# Patient Record
Sex: FEMALE | Race: BLACK OR AFRICAN AMERICAN | NOT HISPANIC OR LATINO
[De-identification: names, ages, dates, MRNs, and addresses within clinical notes are randomized per-mention and may not be internally consistent; named-entity substitution may affect disease eponyms.]

---

## 2017-01-31 ENCOUNTER — APPOINTMENT (OUTPATIENT)
Dept: CARDIOLOGY | Facility: CLINIC | Age: 72
End: 2017-01-31

## 2017-01-31 ENCOUNTER — NON-APPOINTMENT (OUTPATIENT)
Age: 72
End: 2017-01-31

## 2017-01-31 VITALS
SYSTOLIC BLOOD PRESSURE: 186 MMHG | DIASTOLIC BLOOD PRESSURE: 88 MMHG | BODY MASS INDEX: 33.31 KG/M2 | HEART RATE: 62 BPM | HEIGHT: 62 IN | WEIGHT: 181 LBS | RESPIRATION RATE: 16 BRPM | OXYGEN SATURATION: 96 %

## 2017-01-31 VITALS — SYSTOLIC BLOOD PRESSURE: 182 MMHG | DIASTOLIC BLOOD PRESSURE: 69 MMHG

## 2017-02-28 ENCOUNTER — APPOINTMENT (OUTPATIENT)
Dept: CARDIOLOGY | Facility: CLINIC | Age: 72
End: 2017-02-28

## 2017-02-28 ENCOUNTER — NON-APPOINTMENT (OUTPATIENT)
Age: 72
End: 2017-02-28

## 2017-02-28 ENCOUNTER — RX RENEWAL (OUTPATIENT)
Age: 72
End: 2017-02-28

## 2017-02-28 VITALS
TEMPERATURE: 98.3 F | OXYGEN SATURATION: 95 % | HEART RATE: 66 BPM | BODY MASS INDEX: 33.31 KG/M2 | DIASTOLIC BLOOD PRESSURE: 82 MMHG | RESPIRATION RATE: 16 BRPM | WEIGHT: 181 LBS | HEIGHT: 62 IN | SYSTOLIC BLOOD PRESSURE: 176 MMHG

## 2017-02-28 VITALS — SYSTOLIC BLOOD PRESSURE: 194 MMHG | DIASTOLIC BLOOD PRESSURE: 82 MMHG

## 2017-03-08 ENCOUNTER — APPOINTMENT (OUTPATIENT)
Dept: INTERNAL MEDICINE | Facility: CLINIC | Age: 72
End: 2017-03-08

## 2017-03-08 VITALS
RESPIRATION RATE: 16 BRPM | HEIGHT: 62 IN | HEART RATE: 56 BPM | BODY MASS INDEX: 32.02 KG/M2 | DIASTOLIC BLOOD PRESSURE: 73 MMHG | OXYGEN SATURATION: 95 % | SYSTOLIC BLOOD PRESSURE: 150 MMHG | TEMPERATURE: 97.8 F | WEIGHT: 174 LBS

## 2017-03-08 DIAGNOSIS — Z82.49 FAMILY HISTORY OF ISCHEMIC HEART DISEASE AND OTHER DISEASES OF THE CIRCULATORY SYSTEM: ICD-10-CM

## 2017-03-10 LAB
ALBUMIN SERPL ELPH-MCNC: 2.9 G/DL
ALP BLD-CCNC: 86 U/L
ALT SERPL-CCNC: 15 U/L
ANION GAP SERPL CALC-SCNC: 14 MMOL/L
AST SERPL-CCNC: 26 U/L
BASOPHILS # BLD AUTO: 0.03 K/UL
BASOPHILS NFR BLD AUTO: 0.4 %
BILIRUB SERPL-MCNC: <0.2 MG/DL
BUN SERPL-MCNC: 72 MG/DL
CALCIUM SERPL-MCNC: 9.3 MG/DL
CHLORIDE SERPL-SCNC: 101 MMOL/L
CHOLEST SERPL-MCNC: 166 MG/DL
CHOLEST/HDLC SERPL: 1.8 RATIO
CO2 SERPL-SCNC: 24 MMOL/L
CREAT SERPL-MCNC: 4.79 MG/DL
EOSINOPHIL # BLD AUTO: 0.2 K/UL
EOSINOPHIL NFR BLD AUTO: 2.5 %
GLUCOSE SERPL-MCNC: 275 MG/DL
HBA1C MFR BLD HPLC: 9.4 %
HCT VFR BLD CALC: 25.9 %
HDLC SERPL-MCNC: 90 MG/DL
HGB BLD-MCNC: 8.1 G/DL
IMM GRANULOCYTES NFR BLD AUTO: 0.4 %
LDLC SERPL CALC-MCNC: 53 MG/DL
LYMPHOCYTES # BLD AUTO: 1.03 K/UL
LYMPHOCYTES NFR BLD AUTO: 13.1 %
MAN DIFF?: NORMAL
MCHC RBC-ENTMCNC: 24.6 PG
MCHC RBC-ENTMCNC: 31.3 GM/DL
MCV RBC AUTO: 78.7 FL
MONOCYTES # BLD AUTO: 0.49 K/UL
MONOCYTES NFR BLD AUTO: 6.2 %
NEUTROPHILS # BLD AUTO: 6.08 K/UL
NEUTROPHILS NFR BLD AUTO: 77.4 %
PLATELET # BLD AUTO: 446 K/UL
POTASSIUM SERPL-SCNC: 4.1 MMOL/L
PROT SERPL-MCNC: 5.5 G/DL
RBC # BLD: 3.29 M/UL
RBC # FLD: 19.3 %
SODIUM SERPL-SCNC: 139 MMOL/L
TRIGL SERPL-MCNC: 113 MG/DL
TSH SERPL-ACNC: 4.2 UIU/ML
WBC # FLD AUTO: 7.86 K/UL

## 2017-03-29 ENCOUNTER — APPOINTMENT (OUTPATIENT)
Dept: INTERNAL MEDICINE | Facility: CLINIC | Age: 72
End: 2017-03-29

## 2017-03-29 VITALS
WEIGHT: 177 LBS | RESPIRATION RATE: 17 BRPM | TEMPERATURE: 97.4 F | DIASTOLIC BLOOD PRESSURE: 65 MMHG | OXYGEN SATURATION: 98 % | BODY MASS INDEX: 32.57 KG/M2 | HEIGHT: 62 IN | HEART RATE: 65 BPM | SYSTOLIC BLOOD PRESSURE: 140 MMHG

## 2017-03-29 RX ORDER — AMLODIPINE BESYLATE 10 MG/1
10 TABLET ORAL
Refills: 0 | Status: DISCONTINUED | COMMUNITY
End: 2017-03-29

## 2017-03-31 LAB
ALBUMIN SERPL ELPH-MCNC: 2.8 G/DL
ALP BLD-CCNC: 86 U/L
ALT SERPL-CCNC: 23 U/L
ANION GAP SERPL CALC-SCNC: 18 MMOL/L
AST SERPL-CCNC: 43 U/L
BILIRUB SERPL-MCNC: <0.2 MG/DL
BUN SERPL-MCNC: 71 MG/DL
CALCIUM SERPL-MCNC: 8.7 MG/DL
CHLORIDE SERPL-SCNC: 106 MMOL/L
CO2 SERPL-SCNC: 20 MMOL/L
CREAT SERPL-MCNC: 4.38 MG/DL
GLUCOSE SERPL-MCNC: 93 MG/DL
POTASSIUM SERPL-SCNC: 4.6 MMOL/L
PROT SERPL-MCNC: 5.3 G/DL
SODIUM SERPL-SCNC: 144 MMOL/L

## 2017-05-14 ENCOUNTER — INPATIENT (INPATIENT)
Facility: HOSPITAL | Age: 72
LOS: 4 days | Discharge: HOME HEALTH | End: 2017-05-19
Attending: INTERNAL MEDICINE | Admitting: INTERNAL MEDICINE
Payer: MEDICARE

## 2017-05-14 VITALS
HEART RATE: 63 BPM | HEIGHT: 66 IN | OXYGEN SATURATION: 96 % | SYSTOLIC BLOOD PRESSURE: 203 MMHG | WEIGHT: 175.05 LBS | DIASTOLIC BLOOD PRESSURE: 78 MMHG | TEMPERATURE: 98 F | RESPIRATION RATE: 18 BRPM

## 2017-05-14 DIAGNOSIS — D64.9 ANEMIA, UNSPECIFIED: ICD-10-CM

## 2017-05-14 DIAGNOSIS — E83.51 HYPOCALCEMIA: ICD-10-CM

## 2017-05-14 DIAGNOSIS — D63.8 ANEMIA IN OTHER CHRONIC DISEASES CLASSIFIED ELSEWHERE: ICD-10-CM

## 2017-05-14 DIAGNOSIS — E11.22 TYPE 2 DIABETES MELLITUS WITH DIABETIC CHRONIC KIDNEY DISEASE: ICD-10-CM

## 2017-05-14 DIAGNOSIS — I15.0 RENOVASCULAR HYPERTENSION: ICD-10-CM

## 2017-05-14 DIAGNOSIS — N18.5 CHRONIC KIDNEY DISEASE, STAGE 5: ICD-10-CM

## 2017-05-14 LAB
ALBUMIN SERPL ELPH-MCNC: 2.2 G/DL — LOW (ref 3.3–5)
ALLERGY+IMMUNOLOGY DIAG STUDY NOTE: SIGNIFICANT CHANGE UP
ALP SERPL-CCNC: 122 U/L — HIGH (ref 40–120)
ALT FLD-CCNC: 16 U/L — SIGNIFICANT CHANGE UP (ref 12–78)
ANION GAP SERPL CALC-SCNC: 9 MMOL/L — SIGNIFICANT CHANGE UP (ref 5–17)
ANISOCYTOSIS BLD QL: SIGNIFICANT CHANGE UP
APPEARANCE UR: CLEAR — SIGNIFICANT CHANGE UP
APTT BLD: 36.6 SEC — SIGNIFICANT CHANGE UP (ref 27.5–37.4)
AST SERPL-CCNC: 23 U/L — SIGNIFICANT CHANGE UP (ref 15–37)
BACTERIA # UR AUTO: ABNORMAL
BILIRUB SERPL-MCNC: 0.2 MG/DL — SIGNIFICANT CHANGE UP (ref 0.2–1.2)
BILIRUB UR-MCNC: NEGATIVE — SIGNIFICANT CHANGE UP
BLD GP AB SCN SERPL QL: ABNORMAL
BUN SERPL-MCNC: 62 MG/DL — HIGH (ref 7–23)
CALCIUM SERPL-MCNC: 8 MG/DL — LOW (ref 8.5–10.1)
CALCIUM SERPL-MCNC: 8.7 MG/DL — SIGNIFICANT CHANGE UP (ref 8.4–10.5)
CHLORIDE SERPL-SCNC: 106 MMOL/L — SIGNIFICANT CHANGE UP (ref 96–108)
CO2 SERPL-SCNC: 26 MMOL/L — SIGNIFICANT CHANGE UP (ref 22–31)
COLOR SPEC: YELLOW — SIGNIFICANT CHANGE UP
CREAT SERPL-MCNC: 5.65 MG/DL — HIGH (ref 0.5–1.3)
DIFF PNL FLD: ABNORMAL
DIR ANTIGLOB POLYSPECIFIC INTERPRETATION: SIGNIFICANT CHANGE UP
EPI CELLS # UR: SIGNIFICANT CHANGE UP
FERRITIN SERPL-MCNC: 64 NG/ML — SIGNIFICANT CHANGE UP (ref 15–150)
GLUCOSE SERPL-MCNC: 274 MG/DL — HIGH (ref 70–99)
GLUCOSE UR QL: 100 MG/DL
HCT VFR BLD CALC: 21.6 % — LOW (ref 34.5–45)
HGB BLD-MCNC: 7 G/DL — CRITICAL LOW (ref 11.5–15.5)
HYPOCHROMIA BLD QL: SIGNIFICANT CHANGE UP
INR BLD: 0.96 RATIO — SIGNIFICANT CHANGE UP (ref 0.88–1.16)
IRON SATN MFR SERPL: 18 % — SIGNIFICANT CHANGE UP (ref 14–50)
IRON SATN MFR SERPL: 41 UG/DL — SIGNIFICANT CHANGE UP (ref 30–160)
KETONES UR-MCNC: NEGATIVE — SIGNIFICANT CHANGE UP
LEUKOCYTE ESTERASE UR-ACNC: NEGATIVE — SIGNIFICANT CHANGE UP
LYMPHOCYTES # BLD AUTO: 12 % — LOW (ref 13–44)
MCHC RBC-ENTMCNC: 25.7 PG — LOW (ref 27–34)
MCHC RBC-ENTMCNC: 32.5 GM/DL — SIGNIFICANT CHANGE UP (ref 32–36)
MCV RBC AUTO: 79 FL — LOW (ref 80–100)
MICROCYTES BLD QL: SLIGHT — SIGNIFICANT CHANGE UP
MONOCYTES NFR BLD AUTO: 8 % — SIGNIFICANT CHANGE UP (ref 2–14)
NEUTROPHILS NFR BLD AUTO: 80 % — HIGH (ref 43–77)
NITRITE UR-MCNC: NEGATIVE — SIGNIFICANT CHANGE UP
PH UR: 6.5 — SIGNIFICANT CHANGE UP (ref 5–8)
PLAT MORPH BLD: NORMAL — SIGNIFICANT CHANGE UP
PLATELET # BLD AUTO: 333 K/UL — SIGNIFICANT CHANGE UP (ref 150–400)
POTASSIUM SERPL-MCNC: 3.6 MMOL/L — SIGNIFICANT CHANGE UP (ref 3.5–5.3)
POTASSIUM SERPL-SCNC: 3.6 MMOL/L — SIGNIFICANT CHANGE UP (ref 3.5–5.3)
PROT SERPL-MCNC: 6.5 G/DL — SIGNIFICANT CHANGE UP (ref 6–8.3)
PROT SERPL-MCNC: 6.5 G/DL — SIGNIFICANT CHANGE UP (ref 6–8.3)
PROT SERPL-MCNC: 6.5 GM/DL — SIGNIFICANT CHANGE UP (ref 6–8.3)
PROT UR-MCNC: 500 MG/DL
PROTHROM AB SERPL-ACNC: 10.5 SEC — SIGNIFICANT CHANGE UP (ref 9.8–12.7)
PTH-INTACT FLD-MCNC: 222 PG/ML — HIGH (ref 15–65)
RBC # BLD: 2.74 M/UL — LOW (ref 3.8–5.2)
RBC # FLD: 19.4 % — HIGH (ref 11–15)
RBC BLD AUTO: ABNORMAL
RBC CASTS # UR COMP ASSIST: ABNORMAL /HPF (ref 0–4)
SCHISTOCYTES BLD QL AUTO: SLIGHT — SIGNIFICANT CHANGE UP
SODIUM SERPL-SCNC: 141 MMOL/L — SIGNIFICANT CHANGE UP (ref 135–145)
SP GR SPEC: 1.01 — SIGNIFICANT CHANGE UP (ref 1.01–1.02)
TIBC SERPL-MCNC: 223 UG/DL — SIGNIFICANT CHANGE UP (ref 220–430)
UIBC SERPL-MCNC: 182 UG/DL — SIGNIFICANT CHANGE UP (ref 110–370)
UROBILINOGEN FLD QL: NEGATIVE MG/DL — SIGNIFICANT CHANGE UP
WBC # BLD: 9.2 K/UL — SIGNIFICANT CHANGE UP (ref 3.8–10.5)
WBC # FLD AUTO: 9.2 K/UL — SIGNIFICANT CHANGE UP (ref 3.8–10.5)
WBC UR QL: SIGNIFICANT CHANGE UP

## 2017-05-14 PROCEDURE — 99285 EMERGENCY DEPT VISIT HI MDM: CPT | Mod: 25

## 2017-05-14 PROCEDURE — 86077 PHYS BLOOD BANK SERV XMATCH: CPT

## 2017-05-14 RX ORDER — ATORVASTATIN CALCIUM 80 MG/1
20 TABLET, FILM COATED ORAL AT BEDTIME
Qty: 0 | Refills: 0 | Status: DISCONTINUED | OUTPATIENT
Start: 2017-05-14 | End: 2017-05-19

## 2017-05-14 RX ORDER — LOSARTAN POTASSIUM 100 MG/1
50 TABLET, FILM COATED ORAL DAILY
Qty: 0 | Refills: 0 | Status: DISCONTINUED | OUTPATIENT
Start: 2017-05-14 | End: 2017-05-17

## 2017-05-14 RX ORDER — FERROUS SULFATE 325(65) MG
325 TABLET ORAL DAILY
Qty: 0 | Refills: 0 | Status: DISCONTINUED | OUTPATIENT
Start: 2017-05-14 | End: 2017-05-19

## 2017-05-14 RX ORDER — HYDRALAZINE HCL 50 MG
10 TABLET ORAL ONCE
Qty: 0 | Refills: 0 | Status: COMPLETED | OUTPATIENT
Start: 2017-05-14 | End: 2017-05-14

## 2017-05-14 RX ORDER — INSULIN LISPRO 100/ML
VIAL (ML) SUBCUTANEOUS
Qty: 0 | Refills: 0 | Status: DISCONTINUED | OUTPATIENT
Start: 2017-05-14 | End: 2017-05-19

## 2017-05-14 RX ORDER — ASPIRIN/CALCIUM CARB/MAGNESIUM 324 MG
81 TABLET ORAL DAILY
Qty: 0 | Refills: 0 | Status: DISCONTINUED | OUTPATIENT
Start: 2017-05-14 | End: 2017-05-19

## 2017-05-14 RX ORDER — DEXTROSE 50 % IN WATER 50 %
25 SYRINGE (ML) INTRAVENOUS ONCE
Qty: 0 | Refills: 0 | Status: DISCONTINUED | OUTPATIENT
Start: 2017-05-14 | End: 2017-05-19

## 2017-05-14 RX ORDER — INSULIN GLARGINE 100 [IU]/ML
15 INJECTION, SOLUTION SUBCUTANEOUS AT BEDTIME
Qty: 0 | Refills: 0 | Status: DISCONTINUED | OUTPATIENT
Start: 2017-05-14 | End: 2017-05-19

## 2017-05-14 RX ORDER — DEXTROSE 50 % IN WATER 50 %
1 SYRINGE (ML) INTRAVENOUS ONCE
Qty: 0 | Refills: 0 | Status: DISCONTINUED | OUTPATIENT
Start: 2017-05-14 | End: 2017-05-19

## 2017-05-14 RX ORDER — METOPROLOL TARTRATE 50 MG
50 TABLET ORAL
Qty: 0 | Refills: 0 | Status: DISCONTINUED | OUTPATIENT
Start: 2017-05-14 | End: 2017-05-15

## 2017-05-14 RX ORDER — GLUCAGON INJECTION, SOLUTION 0.5 MG/.1ML
1 INJECTION, SOLUTION SUBCUTANEOUS ONCE
Qty: 0 | Refills: 0 | Status: DISCONTINUED | OUTPATIENT
Start: 2017-05-14 | End: 2017-05-19

## 2017-05-14 RX ORDER — SODIUM CHLORIDE 9 MG/ML
1000 INJECTION, SOLUTION INTRAVENOUS
Qty: 0 | Refills: 0 | Status: DISCONTINUED | OUTPATIENT
Start: 2017-05-14 | End: 2017-05-19

## 2017-05-14 RX ORDER — DEXTROSE 50 % IN WATER 50 %
12.5 SYRINGE (ML) INTRAVENOUS ONCE
Qty: 0 | Refills: 0 | Status: DISCONTINUED | OUTPATIENT
Start: 2017-05-14 | End: 2017-05-19

## 2017-05-14 RX ORDER — FUROSEMIDE 40 MG
80 TABLET ORAL DAILY
Qty: 0 | Refills: 0 | Status: DISCONTINUED | OUTPATIENT
Start: 2017-05-14 | End: 2017-05-19

## 2017-05-14 RX ORDER — HYDRALAZINE HCL 50 MG
100 TABLET ORAL THREE TIMES A DAY
Qty: 0 | Refills: 0 | Status: DISCONTINUED | OUTPATIENT
Start: 2017-05-14 | End: 2017-05-15

## 2017-05-14 RX ORDER — AMLODIPINE BESYLATE 2.5 MG/1
5 TABLET ORAL DAILY
Qty: 0 | Refills: 0 | Status: DISCONTINUED | OUTPATIENT
Start: 2017-05-14 | End: 2017-05-15

## 2017-05-14 RX ADMIN — Medication 81 MILLIGRAM(S): at 12:55

## 2017-05-14 RX ADMIN — Medication 80 MILLIGRAM(S): at 12:53

## 2017-05-14 RX ADMIN — Medication 325 MILLIGRAM(S): at 12:53

## 2017-05-14 RX ADMIN — Medication 0.3 MILLIGRAM(S): at 15:22

## 2017-05-14 RX ADMIN — Medication 100 MILLIGRAM(S): at 14:03

## 2017-05-14 RX ADMIN — Medication 100 MILLIGRAM(S): at 21:51

## 2017-05-14 RX ADMIN — Medication 10 MILLIGRAM(S): at 23:49

## 2017-05-14 RX ADMIN — INSULIN GLARGINE 15 UNIT(S): 100 INJECTION, SOLUTION SUBCUTANEOUS at 22:55

## 2017-05-14 RX ADMIN — LOSARTAN POTASSIUM 50 MILLIGRAM(S): 100 TABLET, FILM COATED ORAL at 12:53

## 2017-05-14 RX ADMIN — ATORVASTATIN CALCIUM 20 MILLIGRAM(S): 80 TABLET, FILM COATED ORAL at 21:51

## 2017-05-14 RX ADMIN — Medication 6: at 18:51

## 2017-05-14 RX ADMIN — Medication 50 MILLIGRAM(S): at 18:59

## 2017-05-14 RX ADMIN — Medication 1 TABLET(S): at 12:53

## 2017-05-14 RX ADMIN — AMLODIPINE BESYLATE 5 MILLIGRAM(S): 2.5 TABLET ORAL at 12:53

## 2017-05-14 RX ADMIN — Medication 0.3 MILLIGRAM(S): at 21:51

## 2017-05-14 RX ADMIN — Medication 10 MILLIGRAM(S): at 08:54

## 2017-05-14 NOTE — CONSULT NOTE ADULT - SUBJECTIVE AND OBJECTIVE BOX
HPI:  71 yo lady h/o CKD and anemia sent to the ER by her PMD for low hgb. Pt stated she is feeling tired and week. Denies chest pain and palpitation. (14 May 2017 12:24)      PAST MEDICAL & SURGICAL HISTORY:  Chronic kidney disease  Congestive heart failure, unspecified congestive heart failure chronicity, unspecified congestive heart failure type  Diabetes  Hypertension      REVIEW OF SYSTEMS fatigue  loss of concentration last few days       General: denies fever and chills 	    Skin/Breast:  	  Ophthalmologic:  	  ENMT:	    Respiratory and Thorax: no SOB no cough no hemoptysis  	  Cardiovascular:	no CO no palpitations     Gastrointestinal:	 no abdominal pain     Genitourinary:	    Musculoskeletal: no back pain no weakness of lower legs. 	    Neurological:	no dizziness no headache     Psychiatric:	    Hematology/Lymphatics:	    Endocrine:	    Allergic/Immunologic:	    MEDICATIONS  (STANDING):  aspirin enteric coated 81milliGRAM(s) Oral daily  losartan 50milliGRAM(s) Oral daily  cloNIDine 0.3milliGRAM(s) Oral three times a day  insulin glargine Injectable (LANTUS) 15Unit(s) SubCutaneous at bedtime  atorvastatin 20milliGRAM(s) Oral at bedtime  metoprolol 50milliGRAM(s) Oral two times a day  amLODIPine   Tablet 5milliGRAM(s) Oral daily  furosemide    Tablet 80milliGRAM(s) Oral daily  ferrous    sulfate 325milliGRAM(s) Oral daily  hydrALAZINE 100milliGRAM(s) Oral three times a day  multivitamin 1Tablet(s) Oral daily  insulin lispro (HumaLOG) corrective regimen sliding scale  SubCutaneous three times a day before meals  dextrose 5%. 1000milliLiter(s) IV Continuous <Continuous>  dextrose 50% Injectable 12.5Gram(s) IV Push once  dextrose 50% Injectable 25Gram(s) IV Push once  dextrose 50% Injectable 25Gram(s) IV Push once    MEDICATIONS  (PRN):  dextrose Gel 1Dose(s) Oral once PRN Blood Glucose LESS THAN 70 milliGRAM(s)/deciliter  glucagon  Injectable 1milliGRAM(s) IntraMuscular once PRN Glucose LESS THAN 70 milligrams/deciliter      Allergies    No Known Allergies    Intolerances        SOCIAL HISTORY:    FAMILY HISTORY:      Vital Signs Last 24 Hrs  T(C): 36.3, Max: 36.6 (05-14 @ 06:38)  T(F): 97.4, Max: 97.8 (05-14 @ 06:38)  HR: 62 (57 - 65)  BP: 205/74 (159/57 - 218/21)  BP(mean): --  RR: 22 (17 - 25)  SpO2: 98% (94% - 99%)    PHYSICAL EXAM:      Constitutional: chronically ill looking     Eyes: pale conjunctiva anicteric sclera .     ENMT:    Neck: no JVD non palpable thyroid     Breasts:    Back:    Respiratory: S1S2 regular     Cardiovascular: S1 S2 regular ,     Gastrointestinal: no mass non tender normoactive bowel sounds     Genitourinary:    Rectal:    Extremities:    Vascular:    Neurological: alert awake no focal deficit     Skin:    Lymph Nodes: none     Musculoskeletal: nom focal deficit ,darkening of skin of lower legs     Psychiatric:        LABS:                        7.0    9.2   )-----------( 333      ( 14 May 2017 08:17 )             21.6     05-14    141  |  106  |  62<H>  ----------------------------<  274<H>  3.6   |  26  |  5.65<H>    Ca    8.0<L>      14 May 2017 08:16    TPro  6.5  /  Alb  2.2<L>  /  TBili  0.2  /  DBili  x   /  AST  23  /  ALT  16  /  AlkPhos  122<H>  05-14    PT/INR - ( 14 May 2017 08:16 )   PT: 10.5 sec;   INR: 0.96 ratio         PTT - ( 14 May 2017 08:16 )  PTT:36.6 sec      RADIOLOGY & ADDITIONAL STUDIES:

## 2017-05-14 NOTE — ED ADULT NURSE NOTE - PMH
Chronic kidney disease    Congestive heart failure, unspecified congestive heart failure chronicity, unspecified congestive heart failure type    Diabetes    Hypertension

## 2017-05-14 NOTE — CONSULT NOTE ADULT - ASSESSMENT
72 yob femalev with hypochromic microcytic anemia with CKD stage 5 /  Usually microcytic and hypochromic anemia is not expected in CKD ,

## 2017-05-14 NOTE — CONSULT NOTE ADULT - SUBJECTIVE AND OBJECTIVE BOX
72yFemale  Patient is a 72y old  Female who presents with a chief complaint of anemia (14 May 2017 12:24)  asked to evaluate for ckd  pt has been seeing a nephrologist in [place] who recommended dialysis  but she has refused  HPI:  71 yo lady h/o CKD and anemia sent to the ER by her PMD for low hgb. Pt stated she is feeling tired and week. Denies chest pain and palpitation. (14 May 2017 12:24)    PAST MEDICAL & SURGICAL HISTORY:  Chronic kidney disease  Congestive heart failure, unspecified congestive heart failure chronicity, unspecified congestive heart failure type  Diabetes  Hypertension    Allergies    No Known Allergies    Intolerances      FAMILY HISTORY:    MEDICATIONS  (STANDING):  aspirin enteric coated 81milliGRAM(s) Oral daily  losartan 50milliGRAM(s) Oral daily  cloNIDine 0.3milliGRAM(s) Oral three times a day  insulin glargine Injectable (LANTUS) 15Unit(s) SubCutaneous at bedtime  atorvastatin 20milliGRAM(s) Oral at bedtime  metoprolol 50milliGRAM(s) Oral two times a day  amLODIPine   Tablet 5milliGRAM(s) Oral daily  furosemide    Tablet 80milliGRAM(s) Oral daily  ferrous    sulfate 325milliGRAM(s) Oral daily  hydrALAZINE 100milliGRAM(s) Oral three times a day  multivitamin 1Tablet(s) Oral daily  insulin lispro (HumaLOG) corrective regimen sliding scale  SubCutaneous three times a day before meals  dextrose 5%. 1000milliLiter(s) IV Continuous <Continuous>  dextrose 50% Injectable 12.5Gram(s) IV Push once  dextrose 50% Injectable 25Gram(s) IV Push once  dextrose 50% Injectable 25Gram(s) IV Push once    Vital Signs Last 24 Hrs  T(C): 36.3, Max: 36.6 (05-14 @ 06:38)  T(F): 97.4, Max: 97.8 (05-14 @ 06:38)  HR: 62 (57 - 65)  BP: 205/74 (159/57 - 218/21)  BP(mean): --  RR: 22 (17 - 25)  SpO2: 98% (94% - 99%)  I&O's Summary    Daily Height in cm: 167.64 (14 May 2017 06:38)    Daily   PHYSICAL EXAM:      Constitutional:wdwn f in nad    Eyes:wnl    ENMT:neg    Neck:no jvd no adenopathy    Breasts:    Back:    Respiratory:bilat bs    Cardiovascular:s1 s2     Gastrointestinal:soft nt bs pos    Genitourinary:    Rectal:    Extremities:2+edema    Vascular:    Neurological:a&o x3  moves all extremities    Skin:wnl    Lymph Nodes:    Musculoskeletal:wnl    Psychiatric:                            7.0    9.2   )-----------( 333      ( 14 May 2017 08:17 )             21.6     05-14    141  |  106  |  62<H>  ----------------------------<  274<H>  3.6   |  26  |  5.65<H>    Ca    8.0<L>      14 May 2017 08:16    TPro  6.5  /  Alb  2.2<L>  /  TBili  0.2  /  DBili  x   /  AST  23  /  ALT  16  /  AlkPhos  122<H>  05-14    labs noted  low hb  low ca

## 2017-05-14 NOTE — ED ADULT NURSE REASSESSMENT NOTE - NS ED NURSE REASSESS COMMENT FT1
pt completed her first unit of blood. pt had no negative reactions to transfusion. pt vitals remain within her norm. pt is alert and oriented no signs of sob, or chest pain stated.

## 2017-05-14 NOTE — ED ADULT NURSE REASSESSMENT NOTE - NS ED NURSE REASSESS COMMENT FT1
second unit of PRBC started , consent in the chart, v/s monitor, pt instructed to report any s/s of transfusion reaction to the nursing staff. pt in no acute distress at present , talking to family member at bedside.

## 2017-05-14 NOTE — ED PROVIDER NOTE - OBJECTIVE STATEMENT
72 year old female with h/o HTN, DM, CHf , anemia (s/p transfusion) and CKD presents today c/o being sent in by her pmd for severe anemia secondary to her CKD, pt reports EDMOND (-) chest pain (-) sob (-) palpitations

## 2017-05-14 NOTE — CONSULT NOTE ADULT - PROBLEM SELECTOR RECOMMENDATION 9
anemia work up .  Will benefit from BARBIE ( Erythroid  Stimulating agent)   Will review P.Smear .Avoid over transfusion .Aim for Hb of 9 gm.

## 2017-05-14 NOTE — ED ADULT NURSE NOTE - OBJECTIVE STATEMENT
pt visited her priomary care MD yesterday and had blood work done. Pt daughter received call from MD to b ring her to ed as iron count is low.

## 2017-05-14 NOTE — H&P ADULT. - HISTORY OF PRESENT ILLNESS
71 yo lady h/o CKD and anemia sent to the ER by her PMD for low hgb. Pt stated she is feeling tired and week. Denies chest pain and palpitation. 73 yo lady h/o CKD and anemia sent to the ER by her PMD for low hgb. Pt stated she is feeling tired and weak. Denies chest pain and palpitation.

## 2017-05-14 NOTE — ED ADULT NURSE REASSESSMENT NOTE - NS ED NURSE REASSESS COMMENT FT1
spoke with Technician from the lab concerning receiving blood for patient, and pt has a positive antibody screen anti D. blood will some time to be prepared, and delivered.

## 2017-05-14 NOTE — ED ADULT NURSE REASSESSMENT NOTE - NS ED NURSE REASSESS COMMENT FT1
pt hypertensive, denies any any headache /dizziness, dr Ruiz Made aware, pt medicated as ordered, will continue to monitor pt.

## 2017-05-14 NOTE — H&P ADULT. - PROBLEM SELECTOR PROBLEM 2
Type 2 diabetes mellitus with stage 5 chronic kidney disease not on chronic dialysis, with long-term current use of insulin

## 2017-05-14 NOTE — H&P ADULT. - ASSESSMENT
71 yo lady h/o CKD and anemia sent to the ER by her PMD for low hgb. Pt stated she is feeling tired and week. Denies chest pain and palpitation.

## 2017-05-15 DIAGNOSIS — N18.9 CHRONIC KIDNEY DISEASE, UNSPECIFIED: ICD-10-CM

## 2017-05-15 LAB
% ALBUMIN: 44.3 % — SIGNIFICANT CHANGE UP
% ALPHA 1: 8.6 % — SIGNIFICANT CHANGE UP
% ALPHA 2: 16.9 % — SIGNIFICANT CHANGE UP
% BETA: 25.3 % — SIGNIFICANT CHANGE UP
% GAMMA: 16.4 % — SIGNIFICANT CHANGE UP
ALBUMIN SERPL ELPH-MCNC: 2.9 G/DL — LOW (ref 3.6–5.5)
ALBUMIN/GLOB SERPL ELPH: 0.8 RATIO — SIGNIFICANT CHANGE UP
ALPHA1 GLOB SERPL ELPH-MCNC: 0.6 G/DL — HIGH (ref 0.1–0.4)
ALPHA2 GLOB SERPL ELPH-MCNC: 1.1 G/DL — HIGH (ref 0.5–1)
ANION GAP SERPL CALC-SCNC: 11 MMOL/L — SIGNIFICANT CHANGE UP (ref 5–17)
ANTIBODY INTERPRETATION 2: SIGNIFICANT CHANGE UP
B-GLOBULIN SERPL ELPH-MCNC: 1.6 G/DL — HIGH (ref 0.5–1)
BUN SERPL-MCNC: 61 MG/DL — HIGH (ref 7–23)
CALCIUM SERPL-MCNC: 7.5 MG/DL — LOW (ref 8.5–10.1)
CHLORIDE SERPL-SCNC: 107 MMOL/L — SIGNIFICANT CHANGE UP (ref 96–108)
CO2 SERPL-SCNC: 27 MMOL/L — SIGNIFICANT CHANGE UP (ref 22–31)
CREAT SERPL-MCNC: 5.45 MG/DL — HIGH (ref 0.5–1.3)
GAMMA GLOBULIN: 1.1 G/DL — SIGNIFICANT CHANGE UP (ref 0.6–1.6)
GLUCOSE SERPL-MCNC: 111 MG/DL — HIGH (ref 70–99)
HBA1C BLD-MCNC: 7.3 % — HIGH (ref 4–5.6)
HCT VFR BLD CALC: 28.2 % — LOW (ref 34.5–45)
HGB BLD-MCNC: 9.3 G/DL — LOW (ref 11.5–15.5)
INTERPRETATION SERPL IFE-IMP: SIGNIFICANT CHANGE UP
MCHC RBC-ENTMCNC: 26.1 PG — LOW (ref 27–34)
MCHC RBC-ENTMCNC: 33 GM/DL — SIGNIFICANT CHANGE UP (ref 32–36)
MCV RBC AUTO: 79.2 FL — LOW (ref 80–100)
PHOSPHATE SERPL-MCNC: 4.7 MG/DL — HIGH (ref 2.5–4.5)
PLATELET # BLD AUTO: 355 K/UL — SIGNIFICANT CHANGE UP (ref 150–400)
POTASSIUM SERPL-MCNC: 3.1 MMOL/L — LOW (ref 3.5–5.3)
POTASSIUM SERPL-SCNC: 3.1 MMOL/L — LOW (ref 3.5–5.3)
PROT PATTERN SERPL ELPH-IMP: SIGNIFICANT CHANGE UP
RBC # BLD: 3.57 M/UL — LOW (ref 3.8–5.2)
RBC # FLD: 17.2 % — HIGH (ref 11–15)
SODIUM SERPL-SCNC: 145 MMOL/L — SIGNIFICANT CHANGE UP (ref 135–145)
WBC # BLD: 10.8 K/UL — HIGH (ref 3.8–10.5)
WBC # FLD AUTO: 10.8 K/UL — HIGH (ref 3.8–10.5)

## 2017-05-15 RX ORDER — HYDRALAZINE HCL 50 MG
20 TABLET ORAL EVERY 4 HOURS
Qty: 0 | Refills: 0 | Status: DISCONTINUED | OUTPATIENT
Start: 2017-05-15 | End: 2017-05-19

## 2017-05-15 RX ORDER — HYDRALAZINE HCL 50 MG
100 TABLET ORAL THREE TIMES A DAY
Qty: 0 | Refills: 0 | Status: DISCONTINUED | OUTPATIENT
Start: 2017-05-15 | End: 2017-05-19

## 2017-05-15 RX ORDER — AMLODIPINE BESYLATE 2.5 MG/1
10 TABLET ORAL DAILY
Qty: 0 | Refills: 0 | Status: DISCONTINUED | OUTPATIENT
Start: 2017-05-16 | End: 2017-05-19

## 2017-05-15 RX ORDER — AMLODIPINE BESYLATE 2.5 MG/1
5 TABLET ORAL ONCE
Qty: 0 | Refills: 0 | Status: COMPLETED | OUTPATIENT
Start: 2017-05-15 | End: 2017-05-15

## 2017-05-15 RX ORDER — METOPROLOL TARTRATE 50 MG
100 TABLET ORAL
Qty: 0 | Refills: 0 | Status: DISCONTINUED | OUTPATIENT
Start: 2017-05-15 | End: 2017-05-17

## 2017-05-15 RX ORDER — METOPROLOL TARTRATE 50 MG
50 TABLET ORAL ONCE
Qty: 0 | Refills: 0 | Status: COMPLETED | OUTPATIENT
Start: 2017-05-15 | End: 2017-05-15

## 2017-05-15 RX ORDER — POTASSIUM CHLORIDE 20 MEQ
40 PACKET (EA) ORAL ONCE
Qty: 0 | Refills: 0 | Status: COMPLETED | OUTPATIENT
Start: 2017-05-15 | End: 2017-05-15

## 2017-05-15 RX ORDER — DOCUSATE SODIUM 100 MG
100 CAPSULE ORAL
Qty: 0 | Refills: 0 | Status: DISCONTINUED | OUTPATIENT
Start: 2017-05-15 | End: 2017-05-19

## 2017-05-15 RX ORDER — ERYTHROPOIETIN 10000 [IU]/ML
20000 INJECTION, SOLUTION INTRAVENOUS; SUBCUTANEOUS
Qty: 0 | Refills: 0 | Status: DISCONTINUED | OUTPATIENT
Start: 2017-05-15 | End: 2017-05-19

## 2017-05-15 RX ORDER — HYDRALAZINE HCL 50 MG
100 TABLET ORAL
Qty: 0 | Refills: 0 | Status: DISCONTINUED | OUTPATIENT
Start: 2017-05-15 | End: 2017-05-15

## 2017-05-15 RX ADMIN — Medication 50 MILLIGRAM(S): at 13:47

## 2017-05-15 RX ADMIN — Medication 100 MILLIGRAM(S): at 15:49

## 2017-05-15 RX ADMIN — Medication 0.3 MILLIGRAM(S): at 22:01

## 2017-05-15 RX ADMIN — ERYTHROPOIETIN 20000 UNIT(S): 10000 INJECTION, SOLUTION INTRAVENOUS; SUBCUTANEOUS at 11:38

## 2017-05-15 RX ADMIN — Medication 50 MILLIGRAM(S): at 05:37

## 2017-05-15 RX ADMIN — Medication 0.3 MILLIGRAM(S): at 05:37

## 2017-05-15 RX ADMIN — Medication 100 MILLIGRAM(S): at 17:02

## 2017-05-15 RX ADMIN — AMLODIPINE BESYLATE 5 MILLIGRAM(S): 2.5 TABLET ORAL at 13:47

## 2017-05-15 RX ADMIN — Medication 100 MILLIGRAM(S): at 05:37

## 2017-05-15 RX ADMIN — Medication 6: at 11:38

## 2017-05-15 RX ADMIN — ATORVASTATIN CALCIUM 20 MILLIGRAM(S): 80 TABLET, FILM COATED ORAL at 22:01

## 2017-05-15 RX ADMIN — Medication 100 MILLIGRAM(S): at 13:47

## 2017-05-15 RX ADMIN — Medication 100 MILLIGRAM(S): at 22:01

## 2017-05-15 RX ADMIN — Medication 100 MILLIGRAM(S): at 17:03

## 2017-05-15 RX ADMIN — Medication 40 MILLIEQUIVALENT(S): at 13:48

## 2017-05-15 RX ADMIN — Medication 81 MILLIGRAM(S): at 11:38

## 2017-05-15 RX ADMIN — LOSARTAN POTASSIUM 50 MILLIGRAM(S): 100 TABLET, FILM COATED ORAL at 05:37

## 2017-05-15 RX ADMIN — Medication 325 MILLIGRAM(S): at 11:38

## 2017-05-15 RX ADMIN — AMLODIPINE BESYLATE 5 MILLIGRAM(S): 2.5 TABLET ORAL at 05:37

## 2017-05-15 RX ADMIN — INSULIN GLARGINE 15 UNIT(S): 100 INJECTION, SOLUTION SUBCUTANEOUS at 22:00

## 2017-05-15 RX ADMIN — Medication 80 MILLIGRAM(S): at 05:37

## 2017-05-15 RX ADMIN — Medication 0.3 MILLIGRAM(S): at 13:47

## 2017-05-15 RX ADMIN — Medication 1 TABLET(S): at 11:38

## 2017-05-15 RX ADMIN — Medication 2: at 17:00

## 2017-05-15 NOTE — CONSULT NOTE ADULT - SUBJECTIVE AND OBJECTIVE BOX
HPI:  HPI:  73 yo lady h/o CKD and anemia sent to the ER by her PMD for low hgb. Pt stated she is feeling tired and week. Denies chest pain and palpitation. (14 May 2017 12:24)      Chief Complaint:  Patient is a 72y old  Female who presents with a chief complaint of anemia (14 May 2017 12:24)      Review of Systems:    General:  No wt loss, fevers, chills, night sweats  Eyes:  Good vision, no reported pain  ENT:  No sore throat, pain, runny nose, dysphagia  CV:  No pain, palpitations, hypo/hypertension  Resp:  No dyspnea, cough, tachypnea, wheezing  GI:  No pain, nausea, vomiting, diarrhea, constipation  :  No pain, bleeding, incontinence, nocturia  Muscle:  No pain, weakness  Breast:  No pain, abscess, mass, discharge           Social History/Family History  SOCHX:   tobacco,  -  alcohol    FMHX: FA/MO  - contributory       Discussed with:  PMD, Family    Physical Exam:    Vital Signs:  Vital Signs Last 24 Hrs  T(C): 36.1, Max: 36.9 (05-15 @ 04:50)  T(F): 97, Max: 98.4 (15 @ 04:50)  HR: 59 (58 - 64)  BP: 202/74 (148/76 - 214/80)  BP(mean): --  RR: 16 (16 - 22)  SpO2: 97% (96% - 110%)  Daily Height in cm: 165.1 (15 May 2017 00:37)    Daily   I&O's Summary    I & Os for current day (as of 15 May 2017 13:33)  =============================================  IN: 324 ml / OUT: 0 ml / NET: 324 ml      General:  Appears stated age, well-groomed, well-nourished, no distress  HEENT:  NC/AT, patent nares w/ pink mucosa, OP clear w/o lesions, PERRL, EOMI, conjunctivae clear, no thyromegaly, nodules, adenopathy, no JVD  Chest:  Full & symmetric excursion, no increased effort, breath sounds clear  Cardiovascular:  Regular rhythm, S1, S2, no murmur/rub/S3/S4, no carotid/femoral/abdominal bruit, radial/pedal pulses 2+, no edema  Abdomen:  Soft, non-tender, non-distended, normoactive bowel sounds, no HSM      Laboratory:                          9.3    10.8  )-----------( 355      ( 15 May 2017 06:31 )             28.2     05-15    145  |  107  |  61<H>  ----------------------------<  111<H>  3.1<L>   |  27  |  5.45<H>    Ca    7.5<L>      15 May 2017 06:31  Phos  4.7     05-15    TPro  6.5  /  Alb  x   /  TBili  x   /  DBili  x   /  AST  x   /  ALT  x   /  AlkPhos  x             CAPILLARY BLOOD GLUCOSE  254 (15 May 2017 11:35)  84 (15 May 2017 08:18)  275 (14 May 2017 18:49)    LIVER FUNCTIONS - ( 14 May 2017 20:26 )  Alb: x     / Pro: 6.5 g/dL / ALK PHOS: x     / ALT: x     / AST: x     / GGT: x           PT/INR - ( 14 May 2017 08:16 )   PT: 10.5 sec;   INR: 0.96 ratio         PTT - ( 14 May 2017 08:16 )  PTT:36.6 sec  Urinalysis Basic - ( 14 May 2017 21:19 )    Color: Yellow / Appearance: Clear / S.010 / pH: x  Gluc: x / Ketone: Negative  / Bili: Negative / Urobili: Negative mg/dL   Blood: x / Protein: 500 mg/dL / Nitrite: Negative   Leuk Esterase: Negative / RBC: 3-5 /HPF / WBC 0-2   Sq Epi: x / Non Sq Epi: Occasional / Bacteria: Few        Assessment:    Hypertensive urgency  Hydralazine PRN added, also, QID  Will follow closely

## 2017-05-16 RX ADMIN — ATORVASTATIN CALCIUM 20 MILLIGRAM(S): 80 TABLET, FILM COATED ORAL at 21:56

## 2017-05-16 RX ADMIN — LOSARTAN POTASSIUM 50 MILLIGRAM(S): 100 TABLET, FILM COATED ORAL at 06:24

## 2017-05-16 RX ADMIN — Medication 100 MILLIGRAM(S): at 17:14

## 2017-05-16 RX ADMIN — Medication 0.3 MILLIGRAM(S): at 06:25

## 2017-05-16 RX ADMIN — Medication 100 MILLIGRAM(S): at 21:56

## 2017-05-16 RX ADMIN — AMLODIPINE BESYLATE 10 MILLIGRAM(S): 2.5 TABLET ORAL at 06:24

## 2017-05-16 RX ADMIN — Medication 325 MILLIGRAM(S): at 10:58

## 2017-05-16 RX ADMIN — Medication 1 TABLET(S): at 10:58

## 2017-05-16 RX ADMIN — INSULIN GLARGINE 15 UNIT(S): 100 INJECTION, SOLUTION SUBCUTANEOUS at 21:56

## 2017-05-16 RX ADMIN — Medication: at 11:02

## 2017-05-16 RX ADMIN — Medication 80 MILLIGRAM(S): at 06:24

## 2017-05-16 RX ADMIN — Medication 81 MILLIGRAM(S): at 10:58

## 2017-05-16 RX ADMIN — Medication 100 MILLIGRAM(S): at 15:02

## 2017-05-16 RX ADMIN — Medication 100 MILLIGRAM(S): at 06:24

## 2017-05-16 RX ADMIN — Medication 20 MILLIGRAM(S): at 00:56

## 2017-05-16 RX ADMIN — Medication 0.3 MILLIGRAM(S): at 21:56

## 2017-05-16 RX ADMIN — Medication 100 MILLIGRAM(S): at 06:25

## 2017-05-16 RX ADMIN — Medication 2: at 16:15

## 2017-05-16 RX ADMIN — Medication 0.3 MILLIGRAM(S): at 15:02

## 2017-05-17 LAB
CREATININE, URINE RESULT: 54 MG/DL — SIGNIFICANT CHANGE UP
PROT ?TM UR-MCNC: 755 MG/DL — HIGH (ref 0–12)

## 2017-05-17 RX ORDER — VALSARTAN 80 MG/1
320 TABLET ORAL DAILY
Qty: 0 | Refills: 0 | Status: DISCONTINUED | OUTPATIENT
Start: 2017-05-17 | End: 2017-05-19

## 2017-05-17 RX ORDER — METOPROLOL TARTRATE 50 MG
50 TABLET ORAL
Qty: 0 | Refills: 0 | Status: DISCONTINUED | OUTPATIENT
Start: 2017-05-17 | End: 2017-05-19

## 2017-05-17 RX ADMIN — Medication 0.3 MILLIGRAM(S): at 05:50

## 2017-05-17 RX ADMIN — Medication 4: at 12:01

## 2017-05-17 RX ADMIN — Medication 80 MILLIGRAM(S): at 05:50

## 2017-05-17 RX ADMIN — Medication 2: at 17:44

## 2017-05-17 RX ADMIN — Medication 325 MILLIGRAM(S): at 12:01

## 2017-05-17 RX ADMIN — Medication 100 MILLIGRAM(S): at 05:50

## 2017-05-17 RX ADMIN — ATORVASTATIN CALCIUM 20 MILLIGRAM(S): 80 TABLET, FILM COATED ORAL at 22:41

## 2017-05-17 RX ADMIN — INSULIN GLARGINE 15 UNIT(S): 100 INJECTION, SOLUTION SUBCUTANEOUS at 22:41

## 2017-05-17 RX ADMIN — VALSARTAN 320 MILLIGRAM(S): 80 TABLET ORAL at 22:41

## 2017-05-17 RX ADMIN — Medication 81 MILLIGRAM(S): at 12:01

## 2017-05-17 RX ADMIN — LOSARTAN POTASSIUM 50 MILLIGRAM(S): 100 TABLET, FILM COATED ORAL at 05:50

## 2017-05-17 RX ADMIN — Medication 100 MILLIGRAM(S): at 22:41

## 2017-05-17 RX ADMIN — Medication 100 MILLIGRAM(S): at 17:43

## 2017-05-17 RX ADMIN — Medication 1 TABLET(S): at 12:01

## 2017-05-17 RX ADMIN — AMLODIPINE BESYLATE 10 MILLIGRAM(S): 2.5 TABLET ORAL at 05:50

## 2017-05-17 RX ADMIN — Medication 100 MILLIGRAM(S): at 14:46

## 2017-05-17 RX ADMIN — Medication 0.3 MILLIGRAM(S): at 22:41

## 2017-05-17 RX ADMIN — Medication 0.3 MILLIGRAM(S): at 14:45

## 2017-05-17 NOTE — DIETITIAN INITIAL EVALUATION ADULT. - ENERGY NEEDS
Height (cm): 165.1 (05-15)  Weight (kg): 80.2 (05-15)  BMI (kg/m2): 29.4 (05-15)  IBW: 56.6 kg % IBW: 141   UBW:  79.3kg    %UBW: 101%,   I/O: 584/0 ?, urine output reported

## 2017-05-17 NOTE — DIETITIAN INITIAL EVALUATION ADULT. - PERTINENT LABORATORY DATA
05-15, HbA1/GC 7.3 %<H> bun 61 mg/dL<H> Cr 5.45 mg/dL<H> K 3.1 mmol/L<L> GFR 8 mL/min/1.73M2 <L> 05-14 bun 62 mg/d<H> Cr 5.65 mg/dL<H> K3.6 mmol/L GFR 8 mL/min/1.73M2 <L> Alb 2.9 g/dL<L>

## 2017-05-17 NOTE — DIETITIAN INITIAL EVALUATION ADULT. - NS AS NUTRI INTERV MEALS SNACK
[place] - modified diet/Recommend consistent carbohydrate c evening snack , low sodium, no concentrated phosphorus , no concentrated potassium , 60 gram protein/Carbohydrate - modified diet

## 2017-05-17 NOTE — DIETITIAN INITIAL EVALUATION ADULT. - OTHER INFO
Pt seen due to RN consult for diet education for end stage CKD and for possible HD in the future.  Pt's daughter requested diet education.  Diet hx reveals intake of 3 meals daily which consisted of oatmeal, toast & coffee for breakfast, rice/plantain, meat & vegetable for lunch & dinner.  Pt was on insulin glargine 15 units, Humalog sliding scale coverage & Januvia for diabetes PTA.  Pt self monitored blood glucose before meals.  Pt was not under care of Endocrinologist PTA. Pt seen due to RN consult for diet education for end stage CKD and for possible HD in the future.  Pt's daughter requested diet education.  Diet hx reveals intake of 3 meals daily which consisted of oatmeal, toast & coffee for breakfast, rice/plantain, meat & vegetable for lunch & dinner.  Pt was on insulin glargine 15 units, Humalog sliding scale coverage & Januvia for diabetes PTA.  Pt self monitored blood glucose before meals.  Pt was not under care of Endocrinologist PTA.  Recommend consider Endocrine consult.

## 2017-05-17 NOTE — DIETITIAN INITIAL EVALUATION ADULT. - NS AS NUTRI INTERV ED CONTENT
Purpose of the nutrition education/Educate pt & daughter on end stage CKD nutrition therapy w/o dialysis at present

## 2017-05-17 NOTE — DIETITIAN INITIAL EVALUATION ADULT. - PHYSICAL APPEARANCE
obese/other (specify)/well nourished/BMI=29.4 well nourished/obese/other (specify)/BMI=29.4, pt was not self monitoring wt. for CHF management PTA

## 2017-05-18 LAB
% GAMMA, URINE: 13.1 % — SIGNIFICANT CHANGE UP
ALBUMIN 24H MFR UR ELPH: 60.7 % — SIGNIFICANT CHANGE UP
ALPHA1 GLOB 24H MFR UR ELPH: 7.7 % — SIGNIFICANT CHANGE UP
ALPHA2 GLOB 24H MFR UR ELPH: 8.9 % — SIGNIFICANT CHANGE UP
ANION GAP SERPL CALC-SCNC: 10 MMOL/L — SIGNIFICANT CHANGE UP (ref 5–17)
B-GLOBULIN 24H MFR UR ELPH: 9.6 % — SIGNIFICANT CHANGE UP
BUN SERPL-MCNC: 63 MG/DL — HIGH (ref 7–23)
CALCIUM SERPL-MCNC: 8.4 MG/DL — LOW (ref 8.5–10.1)
CHLORIDE SERPL-SCNC: 105 MMOL/L — SIGNIFICANT CHANGE UP (ref 96–108)
CO2 SERPL-SCNC: 27 MMOL/L — SIGNIFICANT CHANGE UP (ref 22–31)
CREAT SERPL-MCNC: 5.74 MG/DL — HIGH (ref 0.5–1.3)
GLUCOSE SERPL-MCNC: 162 MG/DL — HIGH (ref 70–99)
HCT VFR BLD CALC: 27.8 % — LOW (ref 34.5–45)
HGB BLD-MCNC: 9.4 G/DL — LOW (ref 11.5–15.5)
INTERPRETATION 24H UR IFE-IMP: SIGNIFICANT CHANGE UP
M PROTEIN 24H UR ELPH-MRATE: SIGNIFICANT CHANGE UP
MCHC RBC-ENTMCNC: 27.2 PG — SIGNIFICANT CHANGE UP (ref 27–34)
MCHC RBC-ENTMCNC: 33.9 GM/DL — SIGNIFICANT CHANGE UP (ref 32–36)
MCV RBC AUTO: 80.4 FL — SIGNIFICANT CHANGE UP (ref 80–100)
PLATELET # BLD AUTO: 359 K/UL — SIGNIFICANT CHANGE UP (ref 150–400)
POTASSIUM SERPL-MCNC: 3.5 MMOL/L — SIGNIFICANT CHANGE UP (ref 3.5–5.3)
POTASSIUM SERPL-SCNC: 3.5 MMOL/L — SIGNIFICANT CHANGE UP (ref 3.5–5.3)
PROT ?TM UR-MCNC: 755 MG/DL — HIGH (ref 0–12)
PROT PATTERN 24H UR ELPH-IMP: SIGNIFICANT CHANGE UP
RBC # BLD: 3.46 M/UL — LOW (ref 3.8–5.2)
RBC # FLD: 17.1 % — HIGH (ref 11–15)
SODIUM SERPL-SCNC: 142 MMOL/L — SIGNIFICANT CHANGE UP (ref 135–145)
TOTAL VOLUME - 24 HOUR: SIGNIFICANT CHANGE UP ML
URINE CREATININE CALCULATION: SIGNIFICANT CHANGE UP G/24 H (ref 0.8–1.8)
WBC # BLD: 10.4 K/UL — SIGNIFICANT CHANGE UP (ref 3.8–10.5)
WBC # FLD AUTO: 10.4 K/UL — SIGNIFICANT CHANGE UP (ref 3.8–10.5)

## 2017-05-18 RX ORDER — AMLODIPINE BESYLATE 2.5 MG/1
1 TABLET ORAL
Qty: 30 | Refills: 0 | OUTPATIENT
Start: 2017-05-18 | End: 2017-06-17

## 2017-05-18 RX ORDER — ERYTHROPOIETIN 10000 [IU]/ML
1 INJECTION, SOLUTION INTRAVENOUS; SUBCUTANEOUS
Qty: 4 | Refills: 0 | OUTPATIENT
Start: 2017-05-18 | End: 2017-06-17

## 2017-05-18 RX ORDER — AMLODIPINE BESYLATE 2.5 MG/1
1 TABLET ORAL
Qty: 0 | Refills: 0 | COMMUNITY

## 2017-05-18 RX ORDER — DOCUSATE SODIUM 100 MG
1 CAPSULE ORAL
Qty: 60 | Refills: 0 | OUTPATIENT
Start: 2017-05-18 | End: 2017-06-17

## 2017-05-18 RX ORDER — FERROUS SULFATE 325(65) MG
0 TABLET ORAL
Qty: 0 | Refills: 0 | COMMUNITY

## 2017-05-18 RX ORDER — FERROUS SULFATE 325(65) MG
1 TABLET ORAL
Qty: 90 | Refills: 0 | OUTPATIENT
Start: 2017-05-18 | End: 2017-06-17

## 2017-05-18 RX ORDER — VALSARTAN 80 MG/1
1 TABLET ORAL
Qty: 30 | Refills: 0 | OUTPATIENT
Start: 2017-05-18 | End: 2017-06-17

## 2017-05-18 RX ADMIN — Medication 100 MILLIGRAM(S): at 17:32

## 2017-05-18 RX ADMIN — Medication 0.1 MILLIGRAM(S): at 17:32

## 2017-05-18 RX ADMIN — Medication 80 MILLIGRAM(S): at 05:52

## 2017-05-18 RX ADMIN — Medication 325 MILLIGRAM(S): at 11:45

## 2017-05-18 RX ADMIN — Medication 100 MILLIGRAM(S): at 05:53

## 2017-05-18 RX ADMIN — AMLODIPINE BESYLATE 10 MILLIGRAM(S): 2.5 TABLET ORAL at 05:52

## 2017-05-18 RX ADMIN — ATORVASTATIN CALCIUM 20 MILLIGRAM(S): 80 TABLET, FILM COATED ORAL at 21:59

## 2017-05-18 RX ADMIN — Medication 100 MILLIGRAM(S): at 13:49

## 2017-05-18 RX ADMIN — Medication 0.3 MILLIGRAM(S): at 05:53

## 2017-05-18 RX ADMIN — Medication 1 TABLET(S): at 11:45

## 2017-05-18 RX ADMIN — Medication 4: at 16:42

## 2017-05-18 RX ADMIN — Medication 50 MILLIGRAM(S): at 00:16

## 2017-05-18 RX ADMIN — Medication 100 MILLIGRAM(S): at 05:52

## 2017-05-18 RX ADMIN — INSULIN GLARGINE 15 UNIT(S): 100 INJECTION, SOLUTION SUBCUTANEOUS at 21:59

## 2017-05-18 RX ADMIN — Medication 50 MILLIGRAM(S): at 17:32

## 2017-05-18 RX ADMIN — Medication 50 MILLIGRAM(S): at 11:45

## 2017-05-18 RX ADMIN — VALSARTAN 320 MILLIGRAM(S): 80 TABLET ORAL at 05:52

## 2017-05-18 RX ADMIN — Medication 2: at 12:02

## 2017-05-18 RX ADMIN — Medication 50 MILLIGRAM(S): at 05:53

## 2017-05-18 RX ADMIN — Medication 81 MILLIGRAM(S): at 11:45

## 2017-05-18 RX ADMIN — Medication 100 MILLIGRAM(S): at 21:59

## 2017-05-18 NOTE — DISCHARGE NOTE ADULT - HOSPITAL COURSE
73 yo lady h/o CKD and anemia sent to the ER by her PMD for low hgb. Pt stated she is feeling tired and week. pt noted to have uncontrol HTN

## 2017-05-18 NOTE — PHYSICAL THERAPY INITIAL EVALUATION ADULT - CRITERIA FOR SKILLED THERAPEUTIC INTERVENTIONS
predicted duration of therapy intervention/therapy frequency/risk reduction/prevention/impairments found/anticipated equipment needs at discharge

## 2017-05-18 NOTE — DISCHARGE NOTE ADULT - CAREGIVER ADDRESS
[address] [facility] [[email or web address]] [address]illip [address]az[address], [place] [address]

## 2017-05-18 NOTE — DISCHARGE NOTE ADULT - SECONDARY DIAGNOSIS.
Severe anemia Stage 5 chronic kidney disease Type 2 diabetes mellitus with stage 5 chronic kidney disease not on chronic dialysis, with long-term current use of insulin Acute on chronic diastolic congestive heart failure

## 2017-05-18 NOTE — PHYSICAL THERAPY INITIAL EVALUATION ADULT - MODIFIED CLINICAL TEST OF SENSORY INTEGRATION IN BALANCE TEST
Barthel Index: Feeding Score 10_/10__, Bathing Score 0_/5__, Grooming Score 5_/5__, Dressing Score _5/10__, Bowels Score _10_/10_, Bladder Score _10/10__, Toilet Score 10_/10__, Transfers Score 15_/15__, Mobility Score _0_/15_, Stairs Score _5/10__,     Total Score _70__/100

## 2017-05-18 NOTE — PHYSICAL THERAPY INITIAL EVALUATION ADULT - ADDITIONAL COMMENTS
Pt used to holding on furniture while ambulating and pt had family assisted to negotiate 3 steps w/o rail to get into the house prior to admission. Family also assist in ADL as needed.

## 2017-05-18 NOTE — DISCHARGE NOTE ADULT - PATIENT PORTAL LINK FT
“You can access the [facility] Patient Portal, offered by [facility], by registering with the following website: [email or web address]”

## 2017-05-18 NOTE — DISCHARGE NOTE ADULT - CARE PLAN
Principal Discharge DX:	Renovascular hypertension  Goal:	follow up with pmd in 1 week  Instructions for follow-up, activity and diet:	take med as directed  Secondary Diagnosis:	Severe anemia  Secondary Diagnosis:	Stage 5 chronic kidney disease  Secondary Diagnosis:	Type 2 diabetes mellitus with stage 5 chronic kidney disease not on chronic dialysis, with long-term current use of insulin  Secondary Diagnosis:	Acute on chronic diastolic congestive heart failure

## 2017-05-18 NOTE — PHYSICAL THERAPY INITIAL EVALUATION ADULT - IMPAIRMENTS FOUND, PT EVAL
muscle strength/gait, locomotion, and balance/ergonomics and body mechanics/aerobic capacity/endurance/posture

## 2017-05-18 NOTE — DISCHARGE NOTE ADULT - OTHER SIGNIFICANT FINDINGS
Spoke to Dr Torsten hurd he spoke to the pt"s DTR who told him that the pt nephrologist is trying to get the procrit done therefore pt can be d/c home.

## 2017-05-18 NOTE — DISCHARGE NOTE ADULT - MEDICATION SUMMARY - MEDICATIONS TO TAKE
I will START or STAY ON the medications listed below when I get home from the hospital:    aspirin 81 mg oral tablet  -- 1 tab(s) by mouth once a day  -- Indication: For Heart    valsartan 320 mg oral tablet  -- 1 tab(s) by mouth once a day  -- Indication: For Renovascular hypertension    cloNIDine 0.3 mg oral tablet  -- 1 tab(s) by mouth 2 times a day  -- Indication: For Renovascular hypertension    insulin glargine  -- 15 unit(s) subcutaneous once a day (at bedtime)  -- Indication: For Type 2 diabetes mellitus with stage 5 chronic kidney disease not on chronic dialysis, with long-term current use of insulin    Januvia 50 mg oral tablet  -- 1 tab(s) by mouth once a day  -- Indication: For Type 2 diabetes mellitus with stage 5 chronic kidney disease not on chronic dialysis, with long-term current use of insulin    atorvastatin 20 mg oral tablet  -- 1 tab(s) by mouth once a day  -- Indication: For HLD    amLODIPine 10 mg oral tablet  -- 1 tab(s) by mouth once a day  -- Indication: For Renovascular hypertension    hydroCHLOROthiazide 25 mg oral tablet  -- 1 tab(s) by mouth once a day  -- Indication: For Renovascular hypertension    epoetin marita 20,000 units/mL injectable solution  -- 1 injectable 3 times a week  -- Indication: For Anemia due to chronic kidney disease    ferrous sulfate 324 mg (65 mg elemental iron) oral tablet  -- 1 by mouth 3 times a day  -- Indication: For Anemia, chronic disease    Colace 100 mg oral capsule  -- 1 cap(s) by mouth 2 times a day  -- Indication: For Constipation    hydrALAZINE 100 mg oral tablet  --  by mouth 3 times a day  -- Indication: For Renovascular hypertension    Multiple Vitamins oral tablet  -- 1 tab(s) by mouth once a day  -- Indication: For vitamin I will START or STAY ON the medications listed below when I get home from the hospital:    aspirin 81 mg oral tablet  -- 1 tab(s) by mouth once a day  -- Indication: For Heart    valsartan 320 mg oral tablet  -- 1 tab(s) by mouth once a day  -- Indication: For Renovascular hypertension    cloNIDine 0.3 mg oral tablet  -- 1 tab(s) by mouth 2 times a day  -- Indication: For Renovascular hypertension    doxazosin 2 mg oral tablet  -- 1 tab(s) by mouth once a day (at bedtime)  -- Indication: For Renovascular hypertension    insulin glargine  -- 15 unit(s) subcutaneous once a day (at bedtime)  -- Indication: For Type 2 diabetes mellitus with stage 5 chronic kidney disease not on chronic dialysis, with long-term current use of insulin    Januvia 50 mg oral tablet  -- 1 tab(s) by mouth once a day  -- Indication: For Type 2 diabetes mellitus with stage 5 chronic kidney disease not on chronic dialysis, with long-term current use of insulin    atorvastatin 20 mg oral tablet  -- 1 tab(s) by mouth once a day  -- Indication: For HLD    amLODIPine 10 mg oral tablet  -- 1 tab(s) by mouth once a day  -- Indication: For Renovascular hypertension    hydroCHLOROthiazide 25 mg oral tablet  -- 1 tab(s) by mouth once a day  -- Indication: For Renovascular hypertension    epoetin marita 20,000 units/mL injectable solution  -- 1 injectable 3 times a week  -- Indication: For Anemia due to chronic kidney disease    ferrous sulfate 324 mg (65 mg elemental iron) oral tablet  -- 1 by mouth 3 times a day  -- Indication: For Anemia, chronic disease    Colace 100 mg oral capsule  -- 1 cap(s) by mouth 2 times a day  -- Indication: For Constipation    hydrALAZINE 100 mg oral tablet  --  by mouth 3 times a day  -- Indication: For Renovascular hypertension    Multiple Vitamins oral tablet  -- 1 tab(s) by mouth once a day  -- Indication: For vitamin

## 2017-05-19 VITALS
RESPIRATION RATE: 16 BRPM | SYSTOLIC BLOOD PRESSURE: 173 MMHG | HEART RATE: 56 BPM | DIASTOLIC BLOOD PRESSURE: 91 MMHG | OXYGEN SATURATION: 95 % | TEMPERATURE: 97 F

## 2017-05-19 RX ORDER — DOXAZOSIN MESYLATE 4 MG
1 TABLET ORAL
Qty: 30 | Refills: 0 | OUTPATIENT
Start: 2017-05-19 | End: 2017-06-18

## 2017-05-19 RX ORDER — ACETAMINOPHEN 500 MG
650 TABLET ORAL ONCE
Qty: 0 | Refills: 0 | Status: COMPLETED | OUTPATIENT
Start: 2017-05-19 | End: 2017-05-19

## 2017-05-19 RX ORDER — DOXAZOSIN MESYLATE 4 MG
2 TABLET ORAL ONCE
Qty: 0 | Refills: 0 | Status: COMPLETED | OUTPATIENT
Start: 2017-05-19 | End: 2017-05-19

## 2017-05-19 RX ORDER — DOXAZOSIN MESYLATE 4 MG
2 TABLET ORAL AT BEDTIME
Qty: 0 | Refills: 0 | Status: DISCONTINUED | OUTPATIENT
Start: 2017-05-19 | End: 2017-05-19

## 2017-05-19 RX ADMIN — Medication 50 MILLIGRAM(S): at 13:34

## 2017-05-19 RX ADMIN — Medication 2 MILLIGRAM(S): at 13:37

## 2017-05-19 RX ADMIN — Medication 100 MILLIGRAM(S): at 05:36

## 2017-05-19 RX ADMIN — Medication 0.1 MILLIGRAM(S): at 05:36

## 2017-05-19 RX ADMIN — Medication 50 MILLIGRAM(S): at 00:23

## 2017-05-19 RX ADMIN — VALSARTAN 320 MILLIGRAM(S): 80 TABLET ORAL at 08:03

## 2017-05-19 RX ADMIN — Medication 100 MILLIGRAM(S): at 13:36

## 2017-05-19 RX ADMIN — Medication 81 MILLIGRAM(S): at 13:35

## 2017-05-19 RX ADMIN — AMLODIPINE BESYLATE 10 MILLIGRAM(S): 2.5 TABLET ORAL at 05:36

## 2017-05-19 RX ADMIN — Medication 80 MILLIGRAM(S): at 05:37

## 2017-05-19 RX ADMIN — Medication 650 MILLIGRAM(S): at 06:13

## 2017-05-19 RX ADMIN — Medication 50 MILLIGRAM(S): at 05:36

## 2017-05-19 RX ADMIN — Medication 1 TABLET(S): at 13:34

## 2017-05-19 RX ADMIN — Medication 325 MILLIGRAM(S): at 13:34

## 2017-05-19 NOTE — PROGRESS NOTE ADULT - PROVIDER SPECIALTY LIST ADULT
Heme/Onc
Internal Medicine
Nephrology
Cardiology
Heme/Onc

## 2017-05-19 NOTE — PROGRESS NOTE ADULT - SUBJECTIVE AND OBJECTIVE BOX
Assessment:    Hypertensive urgency  Hydralazine PRN added, also, QID  SBP improved
Assessment:    Hypertensive urgency  Hydralazine PRN added, also, TID  Metoprolol to QID  Cozaar changed to Diovan
Assessment:    Hypertensive urgency  Hydralazine PRN added, also, TID  Metoprolol to QID  Cozaar changed to Diovan  Clonidine to 0.1mg as patient family states dry mouth
Assessment:    Hypertensive urgency, improving  Hydralazine PRN added, also, TID  Metoprolol to QID  Cozaar changed to Diovan  Clonidine to 0.1mg as patient family states dry mouth  Stable for Dc at -165
[id number]    Patient is a 72y old  Female who presents with a chief complaint of anemia (14 May 2017 12:24)      MEDICATIONS  (STANDING):  aspirin enteric coated 81milliGRAM(s) Oral daily  losartan 50milliGRAM(s) Oral daily  cloNIDine 0.3milliGRAM(s) Oral three times a day  insulin glargine Injectable (LANTUS) 15Unit(s) SubCutaneous at bedtime  atorvastatin 20milliGRAM(s) Oral at bedtime  metoprolol 50milliGRAM(s) Oral two times a day  amLODIPine   Tablet 5milliGRAM(s) Oral daily  furosemide    Tablet 80milliGRAM(s) Oral daily  ferrous    sulfate 325milliGRAM(s) Oral daily  hydrALAZINE 100milliGRAM(s) Oral three times a day  multivitamin 1Tablet(s) Oral daily  insulin lispro (HumaLOG) corrective regimen sliding scale  SubCutaneous three times a day before meals  dextrose 5%. 1000milliLiter(s) IV Continuous <Continuous>  dextrose 50% Injectable 12.5Gram(s) IV Push once  dextrose 50% Injectable 25Gram(s) IV Push once  dextrose 50% Injectable 25Gram(s) IV Push once    MEDICATIONS  (PRN):  dextrose Gel 1Dose(s) Oral once PRN Blood Glucose LESS THAN 70 milliGRAM(s)/deciliter  glucagon  Injectable 1milliGRAM(s) IntraMuscular once PRN Glucose LESS THAN 70 milligrams/deciliter      Daily Height in cm: 165.1 (15 May 2017 00:37)    Daily       Vital Signs Last 24 Hrs  T(C): 36.9, Max: 36.9 (05-15 @ 04:50)  T(F): 98.4, Max: 98.4 (15 @ 04:50)  HR: 64 (58 - 65)  BP: 181/74 (148/76 - 216/81)  BP(mean): --  RR: 16 (16 - 25)  SpO2: 98% (94% - 110%)    PHYSICAL EXAM: feeling better post transfusion       Constitutional:    Eyes: pale conjunctiva     ENMT:    Neck: no JVD     Breasts:    Back:    Respiratory: clear lungs,     Cardiovascular: S1S2 regular     Gastrointestinal: no mass non tender normoactive bowel sounds     Genitourinary:    Rectal:    Extremities: no edema, no calf tendeness    Vascular:    Neurological: alert awake OX3    Skin:    Lymph Nodes: none     Musculoskeletal: no focal deficit     Psychiatric:           LABS:  CBC Full  -  ( 15 May 2017 06:31 )  WBC Count : 10.8 K/uL  Hemoglobin : 9.3 g/dL  Hematocrit : 28.2 %  Platelet Count - Automated : 355 K/uL  Mean Cell Volume : 79.2 fl  Mean Cell Hemoglobin : 26.1 pg  Mean Cell Hemoglobin Concentration : 33.0 gm/dL  Auto Neutrophil # : x  Auto Lymphocyte # : x  Auto Monocyte # : x  Auto Eosinophil # : x  Auto Basophil # : x  Auto Neutrophil % : x  Auto Lymphocyte % : x  Auto Monocyte % : x  Auto Eosinophil % : x  Auto Basophil % : x    05-15    145  |  107  |  61<H>  ----------------------------<  111<H>  3.1<L>   |  27  |  5.45<H>    Ca    7.5<L>      15 May 2017 06:31  Phos  4.7     -15    TPro  6.5  /  Alb  x   /  TBili  x   /  DBili  x   /  AST  x   /  ALT  x   /  AlkPhos  x   05-14    PT/INR - ( 14 May 2017 08:16 )   PT: 10.5 sec;   INR: 0.96 ratio         PTT - ( 14 May 2017 08:16 )  PTT:36.6 sec  Urinalysis Basic - ( 14 May 2017 21:19 )    Color: Yellow / Appearance: Clear / S.010 / pH: x  Gluc: x / Ketone: Negative  / Bili: Negative / Urobili: Negative mg/dL   Blood: x / Protein: 500 mg/dL / Nitrite: Negative   Leuk Esterase: Negative / RBC: 3-5 /HPF / WBC 0-2   Sq Epi: x / Non Sq Epi: Occasional / Bacteria: Few        CULTURES:    RADIOLOGY & ADDITIONAL STUDIES:
72yFemale    stage 4-5 ckd    severe anemia   no comp  Vital Signs Last 24 Hrs  T(C): 36.7, Max: 36.7 (-16 @ 16:18)  T(F): 98, Max: 98 (16 @ 16:18)  HR: 60 (56 - 79)  BP: 166/64 (146/75 - 196/69)  BP(mean): --  RR: 18 (16 - 20)  SpO2: 96% (94% - 97%)  I&O's Summary    I & Os for current day (as of 16 May 2017 20:02)  =============================================  IN: 260 ml / OUT: 0 ml / NET: 260 ml    PHYSICAL EXAM:      Constitutional:    Eyes:perrla    ENMT:wnl    Neck:no jvd no adenopathy    Breasts:wnl    Back:    Respiratory:clear to a&p    Cardiovascular:s1 s2    Gastrointestinal:soft nt    Genitourinary:    Rectal:    Extremities:no cc 1+ edema    Vascular:    Neurological:    Skin:    Lymph Nodes:    Musculoskeletal:    Psychiatric:      Urinalysis Basic - ( 14 May 2017 21:19 )    Color: Yellow / Appearance: Clear / S.010 / pH: x  Gluc: x / Ketone: Negative  / Bili: Negative / Urobili: Negative mg/dL   Blood: x / Protein: 500 mg/dL / Nitrite: Negative   Leuk Esterase: Negative / RBC: 3-5 /HPF / WBC 0-2   Sq Epi: x / Non Sq Epi: Occasional / Bacteria: Few      05-15    145  |  107  |  61<H>  ----------------------------<  111<H>  3.1<L>   |  27  |  5.45<H>    Ca    7.5<L>      15 May 2017 06:31  Phos  4.7     05-15    TPro  6.5  /  Alb  2.9<L>  /  TBili  x   /  DBili  x   /  AST  x   /  ALT  x   /  AlkPhos  x                           9.3    10.8  )-----------( 355      ( 15 May 2017 06:31 )             28.2       labs as above
[id number]    Patient is a 72y old  Female who presents with a chief complaint of anemia (18 May 2017 11:50)      MEDICATIONS  (STANDING):  aspirin enteric coated 81milliGRAM(s) Oral daily  insulin glargine Injectable (LANTUS) 15Unit(s) SubCutaneous at bedtime  atorvastatin 20milliGRAM(s) Oral at bedtime  furosemide    Tablet 80milliGRAM(s) Oral daily  ferrous    sulfate 325milliGRAM(s) Oral daily  multivitamin 1Tablet(s) Oral daily  insulin lispro (HumaLOG) corrective regimen sliding scale  SubCutaneous three times a day before meals  dextrose 5%. 1000milliLiter(s) IV Continuous <Continuous>  dextrose 50% Injectable 12.5Gram(s) IV Push once  dextrose 50% Injectable 25Gram(s) IV Push once  dextrose 50% Injectable 25Gram(s) IV Push once  epoetin marita Injectable [id number](s) SubCutaneous every 7 days  amLODIPine   Tablet 10milliGRAM(s) Oral daily  docusate sodium 100milliGRAM(s) Oral two times a day  hydrALAZINE 100milliGRAM(s) Oral three times a day  hydrochlorothiazide   Tablet 25milliGRAM(s) Oral daily  metoprolol 50milliGRAM(s) Oral four times a day  valsartan 320milliGRAM(s) Oral daily  cloNIDine 0.1milliGRAM(s) Oral two times a day    MEDICATIONS  (PRN):  dextrose Gel 1Dose(s) Oral once PRN Blood Glucose LESS THAN 70 milliGRAM(s)/deciliter  glucagon  Injectable 1milliGRAM(s) IntraMuscular once PRN Glucose LESS THAN 70 milligrams/deciliter  hydrALAZINE Injectable 20milliGRAM(s) IV Push every 4 hours PRN only if SBP greater than 170      Daily     Daily Weight in k.4 (18 May 2017 11:50)      Vital Signs Last 24 Hrs  T(C): 36.3, Max: 37.6 (05-18 @ 11:54)  T(F): 97.4, Max: 99.6 (-18 @ 11:54)  HR: 53 (53 - 66)  BP: 159/68 (143/61 - 198/77)  BP(mean): --  RR: 16 (16 - 18)  SpO2: 95% (95% - 99%)    PHYSICAL EXAM :eager to go home , no GI bleeding , no SOB no CP ,       Constitutional: no distress noted    Eyes: pale conjunctiva  anicteric sclera     ENMT:    Neck:    Breasts:    Back:    Respiratory: clear     Cardiovascular: S1S2 regular     Gastrointestinal: no mass normoactive bowel sounds     Genitourinary:    Rectal:    Extremities: no edema  no calf tenderness     Vascular:    Neurological:    Skin:    Lymph Nodes: none     Musculoskeletal:    Psychiatric:           LABS:  CBC Full  -  ( 18 May 2017 07:10 )  WBC Count : 10.4 K/uL  Hemoglobin : 9.4 g/dL  Hematocrit : 27.8 %  Platelet Count - Automated : 359 K/uL  Mean Cell Volume : 80.4 fl  Mean Cell Hemoglobin : 27.2 pg  Mean Cell Hemoglobin Concentration : 33.9 gm/dL  Auto Neutrophil # : x  Auto Lymphocyte # : x  Auto Monocyte # : x  Auto Eosinophil # : x  Auto Basophil # : x  Auto Neutrophil % : x  Auto Lymphocyte % : x  Auto Monocyte % : x  Auto Eosinophil % : x  Auto Basophil % : x    05-18    142  |  105  |  63<H>  ----------------------------<  162<H>  3.5   |  27  |  5.74<H>    Ca    8.4<L>      18 May 2017 07:10            CULTURES:    RADIOLOGY & ADDITIONAL STUDIES:
[id number]    Patient is a 72y old  Female who presents with a chief complaint of anemia (18 May 2017 11:50)      MEDICATIONS  (STANDING):  aspirin enteric coated 81milliGRAM(s) Oral daily  insulin glargine Injectable (LANTUS) 15Unit(s) SubCutaneous at bedtime  atorvastatin 20milliGRAM(s) Oral at bedtime  furosemide    Tablet 80milliGRAM(s) Oral daily  ferrous    sulfate 325milliGRAM(s) Oral daily  multivitamin 1Tablet(s) Oral daily  insulin lispro (HumaLOG) corrective regimen sliding scale  SubCutaneous three times a day before meals  dextrose 5%. 1000milliLiter(s) IV Continuous <Continuous>  dextrose 50% Injectable 12.5Gram(s) IV Push once  dextrose 50% Injectable 25Gram(s) IV Push once  dextrose 50% Injectable 25Gram(s) IV Push once  epoetin marita Injectable [id number](s) SubCutaneous every 7 days  amLODIPine   Tablet 10milliGRAM(s) Oral daily  docusate sodium 100milliGRAM(s) Oral two times a day  hydrALAZINE 100milliGRAM(s) Oral three times a day  hydrochlorothiazide   Tablet 25milliGRAM(s) Oral daily  metoprolol 50milliGRAM(s) Oral four times a day  valsartan 320milliGRAM(s) Oral daily  cloNIDine 0.1milliGRAM(s) Oral two times a day    MEDICATIONS  (PRN):  dextrose Gel 1Dose(s) Oral once PRN Blood Glucose LESS THAN 70 milliGRAM(s)/deciliter  glucagon  Injectable 1milliGRAM(s) IntraMuscular once PRN Glucose LESS THAN 70 milligrams/deciliter  hydrALAZINE Injectable 20milliGRAM(s) IV Push every 4 hours PRN only if SBP greater than 170      Daily     Daily Weight in k.4 (18 May 2017 11:50)      Vital Signs Last 24 Hrs  T(C): 36.6, Max: 37.6 (-18 @ 11:54)  T(F): 97.8, Max: 99.6 (-18 @ 11:54)  HR: 61 (55 - 64)  BP: 152/60 (143/61 - 181/75)  BP(mean): --  RR: 18 (17 - 18)  SpO2: 96% (95% - 96%)    PHYSICAL EXAM: no SOB no GI bleeding feeling better , no fatigue       Constitutional:    Eyes: pale conjunctiva non icteric sclera     ENMT:    Neck: no JVD     Breasts:    Back:    Respiratory: clear lungs    Cardiovascular:S1S2 regular     Gastrointestinal: no mass normoactive bowels sounds     Genitourinary:    Rectal:    Extremities:    Vascular:    Neurological: CNS ox3    Skin:    Lymph Nodes: none     Musculoskeletal:    Psychiatric:           LABS:  CBC Full  -  ( 18 May 2017 07:10 )  WBC Count : 10.4 K/uL  Hemoglobin : 9.4 g/dL  Hematocrit : 27.8 %  Platelet Count - Automated : 359 K/uL  Mean Cell Volume : 80.4 fl  Mean Cell Hemoglobin : 27.2 pg  Mean Cell Hemoglobin Concentration : 33.9 gm/dL  Auto Neutrophil # : x  Auto Lymphocyte # : x  Auto Monocyte # : x  Auto Eosinophil # : x  Auto Basophil # : x  Auto Neutrophil % : x  Auto Lymphocyte % : x  Auto Monocyte % : x  Auto Eosinophil % : x  Auto Basophil % : x    05-18    142  |  105  |  63<H>  ----------------------------<  162<H>  3.5   |  27  |  5.74<H>    Ca    8.4<L>      18 May 2017 07:10            CULTURES:    RADIOLOGY & ADDITIONAL STUDIES:
Patient is a 72y old  Female who presents with a chief complaint of anemia (14 May 2017 12:24)      INTERVAL HPI/OVERNIGHT EVENTS:  c/o constipation  MEDICATIONS  (STANDING):  aspirin enteric coated 81milliGRAM(s) Oral daily  losartan 50milliGRAM(s) Oral daily  cloNIDine 0.3milliGRAM(s) Oral three times a day  insulin glargine Injectable (LANTUS) 15Unit(s) SubCutaneous at bedtime  atorvastatin 20milliGRAM(s) Oral at bedtime  metoprolol 50milliGRAM(s) Oral two times a day  amLODIPine   Tablet 5milliGRAM(s) Oral daily  furosemide    Tablet 80milliGRAM(s) Oral daily  ferrous    sulfate 325milliGRAM(s) Oral daily  hydrALAZINE 100milliGRAM(s) Oral three times a day  multivitamin 1Tablet(s) Oral daily  insulin lispro (HumaLOG) corrective regimen sliding scale  SubCutaneous three times a day before meals  dextrose 5%. 1000milliLiter(s) IV Continuous <Continuous>  dextrose 50% Injectable 12.5Gram(s) IV Push once  dextrose 50% Injectable 25Gram(s) IV Push once  dextrose 50% Injectable 25Gram(s) IV Push once  epoetin marita Injectable [id number](s) SubCutaneous every 7 days    MEDICATIONS  (PRN):  dextrose Gel 1Dose(s) Oral once PRN Blood Glucose LESS THAN 70 milliGRAM(s)/deciliter  glucagon  Injectable 1milliGRAM(s) IntraMuscular once PRN Glucose LESS THAN 70 milligrams/deciliter      Allergies    No Known Allergies    Intolerances        REVIEW OF SYSTEMS:  CONSTITUTIONAL: No fever, weight loss, or fatigue  EYES: No eye pain, visual disturbances, or discharge  ENMT:  No difficulty hearing, tinnitus, vertigo; No sinus or throat pain  NECK: No pain or stiffness  BREASTS: No pain, masses, or nipple discharge  RESPIRATORY: No cough, wheezing, chills or hemoptysis; No shortness of breath  CARDIOVASCULAR: No chest pain, palpitations, dizziness, or leg swelling  GASTROINTESTINAL: No abdominal or epigastric pain. No nausea, vomiting, or hematemesis; No diarrhea or constipation. No melena or hematochezia.  GENITOURINARY: No dysuria, frequency, hematuria, or incontinence  NEUROLOGICAL: No headaches, memory loss, loss of strength, numbness, or tremors  SKIN: No itching, burning, rashes, or lesions   LYMPH NODES: No enlarged glands  ENDOCRINE: No heat or cold intolerance; No hair loss  MUSCULOSKELETAL: No joint pain or swelling; No muscle, back, or extremity pain  PSYCHIATRIC: No depression, anxiety, mood swings, or difficulty sleeping  HEME/LYMPH: No easy bruising, or bleeding gums  ALLERGY AND IMMUNOLOGIC: No hives or eczema    Vital Signs Last 24 Hrs  T(C): 36.1, Max: 36.9 (05-15 @ 04:50)  T(F): 97, Max: 98.4 (05-15 @ 04:50)  HR: 59 (58 - 65)  BP: 202/74 (148/76 - 216/81)  BP(mean): --  RR: 16 (16 - 25)  SpO2: 97% (96% - 110%)    PHYSICAL EXAM:  GENERAL: NAD, well-groomed, well-developed  HEAD:  Atraumatic, Normocephalic  EYES: EOMI, PERRLA, conjunctiva and sclera clear  ENMT: No tonsillar erythema, exudates, or enlargement; Moist mucous membranes, Good dentition, No lesions  NECK: Supple, No JVD, Normal thyroid  NERVOUS SYSTEM:  Alert & Oriented X3, Good concentration; Motor Strength 5/5 B/L upper and lower extremities; DTRs 2+ intact and symmetric  CHEST/LUNG: Clear to percussion bilaterally; No rales, rhonchi, wheezing, or rubs  HEART: Regular rate and rhythm; No murmurs, rubs, or gallops  ABDOMEN: Soft, Nontender, Nondistended; Bowel sounds present  EXTREMITIES:  2+ Peripheral Pulses, No clubbing, cyanosis, or edema  LYMPH: No lymphadenopathy noted  SKIN: No rashes or lesions    LABS:                        9.3    10.8  )-----------( 355      ( 15 May 2017 06:31 )             28.2     05-15    145  |  107  |  61<H>  ----------------------------<  111<H>  3.1<L>   |  27  |  5.45<H>    Ca    7.5<L>      15 May 2017 06:31  Phos  4.7     -15    TPro  6.5  /  Alb  x   /  TBili  x   /  DBili  x   /  AST  x   /  ALT  x   /  AlkPhos  x       PT/INR - ( 14 May 2017 08:16 )   PT: 10.5 sec;   INR: 0.96 ratio         PTT - ( 14 May 2017 08:16 )  PTT:36.6 sec  Urinalysis Basic - ( 14 May 2017 21:19 )    Color: Yellow / Appearance: Clear / S.010 / pH: x  Gluc: x / Ketone: Negative  / Bili: Negative / Urobili: Negative mg/dL   Blood: x / Protein: 500 mg/dL / Nitrite: Negative   Leuk Esterase: Negative / RBC: 3-5 /HPF / WBC 0-2   Sq Epi: x / Non Sq Epi: Occasional / Bacteria: Few      CAPILLARY BLOOD GLUCOSE  254 (15 May 2017 11:35)  84 (15 May 2017 08:18)  275 (14 May 2017 18:49)    CULTURES:    HEMOGLOBIN A1C:  Hemoglobin A1C, Whole Blood: 7.3 % (05-15 @ 08:02)    CHOLESTEROL:        RADIOLOGY & ADDITIONAL TESTS:
Patient is a 72y old  Female who presents with a chief complaint of anemia (14 May 2017 12:24)      INTERVAL HPI/OVERNIGHT EVENTS:  no complaint  MEDICATIONS  (STANDING):  aspirin enteric coated 81milliGRAM(s) Oral daily  insulin glargine Injectable (LANTUS) 15Unit(s) SubCutaneous at bedtime  atorvastatin 20milliGRAM(s) Oral at bedtime  furosemide    Tablet 80milliGRAM(s) Oral daily  ferrous    sulfate 325milliGRAM(s) Oral daily  multivitamin 1Tablet(s) Oral daily  insulin lispro (HumaLOG) corrective regimen sliding scale  SubCutaneous three times a day before meals  dextrose 5%. 1000milliLiter(s) IV Continuous <Continuous>  dextrose 50% Injectable 12.5Gram(s) IV Push once  dextrose 50% Injectable 25Gram(s) IV Push once  dextrose 50% Injectable 25Gram(s) IV Push once  epoetin marita Injectable [id number](s) SubCutaneous every 7 days  amLODIPine   Tablet 10milliGRAM(s) Oral daily  docusate sodium 100milliGRAM(s) Oral two times a day  hydrALAZINE 100milliGRAM(s) Oral three times a day  hydrochlorothiazide   Tablet 25milliGRAM(s) Oral daily  metoprolol 50milliGRAM(s) Oral four times a day  valsartan 320milliGRAM(s) Oral daily  cloNIDine 0.3milliGRAM(s) Oral two times a day    MEDICATIONS  (PRN):  dextrose Gel 1Dose(s) Oral once PRN Blood Glucose LESS THAN 70 milliGRAM(s)/deciliter  glucagon  Injectable 1milliGRAM(s) IntraMuscular once PRN Glucose LESS THAN 70 milligrams/deciliter  hydrALAZINE Injectable 20milliGRAM(s) IV Push every 4 hours PRN only if SBP greater than 170      Allergies    No Known Allergies    Intolerances        REVIEW OF SYSTEMS:  CONSTITUTIONAL: No fever, weight loss, or fatigue  EYES: No eye pain, visual disturbances, or discharge  ENMT:  No difficulty hearing, tinnitus, vertigo; No sinus or throat pain  NECK: No pain or stiffness  BREASTS: No pain, masses, or nipple discharge  RESPIRATORY: No cough, wheezing, chills or hemoptysis; No shortness of breath  CARDIOVASCULAR: No chest pain, palpitations, dizziness, or leg swelling  GASTROINTESTINAL: No abdominal or epigastric pain. No nausea, vomiting, or hematemesis; No diarrhea or constipation. No melena or hematochezia.  GENITOURINARY: No dysuria, frequency, hematuria, or incontinence  NEUROLOGICAL: No headaches, memory loss, loss of strength, numbness, or tremors  SKIN: No itching, burning, rashes, or lesions   LYMPH NODES: No enlarged glands  ENDOCRINE: No heat or cold intolerance; No hair loss  MUSCULOSKELETAL: No joint pain or swelling; No muscle, back, or extremity pain  PSYCHIATRIC: No depression, anxiety, mood swings, or difficulty sleeping  HEME/LYMPH: No easy bruising, or bleeding gums  ALLERGY AND IMMUNOLOGIC: No hives or eczema    Vital Signs Last 24 Hrs  T(C): 36.6, Max: 37 (05-16 @ 23:31)  T(F): 97.8, Max: 98.6 (05-16 @ 23:31)  HR: 66 (58 - 66)  BP: 179/83 (164/73 - 187/94)  BP(mean): --  RR: 18 (17 - 20)  SpO2: 95% (95% - 100%)    PHYSICAL EXAM:  GENERAL: NAD, well-groomed, well-developed  HEAD:  Atraumatic, Normocephalic  EYES: EOMI, PERRLA, conjunctiva and sclera clear  ENMT: No tonsillar erythema, exudates, or enlargement; Moist mucous membranes, Good dentition, No lesions  NECK: Supple, No JVD, Normal thyroid  NERVOUS SYSTEM:  Alert & Oriented X3, Good concentration; Motor Strength 5/5 B/L upper and lower extremities; DTRs 2+ intact and symmetric  CHEST/LUNG: Clear to percussion bilaterally; No rales, rhonchi, wheezing, or rubs  HEART: Regular rate and rhythm; No murmurs, rubs, or gallops  ABDOMEN: Soft, Nontender, Nondistended; Bowel sounds present  EXTREMITIES:  2+ Peripheral Pulses, No clubbing, cyanosis, or edema  LYMPH: No lymphadenopathy noted  SKIN: No rashes or lesions    LABS:              CAPILLARY BLOOD GLUCOSE  172 (17 May 2017 16:25)  233 (17 May 2017 11:54)  98 (17 May 2017 08:05)  143 (16 May 2017 21:54)    CULTURES:    HEMOGLOBIN A1C:    CHOLESTEROL:        RADIOLOGY & ADDITIONAL TESTS:
Patient is a 72y old  Female who presents with a chief complaint of anemia (14 May 2017 12:24)      INTERVAL HPI/OVERNIGHT EVENTS:  pt is doing well no complaint  MEDICATIONS  (STANDING):  aspirin enteric coated 81milliGRAM(s) Oral daily  losartan 50milliGRAM(s) Oral daily  cloNIDine 0.3milliGRAM(s) Oral three times a day  insulin glargine Injectable (LANTUS) 15Unit(s) SubCutaneous at bedtime  atorvastatin 20milliGRAM(s) Oral at bedtime  furosemide    Tablet 80milliGRAM(s) Oral daily  ferrous    sulfate 325milliGRAM(s) Oral daily  multivitamin 1Tablet(s) Oral daily  insulin lispro (HumaLOG) corrective regimen sliding scale  SubCutaneous three times a day before meals  dextrose 5%. 1000milliLiter(s) IV Continuous <Continuous>  dextrose 50% Injectable 12.5Gram(s) IV Push once  dextrose 50% Injectable 25Gram(s) IV Push once  dextrose 50% Injectable 25Gram(s) IV Push once  epoetin marita Injectable [id number](s) SubCutaneous every 7 days  metoprolol 100milliGRAM(s) Oral two times a day  amLODIPine   Tablet 10milliGRAM(s) Oral daily  docusate sodium 100milliGRAM(s) Oral two times a day  hydrALAZINE 100milliGRAM(s) Oral three times a day    MEDICATIONS  (PRN):  dextrose Gel 1Dose(s) Oral once PRN Blood Glucose LESS THAN 70 milliGRAM(s)/deciliter  glucagon  Injectable 1milliGRAM(s) IntraMuscular once PRN Glucose LESS THAN 70 milligrams/deciliter  hydrALAZINE Injectable 20milliGRAM(s) IV Push every 4 hours PRN only if SBP greater than 170      Allergies    No Known Allergies    Intolerances        REVIEW OF SYSTEMS:  CONSTITUTIONAL: No fever, weight loss, or fatigue  EYES: No eye pain, visual disturbances, or discharge  ENMT:  No difficulty hearing, tinnitus, vertigo; No sinus or throat pain  NECK: No pain or stiffness  BREASTS: No pain, masses, or nipple discharge  RESPIRATORY: No cough, wheezing, chills or hemoptysis; No shortness of breath  CARDIOVASCULAR: No chest pain, palpitations, dizziness, or leg swelling  GASTROINTESTINAL: No abdominal or epigastric pain. No nausea, vomiting, or hematemesis; No diarrhea or constipation. No melena or hematochezia.  GENITOURINARY: No dysuria, frequency, hematuria, or incontinence  NEUROLOGICAL: No headaches, memory loss, loss of strength, numbness, or tremors  SKIN: No itching, burning, rashes, or lesions   LYMPH NODES: No enlarged glands  ENDOCRINE: No heat or cold intolerance; No hair loss  MUSCULOSKELETAL: No joint pain or swelling; No muscle, back, or extremity pain  PSYCHIATRIC: No depression, anxiety, mood swings, or difficulty sleeping  HEME/LYMPH: No easy bruising, or bleeding gums  ALLERGY AND IMMUNOLOGIC: No hives or eczema    Vital Signs Last 24 Hrs  T(C): 36.7, Max: 36.7 (-16 @ 16:18)  T(F): 98, Max: 98 ( @ 16:18)  HR: 60 (56 - 79)  BP: 166/64 (146/75 - 196/69)  BP(mean): --  RR: 18 (16 - 20)  SpO2: 96% (94% - 97%)    PHYSICAL EXAM:  GENERAL: NAD, well-groomed, well-developed  HEAD:  Atraumatic, Normocephalic  EYES: EOMI, PERRLA, conjunctiva and sclera clear  ENMT: No tonsillar erythema, exudates, or enlargement; Moist mucous membranes, Good dentition, No lesions  NECK: Supple, No JVD, Normal thyroid  NERVOUS SYSTEM:  Alert & Oriented X3, Good concentration; Motor Strength 5/5 B/L upper and lower extremities; DTRs 2+ intact and symmetric  CHEST/LUNG: Clear to percussion bilaterally; No rales, rhonchi, wheezing, or rubs  HEART: Regular rate and rhythm; No murmurs, rubs, or gallops  ABDOMEN: Soft, Nontender, Nondistended; Bowel sounds present  EXTREMITIES:  2+ Peripheral Pulses, No clubbing, cyanosis, or edema  LYMPH: No lymphadenopathy noted  SKIN: No rashes or lesions    LABS:                        9.3    10.8  )-----------( 355      ( 15 May 2017 06:31 )             28.2     05-15    145  |  107  |  61<H>  ----------------------------<  111<H>  3.1<L>   |  27  |  5.45<H>    Ca    7.5<L>      15 May 2017 06:31  Phos  4.7     05-15        Urinalysis Basic - ( 14 May 2017 21:19 )    Color: Yellow / Appearance: Clear / S.010 / pH: x  Gluc: x / Ketone: Negative  / Bili: Negative / Urobili: Negative mg/dL   Blood: x / Protein: 500 mg/dL / Nitrite: Negative   Leuk Esterase: Negative / RBC: 3-5 /HPF / WBC 0-2   Sq Epi: x / Non Sq Epi: Occasional / Bacteria: Few      CAPILLARY BLOOD GLUCOSE  168 (16 May 2017 16:15)  169 (16 May 2017 10:54)  70 (16 May 2017 08:03)  99 (15 May 2017 21:55)    CULTURES:    HEMOGLOBIN A1C:    CHOLESTEROL:        RADIOLOGY & ADDITIONAL TESTS:
Patient is a 72y old  Female who presents with a chief complaint of anemia (14 May 2017 12:24)    HPI:  73 yo lady h/o CKD and anemia sent to the ER by her PMD for low hgb. Pt stated she is feeling tired and week. Denies chest pain and palpitation. (14 May 2017 12:24)    PAST MEDICAL & SURGICAL HISTORY:  Chronic kidney disease  Congestive heart failure, unspecified congestive heart failure chronicity, unspecified congestive heart failure type  Diabetes  Hypertension  No significant past surgical history    Allergies    No Known Allergies    Intolerances      FAMILY HISTORY:    MEDICATIONS  (STANDING):  aspirin enteric coated 81milliGRAM(s) Oral daily  insulin glargine Injectable (LANTUS) 15Unit(s) SubCutaneous at bedtime  atorvastatin 20milliGRAM(s) Oral at bedtime  furosemide    Tablet 80milliGRAM(s) Oral daily  ferrous    sulfate 325milliGRAM(s) Oral daily  multivitamin 1Tablet(s) Oral daily  insulin lispro (HumaLOG) corrective regimen sliding scale  SubCutaneous three times a day before meals  dextrose 5%. 1000milliLiter(s) IV Continuous <Continuous>  dextrose 50% Injectable 12.5Gram(s) IV Push once  dextrose 50% Injectable 25Gram(s) IV Push once  dextrose 50% Injectable 25Gram(s) IV Push once  epoetin marita Injectable [id number](s) SubCutaneous every 7 days  amLODIPine   Tablet 10milliGRAM(s) Oral daily  docusate sodium 100milliGRAM(s) Oral two times a day  hydrALAZINE 100milliGRAM(s) Oral three times a day  hydrochlorothiazide   Tablet 25milliGRAM(s) Oral daily  metoprolol 50milliGRAM(s) Oral four times a day  valsartan 320milliGRAM(s) Oral daily  cloNIDine 0.3milliGRAM(s) Oral two times a day    Vital Signs Last 24 Hrs  T(C): 36.5, Max: 36.7 (05-17 @ 22:01)  T(F): 97.7, Max: 98 (05-17 @ 22:01)  HR: 57 (57 - 66)  BP: 156/79 (156/79 - 181/75)  BP(mean): --  RR: 17 (17 - 20)  SpO2: 95% (95% - 96%)  I&O's Summary    Daily     Daily Weight in k.6 (17 May 2017 11:21)  PHYSICAL EXAM:      Constitutional: resting in bed, in no distress, alert and oriented x  3    Eyes: normal    Neck: supple; no JVD    Respiratory: clear lungs    Cardiovascular: normal s1s2    Gastrointestinal: abdomen is soft and non tender.                          9.4    10.4  )-----------( 359      ( 18 May 2017 07:10 )             27.8     05-18    142  |  105  |  63<H>  ----------------------------<  162<H>  3.5   |  27  |  5.74<H>    Ca    8.4<L>      18 May 2017 07:10
Patient is a 72y old  Female who presents with a chief complaint of anemia (14 May 2017 12:24)  Patient has known CKD and is followed by a nephrologist in [place]. She was told that dialysis was needed. She presented to our ED with  symptomatic anemia and stage 4-5 CKD.   Patient has not yet agreed to dialysis  Vital Signs Last 24 Hrs  T(C): 37, Max: 37 (05-16 @ 23:31)  T(F): 98.6, Max: 98.6 (05-16 @ 23:31)  HR: 58 (58 - 62)  BP: 182/83 (162/69 - 187/94)  BP(mean): --  RR: 17 (17 - 20)  SpO2: 96% (96% - 100%)  I&O's Summary    MEDICATIONS  (STANDING):  aspirin enteric coated 81milliGRAM(s) Oral daily  losartan 50milliGRAM(s) Oral daily  cloNIDine 0.3milliGRAM(s) Oral three times a day  insulin glargine Injectable (LANTUS) 15Unit(s) SubCutaneous at bedtime  atorvastatin 20milliGRAM(s) Oral at bedtime  furosemide    Tablet 80milliGRAM(s) Oral daily  ferrous    sulfate 325milliGRAM(s) Oral daily  multivitamin 1Tablet(s) Oral daily  insulin lispro (HumaLOG) corrective regimen sliding scale  SubCutaneous three times a day before meals  dextrose 5%. 1000milliLiter(s) IV Continuous <Continuous>  dextrose 50% Injectable 12.5Gram(s) IV Push once  dextrose 50% Injectable 25Gram(s) IV Push once  dextrose 50% Injectable 25Gram(s) IV Push once  epoetin marita Injectable 46682Exrs(s) SubCutaneous every 7 days  metoprolol 100milliGRAM(s) Oral two times a day  amLODIPine   Tablet 10milliGRAM(s) Oral daily  docusate sodium 100milliGRAM(s) Oral two times a day  hydrALAZINE 100milliGRAM(s) Oral three times a day    MEDICATIONS  (PRN):  dextrose Gel 1Dose(s) Oral once PRN Blood Glucose LESS THAN 70 milliGRAM(s)/deciliter  glucagon  Injectable 1milliGRAM(s) IntraMuscular once PRN Glucose LESS THAN 70 milligrams/deciliter  hydrALAZINE Injectable 20milliGRAM(s) IV Push every 4 hours PRN only if SBP greater than 170    PHYSICAL EXAM:    Constitutional: pleasant elderly woman in no acute distress; daughter was at bed side.     Eyes: normal    Neck: supple; no JVD    Respiratory: clear lungs    Cardiovascular: s1s2    Gastrointestinal: benign      BUN: 61  K: 3.1  Creatinine: 5.45  H.3
Patient is a 72y old  Female who presents with a chief complaint of anemia (18 May 2017 11:50)      INTERVAL HPI/OVERNIGHT EVENTS:  no complaint  MEDICATIONS  (STANDING):  aspirin enteric coated 81milliGRAM(s) Oral daily  insulin glargine Injectable (LANTUS) 15Unit(s) SubCutaneous at bedtime  atorvastatin 20milliGRAM(s) Oral at bedtime  furosemide    Tablet 80milliGRAM(s) Oral daily  ferrous    sulfate 325milliGRAM(s) Oral daily  multivitamin 1Tablet(s) Oral daily  insulin lispro (HumaLOG) corrective regimen sliding scale  SubCutaneous three times a day before meals  dextrose 5%. 1000milliLiter(s) IV Continuous <Continuous>  dextrose 50% Injectable 12.5Gram(s) IV Push once  dextrose 50% Injectable 25Gram(s) IV Push once  dextrose 50% Injectable 25Gram(s) IV Push once  epoetin marita Injectable [id number](s) SubCutaneous every 7 days  amLODIPine   Tablet 10milliGRAM(s) Oral daily  docusate sodium 100milliGRAM(s) Oral two times a day  hydrALAZINE 100milliGRAM(s) Oral three times a day  hydrochlorothiazide   Tablet 25milliGRAM(s) Oral daily  metoprolol 50milliGRAM(s) Oral four times a day  valsartan 320milliGRAM(s) Oral daily  cloNIDine 0.1milliGRAM(s) Oral two times a day    MEDICATIONS  (PRN):  dextrose Gel 1Dose(s) Oral once PRN Blood Glucose LESS THAN 70 milliGRAM(s)/deciliter  glucagon  Injectable 1milliGRAM(s) IntraMuscular once PRN Glucose LESS THAN 70 milligrams/deciliter  hydrALAZINE Injectable 20milliGRAM(s) IV Push every 4 hours PRN only if SBP greater than 170      Allergies    No Known Allergies    Intolerances        REVIEW OF SYSTEMS:  CONSTITUTIONAL: No fever, weight loss, or fatigue  EYES: No eye pain, visual disturbances, or discharge  ENMT:  No difficulty hearing, tinnitus, vertigo; No sinus or throat pain  NECK: No pain or stiffness  BREASTS: No pain, masses, or nipple discharge  RESPIRATORY: No cough, wheezing, chills or hemoptysis; No shortness of breath  CARDIOVASCULAR: No chest pain, palpitations, dizziness, or leg swelling  GASTROINTESTINAL: No abdominal or epigastric pain. No nausea, vomiting, or hematemesis; No diarrhea or constipation. No melena or hematochezia.  GENITOURINARY: No dysuria, frequency, hematuria, or incontinence  NEUROLOGICAL: No headaches, memory loss, loss of strength, numbness, or tremors  SKIN: No itching, burning, rashes, or lesions   LYMPH NODES: No enlarged glands  ENDOCRINE: No heat or cold intolerance; No hair loss  MUSCULOSKELETAL: No joint pain or swelling; No muscle, back, or extremity pain  PSYCHIATRIC: No depression, anxiety, mood swings, or difficulty sleeping  HEME/LYMPH: No easy bruising, or bleeding gums  ALLERGY AND IMMUNOLOGIC: No hives or eczema    Vital Signs Last 24 Hrs  T(C): 36.3, Max: 37.6 (05-18 @ 11:54)  T(F): 97.4, Max: 99.6 (05-18 @ 11:54)  HR: 53 (53 - 66)  BP: 159/68 (143/61 - 198/77)  BP(mean): --  RR: 16 (16 - 18)  SpO2: 95% (95% - 99%)    PHYSICAL EXAM:  GENERAL: NAD, well-groomed, well-developed  HEAD:  Atraumatic, Normocephalic  EYES: EOMI, PERRLA, conjunctiva and sclera clear  ENMT: No tonsillar erythema, exudates, or enlargement; Moist mucous membranes, Good dentition, No lesions  NECK: Supple, No JVD, Normal thyroid  NERVOUS SYSTEM:  Alert & Oriented X3, Good concentration; Motor Strength 5/5 B/L upper and lower extremities; DTRs 2+ intact and symmetric  CHEST/LUNG: Clear to percussion bilaterally; No rales, rhonchi, wheezing, or rubs  HEART: Regular rate and rhythm; No murmurs, rubs, or gallops  ABDOMEN: Soft, Nontender, Nondistended; Bowel sounds present  EXTREMITIES:  2+ Peripheral Pulses, No clubbing, cyanosis, or edema  LYMPH: No lymphadenopathy noted  SKIN: No rashes or lesions    LABS:                        9.4    10.4  )-----------( 359      ( 18 May 2017 07:10 )             27.8     05-18    142  |  105  |  63<H>  ----------------------------<  162<H>  3.5   |  27  |  5.74<H>    Ca    8.4<L>      18 May 2017 07:10          CAPILLARY BLOOD GLUCOSE  204 (18 May 2017 21:56)  221 (18 May 2017 16:41)  170 (18 May 2017 11:50)    CULTURES:    HEMOGLOBIN A1C:    CHOLESTEROL:        RADIOLOGY & ADDITIONAL TESTS:
[id number]    Patient is a 72y old  Female who presents with a chief complaint of anemia (14 May 2017 12:24)      MEDICATIONS  (STANDING):  aspirin enteric coated 81milliGRAM(s) Oral daily  losartan 50milliGRAM(s) Oral daily  cloNIDine 0.3milliGRAM(s) Oral three times a day  insulin glargine Injectable (LANTUS) 15Unit(s) SubCutaneous at bedtime  atorvastatin 20milliGRAM(s) Oral at bedtime  furosemide    Tablet 80milliGRAM(s) Oral daily  ferrous    sulfate 325milliGRAM(s) Oral daily  multivitamin 1Tablet(s) Oral daily  insulin lispro (HumaLOG) corrective regimen sliding scale  SubCutaneous three times a day before meals  dextrose 5%. 1000milliLiter(s) IV Continuous <Continuous>  dextrose 50% Injectable 12.5Gram(s) IV Push once  dextrose 50% Injectable 25Gram(s) IV Push once  dextrose 50% Injectable 25Gram(s) IV Push once  epoetin marita Injectable 27765Hevj(s) SubCutaneous every 7 days  metoprolol 100milliGRAM(s) Oral two times a day  amLODIPine   Tablet 10milliGRAM(s) Oral daily  docusate sodium 100milliGRAM(s) Oral two times a day  hydrALAZINE 100milliGRAM(s) Oral three times a day    MEDICATIONS  (PRN):  dextrose Gel 1Dose(s) Oral once PRN Blood Glucose LESS THAN 70 milliGRAM(s)/deciliter  glucagon  Injectable 1milliGRAM(s) IntraMuscular once PRN Glucose LESS THAN 70 milligrams/deciliter  hydrALAZINE Injectable 20milliGRAM(s) IV Push every 4 hours PRN only if SBP greater than 170      Daily     Daily Weight in k (16 May 2017 07:00)      Vital Signs Last 24 Hrs  T(C): 36.7, Max: 36.7 (05-16 @ 16:18)  T(F): 98, Max: 98 (05-16 @ 16:18)  HR: 60 (56 - 79)  BP: 166/64 (146/75 - 196/69)  BP(mean): --  RR: 18 (16 - 20)  SpO2: 96% (94% - 97%)    PHYSICAL EXAM: feeling better , no SOB no CP       Constitutional:    Eyes: pale conjunctiva ,     ENMT:    Neck: no JVD     Breasts:    Back:    Respiratory: clear lungs     Cardiovascular: sis2 regular     Gastrointestinal: no mass normoactive bowel sounds     Genitourinary:    Rectal:    Extremities: no edema     Vascular:    Neurological:    Skin:    Lymph Nodes: none     Musculoskeletal:    Psychiatric:           LABS:  CBC Full  -  ( 15 May 2017 06:31 )  WBC Count : 10.8 K/uL  Hemoglobin : 9.3 g/dL  Hematocrit : 28.2 %  Platelet Count - Automated : 355 K/uL  Mean Cell Volume : 79.2 fl  Mean Cell Hemoglobin : 26.1 pg  Mean Cell Hemoglobin Concentration : 33.0 gm/dL  Auto Neutrophil # : x  Auto Lymphocyte # : x  Auto Monocyte # : x  Auto Eosinophil # : x  Auto Basophil # : x  Auto Neutrophil % : x  Auto Lymphocyte % : x  Auto Monocyte % : x  Auto Eosinophil % : x  Auto Basophil % : x    05-15    145  |  107  |  61<H>  ----------------------------<  111<H>  3.1<L>   |  27  |  5.45<H>    Ca    7.5<L>      15 May 2017 06:31  Phos  4.7     05-15    TPro  6.5  /  Alb  2.9<L>  /  TBili  x   /  DBili  x   /  AST  x   /  ALT  x   /  AlkPhos  x         Urinalysis Basic - ( 14 May 2017 21:19 )    Color: Yellow / Appearance: Clear / S.010 / pH: x  Gluc: x / Ketone: Negative  / Bili: Negative / Urobili: Negative mg/dL   Blood: x / Protein: 500 mg/dL / Nitrite: Negative   Leuk Esterase: Negative / RBC: 3-5 /HPF / WBC 0-2   Sq Epi: x / Non Sq Epi: Occasional / Bacteria: Few        CULTURES:    RADIOLOGY & ADDITIONAL STUDIES:
[id number]    Patient is a 72y old  Female who presents with a chief complaint of anemia (14 May 2017 12:24)      MEDICATIONS  (STANDING):  aspirin enteric coated 81milliGRAM(s) Oral daily  losartan 50milliGRAM(s) Oral daily  cloNIDine 0.3milliGRAM(s) Oral three times a day  insulin glargine Injectable (LANTUS) 15Unit(s) SubCutaneous at bedtime  atorvastatin 20milliGRAM(s) Oral at bedtime  furosemide    Tablet 80milliGRAM(s) Oral daily  ferrous    sulfate 325milliGRAM(s) Oral daily  multivitamin 1Tablet(s) Oral daily  insulin lispro (HumaLOG) corrective regimen sliding scale  SubCutaneous three times a day before meals  dextrose 5%. 1000milliLiter(s) IV Continuous <Continuous>  dextrose 50% Injectable 12.5Gram(s) IV Push once  dextrose 50% Injectable 25Gram(s) IV Push once  dextrose 50% Injectable 25Gram(s) IV Push once  epoetin marita Injectable 27858Nykb(s) SubCutaneous every 7 days  metoprolol 100milliGRAM(s) Oral two times a day  amLODIPine   Tablet 10milliGRAM(s) Oral daily  docusate sodium 100milliGRAM(s) Oral two times a day  hydrALAZINE 100milliGRAM(s) Oral three times a day    MEDICATIONS  (PRN):  dextrose Gel 1Dose(s) Oral once PRN Blood Glucose LESS THAN 70 milliGRAM(s)/deciliter  glucagon  Injectable 1milliGRAM(s) IntraMuscular once PRN Glucose LESS THAN 70 milligrams/deciliter  hydrALAZINE Injectable 20milliGRAM(s) IV Push every 4 hours PRN only if SBP greater than 170      Daily     Daily Weight in k.4 (17 May 2017 05:23)      Vital Signs Last 24 Hrs  T(C): 37, Max: 37 (05-16 @ 23:31)  T(F): 98.6, Max: 98.6 (05-16 @ 23:31)  HR: 58 (58 - 62)  BP: 182/83 (162/69 - 187/94)  BP(mean): --  RR: 17 (17 - 20)  SpO2: 96% (96% - 100%)    PHYSICAL EXAM: lack of energy , no GI bleeding , no SOB no CP ,       Constitutional:    Eyes: pale conjunctiva anicteric sclera     ENMT:    Neck: no JVD     Breasts:    Back:    Respiratory: clear     Cardiovascular: S1S2 regular     Gastrointestinal: no mass normoactive bowel sounds     Genitourinary:    Rectal:    Extremities:no edema no calf tenderness     Vascular:    Neurological: no focal deficit ox 3    Skin:    Lymph Nodes: none     Musculoskeletal:    Psychiatric:           LABS:                CULTURES:    RADIOLOGY & ADDITIONAL STUDIES:

## 2017-05-19 NOTE — PROGRESS NOTE ADULT - PROBLEM SELECTOR PROBLEM 1
Anemia due to chronic kidney disease
Renovascular hypertension
Anemia due to chronic kidney disease

## 2017-05-19 NOTE — PROGRESS NOTE ADULT - PROBLEM SELECTOR PROBLEM 2
Anemia due to chronic kidney disease
Stage 5 chronic kidney disease
Type 2 diabetes mellitus with stage 5 chronic kidney disease not on chronic dialysis, with long-term current use of insulin
Type 2 diabetes mellitus with stage 5 chronic kidney disease not on chronic dialysis, with long-term current use of insulin
Stage 5 chronic kidney disease

## 2017-05-19 NOTE — PROGRESS NOTE ADULT - PROBLEM SELECTOR PLAN 1
better control, cardiology follow up
cardiology follow up
cont med
norvasc, cardiology consult
work up revealed normal ferritin and saturation   Iron deficiency unlikely /  start on Procrit 20,000 units sc once daily,   Work up for multiple myeloma,
Procrit for anemia due to CKD
UPEP in am .  ,
continue wit Procrit as outpatient .  No evidence of Multiple Myeloma.
discussed indication of Aranesp with daughter  Hb is stable ,  ,
procrit

## 2017-05-19 NOTE — PROGRESS NOTE ADULT - ASSESSMENT
80 yob female with CKD required 2 units of PRBCs
72 y o b feamle with anemia secondary to CKD .
72 y o b female with anemia secondary of CKD   SP PRBC transfusion
72 y o b male with anemia of CKD   on Procrit
72 yob female with anemia associated with CKD
Chronic Renal Failure with eGFR of 8; urgent dialysis is not needed but it is advised that patient be prepared for dialysis in the near future with creation of an AVF if possible in the next two to three weeks.   This was discussed at length with the patient and her daughter.   The patient is refusing dialysis at this point and medical management will continue.
Renal failure  Uncontrolled hypertension    Assessment and plan as outlined yesterday  Renal sonogram ordered to assess renal size.
stage 5 ckd  pt refuses avf/avg   anemia procrit as per heme

## 2017-05-26 DIAGNOSIS — I12.0 HYPERTENSIVE CHRONIC KIDNEY DISEASE WITH STAGE 5 CHRONIC KIDNEY DISEASE OR END STAGE RENAL DISEASE: ICD-10-CM

## 2017-05-26 DIAGNOSIS — E83.51 HYPOCALCEMIA: ICD-10-CM

## 2017-05-26 DIAGNOSIS — D64.9 ANEMIA, UNSPECIFIED: ICD-10-CM

## 2017-05-26 DIAGNOSIS — I50.33 ACUTE ON CHRONIC DIASTOLIC (CONGESTIVE) HEART FAILURE: ICD-10-CM

## 2017-05-26 DIAGNOSIS — N18.5 CHRONIC KIDNEY DISEASE, STAGE 5: ICD-10-CM

## 2017-05-26 DIAGNOSIS — D63.1 ANEMIA IN CHRONIC KIDNEY DISEASE: ICD-10-CM

## 2017-05-26 DIAGNOSIS — K59.00 CONSTIPATION, UNSPECIFIED: ICD-10-CM

## 2017-05-26 DIAGNOSIS — E11.22 TYPE 2 DIABETES MELLITUS WITH DIABETIC CHRONIC KIDNEY DISEASE: ICD-10-CM

## 2017-05-26 DIAGNOSIS — E78.5 HYPERLIPIDEMIA, UNSPECIFIED: ICD-10-CM

## 2017-06-06 ENCOUNTER — APPOINTMENT (OUTPATIENT)
Dept: CARDIOLOGY | Facility: CLINIC | Age: 72
End: 2017-06-06

## 2017-06-06 ENCOUNTER — NON-APPOINTMENT (OUTPATIENT)
Age: 72
End: 2017-06-06

## 2017-06-06 VITALS
HEIGHT: 62 IN | DIASTOLIC BLOOD PRESSURE: 72 MMHG | SYSTOLIC BLOOD PRESSURE: 159 MMHG | OXYGEN SATURATION: 95 % | WEIGHT: 170 LBS | TEMPERATURE: 98.1 F | HEART RATE: 89 BPM | BODY MASS INDEX: 31.28 KG/M2 | RESPIRATION RATE: 17 BRPM

## 2017-06-06 PROBLEM — N18.9 CHRONIC KIDNEY DISEASE, UNSPECIFIED: Chronic | Status: ACTIVE | Noted: 2017-05-14

## 2017-06-06 RX ORDER — METOPROLOL TARTRATE 50 MG/1
50 TABLET ORAL 3 TIMES DAILY
Refills: 0 | Status: DISCONTINUED | COMMUNITY
End: 2017-06-06

## 2017-06-06 RX ORDER — LOSARTAN POTASSIUM 50 MG/1
50 TABLET, FILM COATED ORAL DAILY
Qty: 1 | Refills: 1 | Status: DISCONTINUED | COMMUNITY
Start: 2017-03-18 | End: 2017-06-06

## 2017-06-06 RX ORDER — AMLODIPINE BESYLATE 5 MG/1
5 TABLET ORAL DAILY
Qty: 90 | Refills: 4 | Status: DISCONTINUED | COMMUNITY
Start: 2017-02-28 | End: 2017-06-06

## 2017-06-20 ENCOUNTER — MEDICATION RENEWAL (OUTPATIENT)
Age: 72
End: 2017-06-20

## 2017-06-28 ENCOUNTER — APPOINTMENT (OUTPATIENT)
Dept: INTERNAL MEDICINE | Facility: CLINIC | Age: 72
End: 2017-06-28

## 2017-08-05 ENCOUNTER — INPATIENT (INPATIENT)
Facility: HOSPITAL | Age: 72
LOS: 1 days | Discharge: HOME | End: 2017-08-07
Attending: INTERNAL MEDICINE | Admitting: INTERNAL MEDICINE
Payer: MEDICARE

## 2017-08-05 VITALS
OXYGEN SATURATION: 98 % | RESPIRATION RATE: 17 BRPM | TEMPERATURE: 97 F | WEIGHT: 169.98 LBS | DIASTOLIC BLOOD PRESSURE: 63 MMHG | SYSTOLIC BLOOD PRESSURE: 143 MMHG | HEIGHT: 66 IN | HEART RATE: 87 BPM

## 2017-08-05 PROCEDURE — 99284 EMERGENCY DEPT VISIT MOD MDM: CPT | Mod: 25

## 2017-08-06 DIAGNOSIS — D64.9 ANEMIA, UNSPECIFIED: ICD-10-CM

## 2017-08-06 DIAGNOSIS — I50.9 HEART FAILURE, UNSPECIFIED: ICD-10-CM

## 2017-08-06 DIAGNOSIS — I10 ESSENTIAL (PRIMARY) HYPERTENSION: ICD-10-CM

## 2017-08-06 DIAGNOSIS — E11.22 TYPE 2 DIABETES MELLITUS WITH DIABETIC CHRONIC KIDNEY DISEASE: ICD-10-CM

## 2017-08-06 DIAGNOSIS — N18.5 CHRONIC KIDNEY DISEASE, STAGE 5: ICD-10-CM

## 2017-08-06 DIAGNOSIS — Z29.9 ENCOUNTER FOR PROPHYLACTIC MEASURES, UNSPECIFIED: ICD-10-CM

## 2017-08-06 LAB
ALBUMIN SERPL ELPH-MCNC: 2.5 G/DL — LOW (ref 3.3–5)
ALLERGY+IMMUNOLOGY DIAG STUDY NOTE: SIGNIFICANT CHANGE UP
ALP SERPL-CCNC: 78 U/L — SIGNIFICANT CHANGE UP (ref 40–120)
ALT FLD-CCNC: 21 U/L — SIGNIFICANT CHANGE UP (ref 12–78)
ANION GAP SERPL CALC-SCNC: 12 MMOL/L — SIGNIFICANT CHANGE UP (ref 5–17)
ANISOCYTOSIS BLD QL: SLIGHT — SIGNIFICANT CHANGE UP
APTT BLD: 27.5 SEC — SIGNIFICANT CHANGE UP (ref 27.5–37.4)
AST SERPL-CCNC: 18 U/L — SIGNIFICANT CHANGE UP (ref 15–37)
BILIRUB SERPL-MCNC: 0.2 MG/DL — SIGNIFICANT CHANGE UP (ref 0.2–1.2)
BLD GP AB SCN SERPL QL: ABNORMAL
BUN SERPL-MCNC: 91 MG/DL — HIGH (ref 7–23)
CALCIUM SERPL-MCNC: 8 MG/DL — LOW (ref 8.5–10.1)
CHLORIDE SERPL-SCNC: 105 MMOL/L — SIGNIFICANT CHANGE UP (ref 96–108)
CO2 SERPL-SCNC: 22 MMOL/L — SIGNIFICANT CHANGE UP (ref 22–31)
CREAT SERPL-MCNC: 6.3 MG/DL — HIGH (ref 0.5–1.3)
DIR ANTIGLOB POLYSPECIFIC INTERPRETATION: SIGNIFICANT CHANGE UP
EOSINOPHIL NFR BLD AUTO: 2 % — SIGNIFICANT CHANGE UP (ref 0–6)
GLUCOSE SERPL-MCNC: 210 MG/DL — HIGH (ref 70–99)
HCT VFR BLD CALC: 19.8 % — CRITICAL LOW (ref 34.5–45)
HCT VFR BLD CALC: 26.8 % — LOW (ref 34.5–45)
HGB BLD-MCNC: 6.1 G/DL — CRITICAL LOW (ref 11.5–15.5)
HGB BLD-MCNC: 8.6 G/DL — LOW (ref 11.5–15.5)
HYPOCHROMIA BLD QL: SIGNIFICANT CHANGE UP
INR BLD: 0.92 RATIO — SIGNIFICANT CHANGE UP (ref 0.88–1.16)
LYMPHOCYTES # BLD AUTO: 9 % — LOW (ref 13–44)
MCHC RBC-ENTMCNC: 25.8 PG — LOW (ref 27–34)
MCHC RBC-ENTMCNC: 26.6 PG — LOW (ref 27–34)
MCHC RBC-ENTMCNC: 30.5 GM/DL — LOW (ref 32–36)
MCHC RBC-ENTMCNC: 32.3 GM/DL — SIGNIFICANT CHANGE UP (ref 32–36)
MCV RBC AUTO: 82.3 FL — SIGNIFICANT CHANGE UP (ref 80–100)
MCV RBC AUTO: 84.4 FL — SIGNIFICANT CHANGE UP (ref 80–100)
MICROCYTES BLD QL: SLIGHT — SIGNIFICANT CHANGE UP
MONOCYTES NFR BLD AUTO: 3 % — SIGNIFICANT CHANGE UP (ref 2–14)
NEUTROPHILS NFR BLD AUTO: 86 % — HIGH (ref 43–77)
PLAT MORPH BLD: NORMAL — SIGNIFICANT CHANGE UP
PLATELET # BLD AUTO: 416 K/UL — HIGH (ref 150–400)
PLATELET # BLD AUTO: 453 K/UL — HIGH (ref 150–400)
POLYCHROMASIA BLD QL SMEAR: SLIGHT — SIGNIFICANT CHANGE UP
POTASSIUM SERPL-MCNC: 4 MMOL/L — SIGNIFICANT CHANGE UP (ref 3.5–5.3)
POTASSIUM SERPL-SCNC: 4 MMOL/L — SIGNIFICANT CHANGE UP (ref 3.5–5.3)
PROT SERPL-MCNC: 6.5 GM/DL — SIGNIFICANT CHANGE UP (ref 6–8.3)
PROTHROM AB SERPL-ACNC: 10 SEC — SIGNIFICANT CHANGE UP (ref 9.8–12.7)
RBC # BLD: 2.35 M/UL — LOW (ref 3.8–5.2)
RBC # BLD: 3.26 M/UL — LOW (ref 3.8–5.2)
RBC # FLD: 17.1 % — HIGH (ref 11–15)
RBC # FLD: 19.4 % — HIGH (ref 11–15)
RBC BLD AUTO: ABNORMAL
SODIUM SERPL-SCNC: 139 MMOL/L — SIGNIFICANT CHANGE UP (ref 135–145)
WBC # BLD: 8.2 K/UL — SIGNIFICANT CHANGE UP (ref 3.8–10.5)
WBC # BLD: 9.4 K/UL — SIGNIFICANT CHANGE UP (ref 3.8–10.5)
WBC # FLD AUTO: 8.2 K/UL — SIGNIFICANT CHANGE UP (ref 3.8–10.5)
WBC # FLD AUTO: 9.4 K/UL — SIGNIFICANT CHANGE UP (ref 3.8–10.5)

## 2017-08-06 PROCEDURE — 99223 1ST HOSP IP/OBS HIGH 75: CPT | Mod: AI

## 2017-08-06 PROCEDURE — 93010 ELECTROCARDIOGRAM REPORT: CPT

## 2017-08-06 PROCEDURE — 86077 PHYS BLOOD BANK SERV XMATCH: CPT

## 2017-08-06 RX ORDER — INSULIN LISPRO 100/ML
VIAL (ML) SUBCUTANEOUS
Qty: 0 | Refills: 0 | Status: DISCONTINUED | OUTPATIENT
Start: 2017-08-06 | End: 2017-08-07

## 2017-08-06 RX ORDER — AMLODIPINE BESYLATE 2.5 MG/1
10 TABLET ORAL DAILY
Qty: 0 | Refills: 0 | Status: DISCONTINUED | OUTPATIENT
Start: 2017-08-06 | End: 2017-08-07

## 2017-08-06 RX ORDER — FUROSEMIDE 40 MG
40 TABLET ORAL ONCE
Qty: 0 | Refills: 0 | Status: COMPLETED | OUTPATIENT
Start: 2017-08-06 | End: 2017-08-06

## 2017-08-06 RX ORDER — HYDRALAZINE HCL 50 MG
100 TABLET ORAL
Qty: 0 | Refills: 0 | Status: DISCONTINUED | OUTPATIENT
Start: 2017-08-06 | End: 2017-08-07

## 2017-08-06 RX ORDER — GLUCAGON INJECTION, SOLUTION 0.5 MG/.1ML
1 INJECTION, SOLUTION SUBCUTANEOUS ONCE
Qty: 0 | Refills: 0 | Status: DISCONTINUED | OUTPATIENT
Start: 2017-08-06 | End: 2017-08-07

## 2017-08-06 RX ORDER — ASPIRIN/CALCIUM CARB/MAGNESIUM 324 MG
81 TABLET ORAL DAILY
Qty: 0 | Refills: 0 | Status: DISCONTINUED | OUTPATIENT
Start: 2017-08-06 | End: 2017-08-07

## 2017-08-06 RX ORDER — DEXTROSE 50 % IN WATER 50 %
1 SYRINGE (ML) INTRAVENOUS ONCE
Qty: 0 | Refills: 0 | Status: DISCONTINUED | OUTPATIENT
Start: 2017-08-06 | End: 2017-08-07

## 2017-08-06 RX ORDER — DEXTROSE 50 % IN WATER 50 %
12.5 SYRINGE (ML) INTRAVENOUS ONCE
Qty: 0 | Refills: 0 | Status: DISCONTINUED | OUTPATIENT
Start: 2017-08-06 | End: 2017-08-07

## 2017-08-06 RX ORDER — INSULIN LISPRO 100/ML
VIAL (ML) SUBCUTANEOUS AT BEDTIME
Qty: 0 | Refills: 0 | Status: DISCONTINUED | OUTPATIENT
Start: 2017-08-06 | End: 2017-08-07

## 2017-08-06 RX ORDER — DEXTROSE 50 % IN WATER 50 %
25 SYRINGE (ML) INTRAVENOUS ONCE
Qty: 0 | Refills: 0 | Status: DISCONTINUED | OUTPATIENT
Start: 2017-08-06 | End: 2017-08-07

## 2017-08-06 RX ORDER — VALSARTAN 80 MG/1
320 TABLET ORAL DAILY
Qty: 0 | Refills: 0 | Status: DISCONTINUED | OUTPATIENT
Start: 2017-08-06 | End: 2017-08-07

## 2017-08-06 RX ORDER — ATORVASTATIN CALCIUM 80 MG/1
20 TABLET, FILM COATED ORAL AT BEDTIME
Qty: 0 | Refills: 0 | Status: DISCONTINUED | OUTPATIENT
Start: 2017-08-06 | End: 2017-08-07

## 2017-08-06 RX ORDER — SODIUM CHLORIDE 9 MG/ML
1000 INJECTION, SOLUTION INTRAVENOUS
Qty: 0 | Refills: 0 | Status: DISCONTINUED | OUTPATIENT
Start: 2017-08-06 | End: 2017-08-07

## 2017-08-06 RX ORDER — FUROSEMIDE 40 MG
80 TABLET ORAL
Qty: 0 | Refills: 0 | Status: DISCONTINUED | OUTPATIENT
Start: 2017-08-06 | End: 2017-08-07

## 2017-08-06 RX ORDER — DOCUSATE SODIUM 100 MG
100 CAPSULE ORAL
Qty: 0 | Refills: 0 | Status: DISCONTINUED | OUTPATIENT
Start: 2017-08-06 | End: 2017-08-07

## 2017-08-06 RX ORDER — DOXAZOSIN MESYLATE 4 MG
2 TABLET ORAL AT BEDTIME
Qty: 0 | Refills: 0 | Status: DISCONTINUED | OUTPATIENT
Start: 2017-08-06 | End: 2017-08-07

## 2017-08-06 RX ADMIN — VALSARTAN 320 MILLIGRAM(S): 80 TABLET ORAL at 05:54

## 2017-08-06 RX ADMIN — Medication 100 MILLIGRAM(S): at 18:45

## 2017-08-06 RX ADMIN — Medication 0.3 MILLIGRAM(S): at 05:53

## 2017-08-06 RX ADMIN — Medication 1: at 18:04

## 2017-08-06 RX ADMIN — Medication 100 MILLIGRAM(S): at 18:04

## 2017-08-06 RX ADMIN — Medication 80 MILLIGRAM(S): at 18:04

## 2017-08-06 RX ADMIN — Medication 2 MILLIGRAM(S): at 22:59

## 2017-08-06 RX ADMIN — Medication 1 TABLET(S): at 11:04

## 2017-08-06 RX ADMIN — Medication 40 MILLIGRAM(S): at 08:54

## 2017-08-06 RX ADMIN — ATORVASTATIN CALCIUM 20 MILLIGRAM(S): 80 TABLET, FILM COATED ORAL at 21:49

## 2017-08-06 RX ADMIN — Medication 100 MILLIGRAM(S): at 05:53

## 2017-08-06 RX ADMIN — Medication 81 MILLIGRAM(S): at 11:04

## 2017-08-06 RX ADMIN — AMLODIPINE BESYLATE 10 MILLIGRAM(S): 2.5 TABLET ORAL at 05:54

## 2017-08-06 RX ADMIN — Medication 2: at 11:01

## 2017-08-06 RX ADMIN — Medication 0.3 MILLIGRAM(S): at 18:45

## 2017-08-06 RX ADMIN — Medication 80 MILLIGRAM(S): at 05:56

## 2017-08-06 NOTE — H&P ADULT - HISTORY OF PRESENT ILLNESS
pt is a 71 y/o female w/ pmh signif for Renal Failure refusing HD, HTN, HLD; now p/w lightheadedness. sent in from nephrologist for transfusion for hemoglobin of 5.8.  denies cp/sob/palp. denies abd pain. denies n/v/d. denies sick contacts. denies travel. denies trauma. pt states when walks fast gets sob and lightheaded.

## 2017-08-06 NOTE — ED PROVIDER NOTE - OBJECTIVE STATEMENT
72yoF; with pmh signif for Renal Failure, HTN, HLD; now p/w lightheadedness. sent in from nephrologist for transfusion for hemoglobin of 5.8.  denies cp/sob/palp. denies abd pain. denies n/v/d. denies sick contacts. denies travel. denies trauma.

## 2017-08-06 NOTE — H&P ADULT - NSHPLABSRESULTS_GEN_ALL_CORE
LABS:                        6.1    8.2   )-----------( 453      ( 06 Aug 2017 00:57 )             19.8     08-06    139  |  105  |  91<H>  ----------------------------<  210<H>  4.0   |  22  |  6.30<H>    Ca    8.0<L>      06 Aug 2017 00:57    TPro  6.5  /  Alb  2.5<L>  /  TBili  0.2  /  DBili  x   /  AST  18  /  ALT  21  /  AlkPhos  78  08-06    PT/INR - ( 06 Aug 2017 00:57 )   PT: 10.0 sec;   INR: 0.92 ratio         PTT - ( 06 Aug 2017 00:57 )  PTT:27.5 sec    CAPILLARY BLOOD GLUCOSE          RADIOLOGY & ADDITIONAL TESTS:    Imaging Personally Reviewed:  [ X] YES  [ ] NO

## 2017-08-06 NOTE — ED ADULT NURSE REASSESSMENT NOTE - NS ED NURSE REASSESS COMMENT FT1
Blood transfusion started at 04:44. Patient acknowleged to advise nurse for back pain, urticaria, nausea or shortness of breath

## 2017-08-06 NOTE — H&P ADULT - PROBLEM SELECTOR PROBLEM 4
Type 2 diabetes mellitus with stage 5 chronic kidney disease not on chronic dialysis, without long-term current use of insulin

## 2017-08-06 NOTE — CHART NOTE - NSCHARTNOTEFT_GEN_A_CORE
MEDICINE ATTENDING  H&P, labs, imaging reviewed; 72F, HTN, HL, advanced renal failure w/refusal of dialysis; being managed for symptomatic anemia w/hgb 5.8, undergoing supportive transfusions; followup post-transfusion hgb.

## 2017-08-06 NOTE — ED ADULT NURSE NOTE - OBJECTIVE STATEMENT
Patient with hx of anemia sent for low hemoglobin and hematocrit. Co SOB and increased thirst. Denies chest pain. 20 gauge inserted to the L- AC

## 2017-08-06 NOTE — H&P ADULT - NSHPPHYSICALEXAM_GEN_ALL_CORE
Vital Signs Last 24 Hrs  T(C): 36.1 (06 Aug 2017 04:59), Max: 37.1 (06 Aug 2017 01:14)  T(F): 97 (06 Aug 2017 04:59), Max: 98.7 (06 Aug 2017 01:14)  HR: 70 (06 Aug 2017 04:59) (63 - 87)  BP: 171/67 (06 Aug 2017 04:59) (143/63 - 171/67)  BP(mean): --  RR: 14 (06 Aug 2017 04:59) (12 - 17)  SpO2: 100% (06 Aug 2017 04:59) (98% - 100%)    PHYSICAL EXAM:    GENERAL: NAD, well-groomed, well-developed  HEAD:  Atraumatic, Normocephalic  EYES: EOMI, PERRLA, conjunctiva and sclera clear  ENMT: No tonsillar erythema, exudates, or enlargement; Moist mucous membranes, No lesions  NECK: Supple, No JVD, Normal thyroid  NERVOUS SYSTEM:  Alert & Oriented X3, Good concentration; Motor Strength 5/5 B/L upper and lower extremities; DTRs 2+ intact and symmetric  CHEST/LUNG: Clear to percussion bilaterally; No rales, rhonchi, wheezing, or rubs  HEART: Regular rate and rhythm; No rubs, or gallops, +S1,S2  ABDOMEN: Soft, Nontender, Nondistended; Bowel sounds present  EXTREMITIES:  2+ Peripheral Pulses, No clubbing, cyanosis,trace edema  LYMPH: No cervical adenopathy  RECTAL: deferred  BREAST: No palpatble masses, skin no lesions   : deferred  SKIN: No rashes or lesions

## 2017-08-07 ENCOUNTER — TRANSCRIPTION ENCOUNTER (OUTPATIENT)
Age: 72
End: 2017-08-07

## 2017-08-07 VITALS — WEIGHT: 170.64 LBS

## 2017-08-07 DIAGNOSIS — I51.9 HEART DISEASE, UNSPECIFIED: ICD-10-CM

## 2017-08-07 LAB
ANION GAP SERPL CALC-SCNC: 12 MMOL/L — SIGNIFICANT CHANGE UP (ref 5–17)
BUN SERPL-MCNC: 93 MG/DL — HIGH (ref 7–23)
CALCIUM SERPL-MCNC: 8.2 MG/DL — LOW (ref 8.5–10.1)
CHLORIDE SERPL-SCNC: 103 MMOL/L — SIGNIFICANT CHANGE UP (ref 96–108)
CO2 SERPL-SCNC: 25 MMOL/L — SIGNIFICANT CHANGE UP (ref 22–31)
CREAT SERPL-MCNC: 5.86 MG/DL — HIGH (ref 0.5–1.3)
GLUCOSE SERPL-MCNC: 134 MG/DL — HIGH (ref 70–99)
HCT VFR BLD CALC: 28.6 % — LOW (ref 34.5–45)
HGB BLD-MCNC: 9.1 G/DL — LOW (ref 11.5–15.5)
MCHC RBC-ENTMCNC: 26.4 PG — LOW (ref 27–34)
MCHC RBC-ENTMCNC: 32 GM/DL — SIGNIFICANT CHANGE UP (ref 32–36)
MCV RBC AUTO: 82.5 FL — SIGNIFICANT CHANGE UP (ref 80–100)
PLATELET # BLD AUTO: 438 K/UL — HIGH (ref 150–400)
POTASSIUM SERPL-MCNC: 3.8 MMOL/L — SIGNIFICANT CHANGE UP (ref 3.5–5.3)
POTASSIUM SERPL-SCNC: 3.8 MMOL/L — SIGNIFICANT CHANGE UP (ref 3.5–5.3)
RBC # BLD: 3.47 M/UL — LOW (ref 3.8–5.2)
RBC # FLD: 17.3 % — HIGH (ref 11–15)
SODIUM SERPL-SCNC: 140 MMOL/L — SIGNIFICANT CHANGE UP (ref 135–145)
WBC # BLD: 9.8 K/UL — SIGNIFICANT CHANGE UP (ref 3.8–10.5)
WBC # FLD AUTO: 9.8 K/UL — SIGNIFICANT CHANGE UP (ref 3.8–10.5)

## 2017-08-07 PROCEDURE — 99238 HOSP IP/OBS DSCHRG MGMT 30/<: CPT

## 2017-08-07 RX ORDER — FUROSEMIDE 40 MG
1 TABLET ORAL
Qty: 0 | Refills: 0 | COMMUNITY
Start: 2017-08-07

## 2017-08-07 RX ORDER — ATORVASTATIN CALCIUM 80 MG/1
1 TABLET, FILM COATED ORAL
Qty: 0 | Refills: 0 | COMMUNITY

## 2017-08-07 RX ORDER — AMLODIPINE BESYLATE 2.5 MG/1
1 TABLET ORAL
Qty: 0 | Refills: 0 | COMMUNITY
Start: 2017-08-07

## 2017-08-07 RX ORDER — VALSARTAN 80 MG/1
1 TABLET ORAL
Qty: 0 | Refills: 0 | DISCHARGE
Start: 2017-08-07

## 2017-08-07 RX ORDER — HYDRALAZINE HCL 50 MG
0 TABLET ORAL
Qty: 0 | Refills: 0 | COMMUNITY

## 2017-08-07 RX ORDER — DOXAZOSIN MESYLATE 4 MG
1 TABLET ORAL
Qty: 0 | Refills: 0 | COMMUNITY
Start: 2017-08-07

## 2017-08-07 RX ORDER — ASPIRIN/CALCIUM CARB/MAGNESIUM 324 MG
1 TABLET ORAL
Qty: 0 | Refills: 0 | COMMUNITY
Start: 2017-08-07

## 2017-08-07 RX ORDER — ATORVASTATIN CALCIUM 80 MG/1
1 TABLET, FILM COATED ORAL
Qty: 0 | Refills: 0 | DISCHARGE
Start: 2017-08-07

## 2017-08-07 RX ORDER — HYDRALAZINE HCL 50 MG
1 TABLET ORAL
Qty: 0 | Refills: 0 | DISCHARGE
Start: 2017-08-07

## 2017-08-07 RX ORDER — INSULIN LISPRO 100/ML
0 VIAL (ML) SUBCUTANEOUS
Qty: 0 | Refills: 0 | DISCHARGE
Start: 2017-08-07

## 2017-08-07 RX ORDER — SITAGLIPTIN 50 MG/1
1 TABLET, FILM COATED ORAL
Qty: 0 | Refills: 0 | COMMUNITY

## 2017-08-07 RX ORDER — FUROSEMIDE 40 MG
0 TABLET ORAL
Qty: 0 | Refills: 0 | COMMUNITY

## 2017-08-07 RX ORDER — DOCUSATE SODIUM 100 MG
1 CAPSULE ORAL
Qty: 0 | Refills: 0 | DISCHARGE
Start: 2017-08-07

## 2017-08-07 RX ADMIN — Medication 100 MILLIGRAM(S): at 05:36

## 2017-08-07 RX ADMIN — Medication 0.3 MILLIGRAM(S): at 05:36

## 2017-08-07 RX ADMIN — VALSARTAN 320 MILLIGRAM(S): 80 TABLET ORAL at 05:36

## 2017-08-07 RX ADMIN — Medication 80 MILLIGRAM(S): at 05:36

## 2017-08-07 RX ADMIN — Medication 2: at 12:00

## 2017-08-07 RX ADMIN — Medication 1: at 08:22

## 2017-08-07 RX ADMIN — Medication 1 TABLET(S): at 12:00

## 2017-08-07 RX ADMIN — AMLODIPINE BESYLATE 10 MILLIGRAM(S): 2.5 TABLET ORAL at 05:36

## 2017-08-07 RX ADMIN — Medication 81 MILLIGRAM(S): at 12:00

## 2017-08-07 NOTE — DISCHARGE NOTE ADULT - PLAN OF CARE
follow up renal doctors resume prior home diet    low salt low fat low protein diabetic diet continue with epogen per renal doctor continue with meds contiue with meds

## 2017-08-07 NOTE — DISCHARGE NOTE ADULT - PATIENT PORTAL LINK FT
“You can access the [facility] Patient Portal, offered by [facility], by registering with the following website: [email or web address]”

## 2017-08-07 NOTE — DISCHARGE NOTE ADULT - CARE PROVIDER_API CALL
Jaciel Reinoso (DO), Internal Medicine  [facility]  [address] [place] [address]  Phone: [phone]  Fax: [phone]    your established Kidney doctor,   Phone: (   )    -  Fax: (   )    -

## 2017-08-07 NOTE — DISCHARGE NOTE ADULT - PROVIDER TOKENS
TOKEN:'[id number]',FREE:[LAST:[your established Kidney doctor],PHONE:[(   )    -],FAX:[(   )    -]]

## 2017-08-07 NOTE — DISCHARGE NOTE ADULT - SECONDARY DIAGNOSIS.
Symptomatic anemia Type 2 diabetes mellitus with stage 5 chronic kidney disease not on chronic dialysis, with long-term current use of insulin Essential hypertension

## 2017-08-07 NOTE — DISCHARGE NOTE ADULT - HOSPITAL COURSE
· Assessment	  73 yo female h/o CKD and anemia sent to the ed for symptomatic anemia. pt feels better denies any sx.    Problem/Plan - 1:  ·  Problem: Symptomatic anemia.  Plan: admit to tele  s/p 2 units  prbc transfusion hgb at 9 from 6.1.     Problem/Plan - 2:  ·  Problem: Stage 5 chronic kidney disease not on chronic dialysis.  Plan: renal diet  pt refusing hd. d/w family at bedside adise  continued outpt f/u with her established renal doctor.    Problem/Plan - 3:  ·  Problem: Congestive heart failure, unspecified congestive heart failure chronicity, unspecified congestive heart failure type.  Plan: stable cont lasix,   monitor fluid status.     Problem/Plan - 4:  ·  Problem: Type 2 diabetes mellitus with stage 5 chronic kidney disease not on chronic dialysis, without long-term current use of insulin.  Plan: ss insulin coverage  decrease lantus to 10.     Problem/Plan - 5:  ·  Problem: Essential hypertension.  Plan: bp controlled.     Problem/Plan - 6:  Problem: Preventive measure. Plan: b/l scds.    Problem/Plan - 7:  ·  Problem: Diastolic dysfunction.  Plan: stable   ef from 5/2017 normal limits and mil diastolic dysfunction.

## 2017-08-07 NOTE — DISCHARGE NOTE ADULT - MEDICATION SUMMARY - MEDICATIONS TO STOP TAKING
I will STOP taking the medications listed below when I get home from the hospital:    Januvia 50 mg oral tablet  -- 1 tab(s) by mouth once a day

## 2017-08-07 NOTE — PROGRESS NOTE ADULT - PROBLEM SELECTOR PLAN 1
admit to tele  s/p 2 unitsb prbc transfusion hgb at 9 from 6.1 admit to tele  s/p 2 units  prbc transfusion hgb at 9 from 6.1

## 2017-08-07 NOTE — PROGRESS NOTE ADULT - SUBJECTIVE AND OBJECTIVE BOX
Patient is a 72y old  Female who presents with a chief complaint of anemia (06 Aug 2017 05:37)      OVERNIGHT EVENTS:      REVIEW OF SYSTEMS: denies chest pain/SOB, diaphoresis, no F/C, cough, dizziness, headache, blurry vision, nausea, vomiting, abdominal pain. Rest unremarkable     MEDICATIONS  (STANDING):  aspirin enteric coated 81 milliGRAM(s) Oral daily  valsartan 320 milliGRAM(s) Oral daily  cloNIDine 0.3 milliGRAM(s) Oral two times a day  doxazosin 2 milliGRAM(s) Oral at bedtime  atorvastatin 20 milliGRAM(s) Oral at bedtime  amLODIPine   Tablet 10 milliGRAM(s) Oral daily  furosemide    Tablet 80 milliGRAM(s) Oral two times a day  docusate sodium 100 milliGRAM(s) Oral two times a day  hydrALAZINE 100 milliGRAM(s) Oral two times a day  multivitamin 1 Tablet(s) Oral daily  insulin lispro (HumaLOG) corrective regimen sliding scale   SubCutaneous three times a day before meals  insulin lispro (HumaLOG) corrective regimen sliding scale   SubCutaneous at bedtime  dextrose 5%. 1000 milliLiter(s) (50 mL/Hr) IV Continuous <Continuous>  dextrose 50% Injectable 12.5 Gram(s) IV Push once  dextrose 50% Injectable 25 Gram(s) IV Push once  dextrose 50% Injectable 25 Gram(s) IV Push once    MEDICATIONS  (PRN):  dextrose Gel 1 Dose(s) Oral once PRN Blood Glucose LESS THAN 70 milliGRAM(s)/deciliter  glucagon  Injectable 1 milliGRAM(s) IntraMuscular once PRN Glucose LESS THAN 70 milligrams/deciliter      Allergies    No Known Allergies    Intolerances        SUBJECTIVE: in bed in NAD, no acute events overnight     T(F): 96.8 (08-07-17 @ 11:11), Max: 98.4 (08-06-17 @ 13:25)  HR: 69 (08-07-17 @ 11:11) (64 - 100)  BP: 101/62 (08-07-17 @ 11:11) (101/62 - 180/74)  RR: 18 (08-07-17 @ 11:11) (16 - 18)  SpO2: 99% (08-07-17 @ 11:11) (99% - 100%)  Wt(kg): --    PHYSICAL EXAM:  GENERAL: NAD, well-groomed, well-developed  HEAD:  Atraumatic, Normocephalic  EYES: EOMI, PERRLA, conjunctiva and sclera clear  ENMT: No tonsillar erythema, exudates, or enlargement; Moist mucous membranes, Good dentition, No lesions  NECK: Supple, No JVD, Normal thyroid  CHEST/LUNG: Clear to  auscultation bilaterally; No rales, rhonchi, wheezing, or rubs  bilaterally  HEART: Regular rate and rhythm; No murmurs, rubs, or gallops  ABDOMEN: Soft, Nontender, Nondistended; Bowel sounds present  EXTREMITIES:  2+ Peripheral Pulses, No clubbing, cyanosis, or edema BL LE  LYMPH: No lymphadenopathy noted  SKIN: No rashes or lesions  NERVOUS SYSTEM:  Alert & Oriented X3, Good concentration; Motor Strength 5/5 B/L upper and lower extremities;   DTRs 2+ intact and symmetric, sensation intact BL    LABS:                        9.1    9.8   )-----------( 438      ( 07 Aug 2017 06:36 )             28.6     08-07    140  |  103  |  93<H>  ----------------------------<  134<H>  3.8   |  25  |  5.86<H>    Ca    8.2<L>      07 Aug 2017 06:36    TPro  6.5  /  Alb  2.5<L>  /  TBili  0.2  /  DBili  x   /  AST  18  /  ALT  21  /  AlkPhos  78  08-06    PT/INR - ( 06 Aug 2017 00:57 )   PT: 10.0 sec;   INR: 0.92 ratio         PTT - ( 06 Aug 2017 00:57 )  PTT:27.5 sec    Cultures;   CAPILLARY BLOOD GLUCOSE  182 (07 Aug 2017 07:43)  229 (06 Aug 2017 21:48)  157 (06 Aug 2017 17:57)        Lipid panel:           RADIOLOGY & ADDITIONAL TESTS:      Imaging Personally Reviewed:  [ ] YES      Consultant(s) Notes Reviewed:  [ ] YES     Care Discussed with [ ] Consultants [X ] Patient [ ] Family  [x ]    [x ]  Other; RN

## 2017-08-07 NOTE — DISCHARGE NOTE ADULT - CARE PLAN
Principal Discharge DX:	Stage 5 chronic kidney disease not on chronic dialysis  Goal:	follow up renal doctors  Instructions for follow-up, activity and diet:	resume prior home diet    low salt low fat low protein diabetic diet  Secondary Diagnosis:	Symptomatic anemia  Goal:	continue with epogen per renal doctor  Secondary Diagnosis:	Type 2 diabetes mellitus with stage 5 chronic kidney disease not on chronic dialysis, with long-term current use of insulin  Goal:	continue with meds  Secondary Diagnosis:	Essential hypertension  Goal:	contiue with meds

## 2017-08-07 NOTE — DISCHARGE NOTE ADULT - CARE PROVIDERS DIRECT ADDRESSES
[email or web address]

## 2017-08-07 NOTE — DISCHARGE NOTE ADULT - MEDICATION SUMMARY - MEDICATIONS TO TAKE
I will START or STAY ON the medications listed below when I get home from the hospital:    aspirin 81 mg oral delayed release tablet  -- 1 tab(s) by mouth once a day  -- Indication: For general    valsartan 320 mg oral tablet  -- 1 tab(s) by mouth once a day  -- Indication: For htn    cloNIDine 0.3 mg oral tablet  -- 1 tab(s) by mouth 2 times a day  -- Indication: For htn    doxazosin 2 mg oral tablet  -- 1 tab(s) by mouth once a day (at bedtime)  -- Indication: For htn    HumaLOG 100 units/mL subcutaneous solution  --  subcutaneous 3 times a day (before meals); 1 Unit(s) if Glucose 151 - 200  2 Unit(s) if Glucose 201 - 250  3 Unit(s) if Glucose 251 - 300  4 Unit(s) if Glucose 301 - 350  5 Unit(s) if Glucose 351 - 400  6 Unit(s) if Glucose Greater Than 400  -- Indication: For Dm    insulin glargine  -- 15 unit(s) subcutaneous once a day (at bedtime)  -- Indication: For Dm    atorvastatin 20 mg oral tablet  -- 1 tab(s) by mouth once a day (at bedtime)  -- Indication: For hld    amLODIPine 10 mg oral tablet  -- 1 tab(s) by mouth once a day  -- Indication: For htn    furosemide 80 mg oral tablet  -- 1 tab(s) by mouth 2 times a day  -- Indication: For Esrd    ferrous sulfate 324 mg (65 mg elemental iron) oral tablet  -- 1 by mouth 3 times a day  -- Indication: For anemia    docusate sodium 100 mg oral capsule  -- 1 cap(s) by mouth 2 times a day  -- Indication: For general    hydrALAZINE 100 mg oral tablet  -- 1 tab(s) by mouth 2 times a day  -- Indication: For htn    Multiple Vitamins oral tablet  -- 1 tab(s) by mouth once a day  -- Indication: For general

## 2017-08-09 DIAGNOSIS — I13.2 HYPERTENSIVE HEART AND CHRONIC KIDNEY DISEASE WITH HEART FAILURE AND WITH STAGE 5 CHRONIC KIDNEY DISEASE, OR END STAGE RENAL DISEASE: ICD-10-CM

## 2017-08-09 DIAGNOSIS — Z79.82 LONG TERM (CURRENT) USE OF ASPIRIN: ICD-10-CM

## 2017-08-09 DIAGNOSIS — Z53.20 PROCEDURE AND TREATMENT NOT CARRIED OUT BECAUSE OF PATIENT'S DECISION FOR UNSPECIFIED REASONS: ICD-10-CM

## 2017-08-09 DIAGNOSIS — D64.9 ANEMIA, UNSPECIFIED: ICD-10-CM

## 2017-08-09 DIAGNOSIS — E11.22 TYPE 2 DIABETES MELLITUS WITH DIABETIC CHRONIC KIDNEY DISEASE: ICD-10-CM

## 2017-08-09 DIAGNOSIS — N18.5 CHRONIC KIDNEY DISEASE, STAGE 5: ICD-10-CM

## 2017-08-09 DIAGNOSIS — Z79.4 LONG TERM (CURRENT) USE OF INSULIN: ICD-10-CM

## 2017-08-09 DIAGNOSIS — I50.9 HEART FAILURE, UNSPECIFIED: ICD-10-CM

## 2017-08-28 ENCOUNTER — INPATIENT (INPATIENT)
Facility: HOSPITAL | Age: 72
LOS: 4 days | Discharge: HOME HEALTH | End: 2017-09-02
Attending: INTERNAL MEDICINE | Admitting: INTERNAL MEDICINE
Payer: MEDICARE

## 2017-08-28 VITALS
OXYGEN SATURATION: 99 % | WEIGHT: 175.05 LBS | SYSTOLIC BLOOD PRESSURE: 166 MMHG | HEART RATE: 109 BPM | DIASTOLIC BLOOD PRESSURE: 68 MMHG | TEMPERATURE: 98 F | HEIGHT: 66 IN

## 2017-08-28 DIAGNOSIS — N18.5 CHRONIC KIDNEY DISEASE, STAGE 5: ICD-10-CM

## 2017-08-28 DIAGNOSIS — K85.90 ACUTE PANCREATITIS WITHOUT NECROSIS OR INFECTION, UNSPECIFIED: ICD-10-CM

## 2017-08-28 DIAGNOSIS — E11.22 TYPE 2 DIABETES MELLITUS WITH DIABETIC CHRONIC KIDNEY DISEASE: ICD-10-CM

## 2017-08-28 DIAGNOSIS — K52.9 NONINFECTIVE GASTROENTERITIS AND COLITIS, UNSPECIFIED: ICD-10-CM

## 2017-08-28 DIAGNOSIS — I10 ESSENTIAL (PRIMARY) HYPERTENSION: ICD-10-CM

## 2017-08-28 DIAGNOSIS — R10.13 EPIGASTRIC PAIN: ICD-10-CM

## 2017-08-28 DIAGNOSIS — D63.8 ANEMIA IN OTHER CHRONIC DISEASES CLASSIFIED ELSEWHERE: ICD-10-CM

## 2017-08-28 LAB
ALBUMIN SERPL ELPH-MCNC: 2.7 G/DL — LOW (ref 3.3–5)
ALP SERPL-CCNC: 95 U/L — SIGNIFICANT CHANGE UP (ref 40–120)
ALT FLD-CCNC: 18 U/L — SIGNIFICANT CHANGE UP (ref 12–78)
ANION GAP SERPL CALC-SCNC: 16 MMOL/L — SIGNIFICANT CHANGE UP (ref 5–17)
ANISOCYTOSIS BLD QL: SLIGHT — SIGNIFICANT CHANGE UP
APTT BLD: 32 SEC — SIGNIFICANT CHANGE UP (ref 27.5–37.4)
AST SERPL-CCNC: 35 U/L — SIGNIFICANT CHANGE UP (ref 15–37)
BILIRUB SERPL-MCNC: 0.4 MG/DL — SIGNIFICANT CHANGE UP (ref 0.2–1.2)
BLD GP AB SCN SERPL QL: ABNORMAL
BUN SERPL-MCNC: 100 MG/DL — HIGH (ref 7–23)
CALCIUM SERPL-MCNC: 9.3 MG/DL — SIGNIFICANT CHANGE UP (ref 8.5–10.1)
CHLORIDE SERPL-SCNC: 108 MMOL/L — SIGNIFICANT CHANGE UP (ref 96–108)
CO2 SERPL-SCNC: 18 MMOL/L — LOW (ref 22–31)
CREAT SERPL-MCNC: 6.38 MG/DL — HIGH (ref 0.5–1.3)
DIR ANTIGLOB POLYSPECIFIC INTERPRETATION: SIGNIFICANT CHANGE UP
GLUCOSE SERPL-MCNC: 350 MG/DL — HIGH (ref 70–99)
HCT VFR BLD CALC: 24.6 % — LOW (ref 34.5–45)
HGB BLD-MCNC: 7.4 G/DL — LOW (ref 11.5–15.5)
HYPOCHROMIA BLD QL: SLIGHT — SIGNIFICANT CHANGE UP
INR BLD: 0.99 RATIO — SIGNIFICANT CHANGE UP (ref 0.88–1.16)
LACTATE SERPL-SCNC: 1.5 MMOL/L — SIGNIFICANT CHANGE UP (ref 0.7–2)
LACTATE SERPL-SCNC: 2.6 MMOL/L — HIGH (ref 0.7–2)
LIDOCAIN IGE QN: 900 U/L — HIGH (ref 73–393)
LYMPHOCYTES # BLD AUTO: 4 % — LOW (ref 13–44)
MCHC RBC-ENTMCNC: 24.9 PG — LOW (ref 27–34)
MCHC RBC-ENTMCNC: 29.9 GM/DL — LOW (ref 32–36)
MCV RBC AUTO: 83.2 FL — SIGNIFICANT CHANGE UP (ref 80–100)
MONOCYTES NFR BLD AUTO: 3 % — SIGNIFICANT CHANGE UP (ref 2–14)
NEUTROPHILS NFR BLD AUTO: 93 % — HIGH (ref 43–77)
PLAT MORPH BLD: NORMAL — SIGNIFICANT CHANGE UP
PLATELET # BLD AUTO: 537 K/UL — HIGH (ref 150–400)
POLYCHROMASIA BLD QL SMEAR: SLIGHT — SIGNIFICANT CHANGE UP
POTASSIUM SERPL-MCNC: 4.5 MMOL/L — SIGNIFICANT CHANGE UP (ref 3.5–5.3)
POTASSIUM SERPL-SCNC: 4.5 MMOL/L — SIGNIFICANT CHANGE UP (ref 3.5–5.3)
PROT SERPL-MCNC: 7.9 GM/DL — SIGNIFICANT CHANGE UP (ref 6–8.3)
PROTHROM AB SERPL-ACNC: 10.8 SEC — SIGNIFICANT CHANGE UP (ref 9.8–12.7)
RBC # BLD: 2.96 M/UL — LOW (ref 3.8–5.2)
RBC # FLD: 15.3 % — HIGH (ref 11–15)
RBC BLD AUTO: ABNORMAL
SCHISTOCYTES BLD QL AUTO: SLIGHT — SIGNIFICANT CHANGE UP
SODIUM SERPL-SCNC: 142 MMOL/L — SIGNIFICANT CHANGE UP (ref 135–145)
TROPONIN I SERPL-MCNC: 0.05 NG/ML — HIGH (ref 0.01–0.04)
WBC # BLD: 14.7 K/UL — HIGH (ref 3.8–10.5)
WBC # FLD AUTO: 14.7 K/UL — HIGH (ref 3.8–10.5)

## 2017-08-28 PROCEDURE — 93010 ELECTROCARDIOGRAM REPORT: CPT

## 2017-08-28 PROCEDURE — 99285 EMERGENCY DEPT VISIT HI MDM: CPT

## 2017-08-28 PROCEDURE — 99223 1ST HOSP IP/OBS HIGH 75: CPT

## 2017-08-28 PROCEDURE — 71010: CPT | Mod: 26

## 2017-08-28 PROCEDURE — 76700 US EXAM ABDOM COMPLETE: CPT | Mod: 26

## 2017-08-28 PROCEDURE — 74176 CT ABD & PELVIS W/O CONTRAST: CPT | Mod: 26

## 2017-08-28 PROCEDURE — 86077 PHYS BLOOD BANK SERV XMATCH: CPT

## 2017-08-28 RX ORDER — ATORVASTATIN CALCIUM 80 MG/1
20 TABLET, FILM COATED ORAL AT BEDTIME
Qty: 0 | Refills: 0 | Status: DISCONTINUED | OUTPATIENT
Start: 2017-08-28 | End: 2017-09-02

## 2017-08-28 RX ORDER — SODIUM CHLORIDE 9 MG/ML
1000 INJECTION, SOLUTION INTRAVENOUS
Qty: 0 | Refills: 0 | Status: DISCONTINUED | OUTPATIENT
Start: 2017-08-28 | End: 2017-08-28

## 2017-08-28 RX ORDER — INSULIN GLARGINE 100 [IU]/ML
15 INJECTION, SOLUTION SUBCUTANEOUS AT BEDTIME
Qty: 0 | Refills: 0 | Status: DISCONTINUED | OUTPATIENT
Start: 2017-08-28 | End: 2017-09-02

## 2017-08-28 RX ORDER — MORPHINE SULFATE 50 MG/1
2 CAPSULE, EXTENDED RELEASE ORAL EVERY 6 HOURS
Qty: 0 | Refills: 0 | Status: DISCONTINUED | OUTPATIENT
Start: 2017-08-28 | End: 2017-09-02

## 2017-08-28 RX ORDER — GLUCAGON INJECTION, SOLUTION 0.5 MG/.1ML
1 INJECTION, SOLUTION SUBCUTANEOUS ONCE
Qty: 0 | Refills: 0 | Status: DISCONTINUED | OUTPATIENT
Start: 2017-08-28 | End: 2017-09-02

## 2017-08-28 RX ORDER — ASPIRIN/CALCIUM CARB/MAGNESIUM 324 MG
81 TABLET ORAL DAILY
Qty: 0 | Refills: 0 | Status: DISCONTINUED | OUTPATIENT
Start: 2017-08-28 | End: 2017-08-30

## 2017-08-28 RX ORDER — FUROSEMIDE 40 MG
40 TABLET ORAL
Qty: 0 | Refills: 0 | Status: DISCONTINUED | OUTPATIENT
Start: 2017-08-28 | End: 2017-08-29

## 2017-08-28 RX ORDER — DOXAZOSIN MESYLATE 4 MG
2 TABLET ORAL AT BEDTIME
Qty: 0 | Refills: 0 | Status: DISCONTINUED | OUTPATIENT
Start: 2017-08-28 | End: 2017-09-02

## 2017-08-28 RX ORDER — CIPROFLOXACIN LACTATE 400MG/40ML
200 VIAL (ML) INTRAVENOUS EVERY 24 HOURS
Qty: 0 | Refills: 0 | Status: DISCONTINUED | OUTPATIENT
Start: 2017-08-29 | End: 2017-09-02

## 2017-08-28 RX ORDER — INSULIN LISPRO 100/ML
VIAL (ML) SUBCUTANEOUS
Qty: 0 | Refills: 0 | Status: DISCONTINUED | OUTPATIENT
Start: 2017-08-28 | End: 2017-09-02

## 2017-08-28 RX ORDER — HYDRALAZINE HCL 50 MG
0 TABLET ORAL
Qty: 0 | Refills: 0 | COMMUNITY

## 2017-08-28 RX ORDER — MORPHINE SULFATE 50 MG/1
2 CAPSULE, EXTENDED RELEASE ORAL ONCE
Qty: 0 | Refills: 0 | Status: DISCONTINUED | OUTPATIENT
Start: 2017-08-28 | End: 2017-08-28

## 2017-08-28 RX ORDER — CIPROFLOXACIN LACTATE 400MG/40ML
200 VIAL (ML) INTRAVENOUS ONCE
Qty: 0 | Refills: 0 | Status: COMPLETED | OUTPATIENT
Start: 2017-08-28 | End: 2017-08-28

## 2017-08-28 RX ORDER — INSULIN LISPRO 100/ML
0 VIAL (ML) SUBCUTANEOUS
Qty: 0 | Refills: 0 | COMMUNITY

## 2017-08-28 RX ORDER — AMLODIPINE BESYLATE 2.5 MG/1
10 TABLET ORAL DAILY
Qty: 0 | Refills: 0 | Status: DISCONTINUED | OUTPATIENT
Start: 2017-08-28 | End: 2017-09-02

## 2017-08-28 RX ORDER — FAMOTIDINE 10 MG/ML
20 INJECTION INTRAVENOUS ONCE
Qty: 0 | Refills: 0 | Status: COMPLETED | OUTPATIENT
Start: 2017-08-28 | End: 2017-08-28

## 2017-08-28 RX ORDER — ONDANSETRON 8 MG/1
4 TABLET, FILM COATED ORAL EVERY 6 HOURS
Qty: 0 | Refills: 0 | Status: DISCONTINUED | OUTPATIENT
Start: 2017-08-28 | End: 2017-09-02

## 2017-08-28 RX ORDER — METRONIDAZOLE 500 MG
500 TABLET ORAL ONCE
Qty: 0 | Refills: 0 | Status: COMPLETED | OUTPATIENT
Start: 2017-08-28 | End: 2017-08-28

## 2017-08-28 RX ORDER — DEXTROSE 50 % IN WATER 50 %
1 SYRINGE (ML) INTRAVENOUS ONCE
Qty: 0 | Refills: 0 | Status: DISCONTINUED | OUTPATIENT
Start: 2017-08-28 | End: 2017-09-02

## 2017-08-28 RX ORDER — DEXTROSE 50 % IN WATER 50 %
25 SYRINGE (ML) INTRAVENOUS ONCE
Qty: 0 | Refills: 0 | Status: DISCONTINUED | OUTPATIENT
Start: 2017-08-28 | End: 2017-09-02

## 2017-08-28 RX ORDER — ONDANSETRON 8 MG/1
8 TABLET, FILM COATED ORAL ONCE
Qty: 0 | Refills: 0 | Status: COMPLETED | OUTPATIENT
Start: 2017-08-28 | End: 2017-08-28

## 2017-08-28 RX ORDER — METRONIDAZOLE 500 MG
500 TABLET ORAL EVERY 8 HOURS
Qty: 0 | Refills: 0 | Status: DISCONTINUED | OUTPATIENT
Start: 2017-08-28 | End: 2017-09-02

## 2017-08-28 RX ORDER — PANTOPRAZOLE SODIUM 20 MG/1
40 TABLET, DELAYED RELEASE ORAL ONCE
Qty: 0 | Refills: 0 | Status: COMPLETED | OUTPATIENT
Start: 2017-08-28 | End: 2017-08-28

## 2017-08-28 RX ORDER — DOCUSATE SODIUM 100 MG
100 CAPSULE ORAL
Qty: 0 | Refills: 0 | Status: DISCONTINUED | OUTPATIENT
Start: 2017-08-28 | End: 2017-09-02

## 2017-08-28 RX ORDER — FUROSEMIDE 40 MG
1 TABLET ORAL
Qty: 60 | Refills: 2 | COMMUNITY

## 2017-08-28 RX ORDER — PANTOPRAZOLE SODIUM 20 MG/1
40 TABLET, DELAYED RELEASE ORAL
Qty: 0 | Refills: 0 | Status: DISCONTINUED | OUTPATIENT
Start: 2017-08-29 | End: 2017-09-02

## 2017-08-28 RX ORDER — INSULIN LISPRO 100/ML
VIAL (ML) SUBCUTANEOUS AT BEDTIME
Qty: 0 | Refills: 0 | Status: DISCONTINUED | OUTPATIENT
Start: 2017-08-28 | End: 2017-09-02

## 2017-08-28 RX ORDER — METRONIDAZOLE 500 MG
TABLET ORAL
Qty: 0 | Refills: 0 | Status: DISCONTINUED | OUTPATIENT
Start: 2017-08-28 | End: 2017-09-02

## 2017-08-28 RX ORDER — HYDRALAZINE HCL 50 MG
10 TABLET ORAL EVERY 6 HOURS
Qty: 0 | Refills: 0 | Status: DISCONTINUED | OUTPATIENT
Start: 2017-08-28 | End: 2017-09-02

## 2017-08-28 RX ORDER — HYDRALAZINE HCL 50 MG
100 TABLET ORAL EVERY 12 HOURS
Qty: 0 | Refills: 0 | Status: DISCONTINUED | OUTPATIENT
Start: 2017-08-28 | End: 2017-09-02

## 2017-08-28 RX ORDER — SODIUM CHLORIDE 9 MG/ML
1000 INJECTION, SOLUTION INTRAVENOUS
Qty: 0 | Refills: 0 | Status: DISCONTINUED | OUTPATIENT
Start: 2017-08-28 | End: 2017-09-02

## 2017-08-28 RX ORDER — SODIUM CHLORIDE 9 MG/ML
1000 INJECTION INTRAMUSCULAR; INTRAVENOUS; SUBCUTANEOUS ONCE
Qty: 0 | Refills: 0 | Status: COMPLETED | OUTPATIENT
Start: 2017-08-28 | End: 2017-08-28

## 2017-08-28 RX ORDER — DEXTROSE 50 % IN WATER 50 %
12.5 SYRINGE (ML) INTRAVENOUS ONCE
Qty: 0 | Refills: 0 | Status: DISCONTINUED | OUTPATIENT
Start: 2017-08-28 | End: 2017-09-02

## 2017-08-28 RX ORDER — SODIUM CHLORIDE 9 MG/ML
1000 INJECTION, SOLUTION INTRAVENOUS
Qty: 0 | Refills: 0 | Status: DISCONTINUED | OUTPATIENT
Start: 2017-08-28 | End: 2017-08-31

## 2017-08-28 RX ORDER — INSULIN HUMAN 100 [IU]/ML
3 INJECTION, SOLUTION SUBCUTANEOUS ONCE
Qty: 0 | Refills: 0 | Status: COMPLETED | OUTPATIENT
Start: 2017-08-28 | End: 2017-08-28

## 2017-08-28 RX ORDER — CIPROFLOXACIN LACTATE 400MG/40ML
VIAL (ML) INTRAVENOUS
Qty: 0 | Refills: 0 | Status: DISCONTINUED | OUTPATIENT
Start: 2017-08-28 | End: 2017-09-02

## 2017-08-28 RX ADMIN — Medication: at 18:19

## 2017-08-28 RX ADMIN — PANTOPRAZOLE SODIUM 40 MILLIGRAM(S): 20 TABLET, DELAYED RELEASE ORAL at 18:42

## 2017-08-28 RX ADMIN — SODIUM CHLORIDE 1000 MILLILITER(S): 9 INJECTION INTRAMUSCULAR; INTRAVENOUS; SUBCUTANEOUS at 08:45

## 2017-08-28 RX ADMIN — Medication 40 MILLIGRAM(S): at 19:07

## 2017-08-28 RX ADMIN — Medication 100 MILLIGRAM(S): at 18:42

## 2017-08-28 RX ADMIN — Medication 100 MILLIGRAM(S): at 18:41

## 2017-08-28 RX ADMIN — ONDANSETRON 8 MILLIGRAM(S): 8 TABLET, FILM COATED ORAL at 08:51

## 2017-08-28 RX ADMIN — MORPHINE SULFATE 2 MILLIGRAM(S): 50 CAPSULE, EXTENDED RELEASE ORAL at 10:20

## 2017-08-28 RX ADMIN — ONDANSETRON 4 MILLIGRAM(S): 8 TABLET, FILM COATED ORAL at 21:09

## 2017-08-28 RX ADMIN — INSULIN HUMAN 3 UNIT(S): 100 INJECTION, SOLUTION SUBCUTANEOUS at 11:38

## 2017-08-28 RX ADMIN — MORPHINE SULFATE 2 MILLIGRAM(S): 50 CAPSULE, EXTENDED RELEASE ORAL at 21:10

## 2017-08-28 RX ADMIN — Medication 100 MILLIGRAM(S): at 19:07

## 2017-08-28 RX ADMIN — MORPHINE SULFATE 2 MILLIGRAM(S): 50 CAPSULE, EXTENDED RELEASE ORAL at 09:50

## 2017-08-28 RX ADMIN — FAMOTIDINE 20 MILLIGRAM(S): 10 INJECTION INTRAVENOUS at 08:51

## 2017-08-28 NOTE — H&P ADULT - PROBLEM SELECTOR PLAN 1
admit to telemetry  CLD  GI eval/Dr. Hurtado  monitor lft/lipase  US abd negative for obstructive pathology admit to telemetry  CLD  GI eval/Dr. Hurtado  monitor lft/lipase  US abd negative for obstructive pathology  f/u CT abd as ordered by ED(pending as machine down)

## 2017-08-28 NOTE — ED ADULT TRIAGE NOTE - CHIEF COMPLAINT QUOTE
epigastric pain with nausea , vomiting and diarrhea , onset :last night  H/O ESRD , - not on dialysis as per daughter

## 2017-08-28 NOTE — ED PROVIDER NOTE - CONSTITUTIONAL, MLM
normal... Well appearing, well nourished, awake, alert, oriented to person, place, time/situation and appears tired, vomiting.

## 2017-08-28 NOTE — CONSULT NOTE ADULT - SUBJECTIVE AND OBJECTIVE BOX
Chart reviewed   Patient is a 72y old  Female who presents with a chief complaint of   HPI:  71 y/o F with pmh of year old female with PMH of HTN, HLD, DM II ESRD refusing dialysis so far presenting to ED due to nausea/vomiting and diarrhea developing since yesterday. Denies any fever/chills no persistent abd pain. Otherwise with nausea/vomiting feeling weak as well (28 Aug 2017 13:27)      Patient with hx of CKD 5; seeing nephrologist in [place]; recommended HD initiation but has refused. Seen by us in October 2016 during hospitalization.  Sx developed yesterday; review of indices revealed modest changes in Crcl from May 2017.    PAST MEDICAL & SURGICAL HISTORY:  Chronic kidney disease  Congestive heart failure, unspecified congestive heart failure chronicity, unspecified congestive heart failure type  Diabetes  Hypertension  No significant past surgical history    FAMILY HISTORY:  No pertinent family history in first degree relatives    No Known Allergies    MEDICATIONS  (STANDING):  aspirin enteric coated 81 milliGRAM(s) Oral daily  cloNIDine 0.3 milliGRAM(s) Oral two times a day  doxazosin 2 milliGRAM(s) Oral at bedtime  insulin glargine Injectable (LANTUS) 15 Unit(s) SubCutaneous at bedtime  atorvastatin 20 milliGRAM(s) Oral at bedtime  amLODIPine   Tablet 10 milliGRAM(s) Oral daily  furosemide    Tablet 40 milliGRAM(s) Oral two times a day  hydrALAZINE 100 milliGRAM(s) Oral every 12 hours  multivitamin 1 Tablet(s) Oral daily  insulin lispro (HumaLOG) corrective regimen sliding scale   SubCutaneous three times a day before meals  insulin lispro (HumaLOG) corrective regimen sliding scale   SubCutaneous at bedtime  dextrose 5%. 1000 milliLiter(s) (50 mL/Hr) IV Continuous <Continuous>  dextrose 50% Injectable 12.5 Gram(s) IV Push once  dextrose 50% Injectable 25 Gram(s) IV Push once  dextrose 50% Injectable 25 Gram(s) IV Push once  pantoprazole  Injectable 40 milliGRAM(s) IV Push once  dextrose 5% + sodium chloride 0.9%. 1000 milliLiter(s) (50 mL/Hr) IV Continuous <Continuous>    MEDICATIONS  (PRN):  docusate sodium 100 milliGRAM(s) Oral two times a day PRN Constipation  dextrose Gel 1 Dose(s) Oral once PRN Blood Glucose LESS THAN 70 milliGRAM(s)/deciliter  glucagon  Injectable 1 milliGRAM(s) IntraMuscular once PRN Glucose LESS THAN 70 milligrams/deciliter  hydrALAZINE Injectable 10 milliGRAM(s) IV Push every 6 hours PRN for systolic BP more than 160    Vital Signs Last 24 Hrs  T(C): 36.6 (28 Aug 2017 08:04), Max: 36.6 (28 Aug 2017 08:04)  T(F): 97.8 (28 Aug 2017 08:04), Max: 97.8 (28 Aug 2017 08:04)  HR: 105 (28 Aug 2017 09:55) (105 - 109)  BP: 186/68 (28 Aug 2017 09:55) (166/68 - 186/68)  BP(mean): --  RR: 15 (28 Aug 2017 09:55) (15 - 15)  SpO2: 98% (28 Aug 2017 09:55) (98% - 99%)    CAPILLARY BLOOD GLUCOSE        PHYSICAL EXAM:      T(C): 36.6 (28 Aug 2017 08:04), Max: 36.6 (28 Aug 2017 08:04)  HR: 105 (28 Aug 2017 09:55) (105 - 109)  BP: 186/68 (28 Aug 2017 09:55) (166/68 - 186/68)  RR: 15 (28 Aug 2017 09:55) (15 - 15)  SpO2: 98% (28 Aug 2017 09:55) (98% - 99%)  Wt(kg): --  Respiratory: clear anteriorly, decreased BS at bases  Cardiovascular: S1 S2  Gastrointestinal: soft NT ND + BS   Extremities:   1 edema              08-28    142  |  108  |  100<H>  ----------------------------<  350<H>  4.5   |  18<L>  |  6.38<H>    Ca    9.3      28 Aug 2017 08:45    TPro  7.9  /  Alb  2.7<L>  /  TBili  0.4  /  DBili  x   /  AST  35  /  ALT  18  /  AlkPhos  95  08-28                          7.4    14.7  )-----------( 537      ( 28 Aug 2017 08:45 )             24.6             Assessment and Plan    CKD 5 advancing, refusing HD in past.  Suspected acute gastrointestinal illness; r/o element of uremic gastritis.  IVF; supportive care.  Patient continue to refuse the option of HD if/ when indicated.  Fe profle, PTH, phos;   Will follow. Chart reviewed   Patient is a 72y old  Female who presents with a chief complaint of   HPI:  73 y/o F with pmh of year old female with PMH of HTN, HLD, DM II ESRD refusing dialysis so far presenting to ED due to nausea/vomiting and diarrhea developing since yesterday. Denies any fever/chills no persistent abd pain. Otherwise with nausea/vomiting feeling weak as well (28 Aug 2017 13:27)      Patient with hx of CKD 5; seeing nephrologist in [place]; recommended HD initiation but has refused. Seen by us in October 2016 during hospitalization.  Sx developed yesterday; review of indices revealed modest changes in Crcl from May 2017.  Noted elevated lipase.    PAST MEDICAL & SURGICAL HISTORY:  Chronic kidney disease  Congestive heart failure, unspecified congestive heart failure chronicity, unspecified congestive heart failure type  Diabetes  Hypertension  No significant past surgical history    FAMILY HISTORY:  No pertinent family history in first degree relatives    No Known Allergies    MEDICATIONS  (STANDING):  aspirin enteric coated 81 milliGRAM(s) Oral daily  cloNIDine 0.3 milliGRAM(s) Oral two times a day  doxazosin 2 milliGRAM(s) Oral at bedtime  insulin glargine Injectable (LANTUS) 15 Unit(s) SubCutaneous at bedtime  atorvastatin 20 milliGRAM(s) Oral at bedtime  amLODIPine   Tablet 10 milliGRAM(s) Oral daily  furosemide    Tablet 40 milliGRAM(s) Oral two times a day  hydrALAZINE 100 milliGRAM(s) Oral every 12 hours  multivitamin 1 Tablet(s) Oral daily  insulin lispro (HumaLOG) corrective regimen sliding scale   SubCutaneous three times a day before meals  insulin lispro (HumaLOG) corrective regimen sliding scale   SubCutaneous at bedtime  dextrose 5%. 1000 milliLiter(s) (50 mL/Hr) IV Continuous <Continuous>  dextrose 50% Injectable 12.5 Gram(s) IV Push once  dextrose 50% Injectable 25 Gram(s) IV Push once  dextrose 50% Injectable 25 Gram(s) IV Push once  pantoprazole  Injectable 40 milliGRAM(s) IV Push once  dextrose 5% + sodium chloride 0.9%. 1000 milliLiter(s) (50 mL/Hr) IV Continuous <Continuous>    MEDICATIONS  (PRN):  docusate sodium 100 milliGRAM(s) Oral two times a day PRN Constipation  dextrose Gel 1 Dose(s) Oral once PRN Blood Glucose LESS THAN 70 milliGRAM(s)/deciliter  glucagon  Injectable 1 milliGRAM(s) IntraMuscular once PRN Glucose LESS THAN 70 milligrams/deciliter  hydrALAZINE Injectable 10 milliGRAM(s) IV Push every 6 hours PRN for systolic BP more than 160    Vital Signs Last 24 Hrs  T(C): 36.6 (28 Aug 2017 08:04), Max: 36.6 (28 Aug 2017 08:04)  T(F): 97.8 (28 Aug 2017 08:04), Max: 97.8 (28 Aug 2017 08:04)  HR: 105 (28 Aug 2017 09:55) (105 - 109)  BP: 186/68 (28 Aug 2017 09:55) (166/68 - 186/68)  BP(mean): --  RR: 15 (28 Aug 2017 09:55) (15 - 15)  SpO2: 98% (28 Aug 2017 09:55) (98% - 99%)    CAPILLARY BLOOD GLUCOSE        PHYSICAL EXAM:      T(C): 36.6 (28 Aug 2017 08:04), Max: 36.6 (28 Aug 2017 08:04)  HR: 105 (28 Aug 2017 09:55) (105 - 109)  BP: 186/68 (28 Aug 2017 09:55) (166/68 - 186/68)  RR: 15 (28 Aug 2017 09:55) (15 - 15)  SpO2: 98% (28 Aug 2017 09:55) (98% - 99%)  Wt(kg): --  Respiratory: clear anteriorly, decreased BS at bases  Cardiovascular: S1 S2  Gastrointestinal: soft NT ND + BS   Extremities:   1 edema              08-28    142  |  108  |  100<H>  ----------------------------<  350<H>  4.5   |  18<L>  |  6.38<H>    Ca    9.3      28 Aug 2017 08:45    TPro  7.9  /  Alb  2.7<L>  /  TBili  0.4  /  DBili  x   /  AST  35  /  ALT  18  /  AlkPhos  95  08-28                          7.4    14.7  )-----------( 537      ( 28 Aug 2017 08:45 )             24.6             Assessment and Plan    CKD 5 advancing, refusing HD in past.  Suspected acute gastrointestinal illness; pancreatitis;  r/o element of uremic gastritis.  IVF; supportive care. CT abd and pelvis.  Patient continue to refuse the option of HD if/ when indicated.  Fe profle, PTH, phos;   Will follow. Chart reviewed   Patient is a 72y old  Female who presents with a chief complaint of   HPI:  71 y/o F with pmh of year old female with PMH of HTN, HLD, DM II ESRD refusing dialysis so far presenting to ED due to nausea/vomiting and diarrhea developing since yesterday. Denies any fever/chills no persistent abd pain. Otherwise with nausea/vomiting feeling weak as well (28 Aug 2017 13:27)      Patient with hx of CKD 5; seeing nephrologist in [place]; recommended HD initiation but has refused. Seen by us in October 2016 during hospitalization.  Sx developed yesterday; review of indices revealed modest changes in Crcl from May 2017.  Noted elevated lipase.    PAST MEDICAL & SURGICAL HISTORY:  Chronic kidney disease  Congestive heart failure, unspecified congestive heart failure chronicity, unspecified congestive heart failure type  Diabetes  Hypertension  No significant past surgical history    FAMILY HISTORY:  No pertinent family history in first degree relatives    No Known Allergies    MEDICATIONS  (STANDING):  aspirin enteric coated 81 milliGRAM(s) Oral daily  cloNIDine 0.3 milliGRAM(s) Oral two times a day  doxazosin 2 milliGRAM(s) Oral at bedtime  insulin glargine Injectable (LANTUS) 15 Unit(s) SubCutaneous at bedtime  atorvastatin 20 milliGRAM(s) Oral at bedtime  amLODIPine   Tablet 10 milliGRAM(s) Oral daily  furosemide    Tablet 40 milliGRAM(s) Oral two times a day  hydrALAZINE 100 milliGRAM(s) Oral every 12 hours  multivitamin 1 Tablet(s) Oral daily  insulin lispro (HumaLOG) corrective regimen sliding scale   SubCutaneous three times a day before meals  insulin lispro (HumaLOG) corrective regimen sliding scale   SubCutaneous at bedtime  dextrose 5%. 1000 milliLiter(s) (50 mL/Hr) IV Continuous <Continuous>  dextrose 50% Injectable 12.5 Gram(s) IV Push once  dextrose 50% Injectable 25 Gram(s) IV Push once  dextrose 50% Injectable 25 Gram(s) IV Push once  pantoprazole  Injectable 40 milliGRAM(s) IV Push once  dextrose 5% + sodium chloride 0.9%. 1000 milliLiter(s) (50 mL/Hr) IV Continuous <Continuous>    MEDICATIONS  (PRN):  docusate sodium 100 milliGRAM(s) Oral two times a day PRN Constipation  dextrose Gel 1 Dose(s) Oral once PRN Blood Glucose LESS THAN 70 milliGRAM(s)/deciliter  glucagon  Injectable 1 milliGRAM(s) IntraMuscular once PRN Glucose LESS THAN 70 milligrams/deciliter  hydrALAZINE Injectable 10 milliGRAM(s) IV Push every 6 hours PRN for systolic BP more than 160    Vital Signs Last 24 Hrs  T(C): 36.6 (28 Aug 2017 08:04), Max: 36.6 (28 Aug 2017 08:04)  T(F): 97.8 (28 Aug 2017 08:04), Max: 97.8 (28 Aug 2017 08:04)  HR: 105 (28 Aug 2017 09:55) (105 - 109)  BP: 186/68 (28 Aug 2017 09:55) (166/68 - 186/68)  BP(mean): --  RR: 15 (28 Aug 2017 09:55) (15 - 15)  SpO2: 98% (28 Aug 2017 09:55) (98% - 99%)    CAPILLARY BLOOD GLUCOSE        PHYSICAL EXAM:      T(C): 36.6 (28 Aug 2017 08:04), Max: 36.6 (28 Aug 2017 08:04)  HR: 105 (28 Aug 2017 09:55) (105 - 109)  BP: 186/68 (28 Aug 2017 09:55) (166/68 - 186/68)  RR: 15 (28 Aug 2017 09:55) (15 - 15)  SpO2: 98% (28 Aug 2017 09:55) (98% - 99%)  Wt(kg): --  Respiratory: clear anteriorly, decreased BS at bases  Cardiovascular: S1 S2  Gastrointestinal: soft NT ND + BS   Extremities:   1 edema              08-28    142  |  108  |  100<H>  ----------------------------<  350<H>  4.5   |  18<L>  |  6.38<H>    Ca    9.3      28 Aug 2017 08:45    TPro  7.9  /  Alb  2.7<L>  /  TBili  0.4  /  DBili  x   /  AST  35  /  ALT  18  /  AlkPhos  95  08-28                          7.4    14.7  )-----------( 537      ( 28 Aug 2017 08:45 )             24.6             Assessment and Plan    CKD 5 advancing, refusing HD in past.  Suspected acute gastrointestinal illness; pancreatitis;  r/o element of uremic gastritis.  IVF hypotonic; supportive care. CT abd and pelvis.  Change catapress to transdermal for now.  Patient continue to refuse the option of HD if/ when indicated.  Fe profle, PTH, phos;   Will follow.

## 2017-08-28 NOTE — H&P ADULT - PROBLEM SELECTOR PLAN 7
Epigastric pain w nausea/vomiting, abnormal EKG  Cardio consult/Dr. Tyler  d/w Dr. Tyler , recommend serial trop to r/o ACS,   troponin stat ordered ,

## 2017-08-28 NOTE — H&P ADULT - NSHPPHYSICALEXAM_GEN_ALL_CORE
Vital Signs Last 24 Hrs  T(C): 36.6 (28 Aug 2017 08:04), Max: 36.6 (28 Aug 2017 08:04)  T(F): 97.8 (28 Aug 2017 08:04), Max: 97.8 (28 Aug 2017 08:04)  HR: 105 (28 Aug 2017 09:55) (105 - 109)  BP: 186/68 (28 Aug 2017 09:55) (166/68 - 186/68)  BP(mean): --  RR: 15 (28 Aug 2017 09:55) (15 - 15)  SpO2: 98% (28 Aug 2017 09:55) (98% - 99%)    PHYSICAL EXAM:  GENERAL: NAD,  well-developed  HEAD:  Atraumatic, Normocephalic  EYES: EOMI, PERRLA, ++ pallor  ENMT: No tonsillar erythema, exudates, or enlargement; Moist mucous membranes,  NECK: Supple, No JVD, Normal thyroid  NERVOUS SYSTEM:  Alert & Oriented X3,  Motor Strength 4/5 B/L upper and lower extremities;  CHEST/LUNG: Clear to percussion bilaterally; No rales, rhonchi, wheezing, or rubs  HEART: Regular rate and rhythm;   ABDOMEN: Soft, Nontender, Nondistended; Bowel sounds present  EXTREMITIES:  no cyanosis, or edema  LYMPH: No lymphadenopathy noted

## 2017-08-28 NOTE — H&P ADULT - HISTORY OF PRESENT ILLNESS
73 y/o F with pmh of year old female with PMH of HTN, HLD, DM II ESRD refusing dialysis so far presenting to ED due to nausea/vomiting and diarrhea developing since yesterday. Denies any fever/chills no persistent abd pain. Otherwise with nausea/vomiting feeling weak as well 73 y/o F with PMH of HTN, HLD, DM II, CKD stage 5, has been refusing dialysis so far p/w c/o nausea/vomiting and diarrhea since yesterday. Also c/o epigastric pain, pain is severe , radiates to LUQ, 8/10, Denies any fever/chills , denies CP, SOB.  Denies cough, dizziness, HA or blurry vision, all other ROS negative.  patient AAOx3 , but poor historian, Pt also refuses for dialysis,  Pt was recently admitted at [facility] for symptomatic anemia and received 2 PRBC.

## 2017-08-28 NOTE — ED PROVIDER NOTE - OBJECTIVE STATEMENT
72 year old female with PMH of HTN, HLD, DM II ESRD refusing dialysis so far presenting to ED due to nausea/vomiting and diarrhea developing since yesterday. Denies any fever/chills no persistent abd pain. Otherwise with nausea/vomiting feeling weak as well

## 2017-08-28 NOTE — H&P ADULT - NSHPLABSRESULTS_GEN_ALL_CORE
LABS:                        7.4    14.7  )-----------( 537      ( 28 Aug 2017 08:45 )             24.6     08-28    142  |  108  |  100<H>  ----------------------------<  350<H>  4.5   |  18<L>  |  6.38<H>    Ca    9.3      28 Aug 2017 08:45    TPro  7.9  /  Alb  2.7<L>  /  TBili  0.4  /  DBili  x   /  AST  35  /  ALT  18  /  AlkPhos  95  08-28    PT/INR - ( 28 Aug 2017 09:56 )   PT: 10.8 sec;   INR: 0.99 ratio         PTT - ( 28 Aug 2017 09:56 )  PTT:32.0 sec        < from: US Abdomen Complete (08.28.17 @ 11:58) >      IMPRESSION:    No cholelithiasis or biliary ductal dilatation.  Bilaterally increased renal cortical echogenicity compatible with medical   renal disease.  No hydronephrosis.    < from: X-ray Chest 1 View AP/PA. (08.28.17 @ 10:33) >      IMPRESSION:    No focal air space opacities. LABS:                        7.4    14.7  )-----------( 537      ( 28 Aug 2017 08:45 )             24.6     08-28    142  |  108  |  100<H>  ----------------------------<  350<H>  4.5   |  18<L>  |  6.38<H>    Ca    9.3      28 Aug 2017 08:45    TPro  7.9  /  Alb  2.7<L>  /  TBili  0.4  /  DBili  x   /  AST  35  /  ALT  18  /  AlkPhos  95  08-28    PT/INR - ( 28 Aug 2017 09:56 )   PT: 10.8 sec;   INR: 0.99 ratio         PTT - ( 28 Aug 2017 09:56 )  PTT:32.0 sec        < from: US Abdomen Complete (08.28.17 @ 11:58) >      IMPRESSION:    No cholelithiasis or biliary ductal dilatation.  Bilaterally increased renal cortical echogenicity compatible with medical   renal disease.  No hydronephrosis.    < from: X-ray Chest 1 View AP/PA. (08.28.17 @ 10:33) >      IMPRESSION:    No focal air space opacities.    ekg  nsr, twi in lateral leads.

## 2017-08-28 NOTE — H&P ADULT - ASSESSMENT
71 y/o F with PMH of HTN, HLD, DM II, CKD stage 5, has been refusing dialysis so far p/w c/o nausea/vomiting and diarrhea since yesterday. Also c/o epigastric pain, pain is severe , radiates to LUQ, 8/10, Denies any fever/chills , denies CP, SOB. Pt being admitted for acute gastroenteritis/Pancreatitis, and severe anemia, as per daughter no h/o recent antibiotics use. Lipase elevated, US abd negative, CT abd pending (as machine is down) 73 y/o F with PMH of HTN, HLD, DM II, CKD stage 5, has been refusing dialysis so far p/w c/o nausea/vomiting and diarrhea since yesterday. Also c/o epigastric pain, pain is severe , radiates to LUQ, 8/10, Denies any fever/chills , denies CP, SOB. Pt being admitted for acute gastroenteritis/Pancreatitis, and severe anemia, as per daughter no h/o recent antibiotics use. Lipase elevated, US abd negative, CT abd pending (as machine is down), troponin added to am labs,

## 2017-08-28 NOTE — CONSULT NOTE ADULT - SUBJECTIVE AND OBJECTIVE BOX
MEDICAL RECORD REVIEWED; HISTORY AND  REVIEW OF SYSTEMS OBTAINED; PATIENT EXAMINED:     72 YEAR OLD  FEMALE WITH NAUSEA, VOMITING, DIARRHEA IN SETTING OF CHRONIC RENAL FAILURE REFUSING DIALYSIS; ECG: NSR, LVH NONSPECIFIC ST T WAVE CHANGES TROPONIN: PENDING; ALL LABS/RADIOLOGY/MEDICATIONS REVIEWED; RENAL FAILURE, ANEMIA, ELEVATED LIPASE    ON EXAM: NO OVERT ASTERIXIS, ALERT , NO JVD, NO OVERT ICTERUS, 2/6 FREDDIE THROUGHOUT, NO APPRECIABLE RUB, SOFT ABDOMEN, NO CLUBBING CYANOSIS OR EDEMA     MONITOR TROPONINS AND ECG  AS PER NEPHROLOGY      TARI GUZMÁN MD, FACC

## 2017-08-28 NOTE — ED PROVIDER NOTE - MEDICAL DECISION MAKING DETAILS
pt with pancreatitis noted to admit for further care with Dr. Rodríguez, US noted no GB stone or choledocholithiasis

## 2017-08-28 NOTE — H&P ADULT - NSHPREVIEWOFSYSTEMS_GEN_ALL_CORE
CONSTITUTIONAL: No fever, weight loss, or fatigue  EYES: No eye pain, visual disturbances, or discharge  ENMT:  No difficulty hearing, tinnitus, vertigo; No sinus or throat pain  NECK: No pain or stiffness  RESPIRATORY: No cough, wheezing, chills or hemoptysis; No shortness of breath  CARDIOVASCULAR: No chest pain, palpitations, dizziness, or leg swelling  GASTROINTESTINAL: + epigastric pain. + nausea, vomiting, No hematemesis; + diarrhea . No melena or hematochezia.  GENITOURINARY: No dysuria, frequency, hematuria, or incontinence  NEUROLOGICAL: No headaches, memory loss, loss of strength, numbness, or tremors  SKIN: No itching, burning, rashes, or lesions   MUSCULOSKELETAL: No joint pain or swelling;

## 2017-08-28 NOTE — CONSULT NOTE ADULT - ASSESSMENT
HPI:  71 y/o F with PMH of HTN, HLD, DM II, CKD stage 5, has been refusing dialysis so far p/w c/o nausea/vomiting and diarrhea since yesterday. Also c/o epigastric pain, pain is severe , radiates to LUQ, 8/10, Denies any fever/chills , denies CP, SOB.  Denies cough, dizziness, HA or blurry vision, all other ROS negative.  patient AAOx3 , but poor historian, Pt also refuses for dialysis,  Pt was recently admitted at [facility] for symptomatic anemia and received 2 PRBC. (28 Aug 2017 13:27)  Patient is a poor historian.  Patient states that she was in her USOH until yesterday when she developed N/V, abdominal pain and diarrhea. No prior history. On ASA daily. No ETOH or gallbladder disease. CKD disease but refusing dialysis. Prior to this episode, the patient denies melena, hematochezia, hematemesis, nausea, vomiting, abdominal pain, constipation, diarrhea, or change in bowel movements.    -- Pancreatitis vs PUD vs Uremia - 1) repeat labs 2) NPO/IV fluid  3) Dialysis  4) PPI  5) Zofran PRN  6) EGD

## 2017-08-28 NOTE — CONSULT NOTE ADULT - SUBJECTIVE AND OBJECTIVE BOX
HPI:  71 y/o F with PMH of HTN, HLD, DM II, CKD stage 5, has been refusing dialysis so far p/w c/o nausea/vomiting and diarrhea since yesterday. Also c/o epigastric pain, pain is severe , radiates to LUQ, 8/10, Denies any fever/chills , denies CP, SOB.  Denies cough, dizziness, HA or blurry vision, all other ROS negative.  patient AAOx3 , but poor historian, Pt also refuses for dialysis,  Pt was recently admitted at [facility] for symptomatic anemia and received 2 PRBC. (28 Aug 2017 13:27)  Patient is a poor historian.  Patient states that she was in her USOH until yesterday when she developed N/V, abdominal pain and diarrhea. No prior history. On ASA daily. No ETOH or gallbladder disease. CKD disease but refusing dialysis. Prior to this episode, the patient denies melena, hematochezia, hematemesis, nausea, vomiting, abdominal pain, constipation, diarrhea, or change in bowel movements.      PAST MEDICAL & SURGICAL HISTORY:  Chronic kidney disease  Congestive heart failure, unspecified congestive heart failure chronicity, unspecified congestive heart failure type  Diabetes  Hypertension  No significant past surgical history      MEDICATIONS  (STANDING):  aspirin enteric coated 81 milliGRAM(s) Oral daily  doxazosin 2 milliGRAM(s) Oral at bedtime  insulin glargine Injectable (LANTUS) 15 Unit(s) SubCutaneous at bedtime  atorvastatin 20 milliGRAM(s) Oral at bedtime  amLODIPine   Tablet 10 milliGRAM(s) Oral daily  furosemide    Tablet 40 milliGRAM(s) Oral two times a day  hydrALAZINE 100 milliGRAM(s) Oral every 12 hours  multivitamin 1 Tablet(s) Oral daily  insulin lispro (HumaLOG) corrective regimen sliding scale   SubCutaneous three times a day before meals  insulin lispro (HumaLOG) corrective regimen sliding scale   SubCutaneous at bedtime  dextrose 5%. 1000 milliLiter(s) (50 mL/Hr) IV Continuous <Continuous>  dextrose 50% Injectable 12.5 Gram(s) IV Push once  dextrose 50% Injectable 25 Gram(s) IV Push once  dextrose 50% Injectable 25 Gram(s) IV Push once  dextrose 5% + sodium chloride 0.45%. 1000 milliLiter(s) (100 mL/Hr) IV Continuous <Continuous>  cloNIDine Patch 0.3 mG/24Hr(s) 1 patch Topical every 7 days  ciprofloxacin   IVPB   IV Intermittent   metroNIDAZOLE  IVPB   IV Intermittent   metroNIDAZOLE  IVPB 500 milliGRAM(s) IV Intermittent every 8 hours    MEDICATIONS  (PRN):  docusate sodium 100 milliGRAM(s) Oral two times a day PRN Constipation  dextrose Gel 1 Dose(s) Oral once PRN Blood Glucose LESS THAN 70 milliGRAM(s)/deciliter  glucagon  Injectable 1 milliGRAM(s) IntraMuscular once PRN Glucose LESS THAN 70 milligrams/deciliter  hydrALAZINE Injectable 10 milliGRAM(s) IV Push every 6 hours PRN for systolic BP more than 160  ondansetron Injectable 4 milliGRAM(s) IV Push every 6 hours PRN Nausea and/or Vomiting  morphine  - Injectable 2 milliGRAM(s) IV Push every 6 hours PRN Severe Pain (7 - 10)      Allergies    No Known Allergies    Intolerances        FAMILY HISTORY:  No pertinent family history in first degree relatives      REVIEW OF SYSTEMS:    CONSTITUTIONAL: No fever, weight loss, or fatigue  EYES: No eye pain, visual disturbances, or discharge  ENMT:  No difficulty hearing, tinnitus, vertigo; No sinus or throat pain  NECK: No pain or stiffness  BREASTS: No pain, masses, or nipple discharge  RESPIRATORY: No cough, wheezing, chills or hemoptysis; No shortness of breath  CARDIOVASCULAR: No chest pain, palpitations, dizziness, or leg swelling  GASTROINTESTINAL: See above   GENITOURINARY: No dysuria, frequency, hematuria, or incontinence  NEUROLOGICAL: No headaches, memory loss, loss of strength, numbness, or tremors  SKIN: No itching, burning, rashes, or lesions   LYMPH NODES: No enlarged glands  ENDOCRINE: No heat or cold intolerance; No hair loss  MUSCULOSKELETAL: No joint  swelling; No muscle, back, or extremity pain  PSYCHIATRIC: No depression, anxiety, mood swings, or difficulty sleeping  HEME/LYMPH: No easy bruising, or bleeding gums  ALLERGY AND IMMUNOLOGIC: No hives or eczema          SOCIAL HISTORY:    FAMILY HISTORY:  No pertinent family history in first degree relatives      Vital Signs Last 24 Hrs  T(C): 36.6 (28 Aug 2017 08:04), Max: 36.6 (28 Aug 2017 08:04)  T(F): 97.8 (28 Aug 2017 08:04), Max: 97.8 (28 Aug 2017 08:04)  HR: 105 (28 Aug 2017 09:55) (105 - 109)  BP: 186/68 (28 Aug 2017 09:55) (166/68 - 186/68)  BP(mean): --  RR: 15 (28 Aug 2017 09:55) (15 - 15)  SpO2: 98% (28 Aug 2017 09:55) (98% - 99%)    PHYSICAL EXAM:    GENERAL: NAD, well-groomed, well-developed  HEAD:  Atraumatic, Normocephalic  EYES: EOMI, PERRLA, conjunctiva and sclera clear  NECK: Supple, No JVD, Normal thyroid  NERVOUS SYSTEM:  Alert & Oriented X3, Good concentration;   CHEST/LUNG: Clear to percussion bilaterally; No rales, rhonchi, wheezing, or rubs  HEART: Regular rate and rhythm; No murmurs, rubs, or gallops  ABDOMEN: Soft, Minimal tenderness mid epigastrium, Nondistended; Bowel sounds present  EXTREMITIES:  2+ Peripheral Pulses, No clubbing, cyanosis, or edema  LYMPH: No lymphadenopathy noted   RECTAL: Patient refused  SKIN: No rashes or lesions    LABS:                        7.4    14.7  )-----------( 537      ( 28 Aug 2017 08:45 )             24.6       CBC:  08-28 @ 08:45  WBC  14.7  HGB 7.4  HCT 24.6 Plate 537  MCV 83.2           28 Aug 2017 08:45    142    |  108    |  100    ----------------------------<  350    4.5     |  18     |  6.38     Ca    9.3        28 Aug 2017 08:45    TPro  7.9    /  Alb  2.7    /  TBili  0.4    /  DBili  x      /  AST  35     /  ALT  18     /  AlkPhos  95     28 Aug 2017 08:45    PT/INR - ( 28 Aug 2017 09:56 )   PT: 10.8 sec;   INR: 0.99 ratio         PTT - ( 28 Aug 2017 09:56 )  PTT:32.0 sec        RADIOLOGY & ADDITIONAL STUDIES: HPI:  73 y/o F with PMH of HTN, HLD, DM II, CKD stage 5, has been refusing dialysis so far p/w c/o nausea/vomiting and diarrhea since yesterday. Also c/o epigastric pain, pain is severe , radiates to LUQ, 8/10, Denies any fever/chills , denies CP, SOB.  Denies cough, dizziness, HA or blurry vision, all other ROS negative.  patient AAOx3 , but poor historian, Pt also refuses for dialysis,  Pt was recently admitted at [facility] for symptomatic anemia and received 2 PRBC. (28 Aug 2017 13:27)  Patient is a poor historian.  Patient states that she was in her USOH until yesterday when she developed N/V, abdominal pain and diarrhea. No prior history. On ASA daily. No ETOH or gallbladder disease. No new medications CKD disease but refusing dialysis. Prior to this episode, the patient denies melena, hematochezia, hematemesis, nausea, vomiting, abdominal pain, constipation, diarrhea, or change in bowel movements.      PAST MEDICAL & SURGICAL HISTORY:  Chronic kidney disease  Congestive heart failure, unspecified congestive heart failure chronicity, unspecified congestive heart failure type  Diabetes  Hypertension  No significant past surgical history      MEDICATIONS  (STANDING):  aspirin enteric coated 81 milliGRAM(s) Oral daily  doxazosin 2 milliGRAM(s) Oral at bedtime  insulin glargine Injectable (LANTUS) 15 Unit(s) SubCutaneous at bedtime  atorvastatin 20 milliGRAM(s) Oral at bedtime  amLODIPine   Tablet 10 milliGRAM(s) Oral daily  furosemide    Tablet 40 milliGRAM(s) Oral two times a day  hydrALAZINE 100 milliGRAM(s) Oral every 12 hours  multivitamin 1 Tablet(s) Oral daily  insulin lispro (HumaLOG) corrective regimen sliding scale   SubCutaneous three times a day before meals  insulin lispro (HumaLOG) corrective regimen sliding scale   SubCutaneous at bedtime  dextrose 5%. 1000 milliLiter(s) (50 mL/Hr) IV Continuous <Continuous>  dextrose 50% Injectable 12.5 Gram(s) IV Push once  dextrose 50% Injectable 25 Gram(s) IV Push once  dextrose 50% Injectable 25 Gram(s) IV Push once  dextrose 5% + sodium chloride 0.45%. 1000 milliLiter(s) (100 mL/Hr) IV Continuous <Continuous>  cloNIDine Patch 0.3 mG/24Hr(s) 1 patch Topical every 7 days  ciprofloxacin   IVPB   IV Intermittent   metroNIDAZOLE  IVPB   IV Intermittent   metroNIDAZOLE  IVPB 500 milliGRAM(s) IV Intermittent every 8 hours    MEDICATIONS  (PRN):  docusate sodium 100 milliGRAM(s) Oral two times a day PRN Constipation  dextrose Gel 1 Dose(s) Oral once PRN Blood Glucose LESS THAN 70 milliGRAM(s)/deciliter  glucagon  Injectable 1 milliGRAM(s) IntraMuscular once PRN Glucose LESS THAN 70 milligrams/deciliter  hydrALAZINE Injectable 10 milliGRAM(s) IV Push every 6 hours PRN for systolic BP more than 160  ondansetron Injectable 4 milliGRAM(s) IV Push every 6 hours PRN Nausea and/or Vomiting  morphine  - Injectable 2 milliGRAM(s) IV Push every 6 hours PRN Severe Pain (7 - 10)      Allergies    No Known Allergies    Intolerances        FAMILY HISTORY:  No pertinent family history in first degree relatives      REVIEW OF SYSTEMS:    CONSTITUTIONAL: No fever, weight loss, or fatigue  EYES: No eye pain, visual disturbances, or discharge  ENMT:  No difficulty hearing, tinnitus, vertigo; No sinus or throat pain  NECK: No pain or stiffness  BREASTS: No pain, masses, or nipple discharge  RESPIRATORY: No cough, wheezing, chills or hemoptysis; No shortness of breath  CARDIOVASCULAR: No chest pain, palpitations, dizziness, or leg swelling  GASTROINTESTINAL: See above   GENITOURINARY: No dysuria, frequency, hematuria, or incontinence  NEUROLOGICAL: No headaches, memory loss, loss of strength, numbness, or tremors  SKIN: No itching, burning, rashes, or lesions   LYMPH NODES: No enlarged glands  ENDOCRINE: No heat or cold intolerance; No hair loss  MUSCULOSKELETAL: No joint  swelling; No muscle, back, or extremity pain  PSYCHIATRIC: No depression, anxiety, mood swings, or difficulty sleeping  HEME/LYMPH: No easy bruising, or bleeding gums  ALLERGY AND IMMUNOLOGIC: No hives or eczema          SOCIAL HISTORY:    FAMILY HISTORY:  No pertinent family history in first degree relatives      Vital Signs Last 24 Hrs  T(C): 36.6 (28 Aug 2017 08:04), Max: 36.6 (28 Aug 2017 08:04)  T(F): 97.8 (28 Aug 2017 08:04), Max: 97.8 (28 Aug 2017 08:04)  HR: 105 (28 Aug 2017 09:55) (105 - 109)  BP: 186/68 (28 Aug 2017 09:55) (166/68 - 186/68)  BP(mean): --  RR: 15 (28 Aug 2017 09:55) (15 - 15)  SpO2: 98% (28 Aug 2017 09:55) (98% - 99%)    PHYSICAL EXAM:    GENERAL: NAD, well-groomed, well-developed  HEAD:  Atraumatic, Normocephalic  EYES: EOMI, PERRLA, conjunctiva and sclera clear  NECK: Supple, No JVD, Normal thyroid  NERVOUS SYSTEM:  Alert & Oriented X3, Good concentration;   CHEST/LUNG: Clear to percussion bilaterally; No rales, rhonchi, wheezing, or rubs  HEART: Regular rate and rhythm; No murmurs, rubs, or gallops  ABDOMEN: Soft, Minimal tenderness mid epigastrium, Nondistended; Bowel sounds present  EXTREMITIES:  2+ Peripheral Pulses, No clubbing, cyanosis, or edema  LYMPH: No lymphadenopathy noted   RECTAL: Patient refused  SKIN: No rashes or lesions    LABS:                        7.4    14.7  )-----------( 537      ( 28 Aug 2017 08:45 )             24.6       CBC:  08-28 @ 08:45  WBC  14.7  HGB 7.4  HCT 24.6 Plate 537  MCV 83.2           28 Aug 2017 08:45    142    |  108    |  100    ----------------------------<  350    4.5     |  18     |  6.38     Ca    9.3        28 Aug 2017 08:45    TPro  7.9    /  Alb  2.7    /  TBili  0.4    /  DBili  x      /  AST  35     /  ALT  18     /  AlkPhos  95     28 Aug 2017 08:45    PT/INR - ( 28 Aug 2017 09:56 )   PT: 10.8 sec;   INR: 0.99 ratio         PTT - ( 28 Aug 2017 09:56 )  PTT:32.0 sec        RADIOLOGY & ADDITIONAL STUDIES:  < from: CT Abdomen and Pelvis No Cont (08.28.17 @ 15:36) >    EXAM:  CT ABDOMEN AND PELVIS                            PROCEDURE DATE:  08/28/2017          INTERPRETATION:  CLINICAL INFORMATION: Nausea, vomiting, and diarrhea    COMPARISON: No prior examinations are available for comparison.    PROCEDURE:     Serial axial sections through the abdomen and pelvis are obtained without   the use of intravenous contrast from the diaphragms to the symphysis   pubis. 3-D coronal and sagittal reformations were then created from the   axial images. Oral contrast was administered.     FINDINGS:    LOWER CHEST: Clear lung bases. Enlarged heart with coronary artery and   mitral annulus calcifications.    Evaluation of solid visceral organs and vascular structures is limited by   lack of intravenous contrast.     ABDOMEN:  LIVER: Within normal limits.  BILE DUCTS: Normal caliber.  GALLBLADDER: No calcified gallstones. Normal caliber wall.  PANCREAS: Within normal limits.  SPLEEN: Within normal limits.  ADRENALS: Mild thickening.  KIDNEYS/URETERS: Nonspecific perinephric stranding. No hydronephrosis.    PELVIS:  REPRODUCTIVE ORGANS: Calcified uterine fibroids.  URINARY BLADDER: Within normal limits.    BOWEL: Small hiatal hernia. Several colonic diverticula. No bowel   obstruction.  APPENDIX: Within normal limits.  PERITONEUM: No ascites or free air. No fluid collection.  VESSELS: Atherosclerotic changes.  RETROPERITONEUM: Within normal limits.  ABDOMINAL WALL: Small umbilical hernia containing fat and small bowel.  BONES: Hip and spine degenerative changes. Grade 1 spondylolisthesis of   L5 over S1.    IMPRESSION:      No CT evidence of colitis.  No bowel obstruction.                    OMER JUNG M.D., ATTENDING RADIOLOGIST  This document has been electronically signed. Aug 28 2017  3:48PM                < end of copied text >

## 2017-08-29 DIAGNOSIS — R10.9 UNSPECIFIED ABDOMINAL PAIN: ICD-10-CM

## 2017-08-29 DIAGNOSIS — N18.9 CHRONIC KIDNEY DISEASE, UNSPECIFIED: ICD-10-CM

## 2017-08-29 DIAGNOSIS — R74.8 ABNORMAL LEVELS OF OTHER SERUM ENZYMES: ICD-10-CM

## 2017-08-29 DIAGNOSIS — I25.9 CHRONIC ISCHEMIC HEART DISEASE, UNSPECIFIED: ICD-10-CM

## 2017-08-29 DIAGNOSIS — R11.0 NAUSEA: ICD-10-CM

## 2017-08-29 LAB
ALBUMIN SERPL ELPH-MCNC: 2.7 G/DL — LOW (ref 3.3–5)
ALLERGY+IMMUNOLOGY DIAG STUDY NOTE: SIGNIFICANT CHANGE UP
ALP SERPL-CCNC: 91 U/L — SIGNIFICANT CHANGE UP (ref 40–120)
ALT FLD-CCNC: 16 U/L — SIGNIFICANT CHANGE UP (ref 12–78)
ANION GAP SERPL CALC-SCNC: 10 MMOL/L — SIGNIFICANT CHANGE UP (ref 5–17)
ANTIBODY INTERPRETATION 2: SIGNIFICANT CHANGE UP
APPEARANCE UR: CLEAR — SIGNIFICANT CHANGE UP
AST SERPL-CCNC: 15 U/L — SIGNIFICANT CHANGE UP (ref 15–37)
BACTERIA # UR AUTO: ABNORMAL
BASOPHILS # BLD AUTO: 0.1 K/UL — SIGNIFICANT CHANGE UP (ref 0–0.2)
BASOPHILS NFR BLD AUTO: 0.3 % — SIGNIFICANT CHANGE UP (ref 0–2)
BILIRUB SERPL-MCNC: 0.4 MG/DL — SIGNIFICANT CHANGE UP (ref 0.2–1.2)
BILIRUB UR-MCNC: NEGATIVE — SIGNIFICANT CHANGE UP
BUN SERPL-MCNC: 87 MG/DL — HIGH (ref 7–23)
CALCIUM SERPL-MCNC: 8.8 MG/DL — SIGNIFICANT CHANGE UP (ref 8.5–10.1)
CALCIUM SERPL-MCNC: 9.5 MG/DL — SIGNIFICANT CHANGE UP (ref 8.4–10.5)
CHLORIDE SERPL-SCNC: 113 MMOL/L — HIGH (ref 96–108)
CO2 SERPL-SCNC: 26 MMOL/L — SIGNIFICANT CHANGE UP (ref 22–31)
COLOR SPEC: YELLOW — SIGNIFICANT CHANGE UP
CREAT SERPL-MCNC: 6.23 MG/DL — HIGH (ref 0.5–1.3)
CRP SERPL-MCNC: 1.1 MG/DL — HIGH (ref 0–0.4)
DIFF PNL FLD: ABNORMAL
EOSINOPHIL # BLD AUTO: 0 K/UL — SIGNIFICANT CHANGE UP (ref 0–0.5)
EOSINOPHIL NFR BLD AUTO: 0.1 % — SIGNIFICANT CHANGE UP (ref 0–6)
EPI CELLS # UR: SIGNIFICANT CHANGE UP
FERRITIN SERPL-MCNC: 65 NG/ML — SIGNIFICANT CHANGE UP (ref 15–150)
GLUCOSE SERPL-MCNC: 230 MG/DL — HIGH (ref 70–99)
GLUCOSE UR QL: 250 MG/DL
GRAN CASTS # UR COMP ASSIST: ABNORMAL /LPF
HBA1C BLD-MCNC: 6.9 % — HIGH (ref 4–5.6)
HCT VFR BLD CALC: 30.2 % — LOW (ref 34.5–45)
HGB BLD-MCNC: 9.7 G/DL — LOW (ref 11.5–15.5)
IRON SATN MFR SERPL: 103 UG/DL — SIGNIFICANT CHANGE UP (ref 30–160)
IRON SATN MFR SERPL: 46 % — SIGNIFICANT CHANGE UP (ref 14–50)
KETONES UR-MCNC: NEGATIVE — SIGNIFICANT CHANGE UP
LEUKOCYTE ESTERASE UR-ACNC: NEGATIVE — SIGNIFICANT CHANGE UP
LIDOCAIN IGE QN: 357 U/L — SIGNIFICANT CHANGE UP (ref 73–393)
LYMPHOCYTES # BLD AUTO: 0.6 K/UL — LOW (ref 1–3.3)
LYMPHOCYTES # BLD AUTO: 2.9 % — LOW (ref 13–44)
MCHC RBC-ENTMCNC: 27.2 PG — SIGNIFICANT CHANGE UP (ref 27–34)
MCHC RBC-ENTMCNC: 32.2 GM/DL — SIGNIFICANT CHANGE UP (ref 32–36)
MCV RBC AUTO: 84.4 FL — SIGNIFICANT CHANGE UP (ref 80–100)
MONOCYTES # BLD AUTO: 0.8 K/UL — SIGNIFICANT CHANGE UP (ref 0–0.9)
MONOCYTES NFR BLD AUTO: 4 % — SIGNIFICANT CHANGE UP (ref 2–14)
NEUTROPHILS # BLD AUTO: 18.1 K/UL — HIGH (ref 1.8–7.4)
NEUTROPHILS NFR BLD AUTO: 92.8 % — HIGH (ref 43–77)
NITRITE UR-MCNC: NEGATIVE — SIGNIFICANT CHANGE UP
PH UR: 5 — SIGNIFICANT CHANGE UP (ref 5–8)
PHOSPHATE SERPL-MCNC: 6.8 MG/DL — HIGH (ref 2.5–4.5)
PLATELET # BLD AUTO: 465 K/UL — HIGH (ref 150–400)
POTASSIUM SERPL-MCNC: 3.6 MMOL/L — SIGNIFICANT CHANGE UP (ref 3.5–5.3)
POTASSIUM SERPL-SCNC: 3.6 MMOL/L — SIGNIFICANT CHANGE UP (ref 3.5–5.3)
PROT SERPL-MCNC: 7.2 GM/DL — SIGNIFICANT CHANGE UP (ref 6–8.3)
PROT UR-MCNC: 500 MG/DL
PTH-INTACT FLD-MCNC: 359 PG/ML — HIGH (ref 15–65)
RBC # BLD: 3.58 M/UL — LOW (ref 3.8–5.2)
RBC # FLD: 14.6 % — SIGNIFICANT CHANGE UP (ref 11–15)
RBC CASTS # UR COMP ASSIST: ABNORMAL /HPF (ref 0–4)
SODIUM SERPL-SCNC: 149 MMOL/L — HIGH (ref 135–145)
SP GR SPEC: 1.01 — SIGNIFICANT CHANGE UP (ref 1.01–1.02)
TIBC SERPL-MCNC: 222 UG/DL — SIGNIFICANT CHANGE UP (ref 220–430)
TROPONIN I SERPL-MCNC: 0.07 NG/ML — HIGH (ref 0.01–0.04)
UIBC SERPL-MCNC: 119 UG/DL — SIGNIFICANT CHANGE UP (ref 110–370)
UROBILINOGEN FLD QL: NEGATIVE MG/DL — SIGNIFICANT CHANGE UP
WBC # BLD: 19.5 K/UL — HIGH (ref 3.8–10.5)
WBC # FLD AUTO: 19.5 K/UL — HIGH (ref 3.8–10.5)
WBC UR QL: ABNORMAL

## 2017-08-29 PROCEDURE — 99233 SBSQ HOSP IP/OBS HIGH 50: CPT

## 2017-08-29 RX ORDER — ACETAMINOPHEN 500 MG
650 TABLET ORAL EVERY 4 HOURS
Qty: 0 | Refills: 0 | Status: DISCONTINUED | OUTPATIENT
Start: 2017-08-29 | End: 2017-09-02

## 2017-08-29 RX ADMIN — Medication 650 MILLIGRAM(S): at 14:43

## 2017-08-29 RX ADMIN — Medication 100 MILLIGRAM(S): at 01:07

## 2017-08-29 RX ADMIN — Medication 1 PATCH: at 14:36

## 2017-08-29 RX ADMIN — Medication 2 MILLIGRAM(S): at 22:25

## 2017-08-29 RX ADMIN — Medication 100 MILLIGRAM(S): at 18:13

## 2017-08-29 RX ADMIN — Medication 2: at 17:00

## 2017-08-29 RX ADMIN — Medication 100 MILLIGRAM(S): at 16:36

## 2017-08-29 RX ADMIN — AMLODIPINE BESYLATE 10 MILLIGRAM(S): 2.5 TABLET ORAL at 06:53

## 2017-08-29 RX ADMIN — SODIUM CHLORIDE 100 MILLILITER(S): 9 INJECTION, SOLUTION INTRAVENOUS at 14:44

## 2017-08-29 RX ADMIN — Medication 100 MILLIGRAM(S): at 22:36

## 2017-08-29 RX ADMIN — Medication 2: at 01:06

## 2017-08-29 RX ADMIN — Medication 81 MILLIGRAM(S): at 13:22

## 2017-08-29 RX ADMIN — MORPHINE SULFATE 2 MILLIGRAM(S): 50 CAPSULE, EXTENDED RELEASE ORAL at 23:11

## 2017-08-29 RX ADMIN — Medication 650 MILLIGRAM(S): at 13:48

## 2017-08-29 RX ADMIN — MORPHINE SULFATE 2 MILLIGRAM(S): 50 CAPSULE, EXTENDED RELEASE ORAL at 16:00

## 2017-08-29 RX ADMIN — Medication 100 MILLIGRAM(S): at 06:53

## 2017-08-29 RX ADMIN — MORPHINE SULFATE 2 MILLIGRAM(S): 50 CAPSULE, EXTENDED RELEASE ORAL at 15:36

## 2017-08-29 RX ADMIN — ATORVASTATIN CALCIUM 20 MILLIGRAM(S): 80 TABLET, FILM COATED ORAL at 01:01

## 2017-08-29 RX ADMIN — Medication 4: at 08:31

## 2017-08-29 RX ADMIN — Medication 2 MILLIGRAM(S): at 01:01

## 2017-08-29 RX ADMIN — PANTOPRAZOLE SODIUM 40 MILLIGRAM(S): 20 TABLET, DELAYED RELEASE ORAL at 07:25

## 2017-08-29 RX ADMIN — INSULIN GLARGINE 15 UNIT(S): 100 INJECTION, SOLUTION SUBCUTANEOUS at 01:02

## 2017-08-29 RX ADMIN — ATORVASTATIN CALCIUM 20 MILLIGRAM(S): 80 TABLET, FILM COATED ORAL at 22:25

## 2017-08-29 RX ADMIN — Medication 40 MILLIGRAM(S): at 06:53

## 2017-08-29 RX ADMIN — INSULIN GLARGINE 15 UNIT(S): 100 INJECTION, SOLUTION SUBCUTANEOUS at 22:26

## 2017-08-29 RX ADMIN — Medication 100 MILLIGRAM(S): at 18:04

## 2017-08-29 NOTE — PROGRESS NOTE ADULT - PROBLEM SELECTOR PLAN 1
Amylase now normal. Mid epigastric pain with nausea (+). HCO3 level normal.- 1) maintain clear liquid diet  2) EGD Thursday 3) PPI Amylase now normal. Mid epigastric pain with nausea (+). HCO3 level normal.- 1) maintain clear liquid diet  2) EGD Thursday 3) PPI 4) ASA held

## 2017-08-29 NOTE — PROGRESS NOTE ADULT - SUBJECTIVE AND OBJECTIVE BOX
Patient is a 72y old  Female who presents with a chief complaint of Abd. pain, nausea, vomiting, diarrhea since last night (28 Aug 2017 13:27)      HPI:  73 y/o F with PMH of HTN, HLD, DM II, CKD stage 5, has been refusing dialysis so far p/w c/o nausea/vomiting and diarrhea since yesterday. Also c/o epigastric pain, pain is severe , radiates to LUQ, 8/10, Denies any fever/chills , denies CP, SOB.  Denies cough, dizziness, HA or blurry vision, all other ROS negative.  patient AAOx3 , but poor historian, Pt also refuses for dialysis,  Pt was recently admitted at [facility] for symptomatic anemia and received 2 PRBC. (28 Aug 2017 13:27)      INTERVAL HPI/OVERNIGHT EVENTS:  Patient complains of feeling nauseous and has a mid epigastric burning pain. No diarrhea or vomiting. ? Tolerating clear liquid diet.  MEDICATIONS  (STANDING):  aspirin enteric coated 81 milliGRAM(s) Oral daily  doxazosin 2 milliGRAM(s) Oral at bedtime  insulin glargine Injectable (LANTUS) 15 Unit(s) SubCutaneous at bedtime  atorvastatin 20 milliGRAM(s) Oral at bedtime  amLODIPine   Tablet 10 milliGRAM(s) Oral daily  hydrALAZINE 100 milliGRAM(s) Oral every 12 hours  multivitamin 1 Tablet(s) Oral daily  insulin lispro (HumaLOG) corrective regimen sliding scale   SubCutaneous three times a day before meals  insulin lispro (HumaLOG) corrective regimen sliding scale   SubCutaneous at bedtime  dextrose 5%. 1000 milliLiter(s) (50 mL/Hr) IV Continuous <Continuous>  dextrose 50% Injectable 12.5 Gram(s) IV Push once  dextrose 50% Injectable 25 Gram(s) IV Push once  dextrose 50% Injectable 25 Gram(s) IV Push once  pantoprazole    Tablet 40 milliGRAM(s) Oral before breakfast  dextrose 5% + sodium chloride 0.45%. 1000 milliLiter(s) (100 mL/Hr) IV Continuous <Continuous>  cloNIDine Patch 0.3 mG/24Hr(s) 1 patch Topical every 7 days  ciprofloxacin   IVPB   IV Intermittent   metroNIDAZOLE  IVPB   IV Intermittent   ciprofloxacin   IVPB 200 milliGRAM(s) IV Intermittent every 24 hours  metroNIDAZOLE  IVPB 500 milliGRAM(s) IV Intermittent every 8 hours    MEDICATIONS  (PRN):  docusate sodium 100 milliGRAM(s) Oral two times a day PRN Constipation  dextrose Gel 1 Dose(s) Oral once PRN Blood Glucose LESS THAN 70 milliGRAM(s)/deciliter  glucagon  Injectable 1 milliGRAM(s) IntraMuscular once PRN Glucose LESS THAN 70 milligrams/deciliter  hydrALAZINE Injectable 10 milliGRAM(s) IV Push every 6 hours PRN for systolic BP more than 160  ondansetron Injectable 4 milliGRAM(s) IV Push every 6 hours PRN Nausea and/or Vomiting  morphine  - Injectable 2 milliGRAM(s) IV Push every 6 hours PRN Severe Pain (7 - 10)  acetaminophen  Suppository. 650 milliGRAM(s) Rectal every 4 hours PRN Moderate Pain (4 - 6)      FAMILY HISTORY:  No pertinent family history in first degree relatives      Allergies    No Known Allergies    Intolerances        PMH/PSH:  Chronic kidney disease  Congestive heart failure, unspecified congestive heart failure chronicity, unspecified congestive heart failure type  Diabetes  Hypertension  No significant past surgical history        REVIEW OF SYSTEMS:  CONSTITUTIONAL: No fever, weight loss, or fatigue  EYES: No eye pain, visual disturbances, or discharge  ENMT:  No difficulty hearing, tinnitus, vertigo; No sinus or throat pain  NECK: No pain or stiffness  BREASTS: No pain, masses, or nipple discharge  RESPIRATORY: No cough, wheezing, chills or hemoptysis; No shortness of breath  CARDIOVASCULAR: No chest pain, palpitations, dizziness, or leg swelling  GASTROINTESTINAL: No abdominal or epigastric pain. No nausea, vomiting, or hematemesis; No diarrhea or constipation. No melena or hematochezia.  GENITOURINARY: No dysuria, frequency, hematuria, or incontinence  NEUROLOGICAL: No headaches, memory loss, loss of strength, numbness, or tremors  SKIN: No itching, burning, rashes, or lesions   LYMPH NODES: No enlarged glands  ENDOCRINE: No heat or cold intolerance; No hair loss  MUSCULOSKELETAL: No joint pain or swelling; No muscle, back, or extremity pain  PSYCHIATRIC: No depression, anxiety, mood swings, or difficulty sleeping  HEME/LYMPH: No easy bruising, or bleeding gums  ALLERGY AND IMMUNOLOGIC: No hives or eczema    Vital Signs Last 24 Hrs  T(C): 36.6 (29 Aug 2017 11:47), Max: 37.2 (28 Aug 2017 21:05)  T(F): 97.8 (29 Aug 2017 11:47), Max: 99 (28 Aug 2017 21:05)  HR: 97 (29 Aug 2017 11:47) (91 - 103)  BP: 160/72 (29 Aug 2017 11:47) (154/68 - 177/64)  BP(mean): --  RR: 16 (29 Aug 2017 11:47) (16 - 18)  SpO2: 100% (29 Aug 2017 11:47) (96% - 100%)    PHYSICAL EXAM:  GENERAL: NAD, well-groomed, well-developed  HEAD:  Atraumatic, Normocephalic  EYES: EOMI, PERRLA, conjunctiva and sclera clear  ENMT: No tonsillar erythema, exudates, or enlargement; Moist mucous membranes, Good dentition, No lesions  NECK: Supple, No JVD, Normal thyroid  NERVOUS SYSTEM:  Alert & Oriented X3, Good concentration; Motor Strength 5/5 B/L upper and lower extremities; DTRs 2+ intact and symmetric  CHEST/LUNG: Clear to percussion bilaterally; No rales, rhonchi, wheezing, or rubs  HEART: Regular rate and rhythm; No murmurs, rubs, or gallops  ABDOMEN: Soft, Nontender, Nondistended; Bowel sounds present  EXTREMITIES:  2+ Peripheral Pulses, No clubbing, cyanosis, or edema  LYMPH: No lymphadenopathy noted  SKIN: No rashes or lesions    LAB  08-29 @ 11:52  amylase --   lipase 357   08-28 @ 08:45  amylase --   lipase 900                           9.7    19.5  )-----------( 465      ( 29 Aug 2017 11:52 )             30.2       CBC:  08-29 @ 11:52  WBC 19.5   Hgb 9.7   Hct 30.2   Plts 465  MCV 84.4  08-28 @ 08:45  WBC 14.7   Hgb 7.4   Hct 24.6   Plts 537  MCV 83.2      Chemistry:  08-29 @ 11:52  Na+ 149  K+ 3.6  Cl- 113  CO2 26  BUN 87  Cr 6.23     08-28 @ 08:45  Na+ 142  K+ 4.5  Cl- 108  CO2 18    Cr 6.38         Glucose, Serum: 230 mg/dL (08-29 @ 11:52)  Glucose, Serum: 350 mg/dL (08-28 @ 08:45)      29 Aug 2017 11:52    149    |  113    |  87     ----------------------------<  230    3.6     |  26     |  6.23   28 Aug 2017 08:45    142    |  108    |  100    ----------------------------<  350    4.5     |  18     |  6.38     Ca    8.8        29 Aug 2017 11:52  Ca    9.3        28 Aug 2017 08:45  Phos  6.8       29 Aug 2017 11:52    TPro  7.2    /  Alb  2.7    /  TBili  0.4    /  DBili  x      /  AST  15     /  ALT  16     /  AlkPhos  91     29 Aug 2017 11:52  TPro  7.9    /  Alb  2.7    /  TBili  0.4    /  DBili  x      /  AST  35     /  ALT  18     /  AlkPhos  95     28 Aug 2017 08:45      PT/INR - ( 28 Aug 2017 09:56 )   PT: 10.8 sec;   INR: 0.99 ratio         PTT - ( 28 Aug 2017 09:56 )  PTT:32.0 sec        CAPILLARY BLOOD GLUCOSE  231 (29 Aug 2017 16:59)  213 (29 Aug 2017 12:24)  313 (29 Aug 2017 08:27)  417 (28 Aug 2017 17:17)  438 (28 Aug 2017 17:15)          C-Reactive Protein, Serum: 1.10 mg/dL (08-29 @ 14:52)      RADIOLOGY & ADDITIONAL TESTS:    Imaging Personally Reviewed:  [ ] YES  [ ] NO    Consultant(s) Notes Reviewed:  [ ] YES  [ ] NO    Care Discussed with Consultants/Other Providers [ ] YES  [ ] NO

## 2017-08-29 NOTE — PROGRESS NOTE ADULT - SUBJECTIVE AND OBJECTIVE BOX
Patient seen in follow up for KATHERINE, CKD 4-5; still with epigastric discomfort; nausea.     MEDICATIONS  (STANDING):  aspirin enteric coated 81 milliGRAM(s) Oral daily  doxazosin 2 milliGRAM(s) Oral at bedtime  insulin glargine Injectable (LANTUS) 15 Unit(s) SubCutaneous at bedtime  atorvastatin 20 milliGRAM(s) Oral at bedtime  amLODIPine   Tablet 10 milliGRAM(s) Oral daily  hydrALAZINE 100 milliGRAM(s) Oral every 12 hours  multivitamin 1 Tablet(s) Oral daily  insulin lispro (HumaLOG) corrective regimen sliding scale   SubCutaneous three times a day before meals  insulin lispro (HumaLOG) corrective regimen sliding scale   SubCutaneous at bedtime  dextrose 5%. 1000 milliLiter(s) (50 mL/Hr) IV Continuous <Continuous>  dextrose 50% Injectable 12.5 Gram(s) IV Push once  dextrose 50% Injectable 25 Gram(s) IV Push once  dextrose 50% Injectable 25 Gram(s) IV Push once  pantoprazole    Tablet 40 milliGRAM(s) Oral before breakfast  dextrose 5% + sodium chloride 0.45%. 1000 milliLiter(s) (100 mL/Hr) IV Continuous <Continuous>  cloNIDine Patch 0.3 mG/24Hr(s) 1 patch Topical every 7 days  ciprofloxacin   IVPB   IV Intermittent   metroNIDAZOLE  IVPB   IV Intermittent   ciprofloxacin   IVPB 200 milliGRAM(s) IV Intermittent every 24 hours  metroNIDAZOLE  IVPB 500 milliGRAM(s) IV Intermittent every 8 hours    MEDICATIONS  (PRN):  docusate sodium 100 milliGRAM(s) Oral two times a day PRN Constipation  dextrose Gel 1 Dose(s) Oral once PRN Blood Glucose LESS THAN 70 milliGRAM(s)/deciliter  glucagon  Injectable 1 milliGRAM(s) IntraMuscular once PRN Glucose LESS THAN 70 milligrams/deciliter  hydrALAZINE Injectable 10 milliGRAM(s) IV Push every 6 hours PRN for systolic BP more than 160  ondansetron Injectable 4 milliGRAM(s) IV Push every 6 hours PRN Nausea and/or Vomiting  morphine  - Injectable 2 milliGRAM(s) IV Push every 6 hours PRN Severe Pain (7 - 10)  acetaminophen  Suppository. 650 milliGRAM(s) Rectal every 4 hours PRN Moderate Pain (4 - 6)    PHYSICAL EXAM:      T(C): 36.6 (08-29-17 @ 11:47), Max: 37.2 (08-28-17 @ 21:05)  HR: 97 (08-29-17 @ 11:47) (91 - 103)  BP: 160/72 (08-29-17 @ 11:47) (154/68 - 177/64)  RR: 16 (08-29-17 @ 11:47) (16 - 22)  SpO2: 100% (08-29-17 @ 11:47) (96% - 100%)  Wt(kg): --  Respiratory: clear anteriorly, decreased BS at bases  Cardiovascular: S1 S2  Gastrointestinal: soft NT mildD +BS  Extremities:   tr edema                                    9.7    19.5  )-----------( 465      ( 29 Aug 2017 11:52 )             30.2     08-29    149<H>  |  113<H>  |  87<H>  ----------------------------<  230<H>  3.6   |  26  |  6.23<H>    Ca    8.8      29 Aug 2017 11:52  Phos  6.8     08-29    TPro  7.2  /  Alb  2.7<L>  /  TBili  0.4  /  DBili  x   /  AST  15  /  ALT  16  /  AlkPhos  91  08-29      LIVER FUNCTIONS - ( 29 Aug 2017 11:52 )  Alb: 2.7 g/dL / Pro: 7.2 gm/dL / ALK PHOS: 91 U/L / ALT: 16 U/L / AST: 15 U/L / GGT: x             Assessment and Plan:  KATHERINE, CKD 4-5; suspected infectious gastroenteritis vs PUD; question element of uremic gastritis, however GFR relatively stable the last 3 months w/o sx.  IVF; IV abx; PPI; blood cultures; UA micro  Patient still resistant to the prospect of HD; no emergent indications, will follow closely.

## 2017-08-29 NOTE — PROGRESS NOTE ADULT - SUBJECTIVE AND OBJECTIVE BOX
CC/F/U for: pancreatitis, advanced kidney failure    HPI:  73 y/o F with PMH of HTN, HLD, DM II, CKD stage 5, has been refusing dialysis so far p/w c/o nausea/vomiting and diarrhea since yesterday. Also c/o epigastric pain, pain is severe , radiates to LUQ, 8/10, Denies any fever/chills , denies CP, SOB.  Denies cough, dizziness, HA or blurry vision, all other ROS negative.  patient AAOx3 , but poor historian, Pt also refuses for dialysis,  Pt was recently admitted at [facility] for symptomatic anemia and received 2 PRBC. (28 Aug 2017 13:27)        INTERVAL HPI/OVERNIGHT EVENTS:  Pt seen and examined at bedside; wants to eat, feels like left upper abd discomfort is from not eating, want to eat food now; says will talk about not wanting dialysis once eats some food.     Allergies/Intolerance: No Known Allergies      MEDICATIONS  (STANDING):  aspirin enteric coated 81 milliGRAM(s) Oral daily  doxazosin 2 milliGRAM(s) Oral at bedtime  insulin glargine Injectable (LANTUS) 15 Unit(s) SubCutaneous at bedtime  atorvastatin 20 milliGRAM(s) Oral at bedtime  amLODIPine   Tablet 10 milliGRAM(s) Oral daily  furosemide    Tablet 40 milliGRAM(s) Oral two times a day  hydrALAZINE 100 milliGRAM(s) Oral every 12 hours  multivitamin 1 Tablet(s) Oral daily  insulin lispro (HumaLOG) corrective regimen sliding scale   SubCutaneous three times a day before meals  insulin lispro (HumaLOG) corrective regimen sliding scale   SubCutaneous at bedtime  dextrose 5%. 1000 milliLiter(s) (50 mL/Hr) IV Continuous <Continuous>  dextrose 50% Injectable 12.5 Gram(s) IV Push once  dextrose 50% Injectable 25 Gram(s) IV Push once  dextrose 50% Injectable 25 Gram(s) IV Push once  pantoprazole    Tablet 40 milliGRAM(s) Oral before breakfast  dextrose 5% + sodium chloride 0.45%. 1000 milliLiter(s) (100 mL/Hr) IV Continuous <Continuous>  cloNIDine Patch 0.3 mG/24Hr(s) 1 patch Topical every 7 days  ciprofloxacin   IVPB   IV Intermittent   metroNIDAZOLE  IVPB   IV Intermittent   ciprofloxacin   IVPB 200 milliGRAM(s) IV Intermittent every 24 hours  metroNIDAZOLE  IVPB 500 milliGRAM(s) IV Intermittent every 8 hours    MEDICATIONS  (PRN):  docusate sodium 100 milliGRAM(s) Oral two times a day PRN Constipation  dextrose Gel 1 Dose(s) Oral once PRN Blood Glucose LESS THAN 70 milliGRAM(s)/deciliter  glucagon  Injectable 1 milliGRAM(s) IntraMuscular once PRN Glucose LESS THAN 70 milligrams/deciliter  hydrALAZINE Injectable 10 milliGRAM(s) IV Push every 6 hours PRN for systolic BP more than 160  ondansetron Injectable 4 milliGRAM(s) IV Push every 6 hours PRN Nausea and/or Vomiting  morphine  - Injectable 2 milliGRAM(s) IV Push every 6 hours PRN Severe Pain (7 - 10)        ROS: as above; all other systems reviewed and wnl      PHYSICAL EXAMINATION:  Vital Signs Last 24 Hrs  T(C): 36.6 (29 Aug 2017 11:47), Max: 37.2 (28 Aug 2017 21:05)  T(F): 97.8 (29 Aug 2017 11:47), Max: 99 (28 Aug 2017 21:05)  HR: 97 (29 Aug 2017 11:47) (91 - 103)  BP: 160/72 (29 Aug 2017 11:47) (154/68 - 177/64)  RR: 16 (29 Aug 2017 11:47) (16 - 22)  SpO2: 100% (29 Aug 2017 11:47) (96% - 100%)  CAPILLARY BLOOD GLUCOSE  213 (29 Aug 2017 12:24)  313 (29 Aug 2017 08:27)  417 (28 Aug 2017 17:17)  438 (28 Aug 2017 17:15)    GENERAL: elderly female; mild distress 2/2 left upper abd discomfort  HEAD:  atraumatic, normocephalic  EYES: sclera anicteric  ENMT: mucous membranes dry  NECK: supple, No JVD  CHEST/LUNG: respirations unlabored; air entry symmetric; no wheezing b/l  HEART: normal S1, S2  ABDOMEN: BS+, soft, ND, left upper abd w/ttp, no rebound, no guarding  EXTREMITIES:  pulses palpable; no clubbing, cyanosis, or edema b/l LEs  NEURO: awake, alert, interactive; moves all extremities  SKIN: no rashes or lesions      LABS:                        9.7    19.5  )-----------( 465      ( 29 Aug 2017 11:52 )             30.2     08-29    149<H>  |  113<H>  |  87<H>  ----------------------------<  230<H>  3.6   |  26  |  6.23<H>    Ca    8.8      29 Aug 2017 11:52  Phos  6.8     08-29    TPro  7.2  /  Alb  2.7<L>  /  TBili  0.4  /  DBili  x   /  AST  15  /  ALT  16  /  AlkPhos  91  08-29    PT/INR - ( 28 Aug 2017 09:56 )   PT: 10.8 sec;   INR: 0.99 ratio         PTT - ( 28 Aug 2017 09:56 )  PTT:32.0 sec      ASSESSMENT AND PLAN:  72y Female, advanced renal failure; being tx'd for pancreatitis, gastroenteritis, cardiac ischemia w/elev trop, and anemia requiring supportive transfusion  -npo status  -trend lipase  -GI/Bienstock following  -IVFs  -telemetry monitoring  -trending troponins  -TTE  -Cardiology consult  - CC/F/U for: pancreatitis, gastroenteritis, advanced kidney failure, elev trop    HPI:  73 y/o F with PMH of HTN, HLD, DM II, CKD stage 5, has been refusing dialysis so far p/w c/o nausea/vomiting and diarrhea since yesterday. Also c/o epigastric pain, pain is severe , radiates to LUQ, 8/10, Denies any fever/chills , denies CP, SOB.  Denies cough, dizziness, HA or blurry vision, all other ROS negative.  patient AAOx3 , but poor historian, Pt also refuses for dialysis,  Pt was recently admitted at [facility] for symptomatic anemia and received 2 PRBC. (28 Aug 2017 13:27)        INTERVAL HPI/OVERNIGHT EVENTS:  Pt seen and examined at bedside; wants to eat, feels like left upper abd discomfort is from not eating, want to eat food now; says will talk about not wanting dialysis once eats some food.     Allergies/Intolerance: No Known Allergies      MEDICATIONS  (STANDING):  aspirin enteric coated 81 milliGRAM(s) Oral daily  doxazosin 2 milliGRAM(s) Oral at bedtime  insulin glargine Injectable (LANTUS) 15 Unit(s) SubCutaneous at bedtime  atorvastatin 20 milliGRAM(s) Oral at bedtime  amLODIPine   Tablet 10 milliGRAM(s) Oral daily  furosemide    Tablet 40 milliGRAM(s) Oral two times a day  hydrALAZINE 100 milliGRAM(s) Oral every 12 hours  multivitamin 1 Tablet(s) Oral daily  insulin lispro (HumaLOG) corrective regimen sliding scale   SubCutaneous three times a day before meals  insulin lispro (HumaLOG) corrective regimen sliding scale   SubCutaneous at bedtime  dextrose 5%. 1000 milliLiter(s) (50 mL/Hr) IV Continuous <Continuous>  dextrose 50% Injectable 12.5 Gram(s) IV Push once  dextrose 50% Injectable 25 Gram(s) IV Push once  dextrose 50% Injectable 25 Gram(s) IV Push once  pantoprazole    Tablet 40 milliGRAM(s) Oral before breakfast  dextrose 5% + sodium chloride 0.45%. 1000 milliLiter(s) (100 mL/Hr) IV Continuous <Continuous>  cloNIDine Patch 0.3 mG/24Hr(s) 1 patch Topical every 7 days  ciprofloxacin   IVPB   IV Intermittent   metroNIDAZOLE  IVPB   IV Intermittent   ciprofloxacin   IVPB 200 milliGRAM(s) IV Intermittent every 24 hours  metroNIDAZOLE  IVPB 500 milliGRAM(s) IV Intermittent every 8 hours    MEDICATIONS  (PRN):  docusate sodium 100 milliGRAM(s) Oral two times a day PRN Constipation  dextrose Gel 1 Dose(s) Oral once PRN Blood Glucose LESS THAN 70 milliGRAM(s)/deciliter  glucagon  Injectable 1 milliGRAM(s) IntraMuscular once PRN Glucose LESS THAN 70 milligrams/deciliter  hydrALAZINE Injectable 10 milliGRAM(s) IV Push every 6 hours PRN for systolic BP more than 160  ondansetron Injectable 4 milliGRAM(s) IV Push every 6 hours PRN Nausea and/or Vomiting  morphine  - Injectable 2 milliGRAM(s) IV Push every 6 hours PRN Severe Pain (7 - 10)        ROS: as above; all other systems reviewed and wnl      PHYSICAL EXAMINATION:  Vital Signs Last 24 Hrs  T(C): 36.6 (29 Aug 2017 11:47), Max: 37.2 (28 Aug 2017 21:05)  T(F): 97.8 (29 Aug 2017 11:47), Max: 99 (28 Aug 2017 21:05)  HR: 97 (29 Aug 2017 11:47) (91 - 103)  BP: 160/72 (29 Aug 2017 11:47) (154/68 - 177/64)  RR: 16 (29 Aug 2017 11:47) (16 - 22)  SpO2: 100% (29 Aug 2017 11:47) (96% - 100%)  CAPILLARY BLOOD GLUCOSE  213 (29 Aug 2017 12:24)  313 (29 Aug 2017 08:27)  417 (28 Aug 2017 17:17)  438 (28 Aug 2017 17:15)    GENERAL: elderly female; mild distress 2/2 left upper abd discomfort  HEAD:  atraumatic, normocephalic  EYES: sclera anicteric  ENMT: mucous membranes dry  NECK: supple, No JVD  CHEST/LUNG: respirations unlabored; air entry symmetric; no wheezing b/l  HEART: normal S1, S2  ABDOMEN: BS+, soft, ND, left upper abd w/ttp, no rebound, no guarding  EXTREMITIES:  pulses palpable; no clubbing, cyanosis, or edema b/l LEs  NEURO: awake, alert, interactive; moves all extremities  SKIN: no rashes or lesions      LABS:                        9.7    19.5  )-----------( 465      ( 29 Aug 2017 11:52 )             30.2     08-29    149<H>  |  113<H>  |  87<H>  ----------------------------<  230<H>  3.6   |  26  |  6.23<H>    Ca    8.8      29 Aug 2017 11:52  Phos  6.8     08-29    TPro  7.2  /  Alb  2.7<L>  /  TBili  0.4  /  DBili  x   /  AST  15  /  ALT  16  /  AlkPhos  91  08-29    PT/INR - ( 28 Aug 2017 09:56 )   PT: 10.8 sec;   INR: 0.99 ratio         PTT - ( 28 Aug 2017 09:56 )  PTT:32.0 sec      ASSESSMENT AND PLAN:  72y Female, type 2 DM, HTN, HL, stage 5 CKD - has been refusing HD, adm w/abd pain, nausea/vomiting, 1 episode diarrhea, CTAP w/no colitis or bowel obstruction, CXR neg, being eval for possible GI inflammatory process vs acute infection; less likely acute uremic sxs, lipase 900, c/f acute pancreatitis, though CT neg; also elev trop0.05    GI:   *acute pancreatitis  -NPO  -IVFs, gently given advanced renal failure  -prn analgesia, antiemetics  -trend lipase  -GI/Bienstock following    *?gastroenteritis - CT neg, diarrhea resolved; currently on regimen of cipro, flagyl; f/u cxs; GI/Bienstock following    *epigastric/LUQ pain - etiology unclear, r/o gastritis - GI/Bienstock considering EGD      HEME: anemia requiring transfusion   -supportive transfusions prn  -check FOBT  -GI/Bienstock following      RENAL: advanced renal failure, refusing HD; monitoring for need to initiate HD  -Nephrology/Vishnu following  -monitoring electrolyes for stability  -gentle IVFs      CV:   *elev trop/cardiac ischemia - trops 0.05->0.07  -continue cardiac regimen of asa, statin for now  -Cardiology/Tyler following  -trend trops    *HTN - continue multidrug antiHTNve regimen, monitor for need to uptitrate dosages      OTHER:  -dvt prophy  -PT eval  -all diagnoses/mgmt plans discussed in detail w/pt, daughter at bedside; all questions answered

## 2017-08-29 NOTE — PROGRESS NOTE ADULT - SUBJECTIVE AND OBJECTIVE BOX
REPRODUCIBLE CHEST WALL PAIN  EQUIVOCAL TROPONIN MARKER(S)   NO CHANGE  TELEMETRY: NSR  NO acute coronary syndrome SUSPECTED  TYLENOL  DIALYSIS  STABLE CARDIOVASCULAR STATUS; CONTINUING WITH PRESENT MANAGEMENT.    TARI GUZMÁN MD, FACC

## 2017-08-30 DIAGNOSIS — D72.829 ELEVATED WHITE BLOOD CELL COUNT, UNSPECIFIED: ICD-10-CM

## 2017-08-30 LAB
ALBUMIN SERPL ELPH-MCNC: 2.4 G/DL — LOW (ref 3.3–5)
ALP SERPL-CCNC: 83 U/L — SIGNIFICANT CHANGE UP (ref 40–120)
ALT FLD-CCNC: 15 U/L — SIGNIFICANT CHANGE UP (ref 12–78)
ANION GAP SERPL CALC-SCNC: 12 MMOL/L — SIGNIFICANT CHANGE UP (ref 5–17)
AST SERPL-CCNC: 18 U/L — SIGNIFICANT CHANGE UP (ref 15–37)
BILIRUB SERPL-MCNC: 0.3 MG/DL — SIGNIFICANT CHANGE UP (ref 0.2–1.2)
BUN SERPL-MCNC: 80 MG/DL — HIGH (ref 7–23)
CALCIUM SERPL-MCNC: 8.2 MG/DL — LOW (ref 8.5–10.1)
CHLORIDE SERPL-SCNC: 110 MMOL/L — HIGH (ref 96–108)
CO2 SERPL-SCNC: 23 MMOL/L — SIGNIFICANT CHANGE UP (ref 22–31)
CREAT SERPL-MCNC: 5.77 MG/DL — HIGH (ref 0.5–1.3)
GLUCOSE SERPL-MCNC: 277 MG/DL — HIGH (ref 70–99)
HCT VFR BLD CALC: 29.9 % — LOW (ref 34.5–45)
HGB BLD-MCNC: 9.8 G/DL — LOW (ref 11.5–15.5)
MCHC RBC-ENTMCNC: 27.9 PG — SIGNIFICANT CHANGE UP (ref 27–34)
MCHC RBC-ENTMCNC: 32.8 GM/DL — SIGNIFICANT CHANGE UP (ref 32–36)
MCV RBC AUTO: 85.1 FL — SIGNIFICANT CHANGE UP (ref 80–100)
PLATELET # BLD AUTO: 426 K/UL — HIGH (ref 150–400)
POTASSIUM SERPL-MCNC: 3.6 MMOL/L — SIGNIFICANT CHANGE UP (ref 3.5–5.3)
POTASSIUM SERPL-SCNC: 3.6 MMOL/L — SIGNIFICANT CHANGE UP (ref 3.5–5.3)
PROT SERPL-MCNC: 6.5 GM/DL — SIGNIFICANT CHANGE UP (ref 6–8.3)
RBC # BLD: 3.51 M/UL — LOW (ref 3.8–5.2)
RBC # FLD: 15 % — SIGNIFICANT CHANGE UP (ref 11–15)
SODIUM SERPL-SCNC: 145 MMOL/L — SIGNIFICANT CHANGE UP (ref 135–145)
WBC # BLD: 19.5 K/UL — HIGH (ref 3.8–10.5)
WBC # FLD AUTO: 19.5 K/UL — HIGH (ref 3.8–10.5)

## 2017-08-30 PROCEDURE — 99233 SBSQ HOSP IP/OBS HIGH 50: CPT

## 2017-08-30 RX ORDER — MORPHINE SULFATE 50 MG/1
2 CAPSULE, EXTENDED RELEASE ORAL ONCE
Qty: 0 | Refills: 0 | Status: DISCONTINUED | OUTPATIENT
Start: 2017-08-30 | End: 2017-08-30

## 2017-08-30 RX ADMIN — MORPHINE SULFATE 2 MILLIGRAM(S): 50 CAPSULE, EXTENDED RELEASE ORAL at 04:41

## 2017-08-30 RX ADMIN — ATORVASTATIN CALCIUM 20 MILLIGRAM(S): 80 TABLET, FILM COATED ORAL at 21:49

## 2017-08-30 RX ADMIN — Medication 100 MILLIGRAM(S): at 05:25

## 2017-08-30 RX ADMIN — MORPHINE SULFATE 2 MILLIGRAM(S): 50 CAPSULE, EXTENDED RELEASE ORAL at 12:00

## 2017-08-30 RX ADMIN — Medication 2 MILLIGRAM(S): at 21:49

## 2017-08-30 RX ADMIN — MORPHINE SULFATE 2 MILLIGRAM(S): 50 CAPSULE, EXTENDED RELEASE ORAL at 22:21

## 2017-08-30 RX ADMIN — MORPHINE SULFATE 2 MILLIGRAM(S): 50 CAPSULE, EXTENDED RELEASE ORAL at 01:32

## 2017-08-30 RX ADMIN — AMLODIPINE BESYLATE 10 MILLIGRAM(S): 2.5 TABLET ORAL at 05:34

## 2017-08-30 RX ADMIN — MORPHINE SULFATE 2 MILLIGRAM(S): 50 CAPSULE, EXTENDED RELEASE ORAL at 05:04

## 2017-08-30 RX ADMIN — Medication 100 MILLIGRAM(S): at 05:24

## 2017-08-30 RX ADMIN — SODIUM CHLORIDE 100 MILLILITER(S): 9 INJECTION, SOLUTION INTRAVENOUS at 11:34

## 2017-08-30 RX ADMIN — Medication 2: at 17:26

## 2017-08-30 RX ADMIN — Medication 100 MILLIGRAM(S): at 21:50

## 2017-08-30 RX ADMIN — Medication 3: at 07:46

## 2017-08-30 RX ADMIN — MORPHINE SULFATE 2 MILLIGRAM(S): 50 CAPSULE, EXTENDED RELEASE ORAL at 22:30

## 2017-08-30 RX ADMIN — MORPHINE SULFATE 2 MILLIGRAM(S): 50 CAPSULE, EXTENDED RELEASE ORAL at 17:44

## 2017-08-30 RX ADMIN — MORPHINE SULFATE 2 MILLIGRAM(S): 50 CAPSULE, EXTENDED RELEASE ORAL at 18:25

## 2017-08-30 RX ADMIN — MORPHINE SULFATE 2 MILLIGRAM(S): 50 CAPSULE, EXTENDED RELEASE ORAL at 11:22

## 2017-08-30 RX ADMIN — INSULIN GLARGINE 15 UNIT(S): 100 INJECTION, SOLUTION SUBCUTANEOUS at 21:50

## 2017-08-30 RX ADMIN — SODIUM CHLORIDE 100 MILLILITER(S): 9 INJECTION, SOLUTION INTRAVENOUS at 05:33

## 2017-08-30 RX ADMIN — PANTOPRAZOLE SODIUM 40 MILLIGRAM(S): 20 TABLET, DELAYED RELEASE ORAL at 05:33

## 2017-08-30 RX ADMIN — Medication 100 MILLIGRAM(S): at 13:37

## 2017-08-30 RX ADMIN — Medication 1 TABLET(S): at 11:31

## 2017-08-30 RX ADMIN — Medication 100 MILLIGRAM(S): at 14:58

## 2017-08-30 RX ADMIN — Medication 2: at 11:29

## 2017-08-30 RX ADMIN — Medication 100 MILLIGRAM(S): at 17:50

## 2017-08-30 NOTE — PROGRESS NOTE ADULT - SUBJECTIVE AND OBJECTIVE BOX
Patient is a 72y old  Female who presents with a chief complaint of Abd. pain, nausea, vomiting, diarrhea since last night (28 Aug 2017 13:27)      HPI:  73 y/o F with PMH of HTN, HLD, DM II, CKD stage 5, has been refusing dialysis so far p/w c/o nausea/vomiting and diarrhea since yesterday. Also c/o epigastric pain, pain is severe , radiates to LUQ, 8/10, Denies any fever/chills , denies CP, SOB.  Denies cough, dizziness, HA or blurry vision, all other ROS negative.  patient AAOx3 , but poor historian, Pt also refuses for dialysis,  Pt was recently admitted at [facility] for symptomatic anemia and received 2 PRBC. (28 Aug 2017 13:27)      INTERVAL HPI/OVERNIGHT EVENTS:   Nausea and abdominal present but better this AM.  The patient denies melena, hematochezia, hematemesis, vomiting,  constipation, diarrhea, or change in bowel movements     MEDICATIONS  (STANDING):  aspirin enteric coated 81 milliGRAM(s) Oral daily  doxazosin 2 milliGRAM(s) Oral at bedtime  insulin glargine Injectable (LANTUS) 15 Unit(s) SubCutaneous at bedtime  atorvastatin 20 milliGRAM(s) Oral at bedtime  amLODIPine   Tablet 10 milliGRAM(s) Oral daily  hydrALAZINE 100 milliGRAM(s) Oral every 12 hours  multivitamin 1 Tablet(s) Oral daily  insulin lispro (HumaLOG) corrective regimen sliding scale   SubCutaneous three times a day before meals  insulin lispro (HumaLOG) corrective regimen sliding scale   SubCutaneous at bedtime  dextrose 5%. 1000 milliLiter(s) (50 mL/Hr) IV Continuous <Continuous>  dextrose 50% Injectable 12.5 Gram(s) IV Push once  dextrose 50% Injectable 25 Gram(s) IV Push once  dextrose 50% Injectable 25 Gram(s) IV Push once  pantoprazole    Tablet 40 milliGRAM(s) Oral before breakfast  dextrose 5% + sodium chloride 0.45%. 1000 milliLiter(s) (100 mL/Hr) IV Continuous <Continuous>  cloNIDine Patch 0.3 mG/24Hr(s) 1 patch Topical every 7 days  ciprofloxacin   IVPB   IV Intermittent   metroNIDAZOLE  IVPB   IV Intermittent   ciprofloxacin   IVPB 200 milliGRAM(s) IV Intermittent every 24 hours  metroNIDAZOLE  IVPB 500 milliGRAM(s) IV Intermittent every 8 hours    MEDICATIONS  (PRN):  docusate sodium 100 milliGRAM(s) Oral two times a day PRN Constipation  dextrose Gel 1 Dose(s) Oral once PRN Blood Glucose LESS THAN 70 milliGRAM(s)/deciliter  glucagon  Injectable 1 milliGRAM(s) IntraMuscular once PRN Glucose LESS THAN 70 milligrams/deciliter  hydrALAZINE Injectable 10 milliGRAM(s) IV Push every 6 hours PRN for systolic BP more than 160  ondansetron Injectable 4 milliGRAM(s) IV Push every 6 hours PRN Nausea and/or Vomiting  morphine  - Injectable 2 milliGRAM(s) IV Push every 6 hours PRN Severe Pain (7 - 10)  acetaminophen  Suppository. 650 milliGRAM(s) Rectal every 4 hours PRN Moderate Pain (4 - 6)      FAMILY HISTORY:  No pertinent family history in first degree relatives      Allergies    No Known Allergies    Intolerances        PMH/PSH:  Chronic kidney disease  Congestive heart failure, unspecified congestive heart failure chronicity, unspecified congestive heart failure type  Diabetes  Hypertension  No significant past surgical history        REVIEW OF SYSTEMS:  CONSTITUTIONAL: No fever, weight loss, or fatigue  EYES: No eye pain, visual disturbances, or discharge  NECK: No pain or stiffness  BREASTS: No pain, masses, or nipple discharge  RESPIRATORY: No cough, wheezing, chills or hemoptysis; No shortness of breath  CARDIOVASCULAR: No chest pain, palpitations, dizziness, or leg swelling  GASTROINTESTINAL: See above  GENITOURINARY: No dysuria, frequency, hematuria, or incontinence  NEUROLOGICAL: No headaches, memory loss, loss of strength, numbness, or tremors  SKIN: No itching, burning, rashes, or lesions   LYMPH NODES: No enlarged glands  ENDOCRINE: No heat or cold intolerance; No hair loss      Vital Signs Last 24 Hrs  T(C): 36.4 (30 Aug 2017 04:27), Max: 37.3 (29 Aug 2017 16:40)  T(F): 97.6 (30 Aug 2017 04:27), Max: 99.2 (29 Aug 2017 16:40)  HR: 95 (30 Aug 2017 04:27) (85 - 97)  BP: 157/68 (30 Aug 2017 04:27) (157/68 - 166/68)  BP(mean): --  RR: 18 (30 Aug 2017 04:27) (16 - 18)  SpO2: 98% (30 Aug 2017 04:27) (97% - 100%)    PHYSICAL EXAM:  GENERAL: NAD, well-groomed, well-developed  HEAD:  Atraumatic, Normocephalic  EYES: EOMI, PERRLA, conjunctiva and sclera clear  NECK: Supple, No JVD, Normal thyroid  NERVOUS SYSTEM:  Alert & Oriented X3, Good concentration;  CHEST/LUNG: Clear to percussion bilaterally; No rales, rhonchi, wheezing, or rubs  HEART: Regular rate and rhythm; No murmurs, rubs, or gallops  ABDOMEN: Soft, Mild tenderness in mid epigastrium , Nondistended; Bowel sounds present  EXTREMITIES:  2+ Peripheral Pulses, No clubbing, cyanosis, or edema  LYMPH: No lymphadenopathy noted  SKIN: No rashes or lesions    LAB   @ 11:52  amylase --   lipase 357    @ 08:45  amylase --   lipase 900                           9.8    19.5  )-----------( 426      ( 30 Aug 2017 06:16 )             29.9       CBC:   @ 06:16  WBC 19.5   Hgb 9.8   Hct 29.9   Plts 426  MCV 85.1   @ 11:52  WBC 19.5   Hgb 9.7   Hct 30.2   Plts 465  MCV 84.4   @ 08:45  WBC 14.7   Hgb 7.4   Hct 24.6   Plts 537  MCV 83.2      Chemistry:   @ 06:16  Na+ 145  K+ 3.6  Cl- 110  CO2 23  BUN 80  Cr 5.77      @ 11:52  Na+ 149  K+ 3.6  Cl- 113  CO2 26  BUN 87  Cr 6.23      @ 08:45  Na+ 142  K+ 4.5  Cl- 108  CO2 18    Cr 6.38         Glucose, Serum: 277 mg/dL ( @ 06:16)  Glucose, Serum: 230 mg/dL ( @ 11:52)  Glucose, Serum: 350 mg/dL ( @ 08:45)      30 Aug 2017 06:16    145    |  110    |  80     ----------------------------<  277    3.6     |  23     |  5.77   29 Aug 2017 11:52    149    |  113    |  87     ----------------------------<  230    3.6     |  26     |  6.23   28 Aug 2017 08:45    142    |  108    |  100    ----------------------------<  350    4.5     |  18     |  6.38     Ca    8.2        30 Aug 2017 06:16  Ca    8.8        29 Aug 2017 11:52  Ca    9.3        28 Aug 2017 08:45  Phos  6.8       29 Aug 2017 11:52    TPro  6.5    /  Alb  2.4    /  TBili  0.3    /  DBili  x      /  AST  18     /  ALT  15     /  AlkPhos  83     30 Aug 2017 06:16  TPro  7.2    /  Alb  2.7    /  TBili  0.4    /  DBili  x      /  AST  15     /  ALT  16     /  AlkPhos  91     29 Aug 2017 11:52  TPro  7.9    /  Alb  2.7    /  TBili  0.4    /  DBili  x      /  AST  35     /  ALT  18     /  AlkPhos  95     28 Aug 2017 08:45          Urinalysis Basic - ( 29 Aug 2017 21:58 )    Color: Yellow / Appearance: Clear / S.015 / pH: x  Gluc: x / Ketone: Negative  / Bili: Negative / Urobili: Negative mg/dL   Blood: x / Protein: 500 mg/dL / Nitrite: Negative   Leuk Esterase: Negative / RBC: 3-5 /HPF / WBC 11-25   Sq Epi: x / Non Sq Epi: Few / Bacteria: Few        CAPILLARY BLOOD GLUCOSE  296 (30 Aug 2017 07:26)  227 (29 Aug 2017 22:23)  231 (29 Aug 2017 16:59)  213 (29 Aug 2017 12:24)          C-Reactive Protein, Serum: 1.10 mg/dL ( @ 14:52)      RADIOLOGY & ADDITIONAL TESTS:    Imaging Personally Reviewed:  [ ] YES  [ ] NO    Consultant(s) Notes Reviewed:  [ ] YES  [ ] NO    Care Discussed with Consultants/Other Providers [ ] YES  [ ] NO

## 2017-08-30 NOTE — PROGRESS NOTE ADULT - SUBJECTIVE AND OBJECTIVE BOX
RESTING COMFORTABLY  OFF TELEMETRY:   ON IVF  STABLE CARDIOVASCULAR STATUS; CONTINUING WITH PRESENT MANAGEMENT.

## 2017-08-30 NOTE — PROGRESS NOTE ADULT - SUBJECTIVE AND OBJECTIVE BOX
Patient seen in follow up for CKD 4-5; appears comfortable, less distress; still with epigastric discomfort.      MEDICATIONS  (STANDING):  doxazosin 2 milliGRAM(s) Oral at bedtime  insulin glargine Injectable (LANTUS) 15 Unit(s) SubCutaneous at bedtime  atorvastatin 20 milliGRAM(s) Oral at bedtime  amLODIPine   Tablet 10 milliGRAM(s) Oral daily  hydrALAZINE 100 milliGRAM(s) Oral every 12 hours  multivitamin 1 Tablet(s) Oral daily  insulin lispro (HumaLOG) corrective regimen sliding scale   SubCutaneous three times a day before meals  insulin lispro (HumaLOG) corrective regimen sliding scale   SubCutaneous at bedtime  dextrose 5%. 1000 milliLiter(s) (50 mL/Hr) IV Continuous <Continuous>  dextrose 50% Injectable 12.5 Gram(s) IV Push once  dextrose 50% Injectable 25 Gram(s) IV Push once  dextrose 50% Injectable 25 Gram(s) IV Push once  pantoprazole    Tablet 40 milliGRAM(s) Oral before breakfast  dextrose 5% + sodium chloride 0.45%. 1000 milliLiter(s) (100 mL/Hr) IV Continuous <Continuous>  cloNIDine Patch 0.3 mG/24Hr(s) 1 patch Topical every 7 days  ciprofloxacin   IVPB   IV Intermittent   metroNIDAZOLE  IVPB   IV Intermittent   ciprofloxacin   IVPB 200 milliGRAM(s) IV Intermittent every 24 hours  metroNIDAZOLE  IVPB 500 milliGRAM(s) IV Intermittent every 8 hours    MEDICATIONS  (PRN):  docusate sodium 100 milliGRAM(s) Oral two times a day PRN Constipation  dextrose Gel 1 Dose(s) Oral once PRN Blood Glucose LESS THAN 70 milliGRAM(s)/deciliter  glucagon  Injectable 1 milliGRAM(s) IntraMuscular once PRN Glucose LESS THAN 70 milligrams/deciliter  hydrALAZINE Injectable 10 milliGRAM(s) IV Push every 6 hours PRN for systolic BP more than 160  ondansetron Injectable 4 milliGRAM(s) IV Push every 6 hours PRN Nausea and/or Vomiting  morphine  - Injectable 2 milliGRAM(s) IV Push every 6 hours PRN Severe Pain (7 - 10)  acetaminophen  Suppository. 650 milliGRAM(s) Rectal every 4 hours PRN Moderate Pain (4 - 6)    PHYSICAL EXAM:      T(C): 36.6 (08-30-17 @ 10:28), Max: 37.3 (08-29-17 @ 16:40)  HR: 90 (08-30-17 @ 10:28) (85 - 97)  BP: 140/61 (08-30-17 @ 10:28) (140/61 - 166/68)  RR: 18 (08-30-17 @ 10:28) (18 - 18)  SpO2: 93% (08-30-17 @ 10:28) (93% - 98%)  Wt(kg): --  Respiratory: clear anteriorly, decreased BS at bases  Cardiovascular: S1 S2  Gastrointestinal: soft NT ND +BS  Extremities:  1 edema                                    9.8    19.5  )-----------( 426      ( 30 Aug 2017 06:16 )             29.9     08-30    145  |  110<H>  |  80<H>  ----------------------------<  277<H>  3.6   |  23  |  5.77<H>    Ca    8.2<L>      30 Aug 2017 06:16  Phos  6.8     08-29    TPro  6.5  /  Alb  2.4<L>  /  TBili  0.3  /  DBili  x   /  AST  18  /  ALT  15  /  AlkPhos  83  08-30      LIVER FUNCTIONS - ( 30 Aug 2017 06:16 )  Alb: 2.4 g/dL / Pro: 6.5 gm/dL / ALK PHOS: 83 U/L / ALT: 15 U/L / AST: 18 U/L / GGT: x             Assessment and Plan:  CKD 4-5, stable; GI sx to r/o PUD;   No immediate indication for HD; will follow clinical course;   AV access placement once stable.

## 2017-08-30 NOTE — PROGRESS NOTE ADULT - SUBJECTIVE AND OBJECTIVE BOX
CC/F/U for: pancreatitis, gastroenteritis, epigastric abd pain, advanced kidney failure, elev trop      HPI:  71 y/o F with PMH of HTN, HLD, DM II, CKD stage 5, has been refusing dialysis so far p/w c/o nausea/vomiting and diarrhea since yesterday. Also c/o epigastric pain, pain is severe , radiates to LUQ, 8/10, Denies any fever/chills , denies CP, SOB.  Denies cough, dizziness, HA or blurry vision, all other ROS negative.  patient AAOx3 , but poor historian, Pt also refuses for dialysis,  Pt was recently admitted at [facility] for symptomatic anemia and received 2 PRBC. (28 Aug 2017 13:27)        INTERVAL HPI/OVERNIGHT EVENTS:  Pt seen and examined, with family at bedside; feels slightly better; remains on clears; for EGD tomorrow    Allergies/Intolerance: No Known Allergies      MEDICATIONS  (STANDING):  doxazosin 2 milliGRAM(s) Oral at bedtime  insulin glargine Injectable (LANTUS) 15 Unit(s) SubCutaneous at bedtime  atorvastatin 20 milliGRAM(s) Oral at bedtime  amLODIPine   Tablet 10 milliGRAM(s) Oral daily  hydrALAZINE 100 milliGRAM(s) Oral every 12 hours  multivitamin 1 Tablet(s) Oral daily  insulin lispro (HumaLOG) corrective regimen sliding scale   SubCutaneous three times a day before meals  insulin lispro (HumaLOG) corrective regimen sliding scale   SubCutaneous at bedtime  dextrose 5%. 1000 milliLiter(s) (50 mL/Hr) IV Continuous <Continuous>  dextrose 50% Injectable 12.5 Gram(s) IV Push once  dextrose 50% Injectable 25 Gram(s) IV Push once  dextrose 50% Injectable 25 Gram(s) IV Push once  pantoprazole    Tablet 40 milliGRAM(s) Oral before breakfast  dextrose 5% + sodium chloride 0.45%. 1000 milliLiter(s) (100 mL/Hr) IV Continuous <Continuous>  cloNIDine Patch 0.3 mG/24Hr(s) 1 patch Topical every 7 days  ciprofloxacin   IVPB   IV Intermittent   metroNIDAZOLE  IVPB   IV Intermittent   ciprofloxacin   IVPB 200 milliGRAM(s) IV Intermittent every 24 hours  metroNIDAZOLE  IVPB 500 milliGRAM(s) IV Intermittent every 8 hours    MEDICATIONS  (PRN):  docusate sodium 100 milliGRAM(s) Oral two times a day PRN Constipation  dextrose Gel 1 Dose(s) Oral once PRN Blood Glucose LESS THAN 70 milliGRAM(s)/deciliter  glucagon  Injectable 1 milliGRAM(s) IntraMuscular once PRN Glucose LESS THAN 70 milligrams/deciliter  hydrALAZINE Injectable 10 milliGRAM(s) IV Push every 6 hours PRN for systolic BP more than 160  ondansetron Injectable 4 milliGRAM(s) IV Push every 6 hours PRN Nausea and/or Vomiting  morphine  - Injectable 2 milliGRAM(s) IV Push every 6 hours PRN Severe Pain (7 - 10)  acetaminophen  Suppository. 650 milliGRAM(s) Rectal every 4 hours PRN Moderate Pain (4 - 6)        ROS: as above; all other systems reviewed and wnl      PHYSICAL EXAMINATION:  Vital Signs Last 24 Hrs  T(C): 36.4 (30 Aug 2017 17:39), Max: 36.6 (30 Aug 2017 10:28)  T(F): 97.6 (30 Aug 2017 17:39), Max: 97.8 (30 Aug 2017 10:28)  HR: 91 (30 Aug 2017 17:39) (90 - 97)  BP: 152/72 (30 Aug 2017 17:39) (140/61 - 165/69)  RR: 18 (30 Aug 2017 17:39) (18 - 18)  SpO2: 94% (30 Aug 2017 17:39) (93% - 98%)  CAPILLARY BLOOD GLUCOSE  211 (30 Aug 2017 16:21)  243 (30 Aug 2017 11:27)  296 (30 Aug 2017 07:26)  227 (29 Aug 2017 22:23)    GENERAL: elderly female; mild distress 2/2 left upper abd discomfort  HEAD:  atraumatic, normocephalic  EYES: sclera anicteric  ENMT: mucous membranes dry  NECK: supple, No JVD  CHEST/LUNG: respirations unlabored; air entry symmetric; no wheezing b/l  HEART: normal S1, S2  ABDOMEN: BS+, soft, ND, left upper abd w/ttp, no rebound, no guarding  EXTREMITIES:  pulses palpable; no clubbing, cyanosis, or edema b/l LEs  NEURO: awake, alert, interactive; moves all extremities  SKIN: no rashes or lesions    LABS:                        9.8    19.5  )-----------( 426      ( 30 Aug 2017 06:16 )             29.9         145  |  110<H>  |  80<H>  ----------------------------<  277<H>  3.6   |  23  |  5.77<H>    Ca    8.2<L>      30 Aug 2017 06:16  Phos  6.8         TPro  6.5  /  Alb  2.4<L>  /  TBili  0.3  /  DBili  x   /  AST  18  /  ALT  15  /  AlkPhos  83        Urinalysis Basic - ( 29 Aug 2017 21:58 )    Color: Yellow / Appearance: Clear / S.015 / pH: x  Gluc: x / Ketone: Negative  / Bili: Negative / Urobili: Negative mg/dL   Blood: x / Protein: 500 mg/dL / Nitrite: Negative   Leuk Esterase: Negative / RBC: 3-5 /HPF / WBC 11-25   Sq Epi: x / Non Sq Epi: Few / Bacteria: Few      ASSESSMENT AND PLAN:  72y Female, type 2 DM, HTN, HL, stage 5 CKD - has been refusing HD, adm w/abd pain, nausea/vomiting, 1 episode diarrhea, CTAP w/no colitis or bowel obstruction, CXR neg, being eval for possible GI inflammatory process vs acute infection; less likely acute uremic sxs, lipase 900, c/f acute pancreatitis, though CT neg; also elev trop 0.05    GI:   *acute pancreatitis - resolved?; lipase normalized; on clear liquid diet  -prn analgesia, antiemetics  -GI/[facility]ienstock following    *?gastroenteritis - CT neg, diarrhea resolved; currently on regimen of cipro, flagyl; f/u cxs; GI/Bienstock following    *epigastric/LUQ pain - etiology unclear, r/o gastritis - for EGD tomorrow by GI/Bienstock       HEME: anemia requiring transfusion   -supportive transfusions prn  -FOBT ordered  -GI/Bienstock following      RENAL: advanced renal failure, refusing HD; monitoring for need to initiate HD  -Nephrology/Vishnu following  -monitoring electrolyes for stability  -gentle IVFs      CV:   *elev trop/cardiac ischemia - trops 0.05->0.07  -continue cardiac regimen of asa, statin for now  -Cardiology/Tyler following  -trend trops    *HTN - continue multidrug antiHTNve regimen, monitor for need to uptitrate dosages      OTHER:  -dvt prophy  -PT eval  -all diagnoses/mgmt plans discussed in detail w/pt, daughter at bedside; all questions answered CC/F/U for: pancreatitis, gastroenteritis, epigastric abd pain, advanced kidney failure, elev trop      HPI:  73 y/o F with PMH of HTN, HLD, DM II, CKD stage 5, has been refusing dialysis so far p/w c/o nausea/vomiting and diarrhea since yesterday. Also c/o epigastric pain, pain is severe , radiates to LUQ, 8/10, Denies any fever/chills , denies CP, SOB.  Denies cough, dizziness, HA or blurry vision, all other ROS negative.  patient AAOx3 , but poor historian, Pt also refuses for dialysis,  Pt was recently admitted at [facility] for symptomatic anemia and received 2 PRBC. (28 Aug 2017 13:27)        INTERVAL HPI/OVERNIGHT EVENTS:  Pt seen and examined, with family at bedside; feels slightly better; remains on clears; for EGD tomorrow    Allergies/Intolerance: No Known Allergies      MEDICATIONS  (STANDING):  doxazosin 2 milliGRAM(s) Oral at bedtime  insulin glargine Injectable (LANTUS) 15 Unit(s) SubCutaneous at bedtime  atorvastatin 20 milliGRAM(s) Oral at bedtime  amLODIPine   Tablet 10 milliGRAM(s) Oral daily  hydrALAZINE 100 milliGRAM(s) Oral every 12 hours  multivitamin 1 Tablet(s) Oral daily  insulin lispro (HumaLOG) corrective regimen sliding scale   SubCutaneous three times a day before meals  insulin lispro (HumaLOG) corrective regimen sliding scale   SubCutaneous at bedtime  dextrose 5%. 1000 milliLiter(s) (50 mL/Hr) IV Continuous <Continuous>  dextrose 50% Injectable 12.5 Gram(s) IV Push once  dextrose 50% Injectable 25 Gram(s) IV Push once  dextrose 50% Injectable 25 Gram(s) IV Push once  pantoprazole    Tablet 40 milliGRAM(s) Oral before breakfast  dextrose 5% + sodium chloride 0.45%. 1000 milliLiter(s) (100 mL/Hr) IV Continuous <Continuous>  cloNIDine Patch 0.3 mG/24Hr(s) 1 patch Topical every 7 days  ciprofloxacin   IVPB   IV Intermittent   metroNIDAZOLE  IVPB   IV Intermittent   ciprofloxacin   IVPB 200 milliGRAM(s) IV Intermittent every 24 hours  metroNIDAZOLE  IVPB 500 milliGRAM(s) IV Intermittent every 8 hours    MEDICATIONS  (PRN):  docusate sodium 100 milliGRAM(s) Oral two times a day PRN Constipation  dextrose Gel 1 Dose(s) Oral once PRN Blood Glucose LESS THAN 70 milliGRAM(s)/deciliter  glucagon  Injectable 1 milliGRAM(s) IntraMuscular once PRN Glucose LESS THAN 70 milligrams/deciliter  hydrALAZINE Injectable 10 milliGRAM(s) IV Push every 6 hours PRN for systolic BP more than 160  ondansetron Injectable 4 milliGRAM(s) IV Push every 6 hours PRN Nausea and/or Vomiting  morphine  - Injectable 2 milliGRAM(s) IV Push every 6 hours PRN Severe Pain (7 - 10)  acetaminophen  Suppository. 650 milliGRAM(s) Rectal every 4 hours PRN Moderate Pain (4 - 6)        ROS: as above; all other systems reviewed and wnl      PHYSICAL EXAMINATION:  Vital Signs Last 24 Hrs  T(C): 36.4 (30 Aug 2017 17:39), Max: 36.6 (30 Aug 2017 10:28)  T(F): 97.6 (30 Aug 2017 17:39), Max: 97.8 (30 Aug 2017 10:28)  HR: 91 (30 Aug 2017 17:39) (90 - 97)  BP: 152/72 (30 Aug 2017 17:39) (140/61 - 165/69)  RR: 18 (30 Aug 2017 17:39) (18 - 18)  SpO2: 94% (30 Aug 2017 17:39) (93% - 98%)  CAPILLARY BLOOD GLUCOSE  211 (30 Aug 2017 16:21)  243 (30 Aug 2017 11:27)  296 (30 Aug 2017 07:26)  227 (29 Aug 2017 22:23)    GENERAL: elderly female; mild distress 2/2 left upper abd discomfort  HEAD:  atraumatic, normocephalic  EYES: sclera anicteric  ENMT: mucous membranes dry  NECK: supple, No JVD  CHEST/LUNG: respirations unlabored; air entry symmetric; no wheezing b/l  HEART: normal S1, S2  ABDOMEN: BS+, soft, ND, left upper abd w/ttp, no rebound, no guarding  EXTREMITIES:  pulses palpable; no clubbing, cyanosis, or edema b/l LEs  NEURO: awake, alert, interactive; moves all extremities  SKIN: no rashes or lesions    LABS:                        9.8    19.5  )-----------( 426      ( 30 Aug 2017 06:16 )             29.9         145  |  110<H>  |  80<H>  ----------------------------<  277<H>  3.6   |  23  |  5.77<H>    Ca    8.2<L>      30 Aug 2017 06:16  Phos  6.8         TPro  6.5  /  Alb  2.4<L>  /  TBili  0.3  /  DBili  x   /  AST  18  /  ALT  15  /  AlkPhos  83        Urinalysis Basic - ( 29 Aug 2017 21:58 )    Color: Yellow / Appearance: Clear / S.015 / pH: x  Gluc: x / Ketone: Negative  / Bili: Negative / Urobili: Negative mg/dL   Blood: x / Protein: 500 mg/dL / Nitrite: Negative   Leuk Esterase: Negative / RBC: 3-5 /HPF / WBC 11-25   Sq Epi: x / Non Sq Epi: Few / Bacteria: Few      ASSESSMENT AND PLAN:  72y Female, type 2 DM, HTN, HL, stage 5 CKD - has been refusing HD, adm w/abd pain, nausea/vomiting, 1 episode diarrhea, CTAP w/no colitis or bowel obstruction, CXR neg, being eval for possible GI inflammatory process vs acute infection; less likely acute uremic sxs, lipase 900, c/f acute pancreatitis, though CT neg; also elev trop 0.05    GI/ID:   *acute pancreatitis - resolved?; lipase normalized; on clear liquid diet  -prn analgesia, antiemetics  -GI/[facility] following    *epigastric/LUQ pain - etiology unclear, r/o gastritis - for EGD tomorrow by GI/Bienstock     *?gastroenteritis, persistent leukocytosis - CT neg, diarrhea resolved; currently on regimen of cipro, flagyl; f/u cxs; GI/Bienstock following      HEME: anemia requiring transfusion   -supportive transfusions prn  -FOBT ordered  -GI/Bienstock following      RENAL: advanced renal failure, refusing HD; monitoring for need to initiate HD  -Nephrology/Vishnu following  -monitoring electrolyes for stability  -gentle IVFs      CV:   *elev trop/cardiac ischemia - trops 0.05->0.07  -continue cardiac regimen of asa, statin for now  -Cardiology/Tyler following  -trend trops    *HTN - continue multidrug antiHTNve regimen, monitor for need to uptitrate dosages      OTHER:  -dvt prophy  -PT eval  -all diagnoses/mgmt plans discussed in detail w/pt, daughter at bedside; all questions answered

## 2017-08-30 NOTE — PROGRESS NOTE ADULT - PROBLEM SELECTOR PLAN 1
Lipase now normal. Improving mid epigastric pain with nausea (+). HCO3 level normal.- 1) maintain clear liquid diet  2) EGD Thursday 3) PPI

## 2017-08-31 ENCOUNTER — RESULT REVIEW (OUTPATIENT)
Age: 72
End: 2017-08-31

## 2017-08-31 LAB
CRP SERPL-MCNC: 0.6 MG/DL — HIGH (ref 0–0.4)
LIDOCAIN IGE QN: 417 U/L — HIGH (ref 73–393)

## 2017-08-31 PROCEDURE — 88312 SPECIAL STAINS GROUP 1: CPT | Mod: 26

## 2017-08-31 PROCEDURE — 88305 TISSUE EXAM BY PATHOLOGIST: CPT | Mod: 26

## 2017-08-31 PROCEDURE — 99233 SBSQ HOSP IP/OBS HIGH 50: CPT

## 2017-08-31 RX ORDER — SODIUM CHLORIDE 9 MG/ML
1000 INJECTION, SOLUTION INTRAVENOUS
Qty: 0 | Refills: 0 | Status: DISCONTINUED | OUTPATIENT
Start: 2017-08-31 | End: 2017-09-02

## 2017-08-31 RX ORDER — MORPHINE SULFATE 50 MG/1
1 CAPSULE, EXTENDED RELEASE ORAL ONCE
Qty: 0 | Refills: 0 | Status: DISCONTINUED | OUTPATIENT
Start: 2017-08-31 | End: 2017-08-31

## 2017-08-31 RX ORDER — SODIUM CHLORIDE 9 MG/ML
1000 INJECTION, SOLUTION INTRAVENOUS
Qty: 0 | Refills: 0 | Status: DISCONTINUED | OUTPATIENT
Start: 2017-08-31 | End: 2017-08-31

## 2017-08-31 RX ADMIN — PANTOPRAZOLE SODIUM 40 MILLIGRAM(S): 20 TABLET, DELAYED RELEASE ORAL at 05:33

## 2017-08-31 RX ADMIN — SODIUM CHLORIDE 50 MILLILITER(S): 9 INJECTION, SOLUTION INTRAVENOUS at 20:31

## 2017-08-31 RX ADMIN — Medication 1: at 08:12

## 2017-08-31 RX ADMIN — Medication 100 MILLIGRAM(S): at 05:32

## 2017-08-31 RX ADMIN — ATORVASTATIN CALCIUM 20 MILLIGRAM(S): 80 TABLET, FILM COATED ORAL at 21:41

## 2017-08-31 RX ADMIN — Medication 2 MILLIGRAM(S): at 21:41

## 2017-08-31 RX ADMIN — MORPHINE SULFATE 1 MILLIGRAM(S): 50 CAPSULE, EXTENDED RELEASE ORAL at 20:28

## 2017-08-31 RX ADMIN — Medication 100 MILLIGRAM(S): at 21:41

## 2017-08-31 RX ADMIN — Medication 100 MILLIGRAM(S): at 14:51

## 2017-08-31 RX ADMIN — Medication 100 MILLIGRAM(S): at 05:33

## 2017-08-31 RX ADMIN — INSULIN GLARGINE 15 UNIT(S): 100 INJECTION, SOLUTION SUBCUTANEOUS at 21:42

## 2017-08-31 RX ADMIN — Medication 100 MILLIGRAM(S): at 15:00

## 2017-08-31 RX ADMIN — AMLODIPINE BESYLATE 10 MILLIGRAM(S): 2.5 TABLET ORAL at 05:33

## 2017-08-31 RX ADMIN — SODIUM CHLORIDE 100 MILLILITER(S): 9 INJECTION, SOLUTION INTRAVENOUS at 05:33

## 2017-08-31 RX ADMIN — MORPHINE SULFATE 2 MILLIGRAM(S): 50 CAPSULE, EXTENDED RELEASE ORAL at 16:34

## 2017-08-31 RX ADMIN — MORPHINE SULFATE 2 MILLIGRAM(S): 50 CAPSULE, EXTENDED RELEASE ORAL at 16:49

## 2017-08-31 RX ADMIN — MORPHINE SULFATE 1 MILLIGRAM(S): 50 CAPSULE, EXTENDED RELEASE ORAL at 20:42

## 2017-08-31 RX ADMIN — MORPHINE SULFATE 2 MILLIGRAM(S): 50 CAPSULE, EXTENDED RELEASE ORAL at 05:32

## 2017-08-31 RX ADMIN — SODIUM CHLORIDE 50 MILLILITER(S): 9 INJECTION, SOLUTION INTRAVENOUS at 12:08

## 2017-08-31 RX ADMIN — MORPHINE SULFATE 2 MILLIGRAM(S): 50 CAPSULE, EXTENDED RELEASE ORAL at 06:00

## 2017-08-31 RX ADMIN — Medication 1: at 16:32

## 2017-08-31 RX ADMIN — Medication 1 TABLET(S): at 12:06

## 2017-08-31 NOTE — PROGRESS NOTE ADULT - SUBJECTIVE AND OBJECTIVE BOX
STABLE CARDIOVASCULAR STATUS; CONTINUING WITH PRESENT MANAGEMENT.  EQUIVOCAL TROPONIN MARKER(S) SECONDARY TO STRUCTURAL HEART DISEASE; NO ACUTE CORONARY SYNDROME SUSPECTED.  FOR EGD TODAY    TARI GUZMÁN MD, FACC

## 2017-08-31 NOTE — PROGRESS NOTE ADULT - SUBJECTIVE AND OBJECTIVE BOX
Subjective: nausea, anorexia. Epigastric pain persists. No SOB      MEDICATIONS  (STANDING):  doxazosin 2 milliGRAM(s) Oral at bedtime  insulin glargine Injectable (LANTUS) 15 Unit(s) SubCutaneous at bedtime  atorvastatin 20 milliGRAM(s) Oral at bedtime  amLODIPine   Tablet 10 milliGRAM(s) Oral daily  hydrALAZINE 100 milliGRAM(s) Oral every 12 hours  multivitamin 1 Tablet(s) Oral daily  insulin lispro (HumaLOG) corrective regimen sliding scale   SubCutaneous three times a day before meals  insulin lispro (HumaLOG) corrective regimen sliding scale   SubCutaneous at bedtime  dextrose 5%. 1000 milliLiter(s) (50 mL/Hr) IV Continuous <Continuous>  dextrose 50% Injectable 12.5 Gram(s) IV Push once  dextrose 50% Injectable 25 Gram(s) IV Push once  dextrose 50% Injectable 25 Gram(s) IV Push once  pantoprazole    Tablet 40 milliGRAM(s) Oral before breakfast  dextrose 5% + sodium chloride 0.45%. 1000 milliLiter(s) (100 mL/Hr) IV Continuous <Continuous>  cloNIDine Patch 0.3 mG/24Hr(s) 1 patch Topical every 7 days  ciprofloxacin   IVPB   IV Intermittent   metroNIDAZOLE  IVPB   IV Intermittent   ciprofloxacin   IVPB 200 milliGRAM(s) IV Intermittent every 24 hours  metroNIDAZOLE  IVPB 500 milliGRAM(s) IV Intermittent every 8 hours    MEDICATIONS  (PRN):  docusate sodium 100 milliGRAM(s) Oral two times a day PRN Constipation  dextrose Gel 1 Dose(s) Oral once PRN Blood Glucose LESS THAN 70 milliGRAM(s)/deciliter  glucagon  Injectable 1 milliGRAM(s) IntraMuscular once PRN Glucose LESS THAN 70 milligrams/deciliter  hydrALAZINE Injectable 10 milliGRAM(s) IV Push every 6 hours PRN for systolic BP more than 160  ondansetron Injectable 4 milliGRAM(s) IV Push every 6 hours PRN Nausea and/or Vomiting  morphine  - Injectable 2 milliGRAM(s) IV Push every 6 hours PRN Severe Pain (7 - 10)  acetaminophen  Suppository. 650 milliGRAM(s) Rectal every 4 hours PRN Moderate Pain (4 - 6)          T(C): 36.6 (17 @ 05:07), Max: 36.6 (17 @ 10:28)  HR: 96 (17 @ 05:07) (90 - 96)  BP: 154/67 (17 @ 05:07) (140/61 - 158/60)  RR: 18 (17 @ 05:07) (16 - 18)  SpO2: 97% (17 @ 05:07) (92% - 98%)  Wt(kg): --        I&O's Detail    30 Aug 2017 07:01  -  31 Aug 2017 07:00  --------------------------------------------------------  IN:    dextrose 5% + sodium chloride 0.45%.: 1100 mL    Oral Fluid: 240 mL    Solution: 100 mL  Total IN: 1440 mL    OUT:    Voided: 2 mL  Total OUT: 2 mL    Total NET: 1438 mL               PHYSICAL EXAM:    GENERAL: anxious  EYES: EOMI, PERRLA, conjunctiva and sclera clear  NECK: Supple, no inc in JVP  CHEST/LUNG: clear  HEART: S1S2  ABDOMEN: Soft, tender in epigastrium  EXTREMITIES: min edema   NEURO: no asterixis      LABS:  CBC Full  -  ( 30 Aug 2017 06:16 )  WBC Count : 19.5 K/uL  Hemoglobin : 9.8 g/dL  Hematocrit : 29.9 %  Platelet Count - Automated : 426 K/uL  Mean Cell Volume : 85.1 fl  Mean Cell Hemoglobin : 27.9 pg  Mean Cell Hemoglobin Concentration : 32.8 gm/dL  Auto Neutrophil # : x  Auto Lymphocyte # : x  Auto Monocyte # : x  Auto Eosinophil # : x  Auto Basophil # : x  Auto Neutrophil % : x  Auto Lymphocyte % : x  Auto Monocyte % : x  Auto Eosinophil % : x  Auto Basophil % : x    08    145  |  110<H>  |  80<H>  ----------------------------<  277<H>  3.6   |  23  |  5.77<H>    Ca    8.2<L>      30 Aug 2017 06:16  Phos  6.8     08-    TPro  6.5  /  Alb  2.4<L>  /  TBili  0.3  /  DBili  x   /  AST  18  /  ALT  15  /  AlkPhos  83  08-30      Urinalysis Basic - ( 29 Aug 2017 21:58 )    Color: Yellow / Appearance: Clear / S.015 / pH: x  Gluc: x / Ketone: Negative  / Bili: Negative / Urobili: Negative mg/dL   Blood: x / Protein: 500 mg/dL / Nitrite: Negative   Leuk Esterase: Negative / RBC: 3-5 /HPF / WBC 11-25   Sq Epi: x / Non Sq Epi: Few / Bacteria: Few            ASSESSMENT and PLAN:  CKD5, nausea, transient elevation in pancreatic enzymes  * Advanced CKD. ? early uremia. GI w/u in progress to r/o gastritis/ulcer. No urgent indication for HD. Will cont to watch while inpt. Dec IVFs to 50cc/h.   * Awaiting EGD

## 2017-08-31 NOTE — PROGRESS NOTE ADULT - SUBJECTIVE AND OBJECTIVE BOX
Upper esophagogastroduodenoscopy/ENDOSCOPY with biopsy    -Informed consent obtained from patient prior to exam.     Indication:   PUD symptoms    Anesthesia: as per anesthesiologist  provided by:    Esophogus:  WNL    Stomach:  small gastric ulcer wit surrounding edema  s/p biopsy    Duodenum:  WNL    The patient tolerated the procedure well.    Findings:  As above    Plan:  clear liquids, check histology

## 2017-08-31 NOTE — PROGRESS NOTE ADULT - SUBJECTIVE AND OBJECTIVE BOX
CC/F/U for: pancreatitis, gastroenteritis, epigastric abd pain, advanced kidney failure, elev trop    HPI:  71 y/o F with PMH of HTN, HLD, DM II, CKD stage 5, has been refusing dialysis so far p/w c/o nausea/vomiting and diarrhea since yesterday. Also c/o epigastric pain, pain is severe , radiates to LUQ, 8/10, Denies any fever/chills , denies CP, SOB.  Denies cough, dizziness, HA or blurry vision, all other ROS negative.  patient AAOx3 , but poor historian, Pt also refuses for dialysis,  Pt was recently admitted at [facility] for symptomatic anemia and received 2 PRBC. (28 Aug 2017 13:27)        INTERVAL HPI/OVERNIGHT EVENTS:  Pt seen and examined at bedside; for EGD today; sxs unchanged per pt.     Allergies/Intolerance: No Known Allergies      MEDICATIONS  (STANDING):  doxazosin 2 milliGRAM(s) Oral at bedtime  insulin glargine Injectable (LANTUS) 15 Unit(s) SubCutaneous at bedtime  atorvastatin 20 milliGRAM(s) Oral at bedtime  amLODIPine   Tablet 10 milliGRAM(s) Oral daily  hydrALAZINE 100 milliGRAM(s) Oral every 12 hours  multivitamin 1 Tablet(s) Oral daily  insulin lispro (HumaLOG) corrective regimen sliding scale   SubCutaneous three times a day before meals  insulin lispro (HumaLOG) corrective regimen sliding scale   SubCutaneous at bedtime  dextrose 5%. 1000 milliLiter(s) (50 mL/Hr) IV Continuous <Continuous>  dextrose 50% Injectable 12.5 Gram(s) IV Push once  dextrose 50% Injectable 25 Gram(s) IV Push once  dextrose 50% Injectable 25 Gram(s) IV Push once  pantoprazole    Tablet 40 milliGRAM(s) Oral before breakfast  cloNIDine Patch 0.3 mG/24Hr(s) 1 patch Topical every 7 days  ciprofloxacin   IVPB   IV Intermittent   metroNIDAZOLE  IVPB   IV Intermittent   ciprofloxacin   IVPB 200 milliGRAM(s) IV Intermittent every 24 hours  metroNIDAZOLE  IVPB 500 milliGRAM(s) IV Intermittent every 8 hours  dextrose 5% + sodium chloride 0.45%. 1000 milliLiter(s) (50 mL/Hr) IV Continuous <Continuous>    MEDICATIONS  (PRN):  docusate sodium 100 milliGRAM(s) Oral two times a day PRN Constipation  dextrose Gel 1 Dose(s) Oral once PRN Blood Glucose LESS THAN 70 milliGRAM(s)/deciliter  glucagon  Injectable 1 milliGRAM(s) IntraMuscular once PRN Glucose LESS THAN 70 milligrams/deciliter  hydrALAZINE Injectable 10 milliGRAM(s) IV Push every 6 hours PRN for systolic BP more than 160  ondansetron Injectable 4 milliGRAM(s) IV Push every 6 hours PRN Nausea and/or Vomiting  morphine  - Injectable 2 milliGRAM(s) IV Push every 6 hours PRN Severe Pain (7 - 10)  acetaminophen  Suppository. 650 milliGRAM(s) Rectal every 4 hours PRN Moderate Pain (4 - 6)        ROS: as above; all other systems reviewed and wnl      PHYSICAL EXAMINATION:  Vital Signs Last 24 Hrs  T(C): 37.4 (31 Aug 2017 12:01), Max: 37.4 (31 Aug 2017 12:01)  T(F): 99.4 (31 Aug 2017 12:01), Max: 99.4 (31 Aug 2017 12:01)  HR: 91 (31 Aug 2017 12:01) (90 - 96)  BP: 125/46 (31 Aug 2017 12:01) (125/46 - 158/60)  RR: 18 (31 Aug 2017 12:01) (16 - 18)  SpO2: 95% (31 Aug 2017 12:01) (92% - 98%)  CAPILLARY BLOOD GLUCOSE  149 (31 Aug 2017 12:03)  200 (31 Aug 2017 08:10)  212 (30 Aug 2017 21:42)  211 (30 Aug 2017 16:21)      GENERAL: elderly female; mild distress 2/2 left upper abd discomfort  HEAD:  atraumatic, normocephalic  EYES: sclera anicteric  ENMT: mucous membranes dry  NECK: supple, No JVD  CHEST/LUNG: respirations unlabored; air entry symmetric; no wheezing b/l  HEART: normal S1, S2  ABDOMEN: BS+, soft, ND, left upper abd w/ttp, no rebound, no guarding  EXTREMITIES:  pulses palpable; no clubbing, cyanosis, or edema b/l LEs  NEURO: awake, alert, interactive; moves all extremities  SKIN: no rashes or lesions      LABS:                        9.8    19.5  )-----------( 426      ( 30 Aug 2017 06:16 )             29.9     08-31    144  |  111<H>  |  74<H>  ----------------------------<  178<H>  3.6   |  22  |  5.68<H>    Ca    7.8<L>      31 Aug 2017 08:12    TPro  6.5  /  Alb  2.4<L>  /  TBili  0.3  /  DBili  x   /  AST  18  /  ALT  15  /  AlkPhos  83        Urinalysis Basic - ( 29 Aug 2017 21:58 )    Color: Yellow / Appearance: Clear / S.015 / pH: x  Gluc: x / Ketone: Negative  / Bili: Negative / Urobili: Negative mg/dL   Blood: x / Protein: 500 mg/dL / Nitrite: Negative   Leuk Esterase: Negative / RBC: 3-5 /HPF / WBC 11-25   Sq Epi: x / Non Sq Epi: Few / Bacteria: Few      ASSESSMENT AND PLAN:  72y Female, type 2 DM, HTN, HL, stage 5 CKD - has been refusing HD, adm w/abd pain, nausea/vomiting, 1 episode diarrhea, CTAP w/no colitis or bowel obstruction, CXR neg, being eval for possible GI inflammatory process vs acute infection; less likely acute uremic sxs, lipase 900, c/f acute pancreatitis, though CT neg; also elev trop 0.05 - for EGD today    GI/ID:   *acute pancreatitis - resolved?; lipase normalized; on clear liquid diet, but currently npo for EGD today  -prn analgesia, antiemetics  -GI/Bienstock following    *epigastric/LUQ pain - etiology unclear, r/o gastritis - for EGD today by GI/Bienstock     *?gastroenteritis, persistent leukocytosis - CT neg, diarrhea resolved; currently on regimen of cipro, flagyl; blood cx neg; GI/Bienstock following      HEME: anemia requiring transfusion   -supportive transfusions prn  -FOBT ordered  -GI/Bienstock following      RENAL: advanced renal failure, refusing HD; per Nephrology/Vishnu - no acute indication for HD initiation - stable electrolytes, volume status; sx not from uremia given abrupt onset and focal epigastric pain on exam      CV:   *elev trop/cardiac ischemia - trops 0.05->0.07  -continue cardiac regimen of asa, statin for now  -Cardiology/Tyler following      *HTN - continue multidrug antiHTNve regimen, monitor for need to uptitrate dosages      OTHER:  -dvt prophy  -PT eval

## 2017-08-31 NOTE — CHART NOTE - NSCHARTNOTEFT_GEN_A_CORE
Patient feels minimally better.   VSS, afebrile  Lungs - clear  Heart - S1S@ (+), wnl   Abd - BS(+), mid epigastric tenderness    ---discussed with cardiology. For EGD today

## 2017-09-01 ENCOUNTER — TRANSCRIPTION ENCOUNTER (OUTPATIENT)
Age: 72
End: 2017-09-01

## 2017-09-01 LAB
ANION GAP SERPL CALC-SCNC: 11 MMOL/L — SIGNIFICANT CHANGE UP (ref 5–17)
BASOPHILS # BLD AUTO: 0.1 K/UL — SIGNIFICANT CHANGE UP (ref 0–0.2)
BASOPHILS NFR BLD AUTO: 0.9 % — SIGNIFICANT CHANGE UP (ref 0–2)
BUN SERPL-MCNC: 73 MG/DL — HIGH (ref 7–23)
CALCIUM SERPL-MCNC: 7.8 MG/DL — LOW (ref 8.5–10.1)
CHLORIDE SERPL-SCNC: 113 MMOL/L — HIGH (ref 96–108)
CO2 SERPL-SCNC: 22 MMOL/L — SIGNIFICANT CHANGE UP (ref 22–31)
CREAT SERPL-MCNC: 5.43 MG/DL — HIGH (ref 0.5–1.3)
EOSINOPHIL # BLD AUTO: 0.3 K/UL — SIGNIFICANT CHANGE UP (ref 0–0.5)
EOSINOPHIL NFR BLD AUTO: 2.4 % — SIGNIFICANT CHANGE UP (ref 0–6)
GLUCOSE SERPL-MCNC: 67 MG/DL — LOW (ref 70–99)
HCT VFR BLD CALC: 30 % — LOW (ref 34.5–45)
HGB BLD-MCNC: 9.6 G/DL — LOW (ref 11.5–15.5)
LYMPHOCYTES # BLD AUTO: 0.7 K/UL — LOW (ref 1–3.3)
LYMPHOCYTES # BLD AUTO: 6.8 % — LOW (ref 13–44)
MCHC RBC-ENTMCNC: 26.6 PG — LOW (ref 27–34)
MCHC RBC-ENTMCNC: 32.1 GM/DL — SIGNIFICANT CHANGE UP (ref 32–36)
MCV RBC AUTO: 82.9 FL — SIGNIFICANT CHANGE UP (ref 80–100)
MONOCYTES # BLD AUTO: 0.6 K/UL — SIGNIFICANT CHANGE UP (ref 0–0.9)
MONOCYTES NFR BLD AUTO: 5.8 % — SIGNIFICANT CHANGE UP (ref 2–14)
NEUTROPHILS # BLD AUTO: 8.7 K/UL — HIGH (ref 1.8–7.4)
NEUTROPHILS NFR BLD AUTO: 84 % — HIGH (ref 43–77)
PLATELET # BLD AUTO: 398 K/UL — SIGNIFICANT CHANGE UP (ref 150–400)
POTASSIUM SERPL-MCNC: 3.5 MMOL/L — SIGNIFICANT CHANGE UP (ref 3.5–5.3)
POTASSIUM SERPL-SCNC: 3.5 MMOL/L — SIGNIFICANT CHANGE UP (ref 3.5–5.3)
RBC # BLD: 3.62 M/UL — LOW (ref 3.8–5.2)
RBC # FLD: 15.4 % — HIGH (ref 11–15)
SODIUM SERPL-SCNC: 146 MMOL/L — HIGH (ref 135–145)
WBC # BLD: 10.4 K/UL — SIGNIFICANT CHANGE UP (ref 3.8–10.5)
WBC # FLD AUTO: 10.4 K/UL — SIGNIFICANT CHANGE UP (ref 3.8–10.5)

## 2017-09-01 PROCEDURE — 99233 SBSQ HOSP IP/OBS HIGH 50: CPT

## 2017-09-01 RX ADMIN — Medication 2 MILLIGRAM(S): at 22:48

## 2017-09-01 RX ADMIN — Medication 100 MILLIGRAM(S): at 18:31

## 2017-09-01 RX ADMIN — INSULIN GLARGINE 15 UNIT(S): 100 INJECTION, SOLUTION SUBCUTANEOUS at 23:00

## 2017-09-01 RX ADMIN — ATORVASTATIN CALCIUM 20 MILLIGRAM(S): 80 TABLET, FILM COATED ORAL at 22:48

## 2017-09-01 RX ADMIN — SODIUM CHLORIDE 50 MILLILITER(S): 9 INJECTION, SOLUTION INTRAVENOUS at 22:51

## 2017-09-01 RX ADMIN — PANTOPRAZOLE SODIUM 40 MILLIGRAM(S): 20 TABLET, DELAYED RELEASE ORAL at 06:24

## 2017-09-01 RX ADMIN — Medication 100 MILLIGRAM(S): at 06:23

## 2017-09-01 RX ADMIN — AMLODIPINE BESYLATE 10 MILLIGRAM(S): 2.5 TABLET ORAL at 06:24

## 2017-09-01 RX ADMIN — Medication 100 MILLIGRAM(S): at 15:29

## 2017-09-01 RX ADMIN — Medication 100 MILLIGRAM(S): at 22:51

## 2017-09-01 RX ADMIN — SODIUM CHLORIDE 50 MILLILITER(S): 9 INJECTION, SOLUTION INTRAVENOUS at 06:25

## 2017-09-01 RX ADMIN — Medication 1 TABLET(S): at 11:33

## 2017-09-01 RX ADMIN — Medication 100 MILLIGRAM(S): at 06:24

## 2017-09-01 NOTE — PROGRESS NOTE ADULT - SUBJECTIVE AND OBJECTIVE BOX
Patient is a 72y old  Female who presents with a chief complaint of Abd. pain, nausea, vomiting, diarrhea since last night (28 Aug 2017 13:27)      HPI:  73 y/o F with PMH of HTN, HLD, DM II, CKD stage 5, has been refusing dialysis so far p/w c/o nausea/vomiting and diarrhea since yesterday. Also c/o epigastric pain, pain is severe , radiates to LUQ, 8/10, Denies any fever/chills , denies CP, SOB.  Denies cough, dizziness, HA or blurry vision, all other ROS negative.  patient AAOx3 , but poor historian, Pt also refuses for dialysis,  Pt was recently admitted at [facility] for symptomatic anemia and received 2 PRBC. (28 Aug 2017 13:27)      INTERVAL HPI/OVERNIGHT EVENTS:  Patient feeling much better. No nausea and the pain is almost gone completely. The patient denies melena, hematochezia, hematemesis, nausea, vomiting, constipation, diarrhea, or change in bowel movement.     MEDICATIONS  (STANDING):  doxazosin 2 milliGRAM(s) Oral at bedtime  insulin glargine Injectable (LANTUS) 15 Unit(s) SubCutaneous at bedtime  atorvastatin 20 milliGRAM(s) Oral at bedtime  amLODIPine   Tablet 10 milliGRAM(s) Oral daily  hydrALAZINE 100 milliGRAM(s) Oral every 12 hours  multivitamin 1 Tablet(s) Oral daily  insulin lispro (HumaLOG) corrective regimen sliding scale   SubCutaneous three times a day before meals  insulin lispro (HumaLOG) corrective regimen sliding scale   SubCutaneous at bedtime  dextrose 5%. 1000 milliLiter(s) (50 mL/Hr) IV Continuous <Continuous>  dextrose 50% Injectable 12.5 Gram(s) IV Push once  dextrose 50% Injectable 25 Gram(s) IV Push once  dextrose 50% Injectable 25 Gram(s) IV Push once  pantoprazole    Tablet 40 milliGRAM(s) Oral before breakfast  cloNIDine Patch 0.3 mG/24Hr(s) 1 patch Topical every 7 days  ciprofloxacin   IVPB   IV Intermittent   metroNIDAZOLE  IVPB   IV Intermittent   ciprofloxacin   IVPB 200 milliGRAM(s) IV Intermittent every 24 hours  metroNIDAZOLE  IVPB 500 milliGRAM(s) IV Intermittent every 8 hours  dextrose 5% + sodium chloride 0.45%. 1000 milliLiter(s) (50 mL/Hr) IV Continuous <Continuous>    MEDICATIONS  (PRN):  docusate sodium 100 milliGRAM(s) Oral two times a day PRN Constipation  dextrose Gel 1 Dose(s) Oral once PRN Blood Glucose LESS THAN 70 milliGRAM(s)/deciliter  glucagon  Injectable 1 milliGRAM(s) IntraMuscular once PRN Glucose LESS THAN 70 milligrams/deciliter  hydrALAZINE Injectable 10 milliGRAM(s) IV Push every 6 hours PRN for systolic BP more than 160  ondansetron Injectable 4 milliGRAM(s) IV Push every 6 hours PRN Nausea and/or Vomiting  morphine  - Injectable 2 milliGRAM(s) IV Push every 6 hours PRN Severe Pain (7 - 10)  acetaminophen  Suppository. 650 milliGRAM(s) Rectal every 4 hours PRN Moderate Pain (4 - 6)      FAMILY HISTORY:  No pertinent family history in first degree relatives      Allergies    No Known Allergies    Intolerances        PMH/PSH:  Chronic kidney disease  Congestive heart failure, unspecified congestive heart failure chronicity, unspecified congestive heart failure type  Diabetes  Hypertension  No significant past surgical history        REVIEW OF SYSTEMS:  CONSTITUTIONAL: No fever, weight loss, or fatigue  EYES: No eye pain, visual disturbances, or discharge  ENMT:  No difficulty hearing, tinnitus, vertigo; No sinus or throat pain  NECK: No pain or stiffness  BREASTS: No pain, masses, or nipple discharge  RESPIRATORY: No cough, wheezing, chills or hemoptysis; No shortness of breath  CARDIOVASCULAR: No chest pain, palpitations, dizziness, or leg swelling  GASTROINTESTINAL: See above   GENITOURINARY: No dysuria, frequency, hematuria, or incontinence  NEUROLOGICAL: No headaches, memory loss, loss of strength, numbness, or tremors  SKIN: No itching, burning, rashes, or lesions   LYMPH NODES: No enlarged glands  ENDOCRINE: No heat or cold intolerance; No hair loss  MUSCULOSKELETAL: No joint pain or swelling; No muscle, back, or extremity pain  PSYCHIATRIC: No depression, anxiety, mood swings, or difficulty sleeping  HEME/LYMPH: No easy bruising, or bleeding gums  ALLERGY AND IMMUNOLOGIC: No hives or eczema    Vital Signs Last 24 Hrs  T(C): 36.6 (01 Sep 2017 05:05), Max: 97.9 (31 Aug 2017 18:55)  T(F): 97.8 (01 Sep 2017 05:05), Max: 208.2 (31 Aug 2017 18:55)  HR: 87 (01 Sep 2017 05:05) (78 - 91)  BP: 140/61 (01 Sep 2017 05:05) (125/46 - 178/77)  BP(mean): --  RR: 18 (01 Sep 2017 05:05) (13 - 18)  SpO2: 96% (01 Sep 2017 05:05) (95% - 100%)    PHYSICAL EXAM:  GENERAL: NAD, well-groomed, well-developed  HEAD:  Atraumatic, Normocephalic  EYES: EOMI, PERRLA, conjunctiva and sclera clear  NECK: Supple, No JVD, Normal thyroid  NERVOUS SYSTEM:  Alert & Oriented X3, Good concentration  CHEST/LUNG: Clear to percussion bilaterally; No rales, rhonchi, wheezing, or rubs  HEART: Regular rate and rhythm; No murmurs, rubs, or gallops  ABDOMEN: Soft, Nontender, Nondistended; Bowel sounds present  EXTREMITIES:  2+ Peripheral Pulses, No clubbing, cyanosis, or edema  LYMPH: No lymphadenopathy noted  SKIN: No rashes or lesions    LAB  08-31 @ 08:12  amylase --   lipase 417   08-29 @ 11:52  amylase --   lipase 357   08-28 @ 08:45  amylase --   lipase 900                           9.6    10.4  )-----------( 398      ( 01 Sep 2017 07:38 )             30.0       CBC:  09-01 @ 07:38  WBC 10.4   Hgb 9.6   Hct 30.0   Plts 398  MCV 82.9  08-30 @ 06:16  WBC 19.5   Hgb 9.8   Hct 29.9   Plts 426  MCV 85.1  08-29 @ 11:52  WBC 19.5   Hgb 9.7   Hct 30.2   Plts 465  MCV 84.4  08-28 @ 08:45  WBC 14.7   Hgb 7.4   Hct 24.6   Plts 537  MCV 83.2      Chemistry:  09-01 @ 07:38  Na+ 146  K+ 3.5  Cl- 113  CO2 22  BUN 73  Cr 5.43     08-31 @ 08:12  Na+ 144  K+ 3.6  Cl- 111  CO2 22  BUN 74  Cr 5.68     08-30 @ 06:16  Na+ 145  K+ 3.6  Cl- 110  CO2 23  BUN 80  Cr 5.77     08-29 @ 11:52  Na+ 149  K+ 3.6  Cl- 113  CO2 26  BUN 87  Cr 6.23     08-28 @ 08:45  Na+ 142  K+ 4.5  Cl- 108  CO2 18    Cr 6.38         Glucose, Serum: 67 mg/dL (09-01 @ 07:38)  Glucose, Serum: 178 mg/dL (08-31 @ 08:12)  Glucose, Serum: 277 mg/dL (08-30 @ 06:16)  Glucose, Serum: 230 mg/dL (08-29 @ 11:52)  Glucose, Serum: 350 mg/dL (08-28 @ 08:45)      01 Sep 2017 07:38    146    |  113    |  73     ----------------------------<  67     3.5     |  22     |  5.43   31 Aug 2017 08:12    144    |  111    |  74     ----------------------------<  178    3.6     |  22     |  5.68   30 Aug 2017 06:16    145    |  110    |  80     ----------------------------<  277    3.6     |  23     |  5.77   29 Aug 2017 11:52    149    |  113    |  87     ----------------------------<  230    3.6     |  26     |  6.23   28 Aug 2017 08:45    142    |  108    |  100    ----------------------------<  350    4.5     |  18     |  6.38     Ca    7.8        01 Sep 2017 07:38  Ca    7.8        31 Aug 2017 08:12  Ca    8.2        30 Aug 2017 06:16  Ca    8.8        29 Aug 2017 11:52  Ca    9.3        28 Aug 2017 08:45  Phos  6.8       29 Aug 2017 11:52    TPro  6.5    /  Alb  2.4    /  TBili  0.3    /  DBili  x      /  AST  18     /  ALT  15     /  AlkPhos  83     30 Aug 2017 06:16  TPro  7.2    /  Alb  2.7    /  TBili  0.4    /  DBili  x      /  AST  15     /  ALT  16     /  AlkPhos  91     29 Aug 2017 11:52  TPro  7.9    /  Alb  2.7    /  TBili  0.4    /  DBili  x      /  AST  35     /  ALT  18     /  AlkPhos  95     28 Aug 2017 08:45              CAPILLARY BLOOD GLUCOSE  94 (01 Sep 2017 07:17)  154 (31 Aug 2017 16:30)  149 (31 Aug 2017 12:03)          C-Reactive Protein, Serum: 0.60 mg/dL (08-31 @ 10:06)  C-Reactive Protein, Serum: 1.10 mg/dL (08-29 @ 14:52)      RADIOLOGY & ADDITIONAL TESTS:    Imaging Personally Reviewed:  [ ] YES  [ ] NO    Consultant(s) Notes Reviewed:  [ ] YES  [ ] NO    Care Discussed with Consultants/Other Providers [ ] YES  [ ] NO

## 2017-09-01 NOTE — PROGRESS NOTE ADULT - PROBLEM SELECTOR PLAN 1
Lipase now 417. Doubt that patient has pancreatitis. Feels significantly better. See EGD result. .- 1) Advance diet and observe  2) Check biopsy results  3) PPI PO

## 2017-09-01 NOTE — PROGRESS NOTE ADULT - SUBJECTIVE AND OBJECTIVE BOX
Patient seen in follow up for CKD 4-5; feels better; no n/v.    MEDICATIONS  (STANDING):  doxazosin 2 milliGRAM(s) Oral at bedtime  insulin glargine Injectable (LANTUS) 15 Unit(s) SubCutaneous at bedtime  atorvastatin 20 milliGRAM(s) Oral at bedtime  amLODIPine   Tablet 10 milliGRAM(s) Oral daily  hydrALAZINE 100 milliGRAM(s) Oral every 12 hours  multivitamin 1 Tablet(s) Oral daily  insulin lispro (HumaLOG) corrective regimen sliding scale   SubCutaneous three times a day before meals  insulin lispro (HumaLOG) corrective regimen sliding scale   SubCutaneous at bedtime  dextrose 5%. 1000 milliLiter(s) (50 mL/Hr) IV Continuous <Continuous>  dextrose 50% Injectable 12.5 Gram(s) IV Push once  dextrose 50% Injectable 25 Gram(s) IV Push once  dextrose 50% Injectable 25 Gram(s) IV Push once  pantoprazole    Tablet 40 milliGRAM(s) Oral before breakfast  cloNIDine Patch 0.3 mG/24Hr(s) 1 patch Topical every 7 days  ciprofloxacin   IVPB   IV Intermittent   metroNIDAZOLE  IVPB   IV Intermittent   ciprofloxacin   IVPB 200 milliGRAM(s) IV Intermittent every 24 hours  metroNIDAZOLE  IVPB 500 milliGRAM(s) IV Intermittent every 8 hours  dextrose 5% + sodium chloride 0.45%. 1000 milliLiter(s) (50 mL/Hr) IV Continuous <Continuous>    MEDICATIONS  (PRN):  docusate sodium 100 milliGRAM(s) Oral two times a day PRN Constipation  dextrose Gel 1 Dose(s) Oral once PRN Blood Glucose LESS THAN 70 milliGRAM(s)/deciliter  glucagon  Injectable 1 milliGRAM(s) IntraMuscular once PRN Glucose LESS THAN 70 milligrams/deciliter  hydrALAZINE Injectable 10 milliGRAM(s) IV Push every 6 hours PRN for systolic BP more than 160  ondansetron Injectable 4 milliGRAM(s) IV Push every 6 hours PRN Nausea and/or Vomiting  morphine  - Injectable 2 milliGRAM(s) IV Push every 6 hours PRN Severe Pain (7 - 10)  acetaminophen  Suppository. 650 milliGRAM(s) Rectal every 4 hours PRN Moderate Pain (4 - 6)    PHYSICAL EXAM:      T(C): 36.9 (09-01-17 @ 12:11), Max: 97.9 (08-31-17 @ 18:55)  HR: 86 (09-01-17 @ 12:11) (78 - 90)  BP: 142/53 (09-01-17 @ 12:11) (134/65 - 178/77)  RR: 18 (09-01-17 @ 12:11) (13 - 18)  SpO2: 95% (09-01-17 @ 12:11) (95% - 100%)  Wt(kg): --  Respiratory: clear anteriorly, decreased BS at bases  Cardiovascular: S1 S2  Gastrointestinal: soft NT ND +BS  Extremities:  1 edema                                    9.6    10.4  )-----------( 398      ( 01 Sep 2017 07:38 )             30.0     09-01    146<H>  |  113<H>  |  73<H>  ----------------------------<  67<L>  3.5   |  22  |  5.43<H>    Ca    7.8<L>      01 Sep 2017 07:38            Assessment and Plan:  CKD 4-5; GI sx secondary to gastric ulcer.  PPI, supportive care, no immediate indication for HD; will need close outpatient follow up.

## 2017-09-01 NOTE — PROGRESS NOTE ADULT - SUBJECTIVE AND OBJECTIVE BOX
CC/F/U for: pancreatitis, gastroenteritis, epigastric abd pain, advanced kidney failure, elev trop    HPI:  71 y/o F with PMH of HTN, HLD, DM II, CKD stage 5, has been refusing dialysis so far p/w c/o nausea/vomiting and diarrhea since yesterday. Also c/o epigastric pain, pain is severe , radiates to LUQ, 8/10, Denies any fever/chills , denies CP, SOB.  Denies cough, dizziness, HA or blurry vision, all other ROS negative.  patient AAOx3 , but poor historian, Pt also refuses for dialysis,  Pt was recently admitted at [facility] for symptomatic anemia and received 2 PRBC. (28 Aug 2017 13:27)        INTERVAL HPI/OVERNIGHT EVENTS:  Pt seen and examined, with family at bedside; also spk w/daughter by phone at bedside; s/p EGD yesterday w/gastric ulcer - recomm for Plavix, advance diet; feels slightly better today; nausea less.    Allergies/Intolerance: No Known Allergies      MEDICATIONS  (STANDING):  doxazosin 2 milliGRAM(s) Oral at bedtime  insulin glargine Injectable (LANTUS) 15 Unit(s) SubCutaneous at bedtime  atorvastatin 20 milliGRAM(s) Oral at bedtime  amLODIPine   Tablet 10 milliGRAM(s) Oral daily  hydrALAZINE 100 milliGRAM(s) Oral every 12 hours  multivitamin 1 Tablet(s) Oral daily  insulin lispro (HumaLOG) corrective regimen sliding scale   SubCutaneous three times a day before meals  insulin lispro (HumaLOG) corrective regimen sliding scale   SubCutaneous at bedtime  dextrose 5%. 1000 milliLiter(s) (50 mL/Hr) IV Continuous <Continuous>  dextrose 50% Injectable 12.5 Gram(s) IV Push once  dextrose 50% Injectable 25 Gram(s) IV Push once  dextrose 50% Injectable 25 Gram(s) IV Push once  pantoprazole    Tablet 40 milliGRAM(s) Oral before breakfast  cloNIDine Patch 0.3 mG/24Hr(s) 1 patch Topical every 7 days  ciprofloxacin   IVPB   IV Intermittent   metroNIDAZOLE  IVPB   IV Intermittent   ciprofloxacin   IVPB 200 milliGRAM(s) IV Intermittent every 24 hours  metroNIDAZOLE  IVPB 500 milliGRAM(s) IV Intermittent every 8 hours  dextrose 5% + sodium chloride 0.45%. 1000 milliLiter(s) (50 mL/Hr) IV Continuous <Continuous>    MEDICATIONS  (PRN):  docusate sodium 100 milliGRAM(s) Oral two times a day PRN Constipation  dextrose Gel 1 Dose(s) Oral once PRN Blood Glucose LESS THAN 70 milliGRAM(s)/deciliter  glucagon  Injectable 1 milliGRAM(s) IntraMuscular once PRN Glucose LESS THAN 70 milligrams/deciliter  hydrALAZINE Injectable 10 milliGRAM(s) IV Push every 6 hours PRN for systolic BP more than 160  ondansetron Injectable 4 milliGRAM(s) IV Push every 6 hours PRN Nausea and/or Vomiting  morphine  - Injectable 2 milliGRAM(s) IV Push every 6 hours PRN Severe Pain (7 - 10)  acetaminophen  Suppository. 650 milliGRAM(s) Rectal every 4 hours PRN Moderate Pain (4 - 6)        ROS: as above; all other systems reviewed and wnl      PHYSICAL EXAMINATION:  Vital Signs Last 24 Hrs  T(C): 37.1 (01 Sep 2017 17:34), Max: 97.9 (31 Aug 2017 18:55)  T(F): 98.8 (01 Sep 2017 17:34), Max: 208.2 (31 Aug 2017 18:55)  HR: 88 (01 Sep 2017 17:34) (83 - 88)  BP: 143/73 (01 Sep 2017 17:34) (137/61 - 178/77)  RR: 17 (01 Sep 2017 17:34) (15 - 18)  SpO2: 97% (01 Sep 2017 17:34) (95% - 100%)  CAPILLARY BLOOD GLUCOSE  109 (01 Sep 2017 17:10)  124 (01 Sep 2017 11:20)  94 (01 Sep 2017 07:17)      GENERAL: elderly female; NAD  HEAD:  atraumatic, normocephalic  EYES: sclera anicteric  ENMT: mucous membranes dry  NECK: supple, No JVD  CHEST/LUNG: respirations unlabored; air entry symmetric; no wheezing b/l  HEART: normal S1, S2  ABDOMEN: BS+, soft, ND, left upper abd w/ttp, no rebound, no guarding  EXTREMITIES:  pulses palpable; no clubbing, cyanosis, or edema b/l LEs  NEURO: awake, alert, interactive; moves all extremities  SKIN: no rashes or lesions      LABS:                        9.6    10.4  )-----------( 398      ( 01 Sep 2017 07:38 )             30.0     09-01    146<H>  |  113<H>  |  73<H>  ----------------------------<  67<L>  3.5   |  22  |  5.43<H>    Ca    7.8<L>      01 Sep 2017 07:38      ASSESSMENT AND PLAN:  72y Female, type 2 DM, HTN, HL, stage 5 CKD - has been refusing HD, chronic anemia; adm w/abd pain, nausea/vomiting, 1 episode diarrhea, CTAP w/no colitis or bowel obstruction, CXR neg, being eval for possible GI inflammatory process vs acute infection; less likely acute uremic sxs, lipase 900, c/f acute pancreatitis, though CT neg; also elev trop 0.05; s/p EGD w/gastric ulcer    GI/ID:   *epigastric abd pain - likely 2/2 gastric ulcer - continue protonix, advance diet; to f/u w/GI/Bienstock as outpatient for results of EGD bx    *acute pancreatitis - resolved; lipase normalized; diet being advanced    *gastroenteritis, persistent leukocytosis - CT neg, diarrhea resolved; WBCs now normalized on abx cvrg w/cipro, flagyl; blood cx neg; GI/Bienstock following; will plan for 7d course total  (ends 9/4)      HEME: acute on chronic anemia - requiring transfusion; hgb 7.4 on admit, s/p transfusion 2u PRBCs 8/28; hgb remains stable in 9s range - likely multifactorial in setting of advanced CKD and gastric ulcer - may need BARBIE as outpt      RENAL: advanced renal failure, refusing HD; per Nephrology/Vishnu - no acute indication for HD initiation - stable electrolytes, volume status; sx not from uremia given abrupt onset and focal epigastric pain on exam      CV:   *elev trop/cardiac ischemia - trops 0.05->0.07; Cardiology/Tyler followed - recomm continue cardiac regimen of asa, statin; TTE 5/2017 w/nl EF, so not repeated      *HTN - continue multidrug antiHTNve regimen      OTHER:  -dvt prophy  -PT eval pending  -d/c planning

## 2017-09-01 NOTE — PROGRESS NOTE ADULT - PROBLEM SELECTOR PROBLEM 3
Anemia due to chronic kidney disease

## 2017-09-02 ENCOUNTER — TRANSCRIPTION ENCOUNTER (OUTPATIENT)
Age: 72
End: 2017-09-02

## 2017-09-02 VITALS
HEART RATE: 89 BPM | OXYGEN SATURATION: 98 % | RESPIRATION RATE: 18 BRPM | TEMPERATURE: 98 F | DIASTOLIC BLOOD PRESSURE: 80 MMHG | SYSTOLIC BLOOD PRESSURE: 130 MMHG

## 2017-09-02 PROCEDURE — 99239 HOSP IP/OBS DSCHRG MGMT >30: CPT

## 2017-09-02 RX ORDER — PANTOPRAZOLE SODIUM 20 MG/1
1 TABLET, DELAYED RELEASE ORAL
Qty: 30 | Refills: 0 | OUTPATIENT
Start: 2017-09-02 | End: 2017-10-02

## 2017-09-02 RX ORDER — METRONIDAZOLE 500 MG
1 TABLET ORAL
Qty: 6 | Refills: 0 | OUTPATIENT
Start: 2017-09-02 | End: 2017-09-04

## 2017-09-02 RX ORDER — MOXIFLOXACIN HYDROCHLORIDE TABLETS, 400 MG 400 MG/1
1 TABLET, FILM COATED ORAL
Qty: 4 | Refills: 0 | OUTPATIENT
Start: 2017-09-02 | End: 2017-09-04

## 2017-09-02 RX ADMIN — PANTOPRAZOLE SODIUM 40 MILLIGRAM(S): 20 TABLET, DELAYED RELEASE ORAL at 08:20

## 2017-09-02 RX ADMIN — Medication 1 TABLET(S): at 12:22

## 2017-09-02 RX ADMIN — Medication 100 MILLIGRAM(S): at 05:42

## 2017-09-02 RX ADMIN — Medication 1: at 12:23

## 2017-09-02 RX ADMIN — AMLODIPINE BESYLATE 10 MILLIGRAM(S): 2.5 TABLET ORAL at 05:42

## 2017-09-02 RX ADMIN — Medication 100 MILLIGRAM(S): at 15:20

## 2017-09-02 RX ADMIN — Medication 100 MILLIGRAM(S): at 13:32

## 2017-09-02 NOTE — DISCHARGE NOTE ADULT - SECONDARY DIAGNOSIS.
Acute pancreatitis without infection or necrosis, unspecified pancreatitis type Gastroenteritis Acute gastric ulcer with hemorrhage Leukocytosis, unspecified type Stage 5 chronic kidney disease not on chronic dialysis Elevated troponin

## 2017-09-02 NOTE — DISCHARGE NOTE ADULT - MEDICATION SUMMARY - MEDICATIONS TO TAKE
I will START or STAY ON the medications listed below when I get home from the hospital:    Flagyl 500 mg oral tablet  -- 1 tab(s) by mouth every 8 hours x 2 days  -- Indication: For Antibiotic    valsartan 320 mg oral tablet  -- 1 tab(s) by mouth once a day  -- Indication: For bp    cloNIDine 0.3 mg oral tablet  -- 1 tab(s) by mouth 2 times a day  -- Indication: For bp    doxazosin 2 mg oral tablet  -- 1 tab(s) by mouth once a day (at bedtime)  -- Indication: For bp    HumaLOG 100 units/mL subcutaneous solution  --  subcutaneous 3 times a day (before meals); 1 Unit(s) if Glucose 151 - 200  2 Unit(s) if Glucose 201 - 250  3 Unit(s) if Glucose 251 - 300  4 Unit(s) if Glucose 301 - 350  5 Unit(s) if Glucose 351 - 400  6 Unit(s) if Glucose Greater Than 400  -- Indication: For diabetes    insulin glargine  -- 15 unit(s) subcutaneous once a day (at bedtime)  -- Indication: For diabetes    atorvastatin 20 mg oral tablet  -- 1 tab(s) by mouth once a day (at bedtime)  -- Indication: For heart    amLODIPine 10 mg oral tablet  -- 1 tab(s) by mouth once a day  -- Indication: For bp    furosemide 80 mg oral tablet  -- 1 tab(s) by mouth 2 times a day  -- Indication: For kidneys    ferrous sulfate 324 mg (65 mg elemental iron) oral tablet  -- 1 by mouth 3 times a day  -- Indication: For mineral    docusate sodium 100 mg oral capsule  -- 1 cap(s) by mouth 2 times a day  -- Indication: For Constipation    Protonix 40 mg oral delayed release tablet  -- 1 tab(s) by mouth once a day (before a meal)  -- Indication: For ulcer    Cipro 500 mg oral tablet  -- 1 tab(s) by mouth every 12 hours x 2days  -- Indication: For Antibiotic    hydrALAZINE 100 mg oral tablet  -- 1 tab(s) by mouth 2 times a day  -- Indication: For bp    Multiple Vitamins oral tablet  -- 1 tab(s) by mouth once a day  -- Indication: For vitamin

## 2017-09-02 NOTE — DISCHARGE NOTE ADULT - CARE PROVIDERS DIRECT ADDRESSES
,DirectAddress_Unknown,DirectAddress_Unknown,DirectAddress_Unknown,[email or web address]

## 2017-09-02 NOTE — PROGRESS NOTE ADULT - SUBJECTIVE AND OBJECTIVE BOX
Patient is a 72y old  Female who presents with a chief complaint of Abd. pain, nausea, vomiting, diarrhea since last night (02 Sep 2017 11:45)      HPI:  71 y/o F with PMH of HTN, HLD, DM II, CKD stage 5, has been refusing dialysis so far p/w c/o nausea/vomiting and diarrhea since yesterday. Also c/o epigastric pain, pain is severe , radiates to LUQ, 8/10, Denies any fever/chills , denies CP, SOB.  Denies cough, dizziness, HA or blurry vision, all other ROS negative.  patient AAOx3 , but poor historian, Pt also refuses for dialysis,  Pt was recently admitted at [facility] for symptomatic anemia and received 2 PRBC. (28 Aug 2017 13:27)      INTERVAL HPI/OVERNIGHT EVENTS:  The patient denies melena, hematochezia, hematemesis, nausea, vomiting, abdominal pain, constipation, diarrhea, or change in bowel movements Tolerating regular diet    MEDICATIONS  (STANDING):  doxazosin 2 milliGRAM(s) Oral at bedtime  insulin glargine Injectable (LANTUS) 15 Unit(s) SubCutaneous at bedtime  atorvastatin 20 milliGRAM(s) Oral at bedtime  amLODIPine   Tablet 10 milliGRAM(s) Oral daily  hydrALAZINE 100 milliGRAM(s) Oral every 12 hours  multivitamin 1 Tablet(s) Oral daily  insulin lispro (HumaLOG) corrective regimen sliding scale   SubCutaneous three times a day before meals  insulin lispro (HumaLOG) corrective regimen sliding scale   SubCutaneous at bedtime  dextrose 5%. 1000 milliLiter(s) (50 mL/Hr) IV Continuous <Continuous>  dextrose 50% Injectable 12.5 Gram(s) IV Push once  dextrose 50% Injectable 25 Gram(s) IV Push once  dextrose 50% Injectable 25 Gram(s) IV Push once  pantoprazole    Tablet 40 milliGRAM(s) Oral before breakfast  cloNIDine Patch 0.3 mG/24Hr(s) 1 patch Topical every 7 days  ciprofloxacin   IVPB   IV Intermittent   metroNIDAZOLE  IVPB   IV Intermittent   ciprofloxacin   IVPB 200 milliGRAM(s) IV Intermittent every 24 hours  metroNIDAZOLE  IVPB 500 milliGRAM(s) IV Intermittent every 8 hours  dextrose 5% + sodium chloride 0.45%. 1000 milliLiter(s) (50 mL/Hr) IV Continuous <Continuous>    MEDICATIONS  (PRN):  docusate sodium 100 milliGRAM(s) Oral two times a day PRN Constipation  dextrose Gel 1 Dose(s) Oral once PRN Blood Glucose LESS THAN 70 milliGRAM(s)/deciliter  glucagon  Injectable 1 milliGRAM(s) IntraMuscular once PRN Glucose LESS THAN 70 milligrams/deciliter  hydrALAZINE Injectable 10 milliGRAM(s) IV Push every 6 hours PRN for systolic BP more than 160  ondansetron Injectable 4 milliGRAM(s) IV Push every 6 hours PRN Nausea and/or Vomiting  morphine  - Injectable 2 milliGRAM(s) IV Push every 6 hours PRN Severe Pain (7 - 10)  acetaminophen  Suppository. 650 milliGRAM(s) Rectal every 4 hours PRN Moderate Pain (4 - 6)      FAMILY HISTORY:  No pertinent family history in first degree relatives      Allergies    No Known Allergies    Intolerances        PMH/PSH:  Chronic kidney disease  Congestive heart failure, unspecified congestive heart failure chronicity, unspecified congestive heart failure type  Diabetes  Hypertension  No significant past surgical history        REVIEW OF SYSTEMS:  CONSTITUTIONAL: No fever, weight loss, or fatigue  EYES: No eye pain, visual disturbances, or discharge  ENMT:  No difficulty hearing, tinnitus, vertigo; No sinus or throat pain  NECK: No pain or stiffness  BREASTS: No pain, masses, or nipple discharge  RESPIRATORY: No cough, wheezing, chills or hemoptysis; No shortness of breath  CARDIOVASCULAR: No chest pain, palpitations, dizziness, or leg swelling  GASTROINTESTINAL:  See above  NEUROLOGICAL: No headaches, memory loss, loss of strength, numbness, or tremors  SKIN: No itching, burning, rashes, or lesions   LYMPH NODES: No enlarged glands  ENDOCRINE: No heat or cold intolerance; No hair loss  MUSCULOSKELETAL: No joint pain or swelling; No muscle, back, or extremity pain  PSYCHIATRIC: No depression, anxiety, mood swings, or difficulty sleeping  HEME/LYMPH: No easy bruising, or bleeding gums  ALLERGY AND IMMUNOLOGIC: No hives or eczema    Vital Signs Last 24 Hrs  T(C): 36.9 (02 Sep 2017 12:15), Max: 37.2 (02 Sep 2017 05:48)  T(F): 98.4 (02 Sep 2017 12:15), Max: 99 (02 Sep 2017 05:48)  HR: 89 (02 Sep 2017 12:15) (80 - 89)  BP: 130/80 (02 Sep 2017 12:15) (130/80 - 156/66)  BP(mean): --  RR: 18 (02 Sep 2017 12:15) (16 - 18)  SpO2: 98% (02 Sep 2017 12:15) (95% - 98%)    PHYSICAL EXAM:  GENERAL: NAD, well-groomed, well-developed  HEAD:  Atraumatic, Normocephalic  EYES: EOMI, PERRLA, conjunctiva and sclera clear  NECK: Supple, No JVD, Normal thyroid  NERVOUS SYSTEM:  Alert & Oriented X3, Good concentration;   CHEST/LUNG: Clear to percussion bilaterally; No rales, rhonchi, wheezing, or rubs  HEART: Regular rate and rhythm; No murmurs, rubs, or gallops  ABDOMEN: Soft, Nontender, Nondistended; Bowel sounds present  EXTREMITIES:  2+ Peripheral Pulses, No clubbing, cyanosis, or edema  LYMPH: No lymphadenopathy noted  SKIN: No rashes or lesions    LAB  08-31 @ 08:12  amylase --   lipase 417   08-29 @ 11:52  amylase --   lipase 357                           9.6    10.4  )-----------( 398      ( 01 Sep 2017 07:38 )             30.0       CBC:  09-01 @ 07:38  WBC 10.4   Hgb 9.6   Hct 30.0   Plts 398  MCV 82.9  08-30 @ 06:16  WBC 19.5   Hgb 9.8   Hct 29.9   Plts 426  MCV 85.1  08-29 @ 11:52  WBC 19.5   Hgb 9.7   Hct 30.2   Plts 465  MCV 84.4  08-28 @ 08:45  WBC 14.7   Hgb 7.4   Hct 24.6   Plts 537  MCV 83.2      Chemistry:  09-01 @ 07:38  Na+ 146  K+ 3.5  Cl- 113  CO2 22  BUN 73  Cr 5.43     08-31 @ 08:12  Na+ 144  K+ 3.6  Cl- 111  CO2 22  BUN 74  Cr 5.68     08-30 @ 06:16  Na+ 145  K+ 3.6  Cl- 110  CO2 23  BUN 80  Cr 5.77     08-29 @ 11:52  Na+ 149  K+ 3.6  Cl- 113  CO2 26  BUN 87  Cr 6.23     08-28 @ 08:45  Na+ 142  K+ 4.5  Cl- 108  CO2 18    Cr 6.38         Glucose, Serum: 67 mg/dL (09-01 @ 07:38)  Glucose, Serum: 178 mg/dL (08-31 @ 08:12)  Glucose, Serum: 277 mg/dL (08-30 @ 06:16)  Glucose, Serum: 230 mg/dL (08-29 @ 11:52)  Glucose, Serum: 350 mg/dL (08-28 @ 08:45)      01 Sep 2017 07:38    146    |  113    |  73     ----------------------------<  67     3.5     |  22     |  5.43   31 Aug 2017 08:12    144    |  111    |  74     ----------------------------<  178    3.6     |  22     |  5.68   30 Aug 2017 06:16    145    |  110    |  80     ----------------------------<  277    3.6     |  23     |  5.77   29 Aug 2017 11:52    149    |  113    |  87     ----------------------------<  230    3.6     |  26     |  6.23   28 Aug 2017 08:45    142    |  108    |  100    ----------------------------<  350    4.5     |  18     |  6.38     Ca    7.8        01 Sep 2017 07:38  Ca    7.8        31 Aug 2017 08:12  Ca    8.2        30 Aug 2017 06:16  Ca    8.8        29 Aug 2017 11:52  Ca    9.3        28 Aug 2017 08:45  Phos  6.8       29 Aug 2017 11:52    TPro  6.5    /  Alb  2.4    /  TBili  0.3    /  DBili  x      /  AST  18     /  ALT  15     /  AlkPhos  83     30 Aug 2017 06:16  TPro  7.2    /  Alb  2.7    /  TBili  0.4    /  DBili  x      /  AST  15     /  ALT  16     /  AlkPhos  91     29 Aug 2017 11:52  TPro  7.9    /  Alb  2.7    /  TBili  0.4    /  DBili  x      /  AST  35     /  ALT  18     /  AlkPhos  95     28 Aug 2017 08:45              CAPILLARY BLOOD GLUCOSE  160 (02 Sep 2017 12:20)  92 (02 Sep 2017 08:19)  109 (01 Sep 2017 17:10)          C-Reactive Protein, Serum: 0.60 mg/dL (08-31 @ 10:06)  C-Reactive Protein, Serum: 1.10 mg/dL (08-29 @ 14:52)      RADIOLOGY & ADDITIONAL TESTS:    Imaging Personally Reviewed:  [ ] YES  [ ] NO    Consultant(s) Notes Reviewed:  [ ] YES  [ ] NO    Care Discussed with Consultants/Other Providers [ ] YES  [ ] NO

## 2017-09-02 NOTE — PHYSICAL THERAPY INITIAL EVALUATION ADULT - PERTINENT HX OF CURRENT PROBLEM, REHAB EVAL
Pt is a 71yo F admitted secondary to abdominal pain, nausea, vomiting, & diarrhea. CT abdomen & pelvis: no CT evidence of colitis, no bowel obstruction. US abdomen complete: no cholelithiasis or biliary ductal dilation, bilaterally increased renal cortical echogenicity compatible with medical renal disease, & no hydronephrosis. X-ray chest: no focal airspace opacities. Pt is s/p EGD with biopsy on 8/31 with no post-op complications.

## 2017-09-02 NOTE — PROGRESS NOTE ADULT - SUBJECTIVE AND OBJECTIVE BOX
SMILING  FEELING BETTER  NO JVD  CLEAR LUNGS  SOFT S1  NON TENDER   NO EDEMA    STABLE CARDIOVASCULAR STATUS; CONTINUING WITH PRESENT MANAGEMENT.  GASTRIC  ULCER

## 2017-09-02 NOTE — DISCHARGE NOTE ADULT - PROVIDER TOKENS
FREE:[LAST:[Followup with PMD, Gastroenterologist, Cardiologist, and Nephrologist within 3 days of discharge],PHONE:[(   )    -],FAX:[(   )    -]],TOKEN:'[id number]:'[id number]:'[id number]'

## 2017-09-02 NOTE — PHYSICAL THERAPY INITIAL EVALUATION ADULT - ADDITIONAL COMMENTS
Pt lives with family in a private home, 3 entry steps (no rail), pt states she sleeps on 1st floor. Pt states prior to admission she was independent with all functional mobility including household ambulation with a straight cane. Pt endorses that she owns a rolling walker as well. Pt is right hand dominant & wears eye glasses. Goal of therapy: return home today.

## 2017-09-02 NOTE — PROGRESS NOTE ADULT - PROBLEM SELECTOR PROBLEM 1
Acute pancreatitis without infection or necrosis, unspecified pancreatitis type
Nausea
Acute pancreatitis without infection or necrosis, unspecified pancreatitis type
Nausea

## 2017-09-02 NOTE — DISCHARGE NOTE ADULT - PLAN OF CARE
improved; due to gastric ulcer; continue medications and modified diet; followup with Gastroenterologist within 3 days of discharge resolved; followup with PMD and Gastroenterologist within 3 days of discharge continue medications and modified diet; followup with Gastroenterologist within 3 days of discharge for result of biopsy at EGD resolved stable; followup with PMD and Nephrologist within 3 days of discharge continue medications; followup with PMD and Cardiologist within 3 days of discharge

## 2017-09-02 NOTE — DISCHARGE NOTE ADULT - NSFTFSERV1RD_GEN_ALL_CORE
rehabilitation services/observation and assessment rehabilitation services/medication teaching and assessment/observation and assessment/teaching and training

## 2017-09-02 NOTE — DISCHARGE NOTE ADULT - CARE PLAN
Principal Discharge DX:	Epigastric pain  Goal:	improved; due to gastric ulcer; continue medications and modified diet; followup with Gastroenterologist within 3 days of discharge  Instructions for follow-up, activity and diet:	improved; due to gastric ulcer; continue medications and modified diet; followup with Gastroenterologist within 3 days of discharge  Secondary Diagnosis:	Acute pancreatitis without infection or necrosis, unspecified pancreatitis type  Goal:	resolved; followup with PMD and Gastroenterologist within 3 days of discharge  Secondary Diagnosis:	Gastroenteritis  Goal:	resolved; followup with PMD and Gastroenterologist within 3 days of discharge  Secondary Diagnosis:	Acute gastric ulcer with hemorrhage  Goal:	continue medications and modified diet; followup with Gastroenterologist within 3 days of discharge for result of biopsy at EGD  Secondary Diagnosis:	Leukocytosis, unspecified type  Goal:	resolved  Secondary Diagnosis:	Stage 5 chronic kidney disease not on chronic dialysis  Goal:	stable; followup with PMD and Nephrologist within 3 days of discharge  Secondary Diagnosis:	Elevated troponin  Goal:	continue medications; followup with PMD and Cardiologist within 3 days of discharge

## 2017-09-02 NOTE — PROGRESS NOTE ADULT - PROBLEM SELECTOR PROBLEM 2
Gastroenteritis
Abdominal pain, unspecified location
Gastroenteritis
Abdominal pain, unspecified location

## 2017-09-02 NOTE — DISCHARGE NOTE ADULT - HOSPITAL COURSE
72y Female, type 2 DM, HTN, HL, stage 5 CKD - has been refusing HD, chronic anemia; adm w/abd pain, nausea/vomiting, 1 episode diarrhea, CTAP w/no colitis or bowel obstruction, CXR neg, eval for possible GI inflammatory process vs acute infection; less likely acute uremic sxs, lipase 900, c/f acute pancreatitis, though CT neg; also elev trop 0.05; s/p EGD w/gastric ulcer    *epigastric abd pain - likely 2/2 gastric ulcer - continue protonix, advance diet; to f/u w/GI/Bienstock as outpatient for results of EGD bx    *acute pancreatitis - resolved; lipase normalized; diet being advanced    *gastroenteritis, persistent leukocytosis - CT neg, diarrhea resolved; WBCs now normalized on abx cvrg w/cipro, flagyl; blood cx neg; GI/Bienstock followed; for 7d course total (ends 9/4)      *acute on chronic anemia - requiring transfusion; hgb 7.4 on admit, s/p transfusion 2u PRBCs 8/28; hgb remains stable in 9s range - likely multifactorial in setting of advanced CKD and gastric ulcer - may need BARBIE as outpt      *advanced renal failure, refusing HD; per Nephrology/Vishnu - no acute indication for HD initiation - stable electrolytes, volume status; sx not from uremia given abrupt onset and focal epigastric pain on exam      *elev trop/cardiac ischemia - trops 0.05->0.07; Cardiology/Jayson followed - recomm continue cardiac regimen of asa, statin; TTE 5/2017 w/nl EF, so not repeated      *HTN - continue multidrug antiHTNve regimen      -PT eval

## 2017-09-02 NOTE — DISCHARGE NOTE ADULT - CARE PROVIDER_API CALL
Followup with PMD, Gastroenterologist, Cardiologist, and Nephrologist within 3 days of discharge,   Phone: (   )    -  Fax: (   )    -    Howard Hurtado), Medicine  [address] [place] [address]  Phone: [phone]  Fax: [phone]    Alex Tyler), Cardiology; Internal Medicine; Nuclear Cardiology  [address] [place] [address]  Phone: [phone]  Fax: [phone]    Stef Mares), Internal Medicine; Nephrology  [address] [facility]  [address] [place] [id number]  Phone: [phone]  Fax: [phone]

## 2017-09-02 NOTE — DISCHARGE NOTE ADULT - PATIENT PORTAL LINK FT
“You can access the Follow[facility] Patient Portal, offered by [facility], by registering with the following website: [email or web address]”

## 2017-09-02 NOTE — PHYSICAL THERAPY INITIAL EVALUATION ADULT - MODIFIED CLINICAL TEST OF SENSORY INTEGRATION IN BALANCE TEST
Barthel Index: Feeding Score _10/10__, Bathing Score _5/5__, Grooming Score _5/5__, Dressing Score _10/10__, Bowels Score __10/10_, Bladder Score _10/10__, Toilet Score _10/10__, Transfers Score _15/15__, Mobility Score __0/15_, Stairs Score _10/10__,     Total Score ___85/100

## 2017-09-02 NOTE — PROGRESS NOTE ADULT - PROBLEM SELECTOR PLAN 1
Lipase was 417, no labs drawn today. Doubt that patient has pancreatitis. Feels significantly better. Tolerating regular diet. See EGD result. .- 1) Advance diet and observe  2) Check biopsy results  3) PPI PO Lipase was 417, no labs drawn today. Doubt that patient has pancreatitis. Feels significantly better. Tolerating regular diet. See EGD result. .- 1) Check biopsy results  2) PPI PO

## 2017-09-02 NOTE — PROGRESS NOTE ADULT - ASSESSMENT
HPI:  71 y/o F with PMH of HTN, HLD, DM II, CKD stage 5, has been refusing dialysis so far p/w c/o nausea/vomiting and diarrhea since yesterday. Also c/o epigastric pain, pain is severe , radiates to LUQ, 8/10, Denies any fever/chills , denies CP, SOB.  Denies cough, dizziness, HA or blurry vision, all other ROS negative.  patient AAOx3 , but poor historian, Pt also refuses for dialysis,  Pt was recently admitted at [facility] for symptomatic anemia and received 2 PRBC. (28 Aug 2017 13:27)  Patient is a poor historian.  Patient states that she was in her USOH until yesterday when she developed N/V, abdominal pain and diarrhea. No prior history. On ASA daily. No ETOH or gallbladder disease. CKD disease but refusing dialysis. Prior to this episode, the patient denies melena, hematochezia, hematemesis, nausea, vomiting, abdominal pain, constipation, diarrhea, or change in bowel movements.    -- Pancreatitis vs PUD vs Uremia - 1) repeat labs 2) NPO/IV fluid  3) Dialysis  4) PPI  5) Zofran PRN  6) EGD
HPI:  71 y/o F with PMH of HTN, HLD, DM II, CKD stage 5, has been refusing dialysis so far p/w c/o nausea/vomiting and diarrhea since yesterday. Also c/o epigastric pain, pain is severe , radiates to LUQ, 8/10, Denies any fever/chills , denies CP, SOB.  Denies cough, dizziness, HA or blurry vision, all other ROS negative.  patient AAOx3 , but poor historian, Pt also refuses for dialysis,  Pt was recently admitted at [facility] for symptomatic anemia and received 2 PRBC. (28 Aug 2017 13:27)  Patient is a poor historian.  Patient states that she was in her USOH until yesterday when she developed N/V, abdominal pain and diarrhea. No prior history. On ASA daily. No ETOH or gallbladder disease. CKD disease but refusing dialysis. Prior to this episode, the patient denies melena, hematochezia, hematemesis, nausea, vomiting, abdominal pain, constipation, diarrhea, or change in bowel movements.    -- Pancreatitis vs PUD vs Uremia - 1) repeat labs 2) NPO/IV fluid  3) Dialysis  4) PPI  5) Zofran PRN  6) EGD
HPI:  73 y/o F with PMH of HTN, HLD, DM II, CKD stage 5, has been refusing dialysis so far p/w c/o nausea/vomiting and diarrhea since yesterday. Also c/o epigastric pain, pain is severe , radiates to LUQ, 8/10, Denies any fever/chills , denies CP, SOB.  Denies cough, dizziness, HA or blurry vision, all other ROS negative.  patient AAOx3 , but poor historian, Pt also refuses for dialysis,  Pt was recently admitted at [facility] for symptomatic anemia and received 2 PRBC. (28 Aug 2017 13:27)  Patient is a poor historian.  Patient states that she was in her USOH until yesterday when she developed N/V, abdominal pain and diarrhea. No prior history. On ASA daily. No ETOH or gallbladder disease. CKD disease but refusing dialysis. Prior to this episode, the patient denies melena, hematochezia, hematemesis, nausea, vomiting, abdominal pain, constipation, diarrhea, or change in bowel movements.    -- Pancreatitis vs PUD vs Uremia - 1) repeat labs 2) NPO/IV fluid  3) Dialysis  4) PPI  5) Zofran PRN  6) EGD
HPI:  73 y/o F with PMH of HTN, HLD, DM II, CKD stage 5, has been refusing dialysis so far p/w c/o nausea/vomiting and diarrhea since yesterday. Also c/o epigastric pain, pain is severe , radiates to LUQ, 8/10, Denies any fever/chills , denies CP, SOB.  Denies cough, dizziness, HA or blurry vision, all other ROS negative.  patient AAOx3 , but poor historian, Pt also refuses for dialysis,  Pt was recently admitted at [facility] for symptomatic anemia and received 2 PRBC. (28 Aug 2017 13:27)  Patient is a poor historian.  Patient states that she was in her USOH until yesterday when she developed N/V, abdominal pain and diarrhea. No prior history. On ASA daily. No ETOH or gallbladder disease. CKD disease but refusing dialysis. Prior to this episode, the patient denies melena, hematochezia, hematemesis, nausea, vomiting, abdominal pain, constipation, diarrhea, or change in bowel movements.    -- Pancreatitis vs PUD vs Uremia - 1) repeat labs 2) NPO/IV fluid  3) Dialysis  4) PPI  5) Zofran PRN  6) EGD
10-Lazaro-2017 09:43

## 2017-09-04 LAB
CULTURE RESULTS: SIGNIFICANT CHANGE UP
SPECIMEN SOURCE: SIGNIFICANT CHANGE UP

## 2017-09-05 LAB — SURGICAL PATHOLOGY FINAL REPORT - CH: SIGNIFICANT CHANGE UP

## 2017-09-13 ENCOUNTER — APPOINTMENT (OUTPATIENT)
Dept: INTERNAL MEDICINE | Facility: CLINIC | Age: 72
End: 2017-09-13

## 2017-09-14 ENCOUNTER — MEDICATION RENEWAL (OUTPATIENT)
Age: 72
End: 2017-09-14

## 2017-09-14 NOTE — CHART NOTE - NSCHARTNOTEFT_GEN_A_CORE
MEDICINE ATTENDING  Pt has advanced renal failure, designated as stage 5 CKD. MEDICINE ATTENDING  In response to medical coding query 9/7/17: Pt has advanced renal failure, currently designated as stage 5 CKD.

## 2017-09-15 DIAGNOSIS — N18.5 CHRONIC KIDNEY DISEASE, STAGE 5: ICD-10-CM

## 2017-09-15 DIAGNOSIS — Z79.82 LONG TERM (CURRENT) USE OF ASPIRIN: ICD-10-CM

## 2017-09-15 DIAGNOSIS — N17.9 ACUTE KIDNEY FAILURE, UNSPECIFIED: ICD-10-CM

## 2017-09-15 DIAGNOSIS — K29.70 GASTRITIS, UNSPECIFIED, WITHOUT BLEEDING: ICD-10-CM

## 2017-09-15 DIAGNOSIS — I25.9 CHRONIC ISCHEMIC HEART DISEASE, UNSPECIFIED: ICD-10-CM

## 2017-09-15 DIAGNOSIS — E78.5 HYPERLIPIDEMIA, UNSPECIFIED: ICD-10-CM

## 2017-09-15 DIAGNOSIS — D63.1 ANEMIA IN CHRONIC KIDNEY DISEASE: ICD-10-CM

## 2017-09-15 DIAGNOSIS — K25.3 ACUTE GASTRIC ULCER WITHOUT HEMORRHAGE OR PERFORATION: ICD-10-CM

## 2017-09-15 DIAGNOSIS — K52.9 NONINFECTIVE GASTROENTERITIS AND COLITIS, UNSPECIFIED: ICD-10-CM

## 2017-09-15 DIAGNOSIS — Z79.4 LONG TERM (CURRENT) USE OF INSULIN: ICD-10-CM

## 2017-09-15 DIAGNOSIS — Z28.21 IMMUNIZATION NOT CARRIED OUT BECAUSE OF PATIENT REFUSAL: ICD-10-CM

## 2017-09-15 DIAGNOSIS — Z53.29 PROCEDURE AND TREATMENT NOT CARRIED OUT BECAUSE OF PATIENT'S DECISION FOR OTHER REASONS: ICD-10-CM

## 2017-09-15 DIAGNOSIS — I13.2 HYPERTENSIVE HEART AND CHRONIC KIDNEY DISEASE WITH HEART FAILURE AND WITH STAGE 5 CHRONIC KIDNEY DISEASE, OR END STAGE RENAL DISEASE: ICD-10-CM

## 2017-09-15 DIAGNOSIS — I50.9 HEART FAILURE, UNSPECIFIED: ICD-10-CM

## 2017-09-15 DIAGNOSIS — E11.22 TYPE 2 DIABETES MELLITUS WITH DIABETIC CHRONIC KIDNEY DISEASE: ICD-10-CM

## 2017-10-10 ENCOUNTER — INPATIENT (INPATIENT)
Facility: HOSPITAL | Age: 72
LOS: 2 days | Discharge: HOME | End: 2017-10-13
Attending: HOSPITALIST | Admitting: HOSPITALIST
Payer: MEDICARE

## 2017-10-10 VITALS
WEIGHT: 177.03 LBS | OXYGEN SATURATION: 99 % | SYSTOLIC BLOOD PRESSURE: 149 MMHG | RESPIRATION RATE: 19 BRPM | DIASTOLIC BLOOD PRESSURE: 63 MMHG | HEART RATE: 88 BPM | TEMPERATURE: 99 F

## 2017-10-10 DIAGNOSIS — K92.2 GASTROINTESTINAL HEMORRHAGE, UNSPECIFIED: ICD-10-CM

## 2017-10-10 DIAGNOSIS — I50.9 HEART FAILURE, UNSPECIFIED: ICD-10-CM

## 2017-10-10 DIAGNOSIS — Z29.9 ENCOUNTER FOR PROPHYLACTIC MEASURES, UNSPECIFIED: ICD-10-CM

## 2017-10-10 DIAGNOSIS — E11.22 TYPE 2 DIABETES MELLITUS WITH DIABETIC CHRONIC KIDNEY DISEASE: ICD-10-CM

## 2017-10-10 DIAGNOSIS — Z11.59 ENCOUNTER FOR SCREENING FOR OTHER VIRAL DISEASES: ICD-10-CM

## 2017-10-10 DIAGNOSIS — D64.9 ANEMIA, UNSPECIFIED: ICD-10-CM

## 2017-10-10 DIAGNOSIS — E66.09 OTHER OBESITY DUE TO EXCESS CALORIES: ICD-10-CM

## 2017-10-10 DIAGNOSIS — I10 ESSENTIAL (PRIMARY) HYPERTENSION: ICD-10-CM

## 2017-10-10 LAB
ALBUMIN SERPL ELPH-MCNC: 2.6 G/DL — LOW (ref 3.3–5)
ALLERGY+IMMUNOLOGY DIAG STUDY NOTE: SIGNIFICANT CHANGE UP
ALP SERPL-CCNC: 107 U/L — SIGNIFICANT CHANGE UP (ref 40–120)
ALT FLD-CCNC: 20 U/L — SIGNIFICANT CHANGE UP (ref 12–78)
ANION GAP SERPL CALC-SCNC: 12 MMOL/L — SIGNIFICANT CHANGE UP (ref 5–17)
ANISOCYTOSIS BLD QL: SIGNIFICANT CHANGE UP
APTT BLD: 33.4 SEC — SIGNIFICANT CHANGE UP (ref 27.5–37.4)
AST SERPL-CCNC: 20 U/L — SIGNIFICANT CHANGE UP (ref 15–37)
BILIRUB SERPL-MCNC: 0.2 MG/DL — SIGNIFICANT CHANGE UP (ref 0.2–1.2)
BLD GP AB SCN SERPL QL: ABNORMAL
BUN SERPL-MCNC: 88 MG/DL — HIGH (ref 7–23)
CALCIUM SERPL-MCNC: 8.3 MG/DL — LOW (ref 8.5–10.1)
CHLORIDE SERPL-SCNC: 102 MMOL/L — SIGNIFICANT CHANGE UP (ref 96–108)
CK MB BLD-MCNC: 0.2 % — SIGNIFICANT CHANGE UP (ref 0–3.5)
CK MB CFR SERPL CALC: 1.6 NG/ML — SIGNIFICANT CHANGE UP (ref 0.5–3.6)
CK SERPL-CCNC: 924 U/L — HIGH (ref 26–192)
CO2 SERPL-SCNC: 25 MMOL/L — SIGNIFICANT CHANGE UP (ref 22–31)
CREAT SERPL-MCNC: 6.44 MG/DL — HIGH (ref 0.5–1.3)
DIR ANTIGLOB POLYSPECIFIC INTERPRETATION: SIGNIFICANT CHANGE UP
EOSINOPHIL NFR BLD AUTO: 3 % — SIGNIFICANT CHANGE UP (ref 0–6)
GLUCOSE SERPL-MCNC: 186 MG/DL — HIGH (ref 70–99)
HCT VFR BLD CALC: 19.3 % — CRITICAL LOW (ref 34.5–45)
HGB BLD-MCNC: 6.1 G/DL — CRITICAL LOW (ref 11.5–15.5)
HYPOCHROMIA BLD QL: SIGNIFICANT CHANGE UP
INR BLD: 0.96 RATIO — SIGNIFICANT CHANGE UP (ref 0.88–1.16)
LYMPHOCYTES # BLD AUTO: 19 % — SIGNIFICANT CHANGE UP (ref 13–44)
MCHC RBC-ENTMCNC: 25.9 PG — LOW (ref 27–34)
MCHC RBC-ENTMCNC: 31.4 GM/DL — LOW (ref 32–36)
MCV RBC AUTO: 82.5 FL — SIGNIFICANT CHANGE UP (ref 80–100)
MICROCYTES BLD QL: SLIGHT — SIGNIFICANT CHANGE UP
MONOCYTES NFR BLD AUTO: 4 % — SIGNIFICANT CHANGE UP (ref 2–14)
NEUTROPHILS NFR BLD AUTO: 74 % — SIGNIFICANT CHANGE UP (ref 43–77)
OB PNL STL: POSITIVE
PLAT MORPH BLD: NORMAL — SIGNIFICANT CHANGE UP
PLATELET # BLD AUTO: 381 K/UL — SIGNIFICANT CHANGE UP (ref 150–400)
POLYCHROMASIA BLD QL SMEAR: SLIGHT — SIGNIFICANT CHANGE UP
POTASSIUM SERPL-MCNC: 4.5 MMOL/L — SIGNIFICANT CHANGE UP (ref 3.5–5.3)
POTASSIUM SERPL-SCNC: 4.5 MMOL/L — SIGNIFICANT CHANGE UP (ref 3.5–5.3)
PROT SERPL-MCNC: 6.8 GM/DL — SIGNIFICANT CHANGE UP (ref 6–8.3)
PROTHROM AB SERPL-ACNC: 10.4 SEC — SIGNIFICANT CHANGE UP (ref 9.8–12.7)
RBC # BLD: 2.34 M/UL — LOW (ref 3.8–5.2)
RBC # FLD: 17.4 % — HIGH (ref 11–15)
RBC BLD AUTO: ABNORMAL
SCHISTOCYTES BLD QL AUTO: SLIGHT — SIGNIFICANT CHANGE UP
SODIUM SERPL-SCNC: 139 MMOL/L — SIGNIFICANT CHANGE UP (ref 135–145)
TROPONIN I SERPL-MCNC: <.015 NG/ML — SIGNIFICANT CHANGE UP (ref 0.01–0.04)
WBC # BLD: 11.8 K/UL — HIGH (ref 3.8–10.5)
WBC # FLD AUTO: 11.8 K/UL — HIGH (ref 3.8–10.5)

## 2017-10-10 PROCEDURE — 86077 PHYS BLOOD BANK SERV XMATCH: CPT

## 2017-10-10 PROCEDURE — 71010: CPT | Mod: 26

## 2017-10-10 PROCEDURE — 99285 EMERGENCY DEPT VISIT HI MDM: CPT

## 2017-10-10 PROCEDURE — 99223 1ST HOSP IP/OBS HIGH 75: CPT

## 2017-10-10 RX ORDER — HYDRALAZINE HCL 50 MG
100 TABLET ORAL
Qty: 0 | Refills: 0 | Status: DISCONTINUED | OUTPATIENT
Start: 2017-10-10 | End: 2017-10-13

## 2017-10-10 RX ORDER — ATORVASTATIN CALCIUM 80 MG/1
20 TABLET, FILM COATED ORAL AT BEDTIME
Qty: 0 | Refills: 0 | Status: DISCONTINUED | OUTPATIENT
Start: 2017-10-10 | End: 2017-10-13

## 2017-10-10 RX ORDER — VALSARTAN 80 MG/1
320 TABLET ORAL DAILY
Qty: 0 | Refills: 0 | Status: DISCONTINUED | OUTPATIENT
Start: 2017-10-10 | End: 2017-10-13

## 2017-10-10 RX ORDER — FUROSEMIDE 40 MG
80 TABLET ORAL
Qty: 0 | Refills: 0 | Status: DISCONTINUED | OUTPATIENT
Start: 2017-10-10 | End: 2017-10-13

## 2017-10-10 RX ORDER — FERROUS SULFATE 325(65) MG
325 TABLET ORAL
Qty: 0 | Refills: 0 | Status: DISCONTINUED | OUTPATIENT
Start: 2017-10-10 | End: 2017-10-13

## 2017-10-10 RX ORDER — PANTOPRAZOLE SODIUM 20 MG/1
40 TABLET, DELAYED RELEASE ORAL
Qty: 0 | Refills: 0 | Status: DISCONTINUED | OUTPATIENT
Start: 2017-10-10 | End: 2017-10-13

## 2017-10-10 RX ORDER — AMLODIPINE BESYLATE 2.5 MG/1
10 TABLET ORAL DAILY
Qty: 0 | Refills: 0 | Status: DISCONTINUED | OUTPATIENT
Start: 2017-10-10 | End: 2017-10-13

## 2017-10-10 RX ORDER — PANTOPRAZOLE SODIUM 20 MG/1
40 TABLET, DELAYED RELEASE ORAL ONCE
Qty: 0 | Refills: 0 | Status: COMPLETED | OUTPATIENT
Start: 2017-10-10 | End: 2017-10-10

## 2017-10-10 RX ORDER — SODIUM CHLORIDE 9 MG/ML
1000 INJECTION INTRAMUSCULAR; INTRAVENOUS; SUBCUTANEOUS
Qty: 0 | Refills: 0 | Status: DISCONTINUED | OUTPATIENT
Start: 2017-10-10 | End: 2017-10-12

## 2017-10-10 RX ORDER — DOXAZOSIN MESYLATE 4 MG
2 TABLET ORAL AT BEDTIME
Qty: 0 | Refills: 0 | Status: DISCONTINUED | OUTPATIENT
Start: 2017-10-10 | End: 2017-10-13

## 2017-10-10 RX ORDER — SODIUM CHLORIDE 9 MG/ML
3 INJECTION INTRAMUSCULAR; INTRAVENOUS; SUBCUTANEOUS ONCE
Qty: 0 | Refills: 0 | Status: COMPLETED | OUTPATIENT
Start: 2017-10-10 | End: 2017-10-10

## 2017-10-10 RX ADMIN — SODIUM CHLORIDE 40 MILLILITER(S): 9 INJECTION INTRAMUSCULAR; INTRAVENOUS; SUBCUTANEOUS at 22:16

## 2017-10-10 RX ADMIN — SODIUM CHLORIDE 3 MILLILITER(S): 9 INJECTION INTRAMUSCULAR; INTRAVENOUS; SUBCUTANEOUS at 20:47

## 2017-10-10 RX ADMIN — PANTOPRAZOLE SODIUM 40 MILLIGRAM(S): 20 TABLET, DELAYED RELEASE ORAL at 20:51

## 2017-10-10 NOTE — H&P ADULT - HISTORY OF PRESENT ILLNESS
72 year old woman with PMH of HTN, HLD, DM2, CKD stage 5 (has been refusing dialysis) sent in by PMD for Hb of 5.6.  Patient had EGD done last admission for abdominal pain, done as outpt, gastritis found.  Pt's only complaint today is dark stool which she says is not new, also takes Fe supplements.  She does complain of recent fatigue, no other complaints.    In the ED,  h/h 6.1/19.3, FOBT +.  CK elevated at 924.  Vitals stable.

## 2017-10-10 NOTE — H&P ADULT - PROBLEM SELECTOR PLAN 1
Vital sign as per protocol  Keep NPO for now  2 large bore IV lines  Give IV fluid as per cardiac/renal status  Transfuse 2 units pRBC  Serial CBCs  Bleeding precautions  Avoid antiplatelets and antithrombotic medication  Dr. Child called by ED, patient will likely undergo Colonoscopy once stable

## 2017-10-10 NOTE — H&P ADULT - ASSESSMENT
72 year old woman with PMH of HTN, HLD, DM2, CKD stage 5 (has been refusing dialysis) sent in by PMD for Hb of 5.6.  Signs, symptoms, and work up consistent with Symptomatic anemia likely secondary to GIB.  Patient will require admission for at least 2 midnights as detailed below:

## 2017-10-10 NOTE — H&P ADULT - NSHPPHYSICALEXAM_GEN_ALL_CORE
GENERAL: NAD, well-groomed, well-developed  HEAD:  Atraumatic, Normocephalic  EYES: EOMI, PERRLA, conjunctiva and sclera clear  ENMT: No tonsillar erythema, exudates, or enlargement; Moist mucous membranes, No lesions  NECK: Supple, No JVD, Normal thyroid  NERVOUS SYSTEM:  Alert & Oriented X3, Good concentration  CHEST/LUNG: Clear to ascultation bilaterally; No rales, rhonchi, wheezing, or rubs  HEART: Regular rate and rhythm; No murmurs, rubs, or gallops  ABDOMEN: Soft, Nontender, Nondistended; Bowel sounds present  EXTREMITIES: no clubbing, cyanosis, or edema  SKIN: no rashes or lesions  PSYCH: normal affect and behavior

## 2017-10-10 NOTE — ED PROVIDER NOTE - MEDICAL DECISION MAKING DETAILS
Pt anemic with + occult bleed. will transfuse. CK also elev will trial slow hydration. d/w dr. lowe for admission. dr. melony mclaughlin.

## 2017-10-10 NOTE — H&P ADULT - NSHPLABSRESULTS_GEN_ALL_CORE
Vital Signs Last 24 Hrs  T(C): 37.1 (10 Oct 2017 19:32), Max: 37.1 (10 Oct 2017 19:32)  T(F): 98.8 (10 Oct 2017 19:32), Max: 98.8 (10 Oct 2017 19:32)  HR: 81 (10 Oct 2017 21:45) (81 - 88)  BP: 139/71 (10 Oct 2017 21:45) (139/71 - 149/63)  BP(mean): --  RR: 18 (10 Oct 2017 21:45) (18 - 19)  SpO2: 98% (10 Oct 2017 21:45) (98% - 99%)        LABS:                        6.1    11.8  )-----------( 381      ( 10 Oct 2017 20:45 )             19.3     10-10    139  |  102  |  88<H>  ----------------------------<  186<H>  4.5   |  25  |  6.44<H>    Ca    8.3<L>      10 Oct 2017 20:45    TPro  6.8  /  Alb  2.6<L>  /  TBili  0.2  /  DBili  x   /  AST  20  /  ALT  20  /  AlkPhos  107  10-10    PT/INR - ( 10 Oct 2017 20:45 )   PT: 10.4 sec;   INR: 0.96 ratio         PTT - ( 10 Oct 2017 20:45 )  PTT:33.4 sec

## 2017-10-10 NOTE — ED PROVIDER NOTE - PHYSICAL EXAMINATION

## 2017-10-10 NOTE — ED ADULT NURSE NOTE - OBJECTIVE STATEMENT
pt came from pmd with c/o low hemoglobin of 5.7, pt with hx of blood transfusion, not in any distress, vss, axox3,will monitor

## 2017-10-10 NOTE — H&P ADULT - NSHPREVIEWOFSYSTEMS_GEN_ALL_CORE
No fever/chills, No photophobia/eye pain/changes in vision,  No chest pain/palpitations, no SOB/cough/wheeze/stridor, No abdominal pain, No N/V/D, no dysuria/frequency/discharge, No neck/back pain, no rash, no changes in neurological status/function.

## 2017-10-10 NOTE — H&P ADULT - PROBLEM/PLAN-2
Both written and verbal discharge instructions given to pt with verbalized understanding of home care and follow up. Prescription given. DISPLAY PLAN FREE TEXT

## 2017-10-10 NOTE — ED PROVIDER NOTE - OBJECTIVE STATEMENT
71 yo female with pmh HTN, HL, DM II, CKD stage 5, refusing dialysis, recent admission for anemia, abd pain, NVD and Endoscopy with gastritis, s/p transfusion 2 U, presents as had follow up with PMD and Hb was 5.6.  PT was dc 1 month ago with Hb of 9. Pt only c/o fatigue. Pt states black BM, but is on iron. No abd pain, cp, sob, dizziness, palpitations.     ROS: No fever/chills. No photophobia/eye pain/changes in vision, No ear pain/sore throat/dysphagia, No chest pain/palpitations. No SOB/cough/stridor. No abdominal pain, N/V/D, + black/bloody bm. No dysuria/frequency/discharge, No headache. No Dizziness.  No rash.  No numbness/tingling/weakness.

## 2017-10-11 DIAGNOSIS — R74.8 ABNORMAL LEVELS OF OTHER SERUM ENZYMES: ICD-10-CM

## 2017-10-11 LAB
ANION GAP SERPL CALC-SCNC: 10 MMOL/L — SIGNIFICANT CHANGE UP (ref 5–17)
BUN SERPL-MCNC: 82 MG/DL — HIGH (ref 7–23)
CALCIUM SERPL-MCNC: 8.1 MG/DL — LOW (ref 8.5–10.1)
CHLORIDE SERPL-SCNC: 105 MMOL/L — SIGNIFICANT CHANGE UP (ref 96–108)
CO2 SERPL-SCNC: 26 MMOL/L — SIGNIFICANT CHANGE UP (ref 22–31)
CREAT SERPL-MCNC: 6.37 MG/DL — HIGH (ref 0.5–1.3)
GLUCOSE BLDC GLUCOMTR-MCNC: 253 MG/DL — HIGH (ref 70–99)
GLUCOSE SERPL-MCNC: 194 MG/DL — HIGH (ref 70–99)
HCT VFR BLD CALC: 22 % — LOW (ref 34.5–45)
HCT VFR BLD CALC: 26 % — LOW (ref 34.5–45)
HCV AB S/CO SERPL IA: 0.12 S/CO — SIGNIFICANT CHANGE UP
HCV AB SERPL-IMP: SIGNIFICANT CHANGE UP
HGB BLD-MCNC: 7.3 G/DL — LOW (ref 11.5–15.5)
HGB BLD-MCNC: 8.7 G/DL — LOW (ref 11.5–15.5)
MCHC RBC-ENTMCNC: 27.7 PG — SIGNIFICANT CHANGE UP (ref 27–34)
MCHC RBC-ENTMCNC: 27.8 PG — SIGNIFICANT CHANGE UP (ref 27–34)
MCHC RBC-ENTMCNC: 33.2 GM/DL — SIGNIFICANT CHANGE UP (ref 32–36)
MCHC RBC-ENTMCNC: 33.4 GM/DL — SIGNIFICANT CHANGE UP (ref 32–36)
MCV RBC AUTO: 83 FL — SIGNIFICANT CHANGE UP (ref 80–100)
MCV RBC AUTO: 83.7 FL — SIGNIFICANT CHANGE UP (ref 80–100)
PLATELET # BLD AUTO: 365 K/UL — SIGNIFICANT CHANGE UP (ref 150–400)
PLATELET # BLD AUTO: 370 K/UL — SIGNIFICANT CHANGE UP (ref 150–400)
POTASSIUM SERPL-MCNC: 4.3 MMOL/L — SIGNIFICANT CHANGE UP (ref 3.5–5.3)
POTASSIUM SERPL-SCNC: 4.3 MMOL/L — SIGNIFICANT CHANGE UP (ref 3.5–5.3)
RBC # BLD: 2.63 M/UL — LOW (ref 3.8–5.2)
RBC # BLD: 3.13 M/UL — LOW (ref 3.8–5.2)
RBC # FLD: 15.5 % — HIGH (ref 11–15)
RBC # FLD: 15.8 % — HIGH (ref 11–15)
SODIUM SERPL-SCNC: 141 MMOL/L — SIGNIFICANT CHANGE UP (ref 135–145)
WBC # BLD: 12.6 K/UL — HIGH (ref 3.8–10.5)
WBC # BLD: 13.4 K/UL — HIGH (ref 3.8–10.5)
WBC # FLD AUTO: 12.6 K/UL — HIGH (ref 3.8–10.5)
WBC # FLD AUTO: 13.4 K/UL — HIGH (ref 3.8–10.5)

## 2017-10-11 PROCEDURE — 93010 ELECTROCARDIOGRAM REPORT: CPT

## 2017-10-11 PROCEDURE — 99233 SBSQ HOSP IP/OBS HIGH 50: CPT

## 2017-10-11 RX ORDER — TOBRAMYCIN AND DEXAMETHASONE 1; 3 MG/ML; MG/ML
2 SUSPENSION/ DROPS OPHTHALMIC EVERY 6 HOURS
Qty: 0 | Refills: 0 | Status: DISCONTINUED | OUTPATIENT
Start: 2017-10-11 | End: 2017-10-13

## 2017-10-11 RX ORDER — INSULIN LISPRO 100/ML
VIAL (ML) SUBCUTANEOUS AT BEDTIME
Qty: 0 | Refills: 0 | Status: DISCONTINUED | OUTPATIENT
Start: 2017-10-11 | End: 2017-10-13

## 2017-10-11 RX ORDER — SODIUM CHLORIDE 9 MG/ML
1000 INJECTION, SOLUTION INTRAVENOUS
Qty: 0 | Refills: 0 | Status: DISCONTINUED | OUTPATIENT
Start: 2017-10-11 | End: 2017-10-13

## 2017-10-11 RX ORDER — DEXTROSE 50 % IN WATER 50 %
25 SYRINGE (ML) INTRAVENOUS ONCE
Qty: 0 | Refills: 0 | Status: DISCONTINUED | OUTPATIENT
Start: 2017-10-11 | End: 2017-10-13

## 2017-10-11 RX ORDER — GLUCAGON INJECTION, SOLUTION 0.5 MG/.1ML
1 INJECTION, SOLUTION SUBCUTANEOUS ONCE
Qty: 0 | Refills: 0 | Status: DISCONTINUED | OUTPATIENT
Start: 2017-10-11 | End: 2017-10-13

## 2017-10-11 RX ORDER — INSULIN LISPRO 100/ML
VIAL (ML) SUBCUTANEOUS
Qty: 0 | Refills: 0 | Status: DISCONTINUED | OUTPATIENT
Start: 2017-10-11 | End: 2017-10-13

## 2017-10-11 RX ORDER — DEXTROSE 50 % IN WATER 50 %
12.5 SYRINGE (ML) INTRAVENOUS ONCE
Qty: 0 | Refills: 0 | Status: DISCONTINUED | OUTPATIENT
Start: 2017-10-11 | End: 2017-10-13

## 2017-10-11 RX ORDER — DEXTROSE 50 % IN WATER 50 %
1 SYRINGE (ML) INTRAVENOUS ONCE
Qty: 0 | Refills: 0 | Status: DISCONTINUED | OUTPATIENT
Start: 2017-10-11 | End: 2017-10-13

## 2017-10-11 RX ADMIN — SODIUM CHLORIDE 40 MILLILITER(S): 9 INJECTION INTRAMUSCULAR; INTRAVENOUS; SUBCUTANEOUS at 09:27

## 2017-10-11 RX ADMIN — Medication 325 MILLIGRAM(S): at 12:13

## 2017-10-11 RX ADMIN — Medication 2 MILLIGRAM(S): at 21:38

## 2017-10-11 RX ADMIN — PANTOPRAZOLE SODIUM 40 MILLIGRAM(S): 20 TABLET, DELAYED RELEASE ORAL at 06:17

## 2017-10-11 RX ADMIN — Medication 1 TABLET(S): at 12:14

## 2017-10-11 RX ADMIN — ATORVASTATIN CALCIUM 20 MILLIGRAM(S): 80 TABLET, FILM COATED ORAL at 21:38

## 2017-10-11 RX ADMIN — TOBRAMYCIN AND DEXAMETHASONE 2 DROP(S): 1; 3 SUSPENSION/ DROPS OPHTHALMIC at 17:05

## 2017-10-11 RX ADMIN — Medication 325 MILLIGRAM(S): at 16:57

## 2017-10-11 RX ADMIN — AMLODIPINE BESYLATE 10 MILLIGRAM(S): 2.5 TABLET ORAL at 06:17

## 2017-10-11 RX ADMIN — Medication 80 MILLIGRAM(S): at 17:05

## 2017-10-11 RX ADMIN — Medication 0.3 MILLIGRAM(S): at 17:05

## 2017-10-11 RX ADMIN — Medication 4: at 07:37

## 2017-10-11 RX ADMIN — TOBRAMYCIN AND DEXAMETHASONE 2 DROP(S): 1; 3 SUSPENSION/ DROPS OPHTHALMIC at 23:37

## 2017-10-11 RX ADMIN — Medication 0.3 MILLIGRAM(S): at 06:17

## 2017-10-11 RX ADMIN — Medication 100 MILLIGRAM(S): at 17:05

## 2017-10-11 RX ADMIN — Medication 100 MILLIGRAM(S): at 06:17

## 2017-10-11 RX ADMIN — VALSARTAN 320 MILLIGRAM(S): 80 TABLET ORAL at 06:17

## 2017-10-11 RX ADMIN — Medication 325 MILLIGRAM(S): at 07:56

## 2017-10-11 RX ADMIN — Medication 80 MILLIGRAM(S): at 06:17

## 2017-10-11 RX ADMIN — Medication 1: at 23:36

## 2017-10-11 NOTE — CONSULT NOTE ADULT - PROBLEM SELECTOR RECOMMENDATION 9
Patient had EGD with Dr Hurtado less than 2 weeks ago, with results of Gastric ulcer, path neg for H. Pylori as above  Start PPI and Carafate   Transfuse PRBC as needed  Nephrology F/U

## 2017-10-11 NOTE — CONSULT NOTE ADULT - SUBJECTIVE AND OBJECTIVE BOX
Information from chart:  "Patient is a 72y old  Female who presents with a chief complaint of sent in by PMD for low h/h (10 Oct 2017 21:44)    HPI:  72 year old woman with PMH of HTN, HLD, DM2, CKD stage 5 (has been refusing dialysis) sent in by PMD for Hb of 5.6.  Patient had EGD done last admission for abdominal pain, done as outpt, gastritis found.  Pt's only complaint today is dark stool which she says is not new, also takes Fe supplements.  She does complain of recent fatigue, no other complaints.    In the ED,  h/h 6.1/19.3, FOBT +.  CK elevated at 924.  Vitals stable. (10 Oct 2017 21:44)    Patient being followed by an outpatient nephrologists, resistant to HD.   "    PAST MEDICAL & SURGICAL HISTORY:  Chronic kidney disease  Congestive heart failure, unspecified congestive heart failure chronicity, unspecified congestive heart failure type  Diabetes  Hypertension  No significant past surgical history    FAMILY HISTORY:  No pertinent family history in first degree relatives    Allergies    No Known Allergies    Intolerances      MEDICATIONS  (STANDING):  amLODIPine   Tablet 10 milliGRAM(s) Oral daily  atorvastatin 20 milliGRAM(s) Oral at bedtime  cloNIDine 0.3 milliGRAM(s) Oral two times a day  dextrose 5%. 1000 milliLiter(s) (50 mL/Hr) IV Continuous <Continuous>  dextrose 50% Injectable 12.5 Gram(s) IV Push once  dextrose 50% Injectable 25 Gram(s) IV Push once  dextrose 50% Injectable 25 Gram(s) IV Push once  doxazosin 2 milliGRAM(s) Oral at bedtime  ferrous    sulfate 325 milliGRAM(s) Oral three times a day with meals  furosemide    Tablet 80 milliGRAM(s) Oral two times a day  hydrALAZINE 100 milliGRAM(s) Oral two times a day  insulin lispro (HumaLOG) corrective regimen sliding scale   SubCutaneous three times a day before meals  insulin lispro (HumaLOG) corrective regimen sliding scale   SubCutaneous at bedtime  multivitamin 1 Tablet(s) Oral daily  pantoprazole    Tablet 40 milliGRAM(s) Oral before breakfast  sodium chloride 0.9%. 1000 milliLiter(s) (40 mL/Hr) IV Continuous <Continuous>  tobramycin/dexamethasone Suspension 2 Drop(s) Both EYES every 6 hours  valsartan 320 milliGRAM(s) Oral daily    MEDICATIONS  (PRN):  dextrose Gel 1 Dose(s) Oral once PRN Blood Glucose LESS THAN 70 milliGRAM(s)/deciliter  glucagon  Injectable 1 milliGRAM(s) IntraMuscular once PRN Glucose LESS THAN 70 milligrams/deciliter    Vital Signs Last 24 Hrs  T(C): 36.7 (11 Oct 2017 11:50), Max: 37.1 (10 Oct 2017 19:32)  T(F): 98 (11 Oct 2017 11:50), Max: 98.8 (10 Oct 2017 19:32)  HR: 79 (11 Oct 2017 11:50) (69 - 90)  BP: 149/67 (11 Oct 2017 11:50) (136/80 - 174/69)  BP(mean): --  RR: 17 (11 Oct 2017 11:50) (16 - 19)  SpO2: 97% (11 Oct 2017 11:50) (97% - 100%)    Daily     Daily Weight in k.3 (11 Oct 2017 06:49)  CAPILLARY BLOOD GLUCOSE      POCT Blood Glucose.: 114 mg/dL (11 Oct 2017 11:50)  POCT Blood Glucose.: 214 mg/dL (11 Oct 2017 07:32)  POCT Blood Glucose.: 125 mg/dL (11 Oct 2017 00:46)    PHYSICAL EXAM:      T(C): 36.7 (10-11-17 @ 11:50), Max: 37.1 (10-10-17 @ 19:32)  HR: 79 (10-11-17 @ 11:50) (69 - 90)  BP: 149/67 (10-11-17 @ 11:50) (136/80 - 174/69)  RR: 17 (10-11-17 @ 11:50) (16 - 19)  SpO2: 97% (10-11-17 @ 11:50) (97% - 100%)  Wt(kg): --  Respiratory: clear anteriorly, decreased BS at bases  Cardiovascular: S1 S2  Gastrointestinal: soft NT ND +BS  Extremities:    tr edema              10-11    141  |  105  |  82<H>  ----------------------------<  194<H>  4.3   |  26  |  6.37<H>    Ca    8.1<L>      11 Oct 2017 08:32    TPro  6.8  /  Alb  2.6<L>  /  TBili  0.2  /  DBili  x   /  AST  20  /  ALT  20  /  AlkPhos  107  10-10                          7.3    12.6  )-----------( 370      ( 11 Oct 2017 08:32 )             22.0             Assessment and Plan    CKD 4-5; impending ESRD;   Resistant to HD; aware of uremic complications including death.  PRBC tx.  Will follow.
Chief Complaint:  Patient is a 72y old  Female who presents with a chief complaint of sent in by PMD for low h/h (10 Oct 2017 21:44)      HPI:  72 year old woman with PMH of HTN, HLD, DM2, CKD stage 5 (has been refusing dialysis) sent in by PMD for Hb of 5.6.  Patient had EGD done last admission for abdominal pain with results : Gastric ulcer with path neg for Hpylori.  Pt's only complaint today is dark stool which she says is not new, also takes Fe supplements.  She does complain of recent fatigue, no other complaints.      PMH/PSH:PAST MEDICAL & SURGICAL HISTORY:  Chronic kidney disease  Congestive heart failure, unspecified congestive heart failure chronicity, unspecified congestive heart failure type  Diabetes  Hypertension  No significant past surgical history      Allergies:  No Known Allergies      Medications:  amLODIPine   Tablet 10 milliGRAM(s) Oral daily  atorvastatin 20 milliGRAM(s) Oral at bedtime  cloNIDine 0.3 milliGRAM(s) Oral two times a day  dextrose 5%. 1000 milliLiter(s) IV Continuous <Continuous>  dextrose 50% Injectable 12.5 Gram(s) IV Push once  dextrose 50% Injectable 25 Gram(s) IV Push once  dextrose 50% Injectable 25 Gram(s) IV Push once  dextrose Gel 1 Dose(s) Oral once PRN  doxazosin 2 milliGRAM(s) Oral at bedtime  ferrous    sulfate 325 milliGRAM(s) Oral three times a day with meals  furosemide    Tablet 80 milliGRAM(s) Oral two times a day  glucagon  Injectable 1 milliGRAM(s) IntraMuscular once PRN  hydrALAZINE 100 milliGRAM(s) Oral two times a day  insulin lispro (HumaLOG) corrective regimen sliding scale   SubCutaneous three times a day before meals  insulin lispro (HumaLOG) corrective regimen sliding scale   SubCutaneous at bedtime  multivitamin 1 Tablet(s) Oral daily  pantoprazole    Tablet 40 milliGRAM(s) Oral before breakfast  sodium chloride 0.9%. 1000 milliLiter(s) IV Continuous <Continuous>  tobramycin/dexamethasone Suspension 2 Drop(s) Both EYES every 6 hours  valsartan 320 milliGRAM(s) Oral daily      Review of Systems:    General:  No weight loss, fevers, chills, night sweats, fatigue,   Eyes:  Good vision, no reported pain  ENT:  No sore throat, pain, runny nose, dysphagia  CV:  No pain, palpitations, hypo/hypertension  Resp:  No dyspnea, cough, tachypnea, wheezing  GI:  No pain, No nausea, No vomiting, No diarrhea, No constipation, No pruritis, No rectal bleeding, +Dark Stools, No dysphagia,  :  No pain, bleeding, incontinence, nocturia  Muscle:  No pain, weakness  Breast:  No pain, abscess, mass, discharge  Neuro:  No weakness, tingling, memory problems  Psych:  No fatigue, insomnia, mood problems, depression  Endocrine:  No polyuria, polydypsia, cold/heat intolerance  Heme:  No petechiae, ecchymosis, easy bruisability  Skin:  No rash, tattoos, scars, edema    Relevant Family History:   FAMILY HISTORY:  No pertinent family history in first degree relatives      Relevant Social History:     Physical Exam:    Vital Signs:  Vital Signs Last 24 Hrs  T(C): 36.7 (11 Oct 2017 11:50), Max: 37.1 (10 Oct 2017 19:32)  T(F): 98 (11 Oct 2017 11:50), Max: 98.8 (10 Oct 2017 19:32)  HR: 79 (11 Oct 2017 11:50) (69 - 90)  BP: 149/67 (11 Oct 2017 11:50) (136/80 - 174/69)  BP(mean): --  RR: 17 (11 Oct 2017 11:50) (16 - 19)  SpO2: 97% (11 Oct 2017 11:50) (97% - 100%)  Daily     Daily Weight in k.3 (11 Oct 2017 06:49)    General:  Appears stated age, well-groomed, well-nourished, no distress  HEENT:  NC/AT,  conjunctivae clear and pink, no thyromegaly, nodules, adenopathy, no JVD  Chest:  Full & symmetric excursion, no increased effort, breath sounds clear  Cardiovascular:  Regular rhythm, S1, S2, no murmur/rub/S3/S4, no abdominal bruit, no edema  Abdomen:  Soft, non-tender, non-distended, normoactive bowel sounds,  no masses , no hepatosplenomegaly, no signs of chronic liver disease  Extremities:  no cyanosis, clubbing or edema  Skin:  No rash/erythema/ecchymoses/petechiae/wounds/abscess/warm/dry  Neuro/Psych:  Alert, oriented, no asterixis, no tremor, no encephalopathy    Laboratory:                          7.3    12.6  )-----------( 370      ( 11 Oct 2017 08:32 )             22.0     10-11    141  |  105  |  82<H>  ----------------------------<  194<H>  4.3   |  26  |  6.37<H>    Ca    8.1<L>      11 Oct 2017 08:32    TPro  6.8  /  Alb  2.6<L>  /  TBili  0.2  /  DBili  x   /  AST  20  /  ALT  20  /  AlkPhos  107  10-10    LIVER FUNCTIONS - ( 10 Oct 2017 20:45 )  Alb: 2.6 g/dL / Pro: 6.8 gm/dL / ALK PHOS: 107 U/L / ALT: 20 U/L / AST: 20 U/L / GGT: x           PT/INR - ( 10 Oct 2017 20:45 )   PT: 10.4 sec;   INR: 0.96 ratio         PTT - ( 10 Oct 2017 20:45 )  PTT:33.4 sec        Intake and Output    10-10-17 @ 07:01  -  10-11-17 @ 07:00  --------------------------------------------------------  IN: 0 mL / OUT: 230 mL / NET: -230 mL      Progress Note:   · Provider Specialty	Gastroenterology	      · Subjective and Objective: 	  Upper esophagogastroduodenoscopy/ENDOSCOPY with biopsy    -Informed consent obtained from patient prior to exam.     Indication:   PUD symptoms    Anesthesia: as per anesthesiologist  provided by:    Esophogus:  WNL    Stomach:  small gastric ulcer wit surrounding edema  s/p biopsy    Duodenum:  WNL    The patient tolerated the procedure well.    Findings:  As above    Plan:  clear liquids, check histology    Electronic Signatures:  Howard Hurtado)  (Signed 31-Aug-2017 18:14)  	Authored: Progress Note, Subjective and Objective      Last Updated: 31-Aug-2017 18:14 by Howard Hurtado)    Pathology  Surgical Pathology Final Report -  (17 @ 18:05)    Surgical Pathology Final Report - :   ACCESSION No:  [id number]    ALTHEA FERGUSON                      1    Surgical Final Report    Final Diagnosis    Gastric antrum, biopsy:  - Mild chronic antral gastritis with reactive/regenerative  changes  - Negative for Helicobacter pylori (cresyl voilet stain)    Delonte Cornelius MD  (Electronic Signature)  Reported on: 17    Clinical Information  Gastric ulcer  Operation performed: Gastroscopy with biopsy    Specimen Description  1     Antrum biopsy    Gross Description  1.  The specimen is received in formalin and the specimen  container is labeled: Antrum biopsy.  It consists of two  fragments of tan soft tissue measuring 0.1-0.2 cm.  H. Pylori  stain is requested.  Entirely submitted.  One cassette.    In addition to other data that may appear on the specimen  container, the label has been inspected to confirm the presence  of the patient's name and date of birth.  TALIA 17 14:37

## 2017-10-11 NOTE — CONSULT NOTE ADULT - ASSESSMENT
Symptomatic Anemia, multifactorial, Upper GI Bleed and Chronic Disease Symptomatic Anemia, Upper GI Bleed and Chronic Disease

## 2017-10-12 ENCOUNTER — TRANSCRIPTION ENCOUNTER (OUTPATIENT)
Age: 72
End: 2017-10-12

## 2017-10-12 LAB
ANION GAP SERPL CALC-SCNC: 13 MMOL/L — SIGNIFICANT CHANGE UP (ref 5–17)
BUN SERPL-MCNC: 75 MG/DL — HIGH (ref 7–23)
CALCIUM SERPL-MCNC: 7.9 MG/DL — LOW (ref 8.5–10.1)
CHLORIDE SERPL-SCNC: 103 MMOL/L — SIGNIFICANT CHANGE UP (ref 96–108)
CK SERPL-CCNC: 506 U/L — HIGH (ref 26–192)
CO2 SERPL-SCNC: 25 MMOL/L — SIGNIFICANT CHANGE UP (ref 22–31)
CREAT SERPL-MCNC: 6.19 MG/DL — HIGH (ref 0.5–1.3)
FERRITIN SERPL-MCNC: 102 NG/ML — SIGNIFICANT CHANGE UP (ref 15–150)
GLUCOSE BLDC GLUCOMTR-MCNC: 150 MG/DL — HIGH (ref 70–99)
GLUCOSE BLDC GLUCOMTR-MCNC: 169 MG/DL — HIGH (ref 70–99)
GLUCOSE BLDC GLUCOMTR-MCNC: 238 MG/DL — HIGH (ref 70–99)
GLUCOSE BLDC GLUCOMTR-MCNC: 267 MG/DL — HIGH (ref 70–99)
GLUCOSE SERPL-MCNC: 125 MG/DL — HIGH (ref 70–99)
HCT VFR BLD CALC: 26.3 % — LOW (ref 34.5–45)
HGB BLD-MCNC: 8.7 G/DL — LOW (ref 11.5–15.5)
IRON SATN MFR SERPL: 17 % — SIGNIFICANT CHANGE UP (ref 14–50)
IRON SATN MFR SERPL: 30 UG/DL — SIGNIFICANT CHANGE UP (ref 30–160)
MCHC RBC-ENTMCNC: 28 PG — SIGNIFICANT CHANGE UP (ref 27–34)
MCHC RBC-ENTMCNC: 33 GM/DL — SIGNIFICANT CHANGE UP (ref 32–36)
MCV RBC AUTO: 84.9 FL — SIGNIFICANT CHANGE UP (ref 80–100)
PLATELET # BLD AUTO: 337 K/UL — SIGNIFICANT CHANGE UP (ref 150–400)
POTASSIUM SERPL-MCNC: 3.9 MMOL/L — SIGNIFICANT CHANGE UP (ref 3.5–5.3)
POTASSIUM SERPL-SCNC: 3.9 MMOL/L — SIGNIFICANT CHANGE UP (ref 3.5–5.3)
RBC # BLD: 3.1 M/UL — LOW (ref 3.8–5.2)
RBC # FLD: 15.4 % — HIGH (ref 11–15)
SODIUM SERPL-SCNC: 141 MMOL/L — SIGNIFICANT CHANGE UP (ref 135–145)
TIBC SERPL-MCNC: 180 UG/DL — LOW (ref 220–430)
UIBC SERPL-MCNC: 150 UG/DL — SIGNIFICANT CHANGE UP (ref 110–370)
WBC # BLD: 13 K/UL — HIGH (ref 3.8–10.5)
WBC # FLD AUTO: 13 K/UL — HIGH (ref 3.8–10.5)

## 2017-10-12 PROCEDURE — 99233 SBSQ HOSP IP/OBS HIGH 50: CPT

## 2017-10-12 RX ADMIN — Medication 0.3 MILLIGRAM(S): at 17:26

## 2017-10-12 RX ADMIN — Medication 100 MILLIGRAM(S): at 17:28

## 2017-10-12 RX ADMIN — Medication 0.3 MILLIGRAM(S): at 05:48

## 2017-10-12 RX ADMIN — VALSARTAN 320 MILLIGRAM(S): 80 TABLET ORAL at 05:48

## 2017-10-12 RX ADMIN — Medication 325 MILLIGRAM(S): at 11:32

## 2017-10-12 RX ADMIN — Medication 4: at 11:31

## 2017-10-12 RX ADMIN — Medication 325 MILLIGRAM(S): at 07:31

## 2017-10-12 RX ADMIN — Medication 6: at 17:25

## 2017-10-12 RX ADMIN — Medication 2: at 07:30

## 2017-10-12 RX ADMIN — Medication 100 MILLIGRAM(S): at 05:48

## 2017-10-12 RX ADMIN — Medication 325 MILLIGRAM(S): at 17:26

## 2017-10-12 RX ADMIN — ATORVASTATIN CALCIUM 20 MILLIGRAM(S): 80 TABLET, FILM COATED ORAL at 21:40

## 2017-10-12 RX ADMIN — AMLODIPINE BESYLATE 10 MILLIGRAM(S): 2.5 TABLET ORAL at 05:48

## 2017-10-12 RX ADMIN — Medication 80 MILLIGRAM(S): at 05:48

## 2017-10-12 RX ADMIN — Medication 2 MILLIGRAM(S): at 21:40

## 2017-10-12 RX ADMIN — TOBRAMYCIN AND DEXAMETHASONE 2 DROP(S): 1; 3 SUSPENSION/ DROPS OPHTHALMIC at 17:28

## 2017-10-12 RX ADMIN — Medication 1 TABLET(S): at 11:32

## 2017-10-12 RX ADMIN — TOBRAMYCIN AND DEXAMETHASONE 2 DROP(S): 1; 3 SUSPENSION/ DROPS OPHTHALMIC at 11:32

## 2017-10-12 RX ADMIN — Medication 80 MILLIGRAM(S): at 17:27

## 2017-10-12 RX ADMIN — TOBRAMYCIN AND DEXAMETHASONE 2 DROP(S): 1; 3 SUSPENSION/ DROPS OPHTHALMIC at 05:48

## 2017-10-12 RX ADMIN — PANTOPRAZOLE SODIUM 40 MILLIGRAM(S): 20 TABLET, DELAYED RELEASE ORAL at 05:48

## 2017-10-12 RX ADMIN — TOBRAMYCIN AND DEXAMETHASONE 2 DROP(S): 1; 3 SUSPENSION/ DROPS OPHTHALMIC at 23:18

## 2017-10-12 NOTE — DISCHARGE NOTE ADULT - CARE PROVIDERS DIRECT ADDRESSES
,DirectAddress_Unknown,DirectAddress_Unknown ,DirectAddress_Unknown,[email or web address],DirectAddress_Unknown

## 2017-10-12 NOTE — DISCHARGE NOTE ADULT - PLAN OF CARE
stable, resolve follow up with your PCP  and your nephrologist follow up with  within 1 week to plan colonoscopy as outpt follow up with your PCP  follow up with your nephrologist DR.Sushil Herrera follow up with your PCP  within 3-5 days upon discharge follow up with your PCP  and your nephrologist  hold off aspirin until reevaluated by PMD

## 2017-10-12 NOTE — DISCHARGE NOTE ADULT - CARE PROVIDER_API CALL
your, PCP  Phone: (   )    -  Fax: (   )    -    Howard Hurtado), [place] [address]  Phone: [phone]  Fax: [phone] Howard Hurtado (MD), Medicine  [address] [place] [address]  Phone: [phone]  Fax: [phone]    Jaciel Reinoso (DO), Internal Medicine  [facility]  [address] [place] [address] [address] [place] [address]  Phone: [phone]  Fax: [phone]    Warren Herrera), Nephrology  [address] [place] [address]  [address] [place] [address]  Phone: [phone]  Fax: [phone]

## 2017-10-12 NOTE — DISCHARGE NOTE ADULT - PROVIDER TOKENS
FREE:[LAST:[your],FIRST:[PCP],PHONE:[(   )    -],FAX:[(   )    -]],TOKEN:'[id number]' TOKEN:'[id number]',TOKEN:'[id number]',TOKEN:'[id number]'

## 2017-10-12 NOTE — DISCHARGE NOTE ADULT - SECONDARY DIAGNOSIS.
Gastrointestinal hemorrhage, unspecified gastrointestinal hemorrhage type Elevated CK Type 2 diabetes mellitus with stage 5 chronic kidney disease not on chronic dialysis, with long-term current use of insulin Leukocytosis, unspecified type

## 2017-10-12 NOTE — DISCHARGE NOTE ADULT - HOSPITAL COURSE
72 year old woman with PMH of HTN, HLD, DM2, CKD stage 5 (has been refusing dialysis) sent in by PMD for Hb of 5.6    -gastric ulcer on EGD done in may by   - GI /Azra consulted now   -s/p 2 units PRBC transfusion, monitored CBC, stable  - likely combination of GI bleed and CKD.   discussed w/  , who suggested outpt colonoscopy 72 year old woman with PMH of HTN, HLD, DM2, CKD stage 5 (has been refusing dialysis) sent in by PMD for Hb of 5.6    -gastric ulcer on EGD done in may by   - GI /Azra consulted now   -s/p 2 units PRBC transfusion, monitored CBC, stable  - likely combination of GI bleed and CKD.   discussed w/  , who suggested outpt colonoscopy  discussed with pt's daughter jonatan upon pt's request. also spoke to Dr. roberto [phone] upon pt's request who is pt's PCP. Discussed in detail all the above including GI bleed and leukocytosis. He will repeat labs and f/u. no obvious source of infection to explain leukocytosis?? reactive to low h/h? no fever, no cough, no uti s/s ,tolerating diet well. i requested info from  who sent fax about pt's home meds. updated med list in the chart. Also explained to the daughter the same

## 2017-10-12 NOTE — DISCHARGE NOTE ADULT - PATIENT PORTAL LINK FT
“You can access the [facility] Patient Portal, offered by [facility], by registering with the following website: [email or web address]”

## 2017-10-12 NOTE — PROGRESS NOTE ADULT - PROBLEM SELECTOR PROBLEM 3
Type 2 diabetes mellitus with stage 5 chronic kidney disease not on chronic dialysis, with long-term current use of insulin
Type 2 diabetes mellitus with stage 5 chronic kidney disease not on chronic dialysis, with long-term current use of insulin

## 2017-10-12 NOTE — DISCHARGE NOTE ADULT - CARE PLAN
Principal Discharge DX:	Symptomatic anemia  Secondary Diagnosis:	Gastrointestinal hemorrhage, unspecified gastrointestinal hemorrhage type  Secondary Diagnosis:	Elevated CK  Secondary Diagnosis:	Type 2 diabetes mellitus with stage 5 chronic kidney disease not on chronic dialysis, with long-term current use of insulin Principal Discharge DX:	Symptomatic anemia  Goal:	stable, resolve  Instructions for follow-up, activity and diet:	follow up with your PCP  and your nephrologist  Secondary Diagnosis:	Gastrointestinal hemorrhage, unspecified gastrointestinal hemorrhage type  Instructions for follow-up, activity and diet:	follow up with  within 1 week to plan colonoscopy as outpt  Secondary Diagnosis:	Elevated CK  Instructions for follow-up, activity and diet:	follow up with your PCP   Secondary Diagnosis:	Type 2 diabetes mellitus with stage 5 chronic kidney disease not on chronic dialysis, with long-term current use of insulin  Instructions for follow-up, activity and diet:	follow up with your nephrologist DR.Sushil Herrera  Secondary Diagnosis:	Leukocytosis, unspecified type  Instructions for follow-up, activity and diet:	follow up with your PCP  within 3-5 days upon discharge Principal Discharge DX:	Symptomatic anemia  Goal:	stable, resolve  Instructions for follow-up, activity and diet:	follow up with your PCP  and your nephrologist  hold off aspirin until reevaluated by PMD  Secondary Diagnosis:	Gastrointestinal hemorrhage, unspecified gastrointestinal hemorrhage type  Instructions for follow-up, activity and diet:	follow up with  within 1 week to plan colonoscopy as outpt  Secondary Diagnosis:	Elevated CK  Instructions for follow-up, activity and diet:	follow up with your PCP   Secondary Diagnosis:	Type 2 diabetes mellitus with stage 5 chronic kidney disease not on chronic dialysis, with long-term current use of insulin  Instructions for follow-up, activity and diet:	follow up with your nephrologist DR.Sushil Herrera  Secondary Diagnosis:	Leukocytosis, unspecified type  Instructions for follow-up, activity and diet:	follow up with your PCP  within 3-5 days upon discharge

## 2017-10-12 NOTE — PROGRESS NOTE ADULT - PROBLEM SELECTOR PLAN 3
-monitor FS, c/w S/S   - Pt still refusing HD  - Renal consult noted
-monitor FS, c/w S/S   - Pt still refusing HD  - Renal consulted

## 2017-10-13 VITALS
SYSTOLIC BLOOD PRESSURE: 166 MMHG | RESPIRATION RATE: 16 BRPM | DIASTOLIC BLOOD PRESSURE: 73 MMHG | HEART RATE: 86 BPM | OXYGEN SATURATION: 98 % | TEMPERATURE: 99 F

## 2017-10-13 LAB
ANION GAP SERPL CALC-SCNC: 12 MMOL/L — SIGNIFICANT CHANGE UP (ref 5–17)
BUN SERPL-MCNC: 79 MG/DL — HIGH (ref 7–23)
CALCIUM SERPL-MCNC: 8.5 MG/DL — SIGNIFICANT CHANGE UP (ref 8.5–10.1)
CHLORIDE SERPL-SCNC: 101 MMOL/L — SIGNIFICANT CHANGE UP (ref 96–108)
CO2 SERPL-SCNC: 26 MMOL/L — SIGNIFICANT CHANGE UP (ref 22–31)
CREAT SERPL-MCNC: 6.46 MG/DL — HIGH (ref 0.5–1.3)
GLUCOSE BLDC GLUCOMTR-MCNC: 259 MG/DL — HIGH (ref 70–99)
GLUCOSE BLDC GLUCOMTR-MCNC: 339 MG/DL — HIGH (ref 70–99)
GLUCOSE SERPL-MCNC: 245 MG/DL — HIGH (ref 70–99)
HCT VFR BLD CALC: 26 % — LOW (ref 34.5–45)
HGB BLD-MCNC: 8.5 G/DL — LOW (ref 11.5–15.5)
MCHC RBC-ENTMCNC: 27.5 PG — SIGNIFICANT CHANGE UP (ref 27–34)
MCHC RBC-ENTMCNC: 32.8 GM/DL — SIGNIFICANT CHANGE UP (ref 32–36)
MCV RBC AUTO: 83.8 FL — SIGNIFICANT CHANGE UP (ref 80–100)
PLATELET # BLD AUTO: 370 K/UL — SIGNIFICANT CHANGE UP (ref 150–400)
POTASSIUM SERPL-MCNC: 3.7 MMOL/L — SIGNIFICANT CHANGE UP (ref 3.5–5.3)
POTASSIUM SERPL-SCNC: 3.7 MMOL/L — SIGNIFICANT CHANGE UP (ref 3.5–5.3)
RBC # BLD: 3.1 M/UL — LOW (ref 3.8–5.2)
RBC # FLD: 16 % — HIGH (ref 11–15)
SODIUM SERPL-SCNC: 139 MMOL/L — SIGNIFICANT CHANGE UP (ref 135–145)
WBC # BLD: 15.6 K/UL — HIGH (ref 3.8–10.5)
WBC # FLD AUTO: 15.6 K/UL — HIGH (ref 3.8–10.5)

## 2017-10-13 PROCEDURE — 99239 HOSP IP/OBS DSCHRG MGMT >30: CPT

## 2017-10-13 RX ORDER — INSULIN GLARGINE 100 [IU]/ML
15 INJECTION, SOLUTION SUBCUTANEOUS AT BEDTIME
Qty: 0 | Refills: 0 | Status: DISCONTINUED | OUTPATIENT
Start: 2017-10-13 | End: 2017-10-13

## 2017-10-13 RX ORDER — ASPIRIN/CALCIUM CARB/MAGNESIUM 324 MG
1 TABLET ORAL
Qty: 0 | Refills: 0 | COMMUNITY

## 2017-10-13 RX ORDER — CALCITRIOL 0.5 UG/1
5 CAPSULE ORAL
Qty: 0 | Refills: 0 | COMMUNITY

## 2017-10-13 RX ORDER — IRON SUCROSE 20 MG/ML
100 INJECTION, SOLUTION INTRAVENOUS
Qty: 0 | Refills: 0 | Status: DISCONTINUED | OUTPATIENT
Start: 2017-10-13 | End: 2017-10-13

## 2017-10-13 RX ORDER — INFLUENZA VIRUS VACCINE 15; 15; 15; 15 UG/.5ML; UG/.5ML; UG/.5ML; UG/.5ML
0.5 SUSPENSION INTRAMUSCULAR ONCE
Qty: 0 | Refills: 0 | Status: COMPLETED | OUTPATIENT
Start: 2017-10-13 | End: 2017-10-13

## 2017-10-13 RX ORDER — SITAGLIPTIN 50 MG/1
1 TABLET, FILM COATED ORAL
Qty: 0 | Refills: 0 | COMMUNITY

## 2017-10-13 RX ADMIN — VALSARTAN 320 MILLIGRAM(S): 80 TABLET ORAL at 07:03

## 2017-10-13 RX ADMIN — Medication 100 MILLIGRAM(S): at 05:43

## 2017-10-13 RX ADMIN — AMLODIPINE BESYLATE 10 MILLIGRAM(S): 2.5 TABLET ORAL at 05:43

## 2017-10-13 RX ADMIN — IRON SUCROSE 210 MILLIGRAM(S): 20 INJECTION, SOLUTION INTRAVENOUS at 13:41

## 2017-10-13 RX ADMIN — Medication 325 MILLIGRAM(S): at 08:42

## 2017-10-13 RX ADMIN — Medication 80 MILLIGRAM(S): at 07:03

## 2017-10-13 RX ADMIN — Medication 325 MILLIGRAM(S): at 12:45

## 2017-10-13 RX ADMIN — Medication 8: at 13:00

## 2017-10-13 RX ADMIN — PANTOPRAZOLE SODIUM 40 MILLIGRAM(S): 20 TABLET, DELAYED RELEASE ORAL at 08:43

## 2017-10-13 RX ADMIN — TOBRAMYCIN AND DEXAMETHASONE 2 DROP(S): 1; 3 SUSPENSION/ DROPS OPHTHALMIC at 05:43

## 2017-10-13 RX ADMIN — TOBRAMYCIN AND DEXAMETHASONE 2 DROP(S): 1; 3 SUSPENSION/ DROPS OPHTHALMIC at 12:44

## 2017-10-13 RX ADMIN — Medication 0.3 MILLIGRAM(S): at 05:43

## 2017-10-13 RX ADMIN — Medication 6: at 08:41

## 2017-10-13 RX ADMIN — TOBRAMYCIN AND DEXAMETHASONE 2 DROP(S): 1; 3 SUSPENSION/ DROPS OPHTHALMIC at 18:31

## 2017-10-13 RX ADMIN — INFLUENZA VIRUS VACCINE 0.5 MILLILITER(S): 15; 15; 15; 15 SUSPENSION INTRAMUSCULAR at 15:16

## 2017-10-13 RX ADMIN — Medication 100 MILLIGRAM(S): at 18:31

## 2017-10-13 RX ADMIN — Medication 0.3 MILLIGRAM(S): at 18:31

## 2017-10-13 RX ADMIN — Medication 1 TABLET(S): at 12:45

## 2017-10-13 NOTE — PROGRESS NOTE ADULT - PROBLEM SELECTOR PLAN 1
EGD with Dr Hurtado less than 2 weeks ago, with results of Gastric ulcer, path neg for H. Pylori as above  Start PPI and Carafate   Transfuse PRBC as needed  Nephrology F/U.  Advance diet.  Will need Colonoscopy as outpatient when stable
- gastric ulcer on EGD done in may by   - GI   on board now   - s/p 2 units PRBC transfusion, monitor CBC  -Discussed w/  , colonoscopy as outpt  - likely combination of GI bleed and CKD  -discussed w/ pt's daughter Sanchez [phone]  - advance diet as tolerated, d/c planning In AM if stable
PI and Carafate   Transfuse PRBC as needed  Nephrology F/U.  Advance diet.  Will need Colonoscopy as outpatient when stable.
-gastric ulcer on EGD done in may by   - GI /Azra on board now   -s/p 2 units PRBC transfusion, monitor CBC  - likely combination of GI bleed and CKD

## 2017-10-13 NOTE — PROGRESS NOTE ADULT - PROBLEM SELECTOR PROBLEM 1
Symptomatic anemia
Gastrointestinal hemorrhage, unspecified gastrointestinal hemorrhage type
Symptomatic anemia
Gastrointestinal hemorrhage, unspecified gastrointestinal hemorrhage type

## 2017-10-13 NOTE — PATIENT PROFILE ADULT. - NS PRO CONTRA FLU CONTRAIND
Female exam completed with female RN, Mom and child life at bedside.  Will continue to assess.   none

## 2017-10-13 NOTE — PROGRESS NOTE ADULT - SUBJECTIVE AND OBJECTIVE BOX
Gastroenterology  Patient seen and examined bedside resting comfortably.  No complaints offered.   mild  abdominal pain  Denies nausea and vomiting. Tolerating diet.  Normal flatus/BM.     T(F): 98.8 (10-12-17 @ 05:24), Max: 98.8 (10-12-17 @ 05:24)  HR: 82 (10-12-17 @ 05:24) (79 - 85)  BP: 167/67 (10-12-17 @ 05:24) (136/80 - 167/67)  RR: 18 (10-12-17 @ 05:24) (16 - 18)  SpO2: 98% (10-12-17 @ 05:24) (97% - 100%)  Wt(kg): --  CAPILLARY BLOOD GLUCOSE  169 (12 Oct 2017 07:29)      POCT Blood Glucose.: 253 mg/dL (11 Oct 2017 23:27)  POCT Blood Glucose.: 114 mg/dL (11 Oct 2017 11:50)      PHYSICAL EXAM:  General: NAD, WDWN.   Neuro:  Alert & oriented x 3  HEENT: NCAT, EOMI, conjunctiva clear  CV: +S1+S2 regular rate and rhythm  Lung: clear to ausculation bilaterally, respirations nonlabored, good inspiratory effort  Abdomen: soft, OBESE NonTender, No distention Normal active BS  Extremities: no cyanosis, clubbing or edema    LABS:                        8.7    13.0  )-----------( 337      ( 12 Oct 2017 07:33 )             26.3     10-11    141  |  105  |  82<H>  ----------------------------<  194<H>  4.3   |  26  |  6.37<H>    Ca    8.1<L>      11 Oct 2017 08:32    TPro  6.8  /  Alb  2.6<L>  /  TBili  0.2  /  DBili  x   /  AST  20  /  ALT  20  /  AlkPhos  107  10-10    LIVER FUNCTIONS - ( 10 Oct 2017 20:45 )  Alb: 2.6 g/dL / Pro: 6.8 gm/dL / ALK PHOS: 107 U/L / ALT: 20 U/L / AST: 20 U/L / GGT: x           PT/INR - ( 10 Oct 2017 20:45 )   PT: 10.4 sec;   INR: 0.96 ratio         PTT - ( 10 Oct 2017 20:45 )  PTT:33.4 sec  I&O's Detail    11 Oct 2017 07:01  -  12 Oct 2017 07:00  --------------------------------------------------------  IN:    Oral Fluid: 420 mL    sodium chloride 0.9%.: 400 mL  Total IN: 820 mL    OUT:    Voided: 1200 mL  Total OUT: 1200 mL    Total NET: -380 mL        10-11 @ 08:32    141 | 105 | 82  /8.1 | -- | --  _______________________/  4.3 | 26 | 6.37                           \par
Subjective: no SOB. Clear diet resumed.       MEDICATIONS  (STANDING):  amLODIPine   Tablet 10 milliGRAM(s) Oral daily  atorvastatin 20 milliGRAM(s) Oral at bedtime  cloNIDine 0.3 milliGRAM(s) Oral two times a day  dextrose 5%. 1000 milliLiter(s) (50 mL/Hr) IV Continuous <Continuous>  dextrose 50% Injectable 12.5 Gram(s) IV Push once  dextrose 50% Injectable 25 Gram(s) IV Push once  dextrose 50% Injectable 25 Gram(s) IV Push once  doxazosin 2 milliGRAM(s) Oral at bedtime  ferrous    sulfate 325 milliGRAM(s) Oral three times a day with meals  furosemide    Tablet 80 milliGRAM(s) Oral two times a day  hydrALAZINE 100 milliGRAM(s) Oral two times a day  insulin lispro (HumaLOG) corrective regimen sliding scale   SubCutaneous three times a day before meals  insulin lispro (HumaLOG) corrective regimen sliding scale   SubCutaneous at bedtime  multivitamin 1 Tablet(s) Oral daily  pantoprazole    Tablet 40 milliGRAM(s) Oral before breakfast  sodium chloride 0.9%. 1000 milliLiter(s) (40 mL/Hr) IV Continuous <Continuous>  tobramycin/dexamethasone Suspension 2 Drop(s) Both EYES every 6 hours  valsartan 320 milliGRAM(s) Oral daily    MEDICATIONS  (PRN):  dextrose Gel 1 Dose(s) Oral once PRN Blood Glucose LESS THAN 70 milliGRAM(s)/deciliter  glucagon  Injectable 1 milliGRAM(s) IntraMuscular once PRN Glucose LESS THAN 70 milligrams/deciliter          T(C): 37.1 (10-12-17 @ 05:24), Max: 37.1 (10-12-17 @ 05:24)  HR: 82 (10-12-17 @ 05:24) (79 - 85)  BP: 167/67 (10-12-17 @ 05:24) (149/67 - 167/67)  RR: 18 (10-12-17 @ 05:24) (16 - 18)  SpO2: 98% (10-12-17 @ 05:24) (97% - 99%)  Wt(kg): --        I&O's Detail    11 Oct 2017 07:01  -  12 Oct 2017 07:00  --------------------------------------------------------  IN:    Oral Fluid: 420 mL    sodium chloride 0.9%.: 400 mL  Total IN: 820 mL    OUT:    Voided: 1200 mL  Total OUT: 1200 mL    Total NET: -380 mL               PHYSICAL EXAM:    GENERAL: no distress.   EYES: EOMI, PERRLA, conjunctiva and sclera clear  NECK: Supple, no inc in JVP  CHEST/LUNG: Clear  HEART: S1S2  ABDOMEN: Soft, Nontender, Nondistended; Bowel sounds present  EXTREMITIES:  trace edema  NEURO: no asterixis      LABS:  CBC Full  -  ( 12 Oct 2017 07:33 )  WBC Count : 13.0 K/uL  Hemoglobin : 8.7 g/dL  Hematocrit : 26.3 %  Platelet Count - Automated : 337 K/uL  Mean Cell Volume : 84.9 fl  Mean Cell Hemoglobin : 28.0 pg  Mean Cell Hemoglobin Concentration : 33.0 gm/dL  Auto Neutrophil # : x  Auto Lymphocyte # : x  Auto Monocyte # : x  Auto Eosinophil # : x  Auto Basophil # : x  Auto Neutrophil % : x  Auto Lymphocyte % : x  Auto Monocyte % : x  Auto Eosinophil % : x  Auto Basophil % : x    10-12    141  |  103  |  75<H>  ----------------------------<  125<H>  3.9   |  25  |  6.19<H>    Ca    7.9<L>      12 Oct 2017 07:33    TPro  6.8  /  Alb  2.6<L>  /  TBili  0.2  /  DBili  x   /  AST  20  /  ALT  20  /  AlkPhos  107  10-10    PT/INR - ( 10 Oct 2017 20:45 )   PT: 10.4 sec;   INR: 0.96 ratio         PTT - ( 10 Oct 2017 20:45 )  PTT:33.4 sec      Impression:  * CKD 5. Refuses HD  * Anemia of CKD + Fe def, GI blood loss.  Recent EGD: gastric ulcer  Recommendations:  * D/c saline  * Follow Cr on PO lasix  * Follow Fe studies. Consider IV Fe
Gastroenterology  Patient seen and examined bedside resting comfortably.  No complaints offered.   mild  abdominal pain  Denies nausea and vomiting. Tolerating diet.  Normal flatus/BM.     T(F): 97.8 (10-13-17 @ 12:37), Max: 99.8 (10-13-17 @ 05:53)  HR: 76 (10-13-17 @ 12:37) (76 - 87)  BP: 152/58 (10-13-17 @ 12:37) (152/58 - 175/64)  RR: 17 (10-13-17 @ 12:37) (16 - 18)  SpO2: 96% (10-13-17 @ 12:37) (96% - 98%)  Wt(kg): --  CAPILLARY BLOOD GLUCOSE  249 (12 Oct 2017 21:39)      POCT Blood Glucose.: 339 mg/dL (13 Oct 2017 12:42)  POCT Blood Glucose.: 259 mg/dL (13 Oct 2017 08:39)  POCT Blood Glucose.: 267 mg/dL (12 Oct 2017 17:24)      PHYSICAL EXAM:  General: NAD, WDWN.   Neuro:  Alert & oriented x 3  HEENT: NCAT, EOMI, conjunctiva clear  CV: +S1+S2 regular rate and rhythm  Lung: clear to ausculation bilaterally, respirations nonlabored, good inspiratory effort  Abdomen: soft, OBESE NonTender, No distention Normal active BS  Extremities: no cyanosis, clubbing or edema    LABS:                        8.5    15.6  )-----------( 370      ( 13 Oct 2017 07:10 )             26.0     10-13    139  |  101  |  79<H>  ----------------------------<  245<H>  3.7   |  26  |  6.46<H>    Ca    8.5      13 Oct 2017 07:10          I&O's Detail    12 Oct 2017 07:01  -  13 Oct 2017 07:00  --------------------------------------------------------  IN:    Oral Fluid: 900 mL  Total IN: 900 mL    OUT:  Total OUT: 0 mL    Total NET: 900 mL        10-13 @ 07:10    139 | 101 | 79  /8.5 | -- | --  _______________________/  3.7 | 26 | 6.46                           \par
Patient is a 72y old  Female who presents with a chief complaint of sent in by PMD for low h/h (12 Oct 2017 08:29)       OVERNIGHT EVENTS:    MEDICATIONS  (STANDING):  amLODIPine   Tablet 10 milliGRAM(s) Oral daily  atorvastatin 20 milliGRAM(s) Oral at bedtime  cloNIDine 0.3 milliGRAM(s) Oral two times a day  dextrose 5%. 1000 milliLiter(s) (50 mL/Hr) IV Continuous <Continuous>  dextrose 50% Injectable 12.5 Gram(s) IV Push once  dextrose 50% Injectable 25 Gram(s) IV Push once  dextrose 50% Injectable 25 Gram(s) IV Push once  doxazosin 2 milliGRAM(s) Oral at bedtime  ferrous    sulfate 325 milliGRAM(s) Oral three times a day with meals  furosemide    Tablet 80 milliGRAM(s) Oral two times a day  hydrALAZINE 100 milliGRAM(s) Oral two times a day  insulin lispro (HumaLOG) corrective regimen sliding scale   SubCutaneous three times a day before meals  insulin lispro (HumaLOG) corrective regimen sliding scale   SubCutaneous at bedtime  multivitamin 1 Tablet(s) Oral daily  pantoprazole    Tablet 40 milliGRAM(s) Oral before breakfast  tobramycin/dexamethasone Suspension 2 Drop(s) Both EYES every 6 hours  valsartan 320 milliGRAM(s) Oral daily    MEDICATIONS  (PRN):  dextrose Gel 1 Dose(s) Oral once PRN Blood Glucose LESS THAN 70 milliGRAM(s)/deciliter  glucagon  Injectable 1 milliGRAM(s) IntraMuscular once PRN Glucose LESS THAN 70 milligrams/deciliter      Vital Signs Last 24 Hrs  T(C): 36.1 (12 Oct 2017 11:57), Max: 37.1 (12 Oct 2017 05:24)  T(F): 97 (12 Oct 2017 11:57), Max: 98.8 (12 Oct 2017 05:24)  HR: 81 (12 Oct 2017 11:57) (79 - 85)  BP: 157/73 (12 Oct 2017 11:57) (157/73 - 167/67)  BP(mean): --  RR: 18 (12 Oct 2017 11:57) (16 - 18)  SpO2: 98% (12 Oct 2017 11:57) (97% - 99%)     PHYSICAL EXAM:  GENERAL: NAD, well-groomed, well-developed  HEAD:  Atraumatic, Normocephalic  EYES: EOMI, PERRLA, conjunctiva and sclera clear  ENMT: No tonsillar erythema, exudates, or enlargement; Moist mucous membranes   NECK: Supple, No JVD   NERVOUS SYSTEM:  Alert & Oriented X3, Good concentration; Moving all extremities  CHEST/LUNG: Clear to auscultation  bilaterally; No rales, rhonchi, wheezing, or rubs  HEART: Regular rate and rhythm; No murmurs, rubs, or gallops  ABDOMEN: Soft, Nontender, Nondistended; Bowel sounds present  EXTREMITIES:  2+ Peripheral Pulses, No clubbing, cyanosis, or edema  LYMPH: No lymphadenopathy noted  SKIN: No rashes or lesions    LABS:                        8.7    13.0  )-----------( 337      ( 12 Oct 2017 07:33 )             26.3     10-12    141  |  103  |  75<H>  ----------------------------<  125<H>  3.9   |  25  |  6.19<H>    Ca    7.9<L>      12 Oct 2017 07:33    TPro  6.8  /  Alb  2.6<L>  /  TBili  0.2  /  DBili  x   /  AST  20  /  ALT  20  /  AlkPhos  107  10-10    PT/INR - ( 10 Oct 2017 20:45 )   PT: 10.4 sec;   INR: 0.96 ratio         PTT - ( 10 Oct 2017 20:45 )  PTT:33.4 sec   cardiac markers Troponin --        CAPILLARY BLOOD GLUCOSE  169 (12 Oct 2017 07:29)      POCT Blood Glucose.: 238 mg/dL (12 Oct 2017 11:30)  POCT Blood Glucose.: 169 mg/dL (12 Oct 2017 07:29)  POCT Blood Glucose.: 253 mg/dL (11 Oct 2017 23:27)  POCT Blood Glucose.: 150 mg/dL (11 Oct 2017 16:56)    Cultures    RADIOLOGY & ADDITIONAL TESTS:    Imaging Personally Reviewed:  [x ] YES  [ ] NO    Consultant(s) Notes Reviewed:  [x ] YES  [ ] NO    Care Discussed with Consultants/Other Providers [x ] YES  [ ] NO
Patient seen in follow up for CKD 4-5; no distress; no sob or chest pain.    MEDICATIONS  (STANDING):  atorvastatin 20 milliGRAM(s) Oral at bedtime  cloNIDine 0.3 milliGRAM(s) Oral two times a day  dextrose 5%. 1000 milliLiter(s) (50 mL/Hr) IV Continuous <Continuous>  dextrose 50% Injectable 12.5 Gram(s) IV Push once  dextrose 50% Injectable 25 Gram(s) IV Push once  dextrose 50% Injectable 25 Gram(s) IV Push once  doxazosin 2 milliGRAM(s) Oral at bedtime  ferrous    sulfate 325 milliGRAM(s) Oral three times a day with meals  hydrALAZINE 100 milliGRAM(s) Oral two times a day  insulin glargine Injectable (LANTUS) 15 Unit(s) SubCutaneous at bedtime  insulin lispro (HumaLOG) corrective regimen sliding scale   SubCutaneous three times a day before meals  insulin lispro (HumaLOG) corrective regimen sliding scale   SubCutaneous at bedtime  iron sucrose IVPB 100 milliGRAM(s) IV Intermittent <User Schedule>  multivitamin 1 Tablet(s) Oral daily  pantoprazole    Tablet 40 milliGRAM(s) Oral before breakfast  tobramycin/dexamethasone Suspension 2 Drop(s) Both EYES every 6 hours    MEDICATIONS  (PRN):  dextrose Gel 1 Dose(s) Oral once PRN Blood Glucose LESS THAN 70 milliGRAM(s)/deciliter  glucagon  Injectable 1 milliGRAM(s) IntraMuscular once PRN Glucose LESS THAN 70 milligrams/deciliter    PHYSICAL EXAM:      T(C): 36.6 (10-13-17 @ 12:37), Max: 37.7 (10-13-17 @ 05:53)  HR: 76 (10-13-17 @ 12:37) (76 - 87)  BP: 152/58 (10-13-17 @ 12:37) (152/58 - 175/64)  RR: 17 (10-13-17 @ 12:37) (16 - 18)  SpO2: 96% (10-13-17 @ 12:37) (96% - 98%)  Wt(kg): --  Respiratory: clear anteriorly, decreased BS at bases  Cardiovascular: S1 S2  Gastrointestinal: soft NT ND +BS  Extremities:   1 edema                                    8.5    15.6  )-----------( 370      ( 13 Oct 2017 07:10 )             26.0     10-13    139  |  101  |  79<H>  ----------------------------<  245<H>  3.7   |  26  |  6.46<H>    Ca    8.5      13 Oct 2017 07:10            Assessment and Plan:    CKD 4-5, resistant to initiating HD; aware of potential uremic complications and death.  Hold Lasix and ARB for now.  To follow with outpatient nephrologist.  IV Venofer today.
Patient is a 72y old  Female who presents with a chief complaint of sent in by PMD for low h/h (10 Oct 2017 21:44)       OVERNIGHT EVENTS: s/p transfusion    MEDICATIONS  (STANDING):  amLODIPine   Tablet 10 milliGRAM(s) Oral daily  atorvastatin 20 milliGRAM(s) Oral at bedtime  cloNIDine 0.3 milliGRAM(s) Oral two times a day  dextrose 5%. 1000 milliLiter(s) (50 mL/Hr) IV Continuous <Continuous>  dextrose 50% Injectable 12.5 Gram(s) IV Push once  dextrose 50% Injectable 25 Gram(s) IV Push once  dextrose 50% Injectable 25 Gram(s) IV Push once  doxazosin 2 milliGRAM(s) Oral at bedtime  ferrous    sulfate 325 milliGRAM(s) Oral three times a day with meals  furosemide    Tablet 80 milliGRAM(s) Oral two times a day  hydrALAZINE 100 milliGRAM(s) Oral two times a day  insulin lispro (HumaLOG) corrective regimen sliding scale   SubCutaneous three times a day before meals  insulin lispro (HumaLOG) corrective regimen sliding scale   SubCutaneous at bedtime  multivitamin 1 Tablet(s) Oral daily  pantoprazole    Tablet 40 milliGRAM(s) Oral before breakfast  sodium chloride 0.9%. 1000 milliLiter(s) (40 mL/Hr) IV Continuous <Continuous>  tobramycin/dexamethasone Suspension 2 Drop(s) Both EYES every 6 hours  valsartan 320 milliGRAM(s) Oral daily    MEDICATIONS  (PRN):  dextrose Gel 1 Dose(s) Oral once PRN Blood Glucose LESS THAN 70 milliGRAM(s)/deciliter  glucagon  Injectable 1 milliGRAM(s) IntraMuscular once PRN Glucose LESS THAN 70 milligrams/deciliter       Vital Signs Last 24 Hrs  T(C): 36.7 (11 Oct 2017 11:50), Max: 37.1 (10 Oct 2017 19:32)  T(F): 98 (11 Oct 2017 11:50), Max: 98.8 (10 Oct 2017 19:32)  HR: 79 (11 Oct 2017 11:50) (69 - 90)  BP: 149/67 (11 Oct 2017 11:50) (136/80 - 174/69)  BP(mean): --  RR: 17 (11 Oct 2017 11:50) (16 - 19)  SpO2: 97% (11 Oct 2017 11:50) (97% - 100%)    PHYSICAL EXAM:  GENERAL: NAD, well-groomed, well-developed  HEAD:  Atraumatic, Normocephalic  EYES: EOMI, PERRLA, conjunctiva and sclera clear  ENMT: No tonsillar erythema, exudates, or enlargement; Moist mucous membranes   NECK: Supple, No JVD   NERVOUS SYSTEM:  Alert & Oriented X3, Good concentration; Moving all extremities  CHEST/LUNG: Clear to auscultation  bilaterally; No rales, rhonchi, wheezing, or rubs  HEART: Regular rate and rhythm; No murmurs, rubs, or gallops  ABDOMEN: Soft, Nontender, Nondistended; Bowel sounds present  EXTREMITIES:  2+ Peripheral Pulses, No clubbing, cyanosis, or edema  LYMPH: No lymphadenopathy noted  SKIN: No rashes or lesions    LABS:                        7.3    12.6  )-----------( 370      ( 11 Oct 2017 08:32 )             22.0     10-11    141  |  105  |  82<H>  ----------------------------<  194<H>  4.3   |  26  |  6.37<H>    Ca    8.1<L>      11 Oct 2017 08:32    TPro  6.8  /  Alb  2.6<L>  /  TBili  0.2  /  DBili  x   /  AST  20  /  ALT  20  /  AlkPhos  107  10-10    PT/INR - ( 10 Oct 2017 20:45 )   PT: 10.4 sec;   INR: 0.96 ratio         PTT - ( 10 Oct 2017 20:45 )  PTT:33.4 sec   cardiac markers Troponin <.015        CAPILLARY BLOOD GLUCOSE      POCT Blood Glucose.: 114 mg/dL (11 Oct 2017 11:50)  POCT Blood Glucose.: 214 mg/dL (11 Oct 2017 07:32)  POCT Blood Glucose.: 125 mg/dL (11 Oct 2017 00:46)    Cultures    RADIOLOGY & ADDITIONAL TESTS:    Imaging Personally Reviewed:  [ x] YES  [ ] NO    Consultant(s) Notes Reviewed:  [ x] YES  [ ] NO    Care Discussed with Consultants/Other Providers [x ] YES  [ ] NO

## 2017-10-13 NOTE — PROGRESS NOTE ADULT - ASSESSMENT
Symptomatic Anemia, Upper GI Bleed and Chronic Disease
72 year old woman with PMH of HTN, HLD, DM2, CKD stage 5 (has been refusing dialysis) sent in by PMD for Hb of 5.6
Symptomatic Anemia, Upper GI Bleed and Chronic Disease, HGB stable
72 year old woman with PMH of HTN, HLD, DM2, CKD stage 5 (has been refusing dialysis) sent in by PMD for Hb of 5.6

## 2017-10-17 DIAGNOSIS — D72.829 ELEVATED WHITE BLOOD CELL COUNT, UNSPECIFIED: ICD-10-CM

## 2017-10-17 DIAGNOSIS — I50.9 HEART FAILURE, UNSPECIFIED: ICD-10-CM

## 2017-10-17 DIAGNOSIS — K92.2 GASTROINTESTINAL HEMORRHAGE, UNSPECIFIED: ICD-10-CM

## 2017-10-17 DIAGNOSIS — E11.22 TYPE 2 DIABETES MELLITUS WITH DIABETIC CHRONIC KIDNEY DISEASE: ICD-10-CM

## 2017-10-17 DIAGNOSIS — K59.00 CONSTIPATION, UNSPECIFIED: ICD-10-CM

## 2017-10-17 DIAGNOSIS — Z91.15 PATIENT'S NONCOMPLIANCE WITH RENAL DIALYSIS: ICD-10-CM

## 2017-10-17 DIAGNOSIS — Z79.4 LONG TERM (CURRENT) USE OF INSULIN: ICD-10-CM

## 2017-10-17 DIAGNOSIS — K29.70 GASTRITIS, UNSPECIFIED, WITHOUT BLEEDING: ICD-10-CM

## 2017-10-17 DIAGNOSIS — K21.9 GASTRO-ESOPHAGEAL REFLUX DISEASE WITHOUT ESOPHAGITIS: ICD-10-CM

## 2017-10-17 DIAGNOSIS — N18.5 CHRONIC KIDNEY DISEASE, STAGE 5: ICD-10-CM

## 2017-10-17 DIAGNOSIS — I13.2 HYPERTENSIVE HEART AND CHRONIC KIDNEY DISEASE WITH HEART FAILURE AND WITH STAGE 5 CHRONIC KIDNEY DISEASE, OR END STAGE RENAL DISEASE: ICD-10-CM

## 2017-10-17 DIAGNOSIS — D63.1 ANEMIA IN CHRONIC KIDNEY DISEASE: ICD-10-CM

## 2017-10-17 DIAGNOSIS — E78.5 HYPERLIPIDEMIA, UNSPECIFIED: ICD-10-CM

## 2017-10-24 LAB — GLUCOSE BLDC GLUCOMTR-MCNC: 249 MG/DL — HIGH (ref 70–99)

## 2017-10-25 ENCOUNTER — APPOINTMENT (OUTPATIENT)
Dept: INTERNAL MEDICINE | Facility: CLINIC | Age: 72
End: 2017-10-25
Payer: MEDICARE

## 2017-10-25 VITALS
HEIGHT: 62 IN | RESPIRATION RATE: 18 BRPM | SYSTOLIC BLOOD PRESSURE: 205 MMHG | OXYGEN SATURATION: 100 % | WEIGHT: 156 LBS | BODY MASS INDEX: 28.71 KG/M2 | HEART RATE: 82 BPM | TEMPERATURE: 97.9 F | DIASTOLIC BLOOD PRESSURE: 81 MMHG

## 2017-10-25 DIAGNOSIS — K59.00 CONSTIPATION, UNSPECIFIED: ICD-10-CM

## 2017-10-25 DIAGNOSIS — Z86.39 PERSONAL HISTORY OF OTHER ENDOCRINE, NUTRITIONAL AND METABOLIC DISEASE: ICD-10-CM

## 2017-10-25 DIAGNOSIS — N18.5 CHRONIC KIDNEY DISEASE, STAGE 5: ICD-10-CM

## 2017-10-25 PROCEDURE — 99214 OFFICE O/P EST MOD 30 MIN: CPT

## 2017-10-25 RX ORDER — ASPIRIN 81 MG/1
81 TABLET ORAL
Refills: 0 | Status: DISCONTINUED | COMMUNITY
End: 2017-10-25

## 2017-10-25 RX ORDER — FUROSEMIDE 40 MG/1
40 TABLET ORAL
Refills: 0 | Status: DISCONTINUED | COMMUNITY
End: 2017-10-25

## 2017-10-25 RX ORDER — SITAGLIPTIN 50 MG/1
50 TABLET, FILM COATED ORAL
Qty: 90 | Refills: 1 | Status: DISCONTINUED | COMMUNITY
End: 2017-10-25

## 2017-10-28 PROBLEM — N18.5 STAGE 5 CHRONIC KIDNEY DISEASE NOT ON CHRONIC DIALYSIS: Status: RESOLVED | Noted: 2017-10-28 | Resolved: 2017-10-28

## 2017-10-28 PROBLEM — Z86.39 HISTORY OF HYPERLIPIDEMIA: Status: RESOLVED | Noted: 2017-10-28 | Resolved: 2017-10-28

## 2017-10-28 PROBLEM — K59.00 CONSTIPATION: Status: ACTIVE | Noted: 2017-10-25

## 2017-11-21 ENCOUNTER — APPOINTMENT (OUTPATIENT)
Dept: GASTROENTEROLOGY | Facility: CLINIC | Age: 72
End: 2017-11-21
Payer: MEDICARE

## 2017-11-21 PROCEDURE — 99205 OFFICE O/P NEW HI 60 MIN: CPT

## 2017-11-21 PROCEDURE — 82274 ASSAY TEST FOR BLOOD FECAL: CPT | Mod: QW

## 2017-11-22 ENCOUNTER — MEDICATION RENEWAL (OUTPATIENT)
Age: 72
End: 2017-11-22

## 2017-11-29 PROBLEM — Z78.9 SOCIAL ALCOHOL USE: Status: ACTIVE | Noted: 2017-11-29

## 2017-11-29 PROBLEM — Z82.49 FAMILY HISTORY OF HTN (HYPERTENSION): Status: ACTIVE | Noted: 2017-11-29

## 2017-11-29 PROBLEM — Z84.2 FAMILY HISTORY OF PROSTATE PROBLEMS: Status: ACTIVE | Noted: 2017-11-29

## 2017-11-29 PROBLEM — R63.4 WEIGHT LOSS: Status: ACTIVE | Noted: 2017-11-29

## 2017-11-29 PROBLEM — Z98.890 H/O ENDOSCOPY: Status: RESOLVED | Noted: 2017-11-29 | Resolved: 2017-11-29

## 2017-11-29 PROBLEM — Z98.890 HISTORY OF COLONOSCOPY: Status: RESOLVED | Noted: 2017-11-29 | Resolved: 2017-11-29

## 2017-12-01 ENCOUNTER — OUTPATIENT (OUTPATIENT)
Dept: OUTPATIENT SERVICES | Facility: HOSPITAL | Age: 72
LOS: 1 days | End: 2017-12-01
Payer: MEDICARE

## 2017-12-01 VITALS
TEMPERATURE: 98 F | RESPIRATION RATE: 16 BRPM | SYSTOLIC BLOOD PRESSURE: 150 MMHG | WEIGHT: 147.05 LBS | OXYGEN SATURATION: 99 % | DIASTOLIC BLOOD PRESSURE: 70 MMHG | HEART RATE: 90 BPM | HEIGHT: 61 IN

## 2017-12-01 DIAGNOSIS — D50.9 IRON DEFICIENCY ANEMIA, UNSPECIFIED: ICD-10-CM

## 2017-12-01 DIAGNOSIS — R19.5 OTHER FECAL ABNORMALITIES: ICD-10-CM

## 2017-12-01 DIAGNOSIS — G47.33 OBSTRUCTIVE SLEEP APNEA (ADULT) (PEDIATRIC): ICD-10-CM

## 2017-12-01 DIAGNOSIS — E11.9 TYPE 2 DIABETES MELLITUS WITHOUT COMPLICATIONS: ICD-10-CM

## 2017-12-01 DIAGNOSIS — Z98.890 OTHER SPECIFIED POSTPROCEDURAL STATES: Chronic | ICD-10-CM

## 2017-12-01 DIAGNOSIS — R76.11 NONSPECIFIC REACTION TO TUBERCULIN SKIN TEST WITHOUT ACTIVE TUBERCULOSIS: ICD-10-CM

## 2017-12-01 LAB
ALBUMIN SERPL ELPH-MCNC: 3.6 G/DL — SIGNIFICANT CHANGE UP (ref 3.3–5)
ALP SERPL-CCNC: 85 U/L — SIGNIFICANT CHANGE UP (ref 40–120)
ALT FLD-CCNC: 11 U/L — SIGNIFICANT CHANGE UP (ref 4–33)
AST SERPL-CCNC: 15 U/L — SIGNIFICANT CHANGE UP (ref 4–32)
BILIRUB SERPL-MCNC: 0.3 MG/DL — SIGNIFICANT CHANGE UP (ref 0.2–1.2)
BUN SERPL-MCNC: 95 MG/DL — HIGH (ref 7–23)
CALCIUM SERPL-MCNC: 10.6 MG/DL — HIGH (ref 8.4–10.5)
CHLORIDE SERPL-SCNC: 96 MMOL/L — LOW (ref 98–107)
CO2 SERPL-SCNC: 23 MMOL/L — SIGNIFICANT CHANGE UP (ref 22–31)
CREAT SERPL-MCNC: 6.91 MG/DL — HIGH (ref 0.5–1.3)
GLUCOSE SERPL-MCNC: 299 MG/DL — HIGH (ref 70–99)
HBA1C BLD-MCNC: 9.1 % — HIGH (ref 4–5.6)
HCT VFR BLD CALC: 33.2 % — LOW (ref 34.5–45)
HGB BLD-MCNC: 10 G/DL — LOW (ref 11.5–15.5)
MCHC RBC-ENTMCNC: 25.6 PG — LOW (ref 27–34)
MCHC RBC-ENTMCNC: 30.1 % — LOW (ref 32–36)
MCV RBC AUTO: 85.1 FL — SIGNIFICANT CHANGE UP (ref 80–100)
NRBC # FLD: 0 — SIGNIFICANT CHANGE UP
PLATELET # BLD AUTO: 445 K/UL — HIGH (ref 150–400)
PMV BLD: 10.4 FL — SIGNIFICANT CHANGE UP (ref 7–13)
POTASSIUM SERPL-MCNC: 3.8 MMOL/L — SIGNIFICANT CHANGE UP (ref 3.5–5.3)
POTASSIUM SERPL-SCNC: 3.8 MMOL/L — SIGNIFICANT CHANGE UP (ref 3.5–5.3)
PROT SERPL-MCNC: 6.9 G/DL — SIGNIFICANT CHANGE UP (ref 6–8.3)
RBC # BLD: 3.9 M/UL — SIGNIFICANT CHANGE UP (ref 3.8–5.2)
RBC # FLD: 19.8 % — HIGH (ref 10.3–14.5)
SODIUM SERPL-SCNC: 138 MMOL/L — SIGNIFICANT CHANGE UP (ref 135–145)
WBC # BLD: 8.87 K/UL — SIGNIFICANT CHANGE UP (ref 3.8–10.5)
WBC # FLD AUTO: 8.87 K/UL — SIGNIFICANT CHANGE UP (ref 3.8–10.5)

## 2017-12-01 PROCEDURE — 93010 ELECTROCARDIOGRAM REPORT: CPT

## 2017-12-01 NOTE — H&P PST ADULT - NSANTHOSAYNRD_GEN_A_CORE
No. KIM screening performed.  STOP BANG Legend: 0-2 = LOW Risk; 3-4 = INTERMEDIATE Risk; 5-8 = HIGH Risk

## 2017-12-01 NOTE — H&P PST ADULT - PROBLEM SELECTOR PLAN 3
[place], HGBA1C done 12/01/17    regarding lantus ; pt instructed to s/w Dr Reinoso regarding instructions for day prior to procedure and dos     FS dos

## 2017-12-01 NOTE — H&P PST ADULT - RS GEN PE MLT RESP DETAILS PC
airway patent/breath sounds equal/respirations non-labored/clear to auscultation bilaterally/no wheezes/good air movement

## 2017-12-01 NOTE — H&P PST ADULT - PMH
Anemia    Chronic kidney disease    Congestive heart failure, unspecified congestive heart failure chronicity, unspecified congestive heart failure type    Diabetes  x 15 years ; FS  Diabetic neuropathy    GERD (gastroesophageal reflux disease)    Heart murmur    Hypercholesteremia    Hypertension Anemia    Chronic kidney disease    Congestive heart failure, unspecified congestive heart failure chronicity, unspecified congestive heart failure type  last 5/17  Diabetes  x 15 years ; FS  Diabetic neuropathy    GERD (gastroesophageal reflux disease)    Heart murmur    Hypercholesteremia    Hypertension Anemia    Chronic kidney disease    Congestive heart failure, unspecified congestive heart failure chronicity, unspecified congestive heart failure type  last 5/17  Diabetes  x 15 years ; FS  Diabetic neuropathy    GERD (gastroesophageal reflux disease)    Heart murmur    Hypercholesteremia    Hypertension    PPD positive  son reports h/o BCG; CXR 10/10/17

## 2017-12-01 NOTE — H&P PST ADULT - PROBLEM SELECTOR PLAN 1
Colonoscopy EGD anesthesia     Pre op instructions given to pt pt appears to have a good understanding of pre op instructions    Pt to Dr Reinoso for pre op m/c and ekg comparison [ closed at this time]

## 2017-12-01 NOTE — H&P PST ADULT - HISTORY OF PRESENT ILLNESS
Pt  is a 72 y.o. female ; pt in PST accompanied by son Puja López; pt and son report recent n/v;  recent hospitalization for anemia ; pt states a w/u was done " there is blood in my stool ; pt now presents for Colonoscopy Anes, Endoscopy Anes    Pt reports decreased in appetite ; pt eports 30 lb weight loss " few months"    Pt is a poor historian

## 2017-12-05 ENCOUNTER — APPOINTMENT (OUTPATIENT)
Dept: GASTROENTEROLOGY | Facility: HOSPITAL | Age: 72
End: 2017-12-05

## 2017-12-05 DIAGNOSIS — Z82.49 FAMILY HISTORY OF ISCHEMIC HEART DISEASE AND OTHER DISEASES OF THE CIRCULATORY SYSTEM: ICD-10-CM

## 2017-12-05 DIAGNOSIS — Z98.890 OTHER SPECIFIED POSTPROCEDURAL STATES: ICD-10-CM

## 2017-12-05 DIAGNOSIS — R63.4 ABNORMAL WEIGHT LOSS: ICD-10-CM

## 2017-12-05 DIAGNOSIS — Z78.9 OTHER SPECIFIED HEALTH STATUS: ICD-10-CM

## 2017-12-05 DIAGNOSIS — Z84.2 FAMILY HISTORY OF OTHER DISEASES OF THE GENITOURINARY SYSTEM: ICD-10-CM

## 2018-01-08 ENCOUNTER — RX RENEWAL (OUTPATIENT)
Age: 73
End: 2018-01-08

## 2018-01-24 ENCOUNTER — APPOINTMENT (OUTPATIENT)
Dept: INTERNAL MEDICINE | Facility: CLINIC | Age: 73
End: 2018-01-24
Payer: MEDICARE

## 2018-01-24 VITALS
OXYGEN SATURATION: 98 % | HEIGHT: 62 IN | SYSTOLIC BLOOD PRESSURE: 174 MMHG | BODY MASS INDEX: 27.79 KG/M2 | HEART RATE: 76 BPM | RESPIRATION RATE: 18 BRPM | WEIGHT: 151 LBS | DIASTOLIC BLOOD PRESSURE: 80 MMHG | TEMPERATURE: 98.1 F

## 2018-01-24 DIAGNOSIS — Z01.818 ENCOUNTER FOR OTHER PREPROCEDURAL EXAMINATION: ICD-10-CM

## 2018-01-24 PROCEDURE — 99214 OFFICE O/P EST MOD 30 MIN: CPT

## 2018-01-24 RX ORDER — METRONIDAZOLE 500 MG/1
500 TABLET ORAL
Qty: 6 | Refills: 0 | Status: COMPLETED | COMMUNITY
Start: 2017-09-02

## 2018-01-24 RX ORDER — CALCIUM ACETATE 667 MG
667 TABLET ORAL
Qty: 180 | Refills: 0 | Status: COMPLETED | COMMUNITY
Start: 2018-01-08

## 2018-01-24 RX ORDER — HYDROXYZINE HYDROCHLORIDE 10 MG/1
10 TABLET ORAL
Qty: 90 | Refills: 0 | Status: COMPLETED | COMMUNITY
Start: 2017-12-14

## 2018-01-24 RX ORDER — CIPROFLOXACIN HYDROCHLORIDE 500 MG/1
500 TABLET, FILM COATED ORAL
Qty: 4 | Refills: 0 | Status: COMPLETED | COMMUNITY
Start: 2017-09-02

## 2018-01-24 RX ORDER — ERYTHROPOIETIN 20000 [IU]/ML
20000 INJECTION, SOLUTION INTRAVENOUS; SUBCUTANEOUS
Qty: 4 | Refills: 0 | Status: ACTIVE | COMMUNITY
Start: 2018-01-15

## 2018-01-24 RX ORDER — POLYETHYLENE GLYCOL 3350, SODIUM CHLORIDE, SODIUM BICARBONATE AND POTASSIUM CHLORIDE WITH LEMON FLAVOR 420; 11.2; 5.72; 1.48 G/4L; G/4L; G/4L; G/4L
420 POWDER, FOR SOLUTION ORAL
Qty: 4000 | Refills: 0 | Status: COMPLETED | COMMUNITY
Start: 2017-12-03

## 2018-01-25 ENCOUNTER — APPOINTMENT (OUTPATIENT)
Dept: CARDIOLOGY | Facility: CLINIC | Age: 73
End: 2018-01-25
Payer: MEDICARE

## 2018-01-25 ENCOUNTER — NON-APPOINTMENT (OUTPATIENT)
Age: 73
End: 2018-01-25

## 2018-01-25 VITALS
OXYGEN SATURATION: 95 % | DIASTOLIC BLOOD PRESSURE: 70 MMHG | BODY MASS INDEX: 27.23 KG/M2 | HEART RATE: 85 BPM | SYSTOLIC BLOOD PRESSURE: 147 MMHG | WEIGHT: 148 LBS | HEIGHT: 62 IN | RESPIRATION RATE: 17 BRPM

## 2018-01-25 DIAGNOSIS — R01.1 CARDIAC MURMUR, UNSPECIFIED: ICD-10-CM

## 2018-01-25 DIAGNOSIS — R94.31 ABNORMAL ELECTROCARDIOGRAM [ECG] [EKG]: ICD-10-CM

## 2018-01-25 PROCEDURE — 93306 TTE W/DOPPLER COMPLETE: CPT

## 2018-01-25 PROCEDURE — 93000 ELECTROCARDIOGRAM COMPLETE: CPT

## 2018-01-25 PROCEDURE — 99215 OFFICE O/P EST HI 40 MIN: CPT

## 2018-01-31 NOTE — PROGRESS NOTE ADULT - NSHPATTENDINGPLANDISCUSS_GEN_ALL_CORE
RN, pt Melolabial Transposition Flap Text: The defect edges were debeveled with a #15 scalpel blade.  Given the location of the defect and the proximity to free margins a melolabial flap was deemed most appropriate.  Using a sterile surgical marker, an appropriate melolabial transposition flap was drawn incorporating the defect.    The area thus outlined was incised deep to adipose tissue with a #15 scalpel blade.  The skin margins were undermined to an appropriate distance in all directions utilizing iris scissors.

## 2018-02-05 NOTE — PATIENT PROFILE ADULT. - NS PRO MODE OF ARRIVAL
Subjective   History of Present Illness  Patient presents with cough for 6 days and complains of left ear pain and throat pain.  She's not sure if she's had fevers.  She has had some body aches and chills.  She does smoke.  She states that she has had to use an inhaler in the past.  Nuys being short of breath.  She denies chest pain.  His nausea vomiting or diarrhea.  Review of Systems   All other systems reviewed and are negative.      Past Medical History:   Diagnosis Date   • Asthma        No Known Allergies    History reviewed. No pertinent surgical history.    Family History   Problem Relation Age of Onset   • Kidney disease Father    • Heart disease Father    • Lung disease Mother    • Seizures Mother    • Autism spectrum disorder Son    • Lung disease Paternal Grandmother    • Heart disease Paternal Grandmother    • Heart disease Maternal Grandmother    • Lung disease Maternal Grandmother    • Lung disease Maternal Grandfather    • Heart disease Maternal Grandfather        Social History     Social History   • Marital status:      Spouse name: N/A   • Number of children: N/A   • Years of education: N/A     Social History Main Topics   • Smoking status: Current Every Day Smoker     Packs/day: 0.50     Types: Cigarettes   • Smokeless tobacco: Never Used   • Alcohol use No   • Drug use: No      Comment: STOPPED WHEN FOUND OUT PREGNANT   • Sexual activity: Yes     Partners: Female, Male     Other Topics Concern   • None     Social History Narrative           Objective   Physical Exam   Constitutional: She appears well-developed and well-nourished.   HENT:   Head: Normocephalic and atraumatic.   Nose: Nose normal.   TMs are clear bilaterally.  Posterior pharynx is slightly erythematous with clear drainage.   Eyes: EOM are normal. Pupils are equal, round, and reactive to light.   Neck: Normal range of motion. Neck supple.   Cardiovascular: Normal rate, regular rhythm, normal heart sounds and intact distal  pulses.    Pulmonary/Chest: Effort normal. She has wheezes.   Scattered end expiratory wheezing.   Abdominal: Soft.   Musculoskeletal: Normal range of motion.   Lymphadenopathy:     She has no cervical adenopathy.   Neurological: She is alert.   Skin: Skin is warm and dry.   Psychiatric: She has a normal mood and affect. Her behavior is normal. Judgment and thought content normal.   Nursing note and vitals reviewed.      Procedures         ED Course  ED Course   Comment By Time   Strep and influenza are negative. LIVAN Rios 02/05 0018                  [facility]    Final diagnoses:   Bronchitis            LIVAN Rios  02/05/18 0018     ambulatory

## 2018-03-08 ENCOUNTER — INPATIENT (INPATIENT)
Facility: HOSPITAL | Age: 73
LOS: 7 days | Discharge: HOME HEALTH | End: 2018-03-16
Attending: HOSPITALIST | Admitting: HOSPITALIST
Payer: MEDICARE

## 2018-03-08 VITALS
DIASTOLIC BLOOD PRESSURE: 77 MMHG | HEART RATE: 96 BPM | TEMPERATURE: 98 F | WEIGHT: 160.06 LBS | HEIGHT: 62 IN | RESPIRATION RATE: 27 BRPM | SYSTOLIC BLOOD PRESSURE: 181 MMHG | OXYGEN SATURATION: 88 %

## 2018-03-08 DIAGNOSIS — J15.6 PNEUMONIA DUE TO OTHER GRAM-NEGATIVE BACTERIA: ICD-10-CM

## 2018-03-08 DIAGNOSIS — N18.5 CHRONIC KIDNEY DISEASE, STAGE 5: ICD-10-CM

## 2018-03-08 DIAGNOSIS — D64.9 ANEMIA, UNSPECIFIED: ICD-10-CM

## 2018-03-08 DIAGNOSIS — Z98.890 OTHER SPECIFIED POSTPROCEDURAL STATES: Chronic | ICD-10-CM

## 2018-03-08 DIAGNOSIS — E11.22 TYPE 2 DIABETES MELLITUS WITH DIABETIC CHRONIC KIDNEY DISEASE: ICD-10-CM

## 2018-03-08 LAB
ALBUMIN SERPL ELPH-MCNC: 2.6 G/DL — LOW (ref 3.3–5)
ALP SERPL-CCNC: 237 U/L — HIGH (ref 40–120)
ALT FLD-CCNC: 60 U/L — SIGNIFICANT CHANGE UP (ref 12–78)
ANION GAP SERPL CALC-SCNC: 12 MMOL/L — SIGNIFICANT CHANGE UP (ref 5–17)
ANISOCYTOSIS BLD QL: SLIGHT — SIGNIFICANT CHANGE UP
APPEARANCE UR: CLEAR — SIGNIFICANT CHANGE UP
AST SERPL-CCNC: 56 U/L — HIGH (ref 15–37)
BACTERIA # UR AUTO: ABNORMAL
BASOPHILS NFR BLD AUTO: 0 % — SIGNIFICANT CHANGE UP (ref 0–2)
BILIRUB SERPL-MCNC: 0.4 MG/DL — SIGNIFICANT CHANGE UP (ref 0.2–1.2)
BILIRUB UR-MCNC: NEGATIVE — SIGNIFICANT CHANGE UP
BUN SERPL-MCNC: 91 MG/DL — HIGH (ref 7–23)
CALCIUM SERPL-MCNC: 7.9 MG/DL — LOW (ref 8.5–10.1)
CHLORIDE SERPL-SCNC: 104 MMOL/L — SIGNIFICANT CHANGE UP (ref 96–108)
CK MB CFR SERPL CALC: 4.4 NG/ML — HIGH (ref 0.5–3.6)
CO2 SERPL-SCNC: 25 MMOL/L — SIGNIFICANT CHANGE UP (ref 22–31)
COLOR SPEC: YELLOW — SIGNIFICANT CHANGE UP
COMMENT - URINE: SIGNIFICANT CHANGE UP
CREAT SERPL-MCNC: 5.9 MG/DL — HIGH (ref 0.5–1.3)
DIFF PNL FLD: ABNORMAL
DIR ANTIGLOB POLYSPECIFIC INTERPRETATION: SIGNIFICANT CHANGE UP
EOSINOPHIL NFR BLD AUTO: 0 % — SIGNIFICANT CHANGE UP (ref 0–6)
GLUCOSE SERPL-MCNC: 380 MG/DL — HIGH (ref 70–99)
GLUCOSE UR QL: 1000 MG/DL
HCT VFR BLD CALC: 17.4 % — CRITICAL LOW (ref 34.5–45)
HGB BLD-MCNC: 5.4 G/DL — CRITICAL LOW (ref 11.5–15.5)
HYPOCHROMIA BLD QL: SIGNIFICANT CHANGE UP
KETONES UR-MCNC: NEGATIVE — SIGNIFICANT CHANGE UP
LACTATE SERPL-SCNC: 0.8 MMOL/L — SIGNIFICANT CHANGE UP (ref 0.7–2)
LEUKOCYTE ESTERASE UR-ACNC: NEGATIVE — SIGNIFICANT CHANGE UP
LG PLATELETS BLD QL AUTO: SLIGHT — SIGNIFICANT CHANGE UP
LIDOCAIN IGE QN: 701 U/L — HIGH (ref 73–393)
LYMPHOCYTES # BLD AUTO: 1.27 K/UL — SIGNIFICANT CHANGE UP (ref 1–3.3)
LYMPHOCYTES # BLD AUTO: 7 % — LOW (ref 13–44)
MANUAL SMEAR VERIFICATION: SIGNIFICANT CHANGE UP
MCHC RBC-ENTMCNC: 24.3 PG — LOW (ref 27–34)
MCHC RBC-ENTMCNC: 31 GM/DL — LOW (ref 32–36)
MCV RBC AUTO: 78.4 FL — LOW (ref 80–100)
MONOCYTES NFR BLD AUTO: 1 % — LOW (ref 2–14)
NEUTROPHILS # BLD AUTO: 16.73 K/UL — HIGH (ref 1.8–7.4)
NEUTROPHILS NFR BLD AUTO: 92 % — HIGH (ref 43–77)
NITRITE UR-MCNC: NEGATIVE — SIGNIFICANT CHANGE UP
NRBC # BLD: 0 /100 — SIGNIFICANT CHANGE UP (ref 0–0)
NRBC # BLD: SIGNIFICANT CHANGE UP /100 WBCS (ref 0–0)
NT-PROBNP SERPL-SCNC: HIGH PG/ML (ref 0–125)
NT-PROBNP SERPL-SCNC: HIGH PG/ML (ref 0–125)
OB PNL STL: NEGATIVE — SIGNIFICANT CHANGE UP
PH UR: 6 — SIGNIFICANT CHANGE UP (ref 5–8)
PLAT MORPH BLD: NORMAL — SIGNIFICANT CHANGE UP
PLATELET # BLD AUTO: 468 K/UL — HIGH (ref 150–400)
POLYCHROMASIA BLD QL SMEAR: SLIGHT — SIGNIFICANT CHANGE UP
POTASSIUM SERPL-MCNC: 5 MMOL/L — SIGNIFICANT CHANGE UP (ref 3.5–5.3)
POTASSIUM SERPL-SCNC: 5 MMOL/L — SIGNIFICANT CHANGE UP (ref 3.5–5.3)
PROT SERPL-MCNC: 6.8 GM/DL — SIGNIFICANT CHANGE UP (ref 6–8.3)
PROT UR-MCNC: 500 MG/DL
RBC # BLD: 2.22 M/UL — LOW (ref 3.8–5.2)
RBC # FLD: 20 % — HIGH (ref 10.3–14.5)
RBC BLD AUTO: ABNORMAL
RBC CASTS # UR COMP ASSIST: SIGNIFICANT CHANGE UP /HPF (ref 0–4)
ROULEAUX BLD QL SMEAR: PRESENT
SODIUM SERPL-SCNC: 141 MMOL/L — SIGNIFICANT CHANGE UP (ref 135–145)
SP GR SPEC: 1.01 — SIGNIFICANT CHANGE UP (ref 1.01–1.02)
TROPONIN I SERPL-MCNC: <.015 NG/ML — SIGNIFICANT CHANGE UP (ref 0.01–0.04)
UROBILINOGEN FLD QL: NEGATIVE MG/DL — SIGNIFICANT CHANGE UP
WBC # BLD: 18.19 K/UL — HIGH (ref 3.8–10.5)
WBC # FLD AUTO: 18.19 K/UL — HIGH (ref 3.8–10.5)
WBC UR QL: SIGNIFICANT CHANGE UP

## 2018-03-08 PROCEDURE — 71045 X-RAY EXAM CHEST 1 VIEW: CPT | Mod: 26

## 2018-03-08 PROCEDURE — 93010 ELECTROCARDIOGRAM REPORT: CPT

## 2018-03-08 PROCEDURE — 99223 1ST HOSP IP/OBS HIGH 75: CPT

## 2018-03-08 PROCEDURE — 86077 PHYS BLOOD BANK SERV XMATCH: CPT

## 2018-03-08 PROCEDURE — 99285 EMERGENCY DEPT VISIT HI MDM: CPT

## 2018-03-08 RX ORDER — PIPERACILLIN AND TAZOBACTAM 4; .5 G/20ML; G/20ML
3.38 INJECTION, POWDER, LYOPHILIZED, FOR SOLUTION INTRAVENOUS EVERY 12 HOURS
Qty: 0 | Refills: 0 | Status: DISCONTINUED | OUTPATIENT
Start: 2018-03-08 | End: 2018-03-16

## 2018-03-08 RX ORDER — VANCOMYCIN HCL 1 G
1000 VIAL (EA) INTRAVENOUS ONCE
Qty: 0 | Refills: 0 | Status: COMPLETED | OUTPATIENT
Start: 2018-03-08 | End: 2018-03-08

## 2018-03-08 RX ORDER — DEXTROSE 50 % IN WATER 50 %
12.5 SYRINGE (ML) INTRAVENOUS ONCE
Qty: 0 | Refills: 0 | Status: DISCONTINUED | OUTPATIENT
Start: 2018-03-08 | End: 2018-03-16

## 2018-03-08 RX ORDER — INSULIN GLARGINE 100 [IU]/ML
15 INJECTION, SOLUTION SUBCUTANEOUS AT BEDTIME
Qty: 0 | Refills: 0 | Status: DISCONTINUED | OUTPATIENT
Start: 2018-03-08 | End: 2018-03-16

## 2018-03-08 RX ORDER — SODIUM CHLORIDE 9 MG/ML
1000 INJECTION, SOLUTION INTRAVENOUS
Qty: 0 | Refills: 0 | Status: DISCONTINUED | OUTPATIENT
Start: 2018-03-08 | End: 2018-03-16

## 2018-03-08 RX ORDER — VALSARTAN 80 MG/1
320 TABLET ORAL DAILY
Qty: 0 | Refills: 0 | Status: DISCONTINUED | OUTPATIENT
Start: 2018-03-08 | End: 2018-03-08

## 2018-03-08 RX ORDER — AZITHROMYCIN 500 MG/1
500 TABLET, FILM COATED ORAL ONCE
Qty: 0 | Refills: 0 | Status: COMPLETED | OUTPATIENT
Start: 2018-03-08 | End: 2018-03-08

## 2018-03-08 RX ORDER — ATORVASTATIN CALCIUM 80 MG/1
20 TABLET, FILM COATED ORAL AT BEDTIME
Qty: 0 | Refills: 0 | Status: DISCONTINUED | OUTPATIENT
Start: 2018-03-08 | End: 2018-03-16

## 2018-03-08 RX ORDER — GLUCAGON INJECTION, SOLUTION 0.5 MG/.1ML
1 INJECTION, SOLUTION SUBCUTANEOUS ONCE
Qty: 0 | Refills: 0 | Status: DISCONTINUED | OUTPATIENT
Start: 2018-03-08 | End: 2018-03-16

## 2018-03-08 RX ORDER — DOCUSATE SODIUM 100 MG
100 CAPSULE ORAL
Qty: 0 | Refills: 0 | Status: DISCONTINUED | OUTPATIENT
Start: 2018-03-08 | End: 2018-03-16

## 2018-03-08 RX ORDER — DEXTROSE 50 % IN WATER 50 %
25 SYRINGE (ML) INTRAVENOUS ONCE
Qty: 0 | Refills: 0 | Status: DISCONTINUED | OUTPATIENT
Start: 2018-03-08 | End: 2018-03-16

## 2018-03-08 RX ORDER — SEVELAMER CARBONATE 2400 MG/1
1 POWDER, FOR SUSPENSION ORAL
Qty: 0 | Refills: 0 | COMMUNITY

## 2018-03-08 RX ORDER — INFLUENZA VIRUS VACCINE 15; 15; 15; 15 UG/.5ML; UG/.5ML; UG/.5ML; UG/.5ML
0.5 SUSPENSION INTRAMUSCULAR ONCE
Qty: 0 | Refills: 0 | Status: COMPLETED | OUTPATIENT
Start: 2018-03-08 | End: 2018-03-16

## 2018-03-08 RX ORDER — INSULIN LISPRO 100/ML
VIAL (ML) SUBCUTANEOUS
Qty: 0 | Refills: 0 | Status: DISCONTINUED | OUTPATIENT
Start: 2018-03-08 | End: 2018-03-16

## 2018-03-08 RX ORDER — DEXTROSE 50 % IN WATER 50 %
1 SYRINGE (ML) INTRAVENOUS ONCE
Qty: 0 | Refills: 0 | Status: DISCONTINUED | OUTPATIENT
Start: 2018-03-08 | End: 2018-03-16

## 2018-03-08 RX ORDER — INSULIN LISPRO 100/ML
VIAL (ML) SUBCUTANEOUS AT BEDTIME
Qty: 0 | Refills: 0 | Status: DISCONTINUED | OUTPATIENT
Start: 2018-03-08 | End: 2018-03-16

## 2018-03-08 RX ORDER — HEPARIN SODIUM 5000 [USP'U]/ML
5000 INJECTION INTRAVENOUS; SUBCUTANEOUS EVERY 12 HOURS
Qty: 0 | Refills: 0 | Status: DISCONTINUED | OUTPATIENT
Start: 2018-03-08 | End: 2018-03-16

## 2018-03-08 RX ORDER — FUROSEMIDE 40 MG
40 TABLET ORAL ONCE
Qty: 0 | Refills: 0 | Status: COMPLETED | OUTPATIENT
Start: 2018-03-08 | End: 2018-03-08

## 2018-03-08 RX ORDER — FUROSEMIDE 40 MG
40 TABLET ORAL ONCE
Qty: 0 | Refills: 0 | Status: DISCONTINUED | OUTPATIENT
Start: 2018-03-08 | End: 2018-03-08

## 2018-03-08 RX ORDER — FUROSEMIDE 40 MG
80 TABLET ORAL
Qty: 0 | Refills: 0 | Status: DISCONTINUED | OUTPATIENT
Start: 2018-03-08 | End: 2018-03-09

## 2018-03-08 RX ORDER — HYDRALAZINE HCL 50 MG
100 TABLET ORAL EVERY 12 HOURS
Qty: 0 | Refills: 0 | Status: DISCONTINUED | OUTPATIENT
Start: 2018-03-08 | End: 2018-03-16

## 2018-03-08 RX ORDER — FUROSEMIDE 40 MG
80 TABLET ORAL ONCE
Qty: 0 | Refills: 0 | Status: COMPLETED | OUTPATIENT
Start: 2018-03-08 | End: 2018-03-08

## 2018-03-08 RX ORDER — PIPERACILLIN AND TAZOBACTAM 4; .5 G/20ML; G/20ML
3.38 INJECTION, POWDER, LYOPHILIZED, FOR SOLUTION INTRAVENOUS ONCE
Qty: 0 | Refills: 0 | Status: COMPLETED | OUTPATIENT
Start: 2018-03-08 | End: 2018-03-08

## 2018-03-08 RX ADMIN — ATORVASTATIN CALCIUM 20 MILLIGRAM(S): 80 TABLET, FILM COATED ORAL at 22:34

## 2018-03-08 RX ADMIN — Medication 80 MILLIGRAM(S): at 17:16

## 2018-03-08 RX ADMIN — INSULIN GLARGINE 15 UNIT(S): 100 INJECTION, SOLUTION SUBCUTANEOUS at 22:47

## 2018-03-08 RX ADMIN — Medication 80 MILLIGRAM(S): at 08:45

## 2018-03-08 RX ADMIN — Medication 100 MILLIGRAM(S): at 17:16

## 2018-03-08 RX ADMIN — PIPERACILLIN AND TAZOBACTAM 25 GRAM(S): 4; .5 INJECTION, POWDER, LYOPHILIZED, FOR SOLUTION INTRAVENOUS at 17:19

## 2018-03-08 RX ADMIN — Medication 40 MILLIGRAM(S): at 22:33

## 2018-03-08 RX ADMIN — Medication 250 MILLIGRAM(S): at 09:12

## 2018-03-08 RX ADMIN — PIPERACILLIN AND TAZOBACTAM 200 GRAM(S): 4; .5 INJECTION, POWDER, LYOPHILIZED, FOR SOLUTION INTRAVENOUS at 08:01

## 2018-03-08 RX ADMIN — Medication 0.3 MILLIGRAM(S): at 18:16

## 2018-03-08 RX ADMIN — AZITHROMYCIN 500 MILLIGRAM(S): 500 TABLET, FILM COATED ORAL at 08:45

## 2018-03-08 RX ADMIN — HEPARIN SODIUM 5000 UNIT(S): 5000 INJECTION INTRAVENOUS; SUBCUTANEOUS at 17:19

## 2018-03-08 RX ADMIN — Medication 100 MILLIGRAM(S): at 17:19

## 2018-03-08 RX ADMIN — Medication 10: at 12:08

## 2018-03-08 NOTE — ED PROVIDER NOTE - PROGRESS NOTE DETAILS
Daniele: pt signed out to me pending labs, likely chf exacerbation. cxr looks like vascular congestion with likely consolidation. wbc 18k, hgb <6, performed rectal, no brbpr or melena. will cover with abx, sent cultures/lactate, transfuse, admit. Daniele: pt admitted to Dr. Child, paged Dr. Mares

## 2018-03-08 NOTE — H&P ADULT - NSHPREVIEWOFSYSTEMS_GEN_ALL_CORE

## 2018-03-08 NOTE — H&P ADULT - NSHPLABSRESULTS_GEN_ALL_CORE
5.4    18.19 )-----------( 468      ( 08 Mar 2018 06:50 )             17.4   03-08    141  |  104  |  91<H>  ----------------------------<  380<H>  5.0   |  25  |  5.90<H>    Ca    7.9<L>      08 Mar 2018 06:50    TPro  6.8  /  Alb  2.6<L>  /  TBili  0.4  /  DBili  x   /  AST  56<H>  /  ALT  60  /  AlkPhos  237<H>  03-08    < from: Xray Chest 1 View AP/PA. (03.08.18 @ 07:34) >    Bilateral lung opacities, suspicious for pneumonia. 5.4    18.19 )-----------( 468      ( 08 Mar 2018 06:50 )             17.4   03-08    141  |  104  |  91<H>  ----------------------------<  380<H>  5.0   |  25  |  5.90<H>    Ca    7.9<L>      08 Mar 2018 06:50    TPro  6.8  /  Alb  2.6<L>  /  TBili  0.4  /  DBili  x   /  AST  56<H>  /  ALT  60  /  AlkPhos  237<H>  03-08    < from: Xray Chest 1 View AP/PA. (03.08.18 @ 07:34) >    Bilateral lung opacities, suspicious for pneumonia.    ekg - old awmi

## 2018-03-08 NOTE — ED PROVIDER NOTE - OBJECTIVE STATEMENT
Pt is a 73 yo lady with a pmhx of HTN, HL, DM, CKD in process of starting dialysis, CHF who presents to the ED with sob x 3 days. Has had increasing sob, with difficulty lying back, as well as bilateral leg heaviness and swelling. Has had similar presentations for CHF exacerbations in the past. Has had a cough for past week, nonproductive, no fevers. No n/v/d, no pleuritic chest pain, no chest pain.

## 2018-03-08 NOTE — CONSULT NOTE ADULT - SUBJECTIVE AND OBJECTIVE BOX
HPI:  Patient is a 72y old  Female with CKD5 verging HD initiation ( under care of Dr Herrera; AVF placed on ) who presents c/o dyspnea, incr lower extr edema.   In addition, found to be profoundly anemic with Hgb 5.4. Receiving 2nd unit of blood. CXR s/o PNA.  Home meds include ARB and lasix among others.   Hx of long standing DM, resistant HTN  Pos dysuria, difficulty voiding.           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; CXR 10/10/17  Diabetic neuropathy  Heart murmur  GERD (gastroesophageal reflux disease)  Anemia  Hypercholesteremia  Chronic kidney disease  Congestive heart failure, unspecified congestive heart failure chronicity, unspecified congestive heart failure type: last   Diabetes: x 15 years ; FS  Hypertension  H/O endoscopy      Social Hx:     FAMILY HISTORY:  No pertinent family history in first degree relatives      Allergies    No Known Allergies    Intolerances        MEDICATIONS  (STANDING):  atorvastatin 20 milliGRAM(s) Oral at bedtime  cloNIDine 0.3 milliGRAM(s) Oral two times a day  dextrose 5%. 1000 milliLiter(s) (50 mL/Hr) IV Continuous <Continuous>  dextrose 50% Injectable 12.5 Gram(s) IV Push once  dextrose 50% Injectable 25 Gram(s) IV Push once  dextrose 50% Injectable 25 Gram(s) IV Push once  docusate sodium 100 milliGRAM(s) Oral two times a day  furosemide    Tablet 80 milliGRAM(s) Oral two times a day  heparin  Injectable 5000 Unit(s) SubCutaneous every 12 hours  hydrALAZINE 100 milliGRAM(s) Oral every 12 hours  insulin glargine Injectable (LANTUS) 15 Unit(s) SubCutaneous at bedtime  insulin lispro (HumaLOG) corrective regimen sliding scale   SubCutaneous three times a day before meals  insulin lispro (HumaLOG) corrective regimen sliding scale   SubCutaneous at bedtime  piperacillin/tazobactam IVPB. 3.375 Gram(s) IV Intermittent every 12 hours  valsartan 320 milliGRAM(s) Oral daily    MEDICATIONS  (PRN):  dextrose Gel 1 Dose(s) Oral once PRN Blood Glucose LESS THAN 70 milliGRAM(s)/deciliter  glucagon  Injectable 1 milliGRAM(s) IntraMuscular once PRN Glucose LESS THAN 70 milligrams/deciliter      Daily Height in cm: 157.48 (08 Mar 2018 05:58)    Daily     Drug Dosing Weight  Height (cm): 157.48 (08 Mar 2018 05:58)  Weight (kg): 72.6 (08 Mar 2018 05:58)  BMI (kg/m2): 29.3 (08 Mar 2018 05:58)  BSA (m2): 1.74 (08 Mar 2018 05:58)      REVIEW OF SYSTEMS:  Per HPI      I&O's Detail        PHYSICAL EXAM:    GENERAL: dyspneic  HEAD:  Atraumatic, Normocephalic  EYES: EOMI, PERRLA, conjunctiva and sclera clear  ENMT: No tonsillar erythema, exudates, or enlargement; Moist mucous membranes, Good dentition, No lesions  NECK: Supple, incr JVP  NERVOUS SYSTEM:  Alert & Oriented X3, Good concentration; Motor Strength 5/5 B/L upper and lower extremities; DTRs 2+ intact and symmetric  CHEST/LUNG: Clear to percussion bilaterally; No rales, rhonchi, wheezing, or rubs  HEART: Regular rate and rhythm; No murmurs, rubs, or gallops  ABDOMEN: Soft, Nontender, Nondistended; Bowel sounds present  EXTREMITIES:  2+ Peripheral Pulses, No clubbing, cyanosis. POS edema  LYMPH: No lymphadenopathy noted  SKIN: No rashes or lesions  ACCESS: L AVF immature    LABS:  CBC Full  -  ( 08 Mar 2018 06:50 )  WBC Count : 18.19 K/uL  Hemoglobin : 5.4 g/dL  Hematocrit : 17.4 %  Platelet Count - Automated : 468 K/uL  Mean Cell Volume : 78.4 fl  Mean Cell Hemoglobin : 24.3 pg  Mean Cell Hemoglobin Concentration : 31.0 gm/dL  Auto Neutrophil # : 16.73 K/uL  Auto Lymphocyte # : 1.27 K/uL  Auto Monocyte # : x  Auto Eosinophil # : x  Auto Basophil # : x  Auto Neutrophil % : 92.0 %  Auto Lymphocyte % : 7.0 %  Auto Monocyte % : 1.0 %  Auto Eosinophil % : 0.0 %  Auto Basophil % : 0.0 %    08    141  |  104  |  91<H>  ----------------------------<  380<H>  5.0   |  25  |  5.90<H>    Ca    7.9<L>      08 Mar 2018 06:50    TPro  6.8  /  Alb  2.6<L>  /  TBili  0.4  /  DBili  x   /  AST  56<H>  /  ALT  60  /  AlkPhos  237<H>  03-08    CAPILLARY BLOOD GLUCOSE      POCT Blood Glucose.: 397 mg/dL (08 Mar 2018 11:39)      Urinalysis Basic - ( 08 Mar 2018 07:47 )    Color: Yellow / Appearance: Clear / S.010 / pH: x  Gluc: x / Ketone: Negative  / Bili: Negative / Urobili: Negative mg/dL   Blood: x / Protein: 500 mg/dL / Nitrite: Negative   Leuk Esterase: Negative / RBC: 0-2 /HPF / WBC 0-2   Sq Epi: x / Non Sq Epi: x / Bacteria: Occasional      CARDIAC MARKERS ( 08 Mar 2018 06:50 )  <.015 ng/mL / x     / x     / x     / 4.4 ng/mL        Impression:  * CKD 5. Diabetic, hypertensive nephrosclerosis  * Immature AVF  * PNA  * Volume overload  * Acute on chronic anemia    Recommendations:  * No dialytic indications at present  * D/c ARB  * Cont PO lasix  * Insert Appiah  * Urine C&S

## 2018-03-08 NOTE — H&P ADULT - HISTORY OF PRESENT ILLNESS
Pt is a 71 yo lady with a pmhx of HTN, HL, DM, CKD in process of starting dialysis, CHF who presents to the ED with sob x 3 days. Has had increasing sob, with difficulty lying back, as well as bilateral leg heaviness and swelling. Has had similar presentations for CHF exacerbations in the past. Has had a cough for past week, nonproductive, no fevers. No n/v/d, no pleuritic chest pain, no chest pain. Pt is a 73 yo lady with a pmhx of HTN, HL, DM, CKD in process of starting dialysis, CHF who presents to the ED with sob x 3 days. Has had increasing sob, with difficulty lying back, as well as bilateral leg heaviness and swelling. Has had similar presentations for CHF exacerbations in the past. Has had a cough for past week, nonproductive, no fevers. No n/v/d, no pleuritic chest pain, no chest pain.- patient complain of nonproductive cough or fever

## 2018-03-08 NOTE — ED ADULT NURSE NOTE - OBJECTIVE STATEMENT
Pt c/o SOB, upper and pain & urinary incontinence for 4 with chills. Denies N/V. Pts daughter reports pt recent got over a cold last week with coughing. LBM tuesday Pt c/o SOB, upper and pain & urinary incontinence for 4 with chills. Denies N/V. Pts daughter reports pt recent got over a cold last week with coughing. LBM Tuesday. Pt recently had AV fistula placed in left arm in preparation for dialysis. Pitting +2 edema noted in B/L LE.

## 2018-03-08 NOTE — ED PROVIDER NOTE - MEDICAL DECISION MAKING DETAILS
Ddx: CHF exacerbation/ PNA/ COPD  Plan: cbc, cmp, bnp, troponin, cxr, ecg, ua, likely lasix and admit

## 2018-03-08 NOTE — ED ADULT NURSE NOTE - PMH
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

## 2018-03-08 NOTE — H&P ADULT - ASSESSMENT
68f with ckd with short of breath and probable pneumonia     IMPROVE VTE Individual Risk Assessment        RISK                                                          Points  [  ] Previous VTE                                                3  [  ] Thrombophilia                                             2  [  ] Lower limb paralysis                                   2        (unable to hold up >15 seconds)    [  ] Current Cancer                                            2         (within 6 months)  [  x] Immobilization > 24 hrs                              1  [  ] ICU/CCU stay > 24 hours                            1  [x  ] Age > 60                                                    1  IMPROVE VTE Score __2_______    heparin

## 2018-03-08 NOTE — ED PROVIDER NOTE - CARE PLAN
Principal Discharge DX:	SOB (shortness of breath) Principal Discharge DX:	Pneumonia  Secondary Diagnosis:	Anemia

## 2018-03-08 NOTE — H&P ADULT - NSHPPHYSICALEXAM_GEN_ALL_CORE
GENERAL: NAD well-developed  HEAD:  Atraumatic, Normocephalic  EYES: EOMI, PERRLA, conjunctiva and sclera clear  ENMT: No tonsillar erythema, exudates, or enlargement; Moist mucous membranes, Good dentition, No lesions  NECK: Supple, No JVD, Normal thyroid  NERVOUS SYSTEM:  Alert & Oriented X3, Good concentration; Motor Strength 5/5 B/L upper and lower extremities; DTRs 2+ intact and symmetric  CHEST/LUNG: Clear to percussion bilaterally; No rales, rhonchi, wheezing, or rubs  HEART: Regular rate and rhythm; No murmurs, rubs, or gallops  ABDOMEN: Soft, Nontender, Nondistended; Bowel sounds present  EXTREMITIES:  2+ Peripheral Pulses, No clubbing, cyanosis, or edema  LYMPH: No lymphadenopathy   SKIN: No rashes or lesions GENERAL: NAD well-developed  HEAD:  Atraumatic, Normocephalic  EYES: EOMI, PERRLA, conjunctiva and sclera clear  ENMT: No tonsillar erythema, exudates, or enlargement; Moist mucous membranes, Good dentition, No lesions  NECK: Supple, No JVD, Normal thyroid  NERVOUS SYSTEM:  Alert & Oriented X3, Good concentration; Motor Strength 5/5 B/L upper and lower extremities; DTRs 2+ intact and symmetric  CHEST/LUNG: Clear to percussion bilaterally; No rales, rhonchi, wheezing, or rubs  HEART: Regular rate and rhythm; No murmurs, rubs, or gallops  ABDOMEN: Soft, Nontender, Nondistended; Bowel sounds present  EXTREMITIES:  1+edema  LYMPH: No lymphadenopathy   SKIN: No rashes or lesions

## 2018-03-08 NOTE — ED ADULT NURSE REASSESSMENT NOTE - NS ED NURSE REASSESS COMMENT FT1
1 unit prbc started at 1030, explained to patient to report any signs and symptoms of blood transfusion reaction like chest pain, sob, back pain, chills, flushing. patient understood instructions

## 2018-03-09 DIAGNOSIS — R06.03 ACUTE RESPIRATORY DISTRESS: ICD-10-CM

## 2018-03-09 DIAGNOSIS — E87.70 FLUID OVERLOAD, UNSPECIFIED: ICD-10-CM

## 2018-03-09 DIAGNOSIS — E44.0 MODERATE PROTEIN-CALORIE MALNUTRITION: ICD-10-CM

## 2018-03-09 LAB
CULTURE RESULTS: NO GROWTH — SIGNIFICANT CHANGE UP
GLUCOSE BLDC GLUCOMTR-MCNC: 116 MG/DL — HIGH (ref 70–99)
GLUCOSE BLDC GLUCOMTR-MCNC: 157 MG/DL — HIGH (ref 70–99)
GLUCOSE BLDC GLUCOMTR-MCNC: 194 MG/DL — HIGH (ref 70–99)
HBA1C BLD-MCNC: 8.1 % — HIGH (ref 4–5.6)
HCT VFR BLD CALC: 21.9 % — LOW (ref 34.5–45)
HGB BLD-MCNC: 7.3 G/DL — LOW (ref 11.5–15.5)
MCHC RBC-ENTMCNC: 26 PG — LOW (ref 27–34)
MCHC RBC-ENTMCNC: 33.3 GM/DL — SIGNIFICANT CHANGE UP (ref 32–36)
MCV RBC AUTO: 77.9 FL — LOW (ref 80–100)
NRBC # BLD: 0 /100 WBCS — SIGNIFICANT CHANGE UP (ref 0–0)
NT-PROBNP SERPL-SCNC: HIGH PG/ML (ref 0–125)
PLATELET # BLD AUTO: 385 K/UL — SIGNIFICANT CHANGE UP (ref 150–400)
RBC # BLD: 2.81 M/UL — LOW (ref 3.8–5.2)
RBC # FLD: 17.5 % — HIGH (ref 10.3–14.5)
SPECIMEN SOURCE: SIGNIFICANT CHANGE UP
WBC # BLD: 16.46 K/UL — HIGH (ref 3.8–10.5)
WBC # FLD AUTO: 16.46 K/UL — HIGH (ref 3.8–10.5)

## 2018-03-09 PROCEDURE — 99233 SBSQ HOSP IP/OBS HIGH 50: CPT

## 2018-03-09 RX ORDER — FUROSEMIDE 40 MG
80 TABLET ORAL EVERY 12 HOURS
Qty: 0 | Refills: 0 | Status: COMPLETED | OUTPATIENT
Start: 2018-03-09 | End: 2018-03-10

## 2018-03-09 RX ORDER — HYDRALAZINE HCL 50 MG
10 TABLET ORAL ONCE
Qty: 0 | Refills: 0 | Status: COMPLETED | OUTPATIENT
Start: 2018-03-09 | End: 2018-03-09

## 2018-03-09 RX ORDER — LANOLIN ALCOHOL/MO/W.PET/CERES
3 CREAM (GRAM) TOPICAL ONCE
Qty: 0 | Refills: 0 | Status: COMPLETED | OUTPATIENT
Start: 2018-03-09 | End: 2018-03-09

## 2018-03-09 RX ORDER — IPRATROPIUM/ALBUTEROL SULFATE 18-103MCG
3 AEROSOL WITH ADAPTER (GRAM) INHALATION EVERY 8 HOURS
Qty: 0 | Refills: 0 | Status: DISCONTINUED | OUTPATIENT
Start: 2018-03-09 | End: 2018-03-16

## 2018-03-09 RX ADMIN — Medication 100 MILLIGRAM(S): at 17:12

## 2018-03-09 RX ADMIN — HEPARIN SODIUM 5000 UNIT(S): 5000 INJECTION INTRAVENOUS; SUBCUTANEOUS at 05:44

## 2018-03-09 RX ADMIN — Medication 80 MILLIGRAM(S): at 17:14

## 2018-03-09 RX ADMIN — Medication 100 MILLIGRAM(S): at 05:44

## 2018-03-09 RX ADMIN — ATORVASTATIN CALCIUM 20 MILLIGRAM(S): 80 TABLET, FILM COATED ORAL at 21:17

## 2018-03-09 RX ADMIN — Medication 2: at 11:51

## 2018-03-09 RX ADMIN — PIPERACILLIN AND TAZOBACTAM 25 GRAM(S): 4; .5 INJECTION, POWDER, LYOPHILIZED, FOR SOLUTION INTRAVENOUS at 05:44

## 2018-03-09 RX ADMIN — Medication 0.3 MILLIGRAM(S): at 17:12

## 2018-03-09 RX ADMIN — Medication 2: at 17:14

## 2018-03-09 RX ADMIN — Medication 2: at 07:58

## 2018-03-09 RX ADMIN — INSULIN GLARGINE 15 UNIT(S): 100 INJECTION, SOLUTION SUBCUTANEOUS at 21:17

## 2018-03-09 RX ADMIN — Medication 0.3 MILLIGRAM(S): at 05:44

## 2018-03-09 RX ADMIN — Medication 3 MILLILITER(S): at 21:04

## 2018-03-09 RX ADMIN — Medication 80 MILLIGRAM(S): at 03:44

## 2018-03-09 RX ADMIN — Medication 3 MILLIGRAM(S): at 22:54

## 2018-03-09 RX ADMIN — HEPARIN SODIUM 5000 UNIT(S): 5000 INJECTION INTRAVENOUS; SUBCUTANEOUS at 17:14

## 2018-03-09 RX ADMIN — Medication 3 MILLILITER(S): at 16:54

## 2018-03-09 RX ADMIN — Medication 10 MILLIGRAM(S): at 03:00

## 2018-03-09 RX ADMIN — PIPERACILLIN AND TAZOBACTAM 25 GRAM(S): 4; .5 INJECTION, POWDER, LYOPHILIZED, FOR SOLUTION INTRAVENOUS at 17:14

## 2018-03-09 NOTE — DIETITIAN INITIAL EVALUATION ADULT. - ENERGY NEEDS
Height (cm): 157.48 (03-08)  Weight (kg): 72.3 (03-08)  BMI (kg/m2): 29.2 (03-08)  IBW: 49.8 kg        % IBW: 145%          UBW: 81.6 kg        %UBW: 88%, adm c edema   I/O: 0/1200(-1200), pt c urine catheter

## 2018-03-09 NOTE — CONSULT NOTE ADULT - SUBJECTIVE AND OBJECTIVE BOX
Vascular Attending:  The pt is seen and examined for the eval of the left arm AVG, pt admitted with PNE and worsenning renal function. Pt had Av access done in the past and has never been used.      HPI:  Pt is a 71 yo lady with a pmhx of HTN, HL, DM, CKD in process of starting dialysis, CHF who presents to the ED with sob x 3 days. Has had increasing sob, with difficulty lying back, as well as bilateral leg heaviness and swelling. Has had similar presentations for CHF exacerbations in the past. Has had a cough for past week, nonproductive, no fevers. 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 1000 milliLiter(s) (50 mL/Hr) IV Continuous <Continuous>  dextrose 50% Injectable 12.5 Gram(s) IV Push once  dextrose 50% Injectable 25 Gram(s) IV Push once  dextrose 50% Injectable 25 Gram(s) IV Push once  docusate sodium 100 milliGRAM(s) Oral two times a day  furosemide    Tablet 80 milliGRAM(s) Oral two times a day  furosemide   Injectable 80 milliGRAM(s) IV Push every 12 hours  heparin  Injectable 5000 Unit(s) SubCutaneous every 12 hours  hydrALAZINE 100 milliGRAM(s) Oral every 12 hours  influenza   Vaccine 0.5 milliLiter(s) IntraMuscular once  insulin glargine Injectable (LANTUS) 15 Unit(s) SubCutaneous at bedtime  insulin lispro (HumaLOG) corrective regimen sliding scale   SubCutaneous three times a day before meals  insulin lispro (HumaLOG) corrective regimen sliding scale   SubCutaneous at bedtime  piperacillin/tazobactam IVPB. 3.375 Gram(s) IV Intermittent every 12 hours    MEDICATIONS  (PRN):  dextrose Gel 1 Dose(s) Oral once PRN Blood Glucose LESS THAN 70 milliGRAM(s)/deciliter  glucagon  Injectable 1 milliGRAM(s) IntraMuscular once PRN Glucose LESS THAN 70 milligrams/deciliter      Allergies    No Known Allergies    Intolerances        SOCIAL HISTORY:      Vital Signs Last 24 Hrs  T(C): 35.9 (09 Mar 2018 11:57), Max: 36.5 (09 Mar 2018 03:31)  T(F): 96.7 (09 Mar 2018 11:57), Max: 97.7 (09 Mar 2018 03:31)  HR: 85 (09 Mar 2018 11:57) (82 - 97)  BP: 160/70 (09 Mar 2018 11:57) (160/70 - 198/79)  BP(mean): --  RR: 19 (09 Mar 2018 11:57) (18 - 22)  SpO2: 95% (09 Mar 2018 11:57) (92% - 98%)    P/E:-  Pt has left arm Av graft, brachial artery to left basilic  vein, good bruit and thrill. Left hand is warm and the left radial pulse is normal,   CAROTIDS:- Bilateral carotids with no Bruits. No scars of previous catheterisation.  UPPER EXTREMITIES:- Bilateral radial artery pulses are normal and no ischemia of the Hands. No edema of the arms.  ABDOMEN:- No pulsatile mass in the abdomen and no ascites.  LOWER EXTREMITIES:- Bilateral LE with No Edema and no CVI, No varicose veins, no ulcers      LABS:                        7.3    16.46 )-----------( 385      ( 09 Mar 2018 08:15 )             21.9     03    141  |  106  |  97<H>  ----------------------------<  153<H>  4.3   |  24  |  5.86<H>    Ca    7.9<L>      09 Mar 2018 08:15    TPro  6.8  /  Alb  2.6<L>  /  TBili  0.4  /  DBili  x   /  AST  56<H>  /  ALT  60  /  AlkPhos  237<H>  03-08      Urinalysis Basic - ( 08 Mar 2018 07:47 )    Color: Yellow / Appearance: Clear / S.010 / pH: x  Gluc: x / Ketone: Negative  / Bili: Negative / Urobili: Negative mg/dL   Blood: x / Protein: 500 mg/dL / Nitrite: Negative   Leuk Esterase: Negative / RBC: 0-2 /HPF / WBC 0-2   Sq Epi: x / Non Sq Epi: x / Bacteria: Occasional        RADIOLOGY & ADDITIONAL STUDIES    Impression and Plan: The left arm avg IS PATENT WITH GOOD BRUIT AND THRILL, ok to use for the dialysis.

## 2018-03-09 NOTE — DIETITIAN INITIAL EVALUATION ADULT. - FACTORS AFF FOOD INTAKE
change in sense of smell or taste/persistent lack of appetite persistent lack of appetite/poor oral intake for 3-4 months reported/other (specify)/change in sense of smell or taste

## 2018-03-09 NOTE — DIETITIAN INITIAL EVALUATION ADULT. - NS AS NUTRI INTERV MEALS SNACK
Carbohydrate - modified diet/Mineral - modified diet/Recommend Low sodium, no concentrated potassium , no concentrated phosphorus , consistent carbohydrate c evening snack

## 2018-03-09 NOTE — PROGRESS NOTE ADULT - PROBLEM SELECTOR PLAN 2
likely 2/2   to renal status , has AV graft, as per vascular  mature and ready to use, will f/u renal for further plan   - on 2L NC ,Monitor sats  - renal on board

## 2018-03-09 NOTE — DIETITIAN INITIAL EVALUATION ADULT. - NS FNS REASON FOR WEIGHT CHANG
pt stated that she was 151 LBS PTA due to fluid retention, pt reports hx of wt. loss due to illness, used to weigh 180 LBS in 2016/fluid retention decreased po intake/bilateral leg heaviness & swelling on adm noted in H &P, s/p furosemide therapy, 03-08, pt stated that she was 140 LBS PTA and gained up to 151 LBS PTA due to fluid retention, pt reports hx of wt. loss due to illness, used to weigh 180 LBS in 2016/fluid retention

## 2018-03-09 NOTE — DIETITIAN INITIAL EVALUATION ADULT. - PERTINENT MEDS FT
O positive
clonidine , furosemide , hydralazine , heparin , atorvastatin , insulin glargine 15 units, lispro corrective regimen sliding scale 3 x day before meals & bedtime , docusate sodium , ABX

## 2018-03-09 NOTE — PHYSICAL THERAPY INITIAL EVALUATION ADULT - CRITERIA FOR SKILLED THERAPEUTIC INTERVENTIONS
functional limitations in following categories/anticipated discharge recommendation/risk reduction/prevention/therapy frequency/rehab potential/impairments found/predicted duration of therapy intervention

## 2018-03-09 NOTE — PHYSICAL THERAPY INITIAL EVALUATION ADULT - IMPAIRMENTS FOUND, PT EVAL
gait, locomotion, and balance/ergonomics and body mechanics/muscle strength/posture/aerobic capacity/endurance

## 2018-03-09 NOTE — PHYSICAL THERAPY INITIAL EVALUATION ADULT - TINETTI BALANCE TEST, REHAB EVAL
Sitting Balance - 1/1 , Rises From Chair - 1/2, Attempts to rise - 2/2 , Immediate Standing Balance - 2/2,  Standing Balance -2 /2, Nudged - 2 /2, Eyes Closed -1 /1,  Turning 360 Deg -  1/2, Sitting down - 1/2, Balance Score - 13/16

## 2018-03-09 NOTE — DIETITIAN INITIAL EVALUATION ADULT. - ETIOLOGY
inadequate protein-energy intake in setting of ESRD w/o HD @ present inadequate protein-energy intake in setting of ESRD w/o HD, cardiac, diabetes hx

## 2018-03-09 NOTE — DIETITIAN INITIAL EVALUATION ADULT. - PHYSICAL APPEARANCE
BMI=29.2(03-08), Nutrition focused physical exam conducted; Subcutaneous fat Exam;  [ moderate ]  Orbital fat pads region,  [WNL  ]Buccal fat region,  [moderate   ]triceps region, [WNL  ]ribs region.  Muscle Exam; [ moderate ]temples region, [ mild ]clavicle region, [WNL  ]shoulder region, [  WNL]Scapula region, [ moderate ]Interosseous region, [ WNL ]thigh region, [ moderate ]Calf region/overweight

## 2018-03-09 NOTE — DIETITIAN INITIAL EVALUATION ADULT. - NS AS NUTRI DX NUTRIENT
acute severe malnutrition context of chronic illness/Malnutrition Malnutrition/severe malnutrition context of chronic illness moderate malnutrition context of chronic illness/Malnutrition

## 2018-03-09 NOTE — DIETITIAN INITIAL EVALUATION ADULT. - OTHER INFO
Pt seen due to MD consult.  Diet hx provided indicates pt was consuming small breakfast which consisted of bread, farina, egg, skipped lunch as she slept through lunch mealtime & had rice, beans, vegetable for dinner.  Pt reports aversion to meats & chicken.  Pt was taking insulin glargine 15 units & was on Humalog Humalog sliding scale coverage 3 x day PTA.  Pt self monitored blood glucose 2 x day & reports low to high blood glucose PTA. Pt seen due to MD consult.  Diet hx provided indicates pt was consuming small breakfast which consisted of bread, farina, egg, skipped lunch as she slept through lunch mealtime & had rice, beans, vegetable for dinner.  Pt reports aversion to meats & chicken.  Pt is currently not on HD, plan for dialysis noted.  Pt was taking insulin glargine 15 units & was on Humalog Humalog sliding scale coverage 3 x day PTA.  Pt self monitored blood glucose 2 x day & reports low to high blood glucose PTA.

## 2018-03-09 NOTE — PROGRESS NOTE ADULT - PROBLEM SELECTOR PLAN 5
- Low sodium , no concentrated potassium , no concentrated phosphorus , consistent carbohydrate c evening snack , Glucerna Shake 3 x daily = 600 calories, 30 grams protein daily

## 2018-03-09 NOTE — DIETITIAN INITIAL EVALUATION ADULT. - PERTINENT LABORATORY DATA
03-09 Na141 mmol/L Glu 153 mg/dL<H> K+ 4.3 mmol/L Cr  5.86 mg/dL<H> BUN 97 mg/dL<H> Est GFR 8 mL/min/1.73M2<L> Phos n/a   Ca 7.9 mg/dL<L> Alb n/a   PAB n/a   BNP 66993 pg/mL<H> Hgb 7.3 g/dL<L> Hct 21.9 %<L> 03-09 HbA1/GC 8.1 % <H, but acceptable elderly HbA1/GC%> 03-08 Alb 2.6 g/dL<L> Lipase 701 U/L<H> AST/SGOT 56 U/L<H> Alkaline phosphatase 237 U/L<H> Glucose 308 mg/dL<H>

## 2018-03-09 NOTE — PHYSICAL THERAPY INITIAL EVALUATION ADULT - TINETTI GAIT TEST, REHAB EVAL
Indication of gait 1-/1,   Step Length and height -2/2,   Foot Clearance -2/2,   Step Symmetry - 1/1,  Step Continuity -1/1,   Path -1/ 2,   Trunk -1/2,   Walking Time -1/1,   Total Score -10 /12

## 2018-03-09 NOTE — PHYSICAL THERAPY INITIAL EVALUATION ADULT - GENERAL OBSERVATIONS, REHAB EVAL
Pt was seen in supine c  HOB at 40 degrees elevation, cardiac monitor, IV, Appiah and O2 at 3l/min  through nasal cannula donned, alert and Ox4. Pt had O2 donned throughout P.T. intervention.  Pt needed encouragement to participate in P.T. eval.

## 2018-03-09 NOTE — PROGRESS NOTE ADULT - ASSESSMENT
73 y/o lady with a pmhx of HTN, HL, DM, CKD in process of starting dialysis, CHF who presents to the ED with sob x 3 days

## 2018-03-09 NOTE — PHYSICAL THERAPY INITIAL EVALUATION ADULT - MODIFIED CLINICAL TEST OF SENSORY INTEGRATION IN BALANCE TEST
Barthel Index: Feeding Score 10_/10__, Bathing Score _0/5__, Grooming Score _5/5__, Dressing Score 5_/10__, Bowels Score 10__/10_, Bladder Score 0_/10__, Toilet Score _5/10__, Transfers Score 10_/15__, Mobility Score 0__/15_, Stairs Score _0/10__,     Total Score _45__/100

## 2018-03-09 NOTE — DIETITIAN INITIAL EVALUATION ADULT. - ADHERENCE
Low sodium, pt was using season salt @ home, daughter did the shopping & most of the cooking PTA/poor

## 2018-03-09 NOTE — PROGRESS NOTE ADULT - PROBLEM SELECTOR PLAN 1
- likely 2/2 to volume overload likely 2/2 to renal status   - on 2L NC ,Monitor sats  - renal on board

## 2018-03-09 NOTE — CHART NOTE - NSCHARTNOTEFT_GEN_A_CORE
Upon Nutritional Assessment by the Registered Dietitian your patient was determined to meet criteria / has evidence of the following diagnosis/diagnoses:          [ ]  Mild Protein Calorie Malnutrition        [x ]  Moderate Protein Calorie Malnutrition        [ ] Severe Protein Calorie Malnutrition        [ ] Unspecified Protein Calorie Malnutrition        [ ] Underweight / BMI <19        [ ] Morbid Obesity / BMI > 40      Findings as based on:  •  Comprehensive nutrition assessment and consultation  •  Calorie counts (nutrient intake analysis)  •  Food acceptance and intake status from observations by staff  •  Follow up  •  Patient education  •  Intervention secondary to interdisciplinary rounds  •   concerns      Treatment:    The following diet has been recommended:  Low sodium , no concentrated potassium , no concentrated phosphorus , Glucerna Shake 3 x daily = 600 calories, 30 grams protein daily       PROVIDER Section:     By signing this assessment you are acknowledging and agree with the diagnosis/diagnoses assigned by the Registered Dietitian    Comments: Upon Nutritional Assessment by the Registered Dietitian your patient was determined to meet criteria / has evidence of the following diagnosis/diagnoses:          [ ]  Mild Protein Calorie Malnutrition        [x ]  Moderate Protein Calorie Malnutrition        [ ] Severe Protein Calorie Malnutrition        [ ] Unspecified Protein Calorie Malnutrition        [ ] Underweight / BMI <19        [ ] Morbid Obesity / BMI > 40      Findings as based on:  •  Comprehensive nutrition assessment and consultation  •  Calorie counts (nutrient intake analysis)  •  Food acceptance and intake status from observations by staff  •  Follow up  •  Patient education  •  Intervention secondary to interdisciplinary rounds  •   concerns      Treatment:    The following diet has been recommended:  Low sodium , no concentrated potassium , no concentrated phosphorus , consistent carbohydrate c evening snack , Glucerna Shake 3 x daily = 600 calories, 30 grams protein daily       PROVIDER Section:     By signing this assessment you are acknowledging and agree with the diagnosis/diagnoses assigned by the Registered Dietitian    Comments:

## 2018-03-09 NOTE — DIETITIAN INITIAL EVALUATION ADULT. - NS AS NUTRI INTERV ED CONTENT
Purpose of the nutrition education/Educate pt on ways to improve protein energy intake in presence of ESRD

## 2018-03-10 DIAGNOSIS — I10 ESSENTIAL (PRIMARY) HYPERTENSION: ICD-10-CM

## 2018-03-10 LAB
ANION GAP SERPL CALC-SCNC: 13 MMOL/L — SIGNIFICANT CHANGE UP (ref 5–17)
BUN SERPL-MCNC: 90 MG/DL — HIGH (ref 7–23)
CALCIUM SERPL-MCNC: 7.8 MG/DL — LOW (ref 8.5–10.1)
CHLORIDE SERPL-SCNC: 105 MMOL/L — SIGNIFICANT CHANGE UP (ref 96–108)
CO2 SERPL-SCNC: 24 MMOL/L — SIGNIFICANT CHANGE UP (ref 22–31)
CREAT SERPL-MCNC: 6.29 MG/DL — HIGH (ref 0.5–1.3)
ERYTHROCYTE [SEDIMENTATION RATE] IN BLOOD: 79 MM/HR — HIGH (ref 0–20)
FERRITIN SERPL-MCNC: 171 NG/ML — HIGH (ref 15–150)
GLUCOSE BLDC GLUCOMTR-MCNC: 154 MG/DL — HIGH (ref 70–99)
GLUCOSE BLDC GLUCOMTR-MCNC: 222 MG/DL — HIGH (ref 70–99)
GLUCOSE BLDC GLUCOMTR-MCNC: 242 MG/DL — HIGH (ref 70–99)
GLUCOSE BLDC GLUCOMTR-MCNC: 271 MG/DL — HIGH (ref 70–99)
GLUCOSE SERPL-MCNC: 138 MG/DL — HIGH (ref 70–99)
HCT VFR BLD CALC: 22 % — LOW (ref 34.5–45)
HGB BLD-MCNC: 7.2 G/DL — LOW (ref 11.5–15.5)
IRON SATN MFR SERPL: 23 % — SIGNIFICANT CHANGE UP (ref 14–50)
IRON SATN MFR SERPL: 23 % — SIGNIFICANT CHANGE UP (ref 14–50)
IRON SATN MFR SERPL: 34 UG/DL — SIGNIFICANT CHANGE UP (ref 30–160)
IRON SATN MFR SERPL: 34 UG/DL — SIGNIFICANT CHANGE UP (ref 30–160)
MCHC RBC-ENTMCNC: 26.2 PG — LOW (ref 27–34)
MCHC RBC-ENTMCNC: 32.7 GM/DL — SIGNIFICANT CHANGE UP (ref 32–36)
MCV RBC AUTO: 80 FL — SIGNIFICANT CHANGE UP (ref 80–100)
NRBC # BLD: 0 /100 WBCS — SIGNIFICANT CHANGE UP (ref 0–0)
PLATELET # BLD AUTO: 384 K/UL — SIGNIFICANT CHANGE UP (ref 150–400)
POTASSIUM SERPL-MCNC: 4 MMOL/L — SIGNIFICANT CHANGE UP (ref 3.5–5.3)
POTASSIUM SERPL-SCNC: 4 MMOL/L — SIGNIFICANT CHANGE UP (ref 3.5–5.3)
PROT SERPL-MCNC: 5.3 G/DL — LOW (ref 6–8.3)
PROT SERPL-MCNC: 5.3 G/DL — LOW (ref 6–8.3)
RBC # BLD: 2.75 M/UL — LOW (ref 3.8–5.2)
RBC # FLD: 18.3 % — HIGH (ref 10.3–14.5)
SODIUM SERPL-SCNC: 142 MMOL/L — SIGNIFICANT CHANGE UP (ref 135–145)
TIBC SERPL-MCNC: 148 UG/DL — LOW (ref 220–430)
TIBC SERPL-MCNC: 149 UG/DL — LOW (ref 220–430)
UIBC SERPL-MCNC: 114 UG/DL — SIGNIFICANT CHANGE UP (ref 110–370)
UIBC SERPL-MCNC: 115 UG/DL — SIGNIFICANT CHANGE UP (ref 110–370)
VANCOMYCIN FLD-MCNC: 9.3 UG/ML — SIGNIFICANT CHANGE UP
WBC # BLD: 15.55 K/UL — HIGH (ref 3.8–10.5)
WBC # FLD AUTO: 15.55 K/UL — HIGH (ref 3.8–10.5)

## 2018-03-10 PROCEDURE — 99233 SBSQ HOSP IP/OBS HIGH 50: CPT

## 2018-03-10 RX ORDER — LANOLIN ALCOHOL/MO/W.PET/CERES
3 CREAM (GRAM) TOPICAL AT BEDTIME
Qty: 0 | Refills: 0 | Status: DISCONTINUED | OUTPATIENT
Start: 2018-03-10 | End: 2018-03-16

## 2018-03-10 RX ADMIN — Medication 4: at 16:12

## 2018-03-10 RX ADMIN — HEPARIN SODIUM 5000 UNIT(S): 5000 INJECTION INTRAVENOUS; SUBCUTANEOUS at 17:07

## 2018-03-10 RX ADMIN — Medication 4: at 11:43

## 2018-03-10 RX ADMIN — Medication 3 MILLIGRAM(S): at 23:03

## 2018-03-10 RX ADMIN — Medication 3 MILLILITER(S): at 21:01

## 2018-03-10 RX ADMIN — Medication 2: at 07:45

## 2018-03-10 RX ADMIN — Medication 80 MILLIGRAM(S): at 17:07

## 2018-03-10 RX ADMIN — PIPERACILLIN AND TAZOBACTAM 25 GRAM(S): 4; .5 INJECTION, POWDER, LYOPHILIZED, FOR SOLUTION INTRAVENOUS at 05:37

## 2018-03-10 RX ADMIN — Medication 80 MILLIGRAM(S): at 05:37

## 2018-03-10 RX ADMIN — Medication 3 MILLILITER(S): at 05:25

## 2018-03-10 RX ADMIN — Medication 0.3 MILLIGRAM(S): at 05:37

## 2018-03-10 RX ADMIN — Medication 0.3 MILLIGRAM(S): at 17:07

## 2018-03-10 RX ADMIN — INSULIN GLARGINE 15 UNIT(S): 100 INJECTION, SOLUTION SUBCUTANEOUS at 21:10

## 2018-03-10 RX ADMIN — HEPARIN SODIUM 5000 UNIT(S): 5000 INJECTION INTRAVENOUS; SUBCUTANEOUS at 05:37

## 2018-03-10 RX ADMIN — Medication 100 MILLIGRAM(S): at 17:08

## 2018-03-10 RX ADMIN — Medication 100 MILLIGRAM(S): at 05:37

## 2018-03-10 RX ADMIN — PIPERACILLIN AND TAZOBACTAM 25 GRAM(S): 4; .5 INJECTION, POWDER, LYOPHILIZED, FOR SOLUTION INTRAVENOUS at 17:08

## 2018-03-10 RX ADMIN — Medication 3 MILLILITER(S): at 14:25

## 2018-03-10 RX ADMIN — ATORVASTATIN CALCIUM 20 MILLIGRAM(S): 80 TABLET, FILM COATED ORAL at 21:09

## 2018-03-10 RX ADMIN — Medication 2: at 21:09

## 2018-03-10 NOTE — PROGRESS NOTE ADULT - PROBLEM SELECTOR PLAN 2
likely 2/2   to renal status , has AV graft, as per vascular  mature and ready to use, -As per renal will decide for ?starting dialysis in next 24-48 hrs.  - on 2L NC ,Monitor sats  - renal on board

## 2018-03-10 NOTE — PROGRESS NOTE ADULT - ASSESSMENT
73 y/o lady with a pmhx of HTN, HL, DM, CKD in process of starting dialysis, also with CHF who presents to the ED with sob x 3 days, sec to fluid overload/pna

## 2018-03-10 NOTE — PROGRESS NOTE ADULT - PROBLEM SELECTOR PLAN 1
- likely 2/2 to volume overload likely 2/2 to renal status   - on 2L NC ,Monitor sats  - renal on board,  LASIX X3

## 2018-03-11 DIAGNOSIS — E11.21 TYPE 2 DIABETES MELLITUS WITH DIABETIC NEPHROPATHY: ICD-10-CM

## 2018-03-11 LAB
ANION GAP SERPL CALC-SCNC: 12 MMOL/L — SIGNIFICANT CHANGE UP (ref 5–17)
BUN SERPL-MCNC: 113 MG/DL — HIGH (ref 7–23)
CALCIUM SERPL-MCNC: 7.5 MG/DL — LOW (ref 8.5–10.1)
CHLORIDE SERPL-SCNC: 105 MMOL/L — SIGNIFICANT CHANGE UP (ref 96–108)
CO2 SERPL-SCNC: 25 MMOL/L — SIGNIFICANT CHANGE UP (ref 22–31)
CREAT SERPL-MCNC: 6.6 MG/DL — HIGH (ref 0.5–1.3)
GLUCOSE BLDC GLUCOMTR-MCNC: 197 MG/DL — HIGH (ref 70–99)
GLUCOSE BLDC GLUCOMTR-MCNC: 249 MG/DL — HIGH (ref 70–99)
GLUCOSE BLDC GLUCOMTR-MCNC: 264 MG/DL — HIGH (ref 70–99)
GLUCOSE BLDC GLUCOMTR-MCNC: 300 MG/DL — HIGH (ref 70–99)
GLUCOSE SERPL-MCNC: 237 MG/DL — HIGH (ref 70–99)
HCT VFR BLD CALC: 22.3 % — LOW (ref 34.5–45)
HGB BLD-MCNC: 7.2 G/DL — LOW (ref 11.5–15.5)
MCHC RBC-ENTMCNC: 26 PG — LOW (ref 27–34)
MCHC RBC-ENTMCNC: 32.3 GM/DL — SIGNIFICANT CHANGE UP (ref 32–36)
MCV RBC AUTO: 80.5 FL — SIGNIFICANT CHANGE UP (ref 80–100)
NRBC # BLD: 0 /100 WBCS — SIGNIFICANT CHANGE UP (ref 0–0)
PLATELET # BLD AUTO: 354 K/UL — SIGNIFICANT CHANGE UP (ref 150–400)
POTASSIUM SERPL-MCNC: 4.1 MMOL/L — SIGNIFICANT CHANGE UP (ref 3.5–5.3)
POTASSIUM SERPL-SCNC: 4.1 MMOL/L — SIGNIFICANT CHANGE UP (ref 3.5–5.3)
RBC # BLD: 2.77 M/UL — LOW (ref 3.8–5.2)
RBC # FLD: 18.4 % — HIGH (ref 10.3–14.5)
SODIUM SERPL-SCNC: 142 MMOL/L — SIGNIFICANT CHANGE UP (ref 135–145)
WBC # BLD: 14.98 K/UL — HIGH (ref 3.8–10.5)
WBC # FLD AUTO: 14.98 K/UL — HIGH (ref 3.8–10.5)

## 2018-03-11 RX ORDER — FUROSEMIDE 40 MG
80 TABLET ORAL ONCE
Qty: 0 | Refills: 0 | Status: COMPLETED | OUTPATIENT
Start: 2018-03-11 | End: 2018-03-11

## 2018-03-11 RX ADMIN — Medication 4: at 08:08

## 2018-03-11 RX ADMIN — HEPARIN SODIUM 5000 UNIT(S): 5000 INJECTION INTRAVENOUS; SUBCUTANEOUS at 17:13

## 2018-03-11 RX ADMIN — HEPARIN SODIUM 5000 UNIT(S): 5000 INJECTION INTRAVENOUS; SUBCUTANEOUS at 05:30

## 2018-03-11 RX ADMIN — Medication 3 MILLILITER(S): at 13:18

## 2018-03-11 RX ADMIN — ATORVASTATIN CALCIUM 20 MILLIGRAM(S): 80 TABLET, FILM COATED ORAL at 21:36

## 2018-03-11 RX ADMIN — Medication 0.3 MILLIGRAM(S): at 21:36

## 2018-03-11 RX ADMIN — Medication 2: at 21:45

## 2018-03-11 RX ADMIN — PIPERACILLIN AND TAZOBACTAM 25 GRAM(S): 4; .5 INJECTION, POWDER, LYOPHILIZED, FOR SOLUTION INTRAVENOUS at 17:14

## 2018-03-11 RX ADMIN — Medication 100 MILLIGRAM(S): at 17:13

## 2018-03-11 RX ADMIN — Medication 80 MILLIGRAM(S): at 17:13

## 2018-03-11 RX ADMIN — Medication 2: at 11:30

## 2018-03-11 RX ADMIN — Medication 0.3 MILLIGRAM(S): at 05:24

## 2018-03-11 RX ADMIN — Medication 0.3 MILLIGRAM(S): at 13:03

## 2018-03-11 RX ADMIN — Medication 3 MILLILITER(S): at 05:35

## 2018-03-11 RX ADMIN — Medication 6: at 16:08

## 2018-03-11 RX ADMIN — PIPERACILLIN AND TAZOBACTAM 25 GRAM(S): 4; .5 INJECTION, POWDER, LYOPHILIZED, FOR SOLUTION INTRAVENOUS at 05:24

## 2018-03-11 RX ADMIN — Medication 100 MILLIGRAM(S): at 05:24

## 2018-03-11 RX ADMIN — Medication 3 MILLILITER(S): at 21:08

## 2018-03-11 RX ADMIN — Medication 100 MILLIGRAM(S): at 17:14

## 2018-03-11 RX ADMIN — INSULIN GLARGINE 15 UNIT(S): 100 INJECTION, SOLUTION SUBCUTANEOUS at 21:44

## 2018-03-11 NOTE — PROGRESS NOTE ADULT - ASSESSMENT
73 y/o F with a pmhx of HTN, HL, DM, CKD stage 5 in process of starting dialysis, has AVF, also with CHF p/w sob x 3 days, sec to fluid overload/pna, As per PT eval DC plan to [facility], pt will also likely require home oxygen, as pulse ox dropped to 79% today on RA and oxygen via NC resumed.

## 2018-03-11 NOTE — PROGRESS NOTE ADULT - PROBLEM SELECTOR PLAN 3
as above  also with anemia of CKD.  h/h stable  Creat stable in 6.  No hyperkalemia.  CRAIG [place] today  monitor for TOV

## 2018-03-11 NOTE — PROGRESS NOTE ADULT - PROBLEM SELECTOR PLAN 1
- likely 2/2 to volume overload due to CKD 5 - resp. status better now.  - on 2L NC ,Monitor sats, O2 sat dropped to 79% on RA, pt will likely need Home oxygen upon DC  - renal on board,  s/p LASIX X3

## 2018-03-11 NOTE — PROGRESS NOTE ADULT - PROBLEM SELECTOR PLAN 6
uncontrolled  c/w anti HTnves. increase clonidien freq to tid to optimize bp control  Renal following

## 2018-03-11 NOTE — PROGRESS NOTE ADULT - PROBLEM SELECTOR PLAN 2
likely 2/2   to renal status , has AV graft, as per vascular  mature and ready to use, -As per renal no indication for immediate HD at this time.  - on 2L NC ,Monitor sats  - renal on board

## 2018-03-12 DIAGNOSIS — N18.5 CHRONIC KIDNEY DISEASE, STAGE 5: ICD-10-CM

## 2018-03-12 DIAGNOSIS — J96.01 ACUTE RESPIRATORY FAILURE WITH HYPOXIA: ICD-10-CM

## 2018-03-12 LAB
% ALBUMIN: 48.6 % — SIGNIFICANT CHANGE UP
% ALPHA 1: 8.7 % — SIGNIFICANT CHANGE UP
% ALPHA 2: 13.6 % — SIGNIFICANT CHANGE UP
% BETA: 12.7 % — SIGNIFICANT CHANGE UP
% GAMMA: 16.4 % — SIGNIFICANT CHANGE UP
ALBUMIN SERPL ELPH-MCNC: 2.6 G/DL — LOW (ref 3.6–5.5)
ALBUMIN/GLOB SERPL ELPH: 1 RATIO — SIGNIFICANT CHANGE UP
ALLERGY+IMMUNOLOGY DIAG STUDY NOTE: SIGNIFICANT CHANGE UP
ALPHA1 GLOB SERPL ELPH-MCNC: 0.5 G/DL — HIGH (ref 0.1–0.4)
ALPHA2 GLOB SERPL ELPH-MCNC: 0.7 G/DL — SIGNIFICANT CHANGE UP (ref 0.5–1)
ANION GAP SERPL CALC-SCNC: 12 MMOL/L — SIGNIFICANT CHANGE UP (ref 5–17)
B-GLOBULIN SERPL ELPH-MCNC: 0.7 G/DL — SIGNIFICANT CHANGE UP (ref 0.5–1)
BLD GP AB SCN SERPL QL: ABNORMAL
BUN SERPL-MCNC: 113 MG/DL — HIGH (ref 7–23)
CALCIUM SERPL-MCNC: 7.4 MG/DL — LOW (ref 8.5–10.1)
CHLORIDE SERPL-SCNC: 104 MMOL/L — SIGNIFICANT CHANGE UP (ref 96–108)
CO2 SERPL-SCNC: 25 MMOL/L — SIGNIFICANT CHANGE UP (ref 22–31)
CREAT SERPL-MCNC: 7.15 MG/DL — HIGH (ref 0.5–1.3)
DIR ANTIGLOB POLYSPECIFIC INTERPRETATION: SIGNIFICANT CHANGE UP
GAMMA GLOBULIN: 0.9 G/DL — SIGNIFICANT CHANGE UP (ref 0.6–1.6)
GLUCOSE BLDC GLUCOMTR-MCNC: 131 MG/DL — HIGH (ref 70–99)
GLUCOSE BLDC GLUCOMTR-MCNC: 136 MG/DL — HIGH (ref 70–99)
GLUCOSE BLDC GLUCOMTR-MCNC: 330 MG/DL — HIGH (ref 70–99)
GLUCOSE SERPL-MCNC: 157 MG/DL — HIGH (ref 70–99)
HAV IGM SER-ACNC: SIGNIFICANT CHANGE UP
HBV CORE IGM SER-ACNC: SIGNIFICANT CHANGE UP
HBV SURFACE AB SER-ACNC: SIGNIFICANT CHANGE UP
HBV SURFACE AG SER-ACNC: SIGNIFICANT CHANGE UP
HCT VFR BLD CALC: 21.4 % — LOW (ref 34.5–45)
HCV AB S/CO SERPL IA: 0.11 S/CO — SIGNIFICANT CHANGE UP
HCV AB SERPL-IMP: SIGNIFICANT CHANGE UP
HGB BLD-MCNC: 6.8 G/DL — CRITICAL LOW (ref 11.5–15.5)
IGA FLD-MCNC: 224 MG/DL — SIGNIFICANT CHANGE UP (ref 68–378)
IGG FLD-MCNC: 820 MG/DL — SIGNIFICANT CHANGE UP (ref 694–1618)
IGM SERPL-MCNC: 32 MG/DL — LOW (ref 40–230)
INTERPRETATION SERPL IFE-IMP: SIGNIFICANT CHANGE UP
KAPPA LC SER QL IFE: 19.4 MG/DL — HIGH (ref 0.33–1.94)
KAPPA/LAMBDA FREE LIGHT CHAIN RATIO, SERUM: 1.11 RATIO — SIGNIFICANT CHANGE UP (ref 0.26–1.65)
LAMBDA LC SER QL IFE: 17.4 MG/DL — HIGH (ref 0.57–2.63)
MCHC RBC-ENTMCNC: 25.7 PG — LOW (ref 27–34)
MCHC RBC-ENTMCNC: 31.8 GM/DL — LOW (ref 32–36)
MCV RBC AUTO: 80.8 FL — SIGNIFICANT CHANGE UP (ref 80–100)
NRBC # BLD: 0 /100 WBCS — SIGNIFICANT CHANGE UP (ref 0–0)
PLATELET # BLD AUTO: 336 K/UL — SIGNIFICANT CHANGE UP (ref 150–400)
POTASSIUM SERPL-MCNC: 4.1 MMOL/L — SIGNIFICANT CHANGE UP (ref 3.5–5.3)
POTASSIUM SERPL-SCNC: 4.1 MMOL/L — SIGNIFICANT CHANGE UP (ref 3.5–5.3)
PROT PATTERN SERPL ELPH-IMP: SIGNIFICANT CHANGE UP
RBC # BLD: 2.65 M/UL — LOW (ref 3.8–5.2)
RBC # FLD: 18.6 % — HIGH (ref 10.3–14.5)
SODIUM SERPL-SCNC: 141 MMOL/L — SIGNIFICANT CHANGE UP (ref 135–145)
WBC # BLD: 14.62 K/UL — HIGH (ref 3.8–10.5)
WBC # FLD AUTO: 14.62 K/UL — HIGH (ref 3.8–10.5)

## 2018-03-12 PROCEDURE — 86077 PHYS BLOOD BANK SERV XMATCH: CPT

## 2018-03-12 PROCEDURE — 99233 SBSQ HOSP IP/OBS HIGH 50: CPT

## 2018-03-12 RX ORDER — LIDOCAINE AND PRILOCAINE CREAM 25; 25 MG/G; MG/G
1 CREAM TOPICAL DAILY
Qty: 0 | Refills: 0 | Status: DISCONTINUED | OUTPATIENT
Start: 2018-03-12 | End: 2018-03-16

## 2018-03-12 RX ORDER — DIPHENOXYLATE HCL/ATROPINE 2.5-.025MG
1 TABLET ORAL EVERY 8 HOURS
Qty: 0 | Refills: 0 | Status: DISCONTINUED | OUTPATIENT
Start: 2018-03-12 | End: 2018-03-13

## 2018-03-12 RX ORDER — DIPHENOXYLATE HCL/ATROPINE 2.5-.025MG
5 TABLET ORAL EVERY 8 HOURS
Qty: 0 | Refills: 0 | Status: DISCONTINUED | OUTPATIENT
Start: 2018-03-12 | End: 2018-03-12

## 2018-03-12 RX ORDER — PIPERACILLIN AND TAZOBACTAM 4; .5 G/20ML; G/20ML
3.38 INJECTION, POWDER, LYOPHILIZED, FOR SOLUTION INTRAVENOUS ONCE
Qty: 0 | Refills: 0 | Status: COMPLETED | OUTPATIENT
Start: 2018-03-12 | End: 2018-03-12

## 2018-03-12 RX ADMIN — LIDOCAINE AND PRILOCAINE CREAM 1 APPLICATION(S): 25; 25 CREAM TOPICAL at 16:20

## 2018-03-12 RX ADMIN — Medication 3 MILLILITER(S): at 21:52

## 2018-03-12 RX ADMIN — Medication 3 MILLILITER(S): at 05:13

## 2018-03-12 RX ADMIN — PIPERACILLIN AND TAZOBACTAM 25 GRAM(S): 4; .5 INJECTION, POWDER, LYOPHILIZED, FOR SOLUTION INTRAVENOUS at 06:13

## 2018-03-12 RX ADMIN — Medication 1 TABLET(S): at 21:02

## 2018-03-12 RX ADMIN — HEPARIN SODIUM 5000 UNIT(S): 5000 INJECTION INTRAVENOUS; SUBCUTANEOUS at 06:11

## 2018-03-12 RX ADMIN — Medication 100 MILLIGRAM(S): at 06:11

## 2018-03-12 RX ADMIN — Medication 0.3 MILLIGRAM(S): at 06:12

## 2018-03-12 RX ADMIN — Medication 100 MILLIGRAM(S): at 18:35

## 2018-03-12 RX ADMIN — INSULIN GLARGINE 15 UNIT(S): 100 INJECTION, SOLUTION SUBCUTANEOUS at 21:30

## 2018-03-12 RX ADMIN — ATORVASTATIN CALCIUM 20 MILLIGRAM(S): 80 TABLET, FILM COATED ORAL at 21:02

## 2018-03-12 RX ADMIN — Medication 0.3 MILLIGRAM(S): at 13:48

## 2018-03-12 RX ADMIN — Medication 3 MILLILITER(S): at 13:43

## 2018-03-12 RX ADMIN — Medication 8: at 11:17

## 2018-03-12 RX ADMIN — PIPERACILLIN AND TAZOBACTAM 25 GRAM(S): 4; .5 INJECTION, POWDER, LYOPHILIZED, FOR SOLUTION INTRAVENOUS at 20:53

## 2018-03-12 RX ADMIN — Medication 0.3 MILLIGRAM(S): at 21:02

## 2018-03-12 NOTE — PROGRESS NOTE ADULT - PROBLEM SELECTOR PLAN 2
likely 2/2   to renal status , has AV graft, as per vascular  mature and ready to use, -renal is on board   -on 2L NC ,Monitor sats

## 2018-03-12 NOTE — PROGRESS NOTE ADULT - PROBLEM SELECTOR PLAN 1
- likely 2/2 to volume overload due to CKD 5 - resp. status better now.  - on 2L NC, Monitor sats  - renal on board

## 2018-03-12 NOTE — PROGRESS NOTE ADULT - PROBLEM SELECTOR PROBLEM 7
Type 2 diabetes mellitus with diabetic nephropathy, unspecified long term insulin use status Essential hypertension

## 2018-03-12 NOTE — PROGRESS NOTE ADULT - PROBLEM SELECTOR PLAN 7
-hbA1c 8.1  -c/w fsbs monitoring, ISS -uncontrolled  -c/w anti HTnves. increase clonidien freq to tid to optimize bp control  -Renal following

## 2018-03-12 NOTE — PROGRESS NOTE ADULT - PROBLEM SELECTOR PLAN 6
-uncontrolled  -c/w anti HTnves. increase clonidien freq to tid to optimize bp control  -Renal following - Low sodium , no concentrated potassium , no concentrated phosphorus , consistent carbohydrate c evening snack , Glucerna Shake 3 x daily = 600 calories, 30 grams protein daily

## 2018-03-12 NOTE — CHART NOTE - NSCHARTNOTEFT_GEN_A_CORE
Notified of critical value:  H/H                         6.8    14.62 )-----------( 336      ( 12 Mar 2018 06:42 )             21.4     stat type and screen  transfuse 1 unit PRBC

## 2018-03-12 NOTE — PROGRESS NOTE ADULT - PROBLEM SELECTOR PLAN 5
- Low sodium , no concentrated potassium , no concentrated phosphorus , consistent carbohydrate c evening snack , Glucerna Shake 3 x daily = 600 calories, 30 grams protein daily -as above  -also with anemia of CKD.  -monitor for TOV

## 2018-03-12 NOTE — PROGRESS NOTE ADULT - ASSESSMENT
73 y/o F with a pmhx of HTN, HL, DM, CKD stage 5 in process of starting dialysis, has AVF, also with CHF p/w sob x 3 days, sec to fluid overload/pna, As per PT eval DC plan to [facility].

## 2018-03-13 LAB
ANION GAP SERPL CALC-SCNC: 11 MMOL/L — SIGNIFICANT CHANGE UP (ref 5–17)
BUN SERPL-MCNC: 69 MG/DL — HIGH (ref 7–23)
CALCIUM SERPL-MCNC: 7.4 MG/DL — LOW (ref 8.5–10.1)
CHLORIDE SERPL-SCNC: 105 MMOL/L — SIGNIFICANT CHANGE UP (ref 96–108)
CO2 SERPL-SCNC: 26 MMOL/L — SIGNIFICANT CHANGE UP (ref 22–31)
CREAT SERPL-MCNC: 5.31 MG/DL — HIGH (ref 0.5–1.3)
CULTURE RESULTS: SIGNIFICANT CHANGE UP
CULTURE RESULTS: SIGNIFICANT CHANGE UP
GLUCOSE BLDC GLUCOMTR-MCNC: 148 MG/DL — HIGH (ref 70–99)
GLUCOSE BLDC GLUCOMTR-MCNC: 176 MG/DL — HIGH (ref 70–99)
GLUCOSE BLDC GLUCOMTR-MCNC: 222 MG/DL — HIGH (ref 70–99)
GLUCOSE SERPL-MCNC: 147 MG/DL — HIGH (ref 70–99)
HAV IGM SER-ACNC: SIGNIFICANT CHANGE UP
HBV CORE IGM SER-ACNC: SIGNIFICANT CHANGE UP
HBV SURFACE AB SER-ACNC: <3 MIU/ML — LOW
HBV SURFACE AG SER-ACNC: SIGNIFICANT CHANGE UP
HCT VFR BLD CALC: 25.7 % — LOW (ref 34.5–45)
HCV AB S/CO SERPL IA: 0.13 S/CO — SIGNIFICANT CHANGE UP
HCV AB SERPL-IMP: SIGNIFICANT CHANGE UP
HGB BLD-MCNC: 8.4 G/DL — LOW (ref 11.5–15.5)
MCHC RBC-ENTMCNC: 26.6 PG — LOW (ref 27–34)
MCHC RBC-ENTMCNC: 32.7 GM/DL — SIGNIFICANT CHANGE UP (ref 32–36)
MCV RBC AUTO: 81.3 FL — SIGNIFICANT CHANGE UP (ref 80–100)
NRBC # BLD: 0 /100 WBCS — SIGNIFICANT CHANGE UP (ref 0–0)
PLATELET # BLD AUTO: 309 K/UL — SIGNIFICANT CHANGE UP (ref 150–400)
POTASSIUM SERPL-MCNC: 3.9 MMOL/L — SIGNIFICANT CHANGE UP (ref 3.5–5.3)
POTASSIUM SERPL-SCNC: 3.9 MMOL/L — SIGNIFICANT CHANGE UP (ref 3.5–5.3)
RBC # BLD: 3.16 M/UL — LOW (ref 3.8–5.2)
RBC # FLD: 18.2 % — HIGH (ref 10.3–14.5)
SODIUM SERPL-SCNC: 142 MMOL/L — SIGNIFICANT CHANGE UP (ref 135–145)
SPECIMEN SOURCE: SIGNIFICANT CHANGE UP
SPECIMEN SOURCE: SIGNIFICANT CHANGE UP
WBC # BLD: 12.41 K/UL — HIGH (ref 3.8–10.5)
WBC # FLD AUTO: 12.41 K/UL — HIGH (ref 3.8–10.5)

## 2018-03-13 PROCEDURE — 99233 SBSQ HOSP IP/OBS HIGH 50: CPT

## 2018-03-13 RX ORDER — IRON SUCROSE 20 MG/ML
100 INJECTION, SOLUTION INTRAVENOUS ONCE
Qty: 0 | Refills: 0 | Status: COMPLETED | OUTPATIENT
Start: 2018-03-13 | End: 2018-03-13

## 2018-03-13 RX ADMIN — Medication 0.3 MILLIGRAM(S): at 21:15

## 2018-03-13 RX ADMIN — HEPARIN SODIUM 5000 UNIT(S): 5000 INJECTION INTRAVENOUS; SUBCUTANEOUS at 05:37

## 2018-03-13 RX ADMIN — Medication 100 MILLIGRAM(S): at 18:47

## 2018-03-13 RX ADMIN — Medication 100 MILLIGRAM(S): at 05:38

## 2018-03-13 RX ADMIN — LIDOCAINE AND PRILOCAINE CREAM 1 APPLICATION(S): 25; 25 CREAM TOPICAL at 15:01

## 2018-03-13 RX ADMIN — Medication 3 MILLILITER(S): at 05:35

## 2018-03-13 RX ADMIN — Medication 100 MILLIGRAM(S): at 18:50

## 2018-03-13 RX ADMIN — Medication 1 TABLET(S): at 05:37

## 2018-03-13 RX ADMIN — Medication 0.3 MILLIGRAM(S): at 05:37

## 2018-03-13 RX ADMIN — PIPERACILLIN AND TAZOBACTAM 25 GRAM(S): 4; .5 INJECTION, POWDER, LYOPHILIZED, FOR SOLUTION INTRAVENOUS at 20:04

## 2018-03-13 RX ADMIN — PIPERACILLIN AND TAZOBACTAM 25 GRAM(S): 4; .5 INJECTION, POWDER, LYOPHILIZED, FOR SOLUTION INTRAVENOUS at 05:41

## 2018-03-13 RX ADMIN — ATORVASTATIN CALCIUM 20 MILLIGRAM(S): 80 TABLET, FILM COATED ORAL at 21:15

## 2018-03-13 RX ADMIN — Medication 3 MILLILITER(S): at 14:17

## 2018-03-13 RX ADMIN — IRON SUCROSE 210 MILLIGRAM(S): 20 INJECTION, SOLUTION INTRAVENOUS at 18:12

## 2018-03-13 RX ADMIN — Medication 2: at 07:24

## 2018-03-13 RX ADMIN — INSULIN GLARGINE 15 UNIT(S): 100 INJECTION, SOLUTION SUBCUTANEOUS at 21:15

## 2018-03-13 RX ADMIN — Medication 0.3 MILLIGRAM(S): at 13:49

## 2018-03-13 RX ADMIN — Medication 3 MILLILITER(S): at 21:00

## 2018-03-13 NOTE — PROGRESS NOTE ADULT - ASSESSMENT
71 y/o F with a pmhx of HTN, HL, DM, CKD stage 5, has AVF, also with CHF p/w sob x 3 days, sec to fluid overload/pna, As per PT eval DC plan to [facility]. Pt started on dialysis on 3/12/18. Xray Chest 1 View AP/PA. (03.08.18 @ 07:34) >Bilateral lung opacities, suspicious for pneumonia. on abx.

## 2018-03-13 NOTE — PROGRESS NOTE ADULT - PROBLEM SELECTOR PLAN 2
likely 2/2   to renal status, has AV graft  -renal is on board ; started on dialysis   -on 2L NC ,Monitor sats

## 2018-03-14 LAB
ANION GAP SERPL CALC-SCNC: 8 MMOL/L — SIGNIFICANT CHANGE UP (ref 5–17)
ANTIBODY INTERPRETATION 2: SIGNIFICANT CHANGE UP
BUN SERPL-MCNC: 42 MG/DL — HIGH (ref 7–23)
CALCIUM SERPL-MCNC: 7.6 MG/DL — LOW (ref 8.5–10.1)
CHLORIDE SERPL-SCNC: 103 MMOL/L — SIGNIFICANT CHANGE UP (ref 96–108)
CO2 SERPL-SCNC: 30 MMOL/L — SIGNIFICANT CHANGE UP (ref 22–31)
CREAT SERPL-MCNC: 3.79 MG/DL — HIGH (ref 0.5–1.3)
GLUCOSE BLDC GLUCOMTR-MCNC: 179 MG/DL — HIGH (ref 70–99)
GLUCOSE BLDC GLUCOMTR-MCNC: 199 MG/DL — HIGH (ref 70–99)
GLUCOSE BLDC GLUCOMTR-MCNC: 227 MG/DL — HIGH (ref 70–99)
GLUCOSE BLDC GLUCOMTR-MCNC: 282 MG/DL — HIGH (ref 70–99)
GLUCOSE SERPL-MCNC: 63 MG/DL — LOW (ref 70–99)
HCT VFR BLD CALC: 25.3 % — LOW (ref 34.5–45)
HGB BLD-MCNC: 8.2 G/DL — LOW (ref 11.5–15.5)
MCHC RBC-ENTMCNC: 27 PG — SIGNIFICANT CHANGE UP (ref 27–34)
MCHC RBC-ENTMCNC: 32.4 GM/DL — SIGNIFICANT CHANGE UP (ref 32–36)
MCV RBC AUTO: 83.2 FL — SIGNIFICANT CHANGE UP (ref 80–100)
NRBC # BLD: 0 /100 WBCS — SIGNIFICANT CHANGE UP (ref 0–0)
PLATELET # BLD AUTO: 312 K/UL — SIGNIFICANT CHANGE UP (ref 150–400)
POTASSIUM SERPL-MCNC: 3.4 MMOL/L — LOW (ref 3.5–5.3)
POTASSIUM SERPL-SCNC: 3.4 MMOL/L — LOW (ref 3.5–5.3)
RBC # BLD: 3.04 M/UL — LOW (ref 3.8–5.2)
RBC # FLD: 18.6 % — HIGH (ref 10.3–14.5)
SODIUM SERPL-SCNC: 141 MMOL/L — SIGNIFICANT CHANGE UP (ref 135–145)
WBC # BLD: 10.88 K/UL — HIGH (ref 3.8–10.5)
WBC # FLD AUTO: 10.88 K/UL — HIGH (ref 3.8–10.5)

## 2018-03-14 PROCEDURE — 99233 SBSQ HOSP IP/OBS HIGH 50: CPT

## 2018-03-14 RX ORDER — DOXAZOSIN MESYLATE 4 MG
2 TABLET ORAL AT BEDTIME
Qty: 0 | Refills: 0 | Status: DISCONTINUED | OUTPATIENT
Start: 2018-03-14 | End: 2018-03-16

## 2018-03-14 RX ORDER — ACETAMINOPHEN 500 MG
650 TABLET ORAL EVERY 8 HOURS
Qty: 0 | Refills: 0 | Status: DISCONTINUED | OUTPATIENT
Start: 2018-03-14 | End: 2018-03-16

## 2018-03-14 RX ORDER — CARVEDILOL PHOSPHATE 80 MG/1
6.25 CAPSULE, EXTENDED RELEASE ORAL EVERY 12 HOURS
Qty: 0 | Refills: 0 | Status: DISCONTINUED | OUTPATIENT
Start: 2018-03-14 | End: 2018-03-16

## 2018-03-14 RX ADMIN — Medication 3 MILLILITER(S): at 05:53

## 2018-03-14 RX ADMIN — HEPARIN SODIUM 5000 UNIT(S): 5000 INJECTION INTRAVENOUS; SUBCUTANEOUS at 17:18

## 2018-03-14 RX ADMIN — HEPARIN SODIUM 5000 UNIT(S): 5000 INJECTION INTRAVENOUS; SUBCUTANEOUS at 05:14

## 2018-03-14 RX ADMIN — Medication 100 MILLIGRAM(S): at 05:14

## 2018-03-14 RX ADMIN — Medication 6: at 07:43

## 2018-03-14 RX ADMIN — Medication 2: at 17:18

## 2018-03-14 RX ADMIN — Medication 650 MILLIGRAM(S): at 12:22

## 2018-03-14 RX ADMIN — CARVEDILOL PHOSPHATE 6.25 MILLIGRAM(S): 80 CAPSULE, EXTENDED RELEASE ORAL at 17:17

## 2018-03-14 RX ADMIN — LIDOCAINE AND PRILOCAINE CREAM 1 APPLICATION(S): 25; 25 CREAM TOPICAL at 09:00

## 2018-03-14 RX ADMIN — INSULIN GLARGINE 15 UNIT(S): 100 INJECTION, SOLUTION SUBCUTANEOUS at 23:14

## 2018-03-14 RX ADMIN — Medication 2 MILLIGRAM(S): at 21:53

## 2018-03-14 RX ADMIN — PIPERACILLIN AND TAZOBACTAM 25 GRAM(S): 4; .5 INJECTION, POWDER, LYOPHILIZED, FOR SOLUTION INTRAVENOUS at 17:18

## 2018-03-14 RX ADMIN — Medication 3 MILLIGRAM(S): at 23:13

## 2018-03-14 RX ADMIN — ATORVASTATIN CALCIUM 20 MILLIGRAM(S): 80 TABLET, FILM COATED ORAL at 21:52

## 2018-03-14 RX ADMIN — Medication 0.3 MILLIGRAM(S): at 21:53

## 2018-03-14 RX ADMIN — Medication 0.3 MILLIGRAM(S): at 05:14

## 2018-03-14 RX ADMIN — Medication 0.3 MILLIGRAM(S): at 14:27

## 2018-03-14 RX ADMIN — Medication 100 MILLIGRAM(S): at 17:18

## 2018-03-14 RX ADMIN — Medication 100 MILLIGRAM(S): at 17:17

## 2018-03-14 RX ADMIN — PIPERACILLIN AND TAZOBACTAM 25 GRAM(S): 4; .5 INJECTION, POWDER, LYOPHILIZED, FOR SOLUTION INTRAVENOUS at 05:14

## 2018-03-14 NOTE — PROGRESS NOTE ADULT - PROBLEM SELECTOR PLAN 1
- likely 2/2 to volume overload due to CKD 5 - resp. status better now.  - on 2L NC, Monitor sats  - renal on board; ongoing hemodialysis, today is day#3

## 2018-03-14 NOTE — PROGRESS NOTE ADULT - PROBLEM SELECTOR PLAN 8
-hbA1c 8.1  -c/w fsbs monitoring, ISS

## 2018-03-14 NOTE — PROGRESS NOTE ADULT - PROBLEM SELECTOR PROBLEM 8
Type 2 diabetes mellitus with diabetic nephropathy, unspecified long term insulin use status

## 2018-03-14 NOTE — PROGRESS NOTE ADULT - PROBLEM SELECTOR PLAN 2
likely 2/2 to renal status, has AV graft  -renal is on board ; started on dialysis   -on 2L NC, Monitor sats

## 2018-03-14 NOTE — PROGRESS NOTE ADULT - ASSESSMENT
71 y/o F with a pmhx of HTN, HL, DM, CKD stage 5, has AVF, also with CHF p/w sob x 3 days, sec to fluid overload/pna, As per PT eval DC plan to [facility]. Pt started on dialysis on 3/12/18. Xray Chest 1 View AP/PA. (03.08.18 @ 07:34) >Bilateral lung opacities, suspicious for sespsis/pneumonia. on abx. Pt agreed for hemodialysis and started.

## 2018-03-15 DIAGNOSIS — J81.0 ACUTE PULMONARY EDEMA: ICD-10-CM

## 2018-03-15 LAB
GLUCOSE BLDC GLUCOMTR-MCNC: 105 MG/DL — HIGH (ref 70–99)
GLUCOSE BLDC GLUCOMTR-MCNC: 109 MG/DL — HIGH (ref 70–99)
GLUCOSE BLDC GLUCOMTR-MCNC: 249 MG/DL — HIGH (ref 70–99)
GLUCOSE BLDC GLUCOMTR-MCNC: 97 MG/DL — SIGNIFICANT CHANGE UP (ref 70–99)

## 2018-03-15 PROCEDURE — 99233 SBSQ HOSP IP/OBS HIGH 50: CPT

## 2018-03-15 RX ORDER — VALSARTAN 80 MG/1
40 TABLET ORAL DAILY
Qty: 0 | Refills: 0 | Status: DISCONTINUED | OUTPATIENT
Start: 2018-03-15 | End: 2018-03-16

## 2018-03-15 RX ADMIN — CARVEDILOL PHOSPHATE 6.25 MILLIGRAM(S): 80 CAPSULE, EXTENDED RELEASE ORAL at 05:01

## 2018-03-15 RX ADMIN — HEPARIN SODIUM 5000 UNIT(S): 5000 INJECTION INTRAVENOUS; SUBCUTANEOUS at 05:01

## 2018-03-15 RX ADMIN — Medication 100 MILLIGRAM(S): at 17:15

## 2018-03-15 RX ADMIN — Medication 3 MILLILITER(S): at 13:50

## 2018-03-15 RX ADMIN — Medication 2 MILLIGRAM(S): at 21:18

## 2018-03-15 RX ADMIN — Medication 0.3 MILLIGRAM(S): at 05:01

## 2018-03-15 RX ADMIN — INSULIN GLARGINE 15 UNIT(S): 100 INJECTION, SOLUTION SUBCUTANEOUS at 21:18

## 2018-03-15 RX ADMIN — CARVEDILOL PHOSPHATE 6.25 MILLIGRAM(S): 80 CAPSULE, EXTENDED RELEASE ORAL at 17:16

## 2018-03-15 RX ADMIN — HEPARIN SODIUM 5000 UNIT(S): 5000 INJECTION INTRAVENOUS; SUBCUTANEOUS at 17:15

## 2018-03-15 RX ADMIN — Medication 100 MILLIGRAM(S): at 05:01

## 2018-03-15 RX ADMIN — Medication 100 MILLIGRAM(S): at 17:14

## 2018-03-15 RX ADMIN — PIPERACILLIN AND TAZOBACTAM 25 GRAM(S): 4; .5 INJECTION, POWDER, LYOPHILIZED, FOR SOLUTION INTRAVENOUS at 20:06

## 2018-03-15 RX ADMIN — Medication 0.3 MILLIGRAM(S): at 14:30

## 2018-03-15 RX ADMIN — ATORVASTATIN CALCIUM 20 MILLIGRAM(S): 80 TABLET, FILM COATED ORAL at 21:18

## 2018-03-15 RX ADMIN — Medication 3 MILLILITER(S): at 05:33

## 2018-03-15 RX ADMIN — Medication 0.3 MILLIGRAM(S): at 21:18

## 2018-03-15 RX ADMIN — Medication 3 MILLILITER(S): at 23:00

## 2018-03-15 RX ADMIN — PIPERACILLIN AND TAZOBACTAM 25 GRAM(S): 4; .5 INJECTION, POWDER, LYOPHILIZED, FOR SOLUTION INTRAVENOUS at 08:12

## 2018-03-15 NOTE — PROGRESS NOTE ADULT - ASSESSMENT
71 y/o F with a pmhx of HTN, HL, DM, CKD stage 5, has AVF, also with CHF p/w sob x 3 days, sec to fluid overload/pna, As per PT eval DC plan to [facility]. Pt started on dialysis on 3/12/18. Xray Chest 1 View AP/PA. (03.08.18 @ 07:34) >Bilateral lung opacities, suspicious for sespsis/pneumonia. on abx. Pt agreed for hemodialysis and started. awaiting dialysis center spot for scheduled session. SW are aware.

## 2018-03-15 NOTE — PROGRESS NOTE ADULT - NSHPATTENDINGPLANDISCUSS_GEN_ALL_CORE
pt,rn
Nursing staff
Patient and consultants
Patient and consultants, RN

## 2018-03-16 ENCOUNTER — TRANSCRIPTION ENCOUNTER (OUTPATIENT)
Age: 73
End: 2018-03-16

## 2018-03-16 VITALS — DIASTOLIC BLOOD PRESSURE: 69 MMHG | SYSTOLIC BLOOD PRESSURE: 163 MMHG | HEART RATE: 70 BPM

## 2018-03-16 LAB
ANION GAP SERPL CALC-SCNC: 11 MMOL/L — SIGNIFICANT CHANGE UP (ref 5–17)
BUN SERPL-MCNC: 59 MG/DL — HIGH (ref 7–23)
CALCIUM SERPL-MCNC: 7.5 MG/DL — LOW (ref 8.5–10.1)
CHLORIDE SERPL-SCNC: 98 MMOL/L — SIGNIFICANT CHANGE UP (ref 96–108)
CO2 SERPL-SCNC: 25 MMOL/L — SIGNIFICANT CHANGE UP (ref 22–31)
CREAT SERPL-MCNC: 4.43 MG/DL — HIGH (ref 0.5–1.3)
GLUCOSE BLDC GLUCOMTR-MCNC: 206 MG/DL — HIGH (ref 70–99)
GLUCOSE SERPL-MCNC: 169 MG/DL — HIGH (ref 70–99)
HCT VFR BLD CALC: 24.2 % — LOW (ref 34.5–45)
HGB BLD-MCNC: 7.7 G/DL — LOW (ref 11.5–15.5)
MCHC RBC-ENTMCNC: 26.2 PG — LOW (ref 27–34)
MCHC RBC-ENTMCNC: 31.8 GM/DL — LOW (ref 32–36)
MCV RBC AUTO: 82.3 FL — SIGNIFICANT CHANGE UP (ref 80–100)
NRBC # BLD: 0 /100 WBCS — SIGNIFICANT CHANGE UP (ref 0–0)
PLATELET # BLD AUTO: 264 K/UL — SIGNIFICANT CHANGE UP (ref 150–400)
POTASSIUM SERPL-MCNC: 3.7 MMOL/L — SIGNIFICANT CHANGE UP (ref 3.5–5.3)
POTASSIUM SERPL-SCNC: 3.7 MMOL/L — SIGNIFICANT CHANGE UP (ref 3.5–5.3)
RBC # BLD: 2.94 M/UL — LOW (ref 3.8–5.2)
RBC # FLD: 18.5 % — HIGH (ref 10.3–14.5)
SODIUM SERPL-SCNC: 134 MMOL/L — LOW (ref 135–145)
WBC # BLD: 10.44 K/UL — SIGNIFICANT CHANGE UP (ref 3.8–10.5)
WBC # FLD AUTO: 10.44 K/UL — SIGNIFICANT CHANGE UP (ref 3.8–10.5)

## 2018-03-16 PROCEDURE — 99239 HOSP IP/OBS DSCHRG MGMT >30: CPT

## 2018-03-16 RX ORDER — FUROSEMIDE 40 MG
0 TABLET ORAL
Qty: 0 | Refills: 0 | COMMUNITY

## 2018-03-16 RX ORDER — ETHYL CHLORIDE
1 AEROSOL, SPRAY (ML) TOPICAL
Qty: 0 | Refills: 0 | Status: DISCONTINUED | OUTPATIENT
Start: 2018-03-16 | End: 2018-03-16

## 2018-03-16 RX ORDER — DOXAZOSIN MESYLATE 4 MG
1 TABLET ORAL
Qty: 30 | Refills: 0
Start: 2018-03-16 | End: 2018-04-14

## 2018-03-16 RX ORDER — IRON SUCROSE 20 MG/ML
100 INJECTION, SOLUTION INTRAVENOUS ONCE
Qty: 0 | Refills: 0 | Status: COMPLETED | OUTPATIENT
Start: 2018-03-16 | End: 2018-03-16

## 2018-03-16 RX ORDER — AMLODIPINE BESYLATE 2.5 MG/1
10 TABLET ORAL ONCE
Qty: 0 | Refills: 0 | Status: COMPLETED | OUTPATIENT
Start: 2018-03-16 | End: 2018-03-16

## 2018-03-16 RX ORDER — AMLODIPINE BESYLATE 2.5 MG/1
1 TABLET ORAL
Qty: 0 | Refills: 0 | DISCHARGE
Start: 2018-03-16

## 2018-03-16 RX ORDER — CARVEDILOL PHOSPHATE 80 MG/1
1 CAPSULE, EXTENDED RELEASE ORAL
Qty: 60 | Refills: 0
Start: 2018-03-16 | End: 2018-04-14

## 2018-03-16 RX ORDER — AMLODIPINE BESYLATE 2.5 MG/1
1 TABLET ORAL
Qty: 0 | Refills: 0 | COMMUNITY

## 2018-03-16 RX ORDER — ERYTHROPOIETIN 10000 [IU]/ML
5000 INJECTION, SOLUTION INTRAVENOUS; SUBCUTANEOUS ONCE
Qty: 0 | Refills: 0 | Status: COMPLETED | OUTPATIENT
Start: 2018-03-16 | End: 2018-03-16

## 2018-03-16 RX ADMIN — IRON SUCROSE 210 MILLIGRAM(S): 20 INJECTION, SOLUTION INTRAVENOUS at 12:50

## 2018-03-16 RX ADMIN — Medication 100 MILLIGRAM(S): at 17:05

## 2018-03-16 RX ADMIN — Medication 0.3 MILLIGRAM(S): at 05:16

## 2018-03-16 RX ADMIN — Medication 100 MILLIGRAM(S): at 17:06

## 2018-03-16 RX ADMIN — AMLODIPINE BESYLATE 10 MILLIGRAM(S): 2.5 TABLET ORAL at 18:23

## 2018-03-16 RX ADMIN — CARVEDILOL PHOSPHATE 6.25 MILLIGRAM(S): 80 CAPSULE, EXTENDED RELEASE ORAL at 17:05

## 2018-03-16 RX ADMIN — Medication 3 MILLILITER(S): at 14:42

## 2018-03-16 RX ADMIN — HEPARIN SODIUM 5000 UNIT(S): 5000 INJECTION INTRAVENOUS; SUBCUTANEOUS at 05:16

## 2018-03-16 RX ADMIN — HEPARIN SODIUM 5000 UNIT(S): 5000 INJECTION INTRAVENOUS; SUBCUTANEOUS at 17:06

## 2018-03-16 RX ADMIN — PIPERACILLIN AND TAZOBACTAM 25 GRAM(S): 4; .5 INJECTION, POWDER, LYOPHILIZED, FOR SOLUTION INTRAVENOUS at 17:06

## 2018-03-16 RX ADMIN — CARVEDILOL PHOSPHATE 6.25 MILLIGRAM(S): 80 CAPSULE, EXTENDED RELEASE ORAL at 05:16

## 2018-03-16 RX ADMIN — VALSARTAN 40 MILLIGRAM(S): 80 TABLET ORAL at 06:51

## 2018-03-16 RX ADMIN — INFLUENZA VIRUS VACCINE 0.5 MILLILITER(S): 15; 15; 15; 15 SUSPENSION INTRAMUSCULAR at 18:24

## 2018-03-16 RX ADMIN — Medication 100 MILLIGRAM(S): at 05:16

## 2018-03-16 RX ADMIN — ERYTHROPOIETIN 5000 UNIT(S): 10000 INJECTION, SOLUTION INTRAVENOUS; SUBCUTANEOUS at 12:49

## 2018-03-16 RX ADMIN — Medication 4: at 07:38

## 2018-03-16 RX ADMIN — Medication 3 MILLILITER(S): at 05:34

## 2018-03-16 NOTE — CHART NOTE - NSCHARTNOTEFT_GEN_A_CORE
Pt c acute respiratory failure c hypoxia, acute pulmonary edema, anemia of chronic renal failure, pneumonia, stage 5 CKD on HD (initiated 03-12)    Factors impacting intake: [x ] none at present  [ ] nausea  [ ] vomiting [ ] diarrhea [ ] constipation  [ ]chewing problems [ ] swallowing issues  [ ] other:     Diet Prescription: Diet, Consistent Carbohydrate w/Evening Snack:   No Concentrated Potassium  Low Sodium  No Concentrated Phosphorus  Supplement Feeding Modality:  Oral  Glucerna Shake Cans or Servings Per Day:  3       Frequency:  Daily (03-09-18 @ 13:42)    Intake: 100% +Glucerna Shakes 3 x day    Current Weight: 03-16, 73.1 kg, 03-08 72.3 kg, 0.8 kg wt. gain   % Weight Change:  1.10%  no edema noted  I/O: 1425/0(+1425), 03-11, voided output 200 ml    Pertinent Medications: MEDICATIONS  (STANDING):  ALBUTerol/ipratropium for Nebulization 3 milliLiter(s) Nebulizer every 8 hours  atorvastatin 20 milliGRAM(s) Oral at bedtime  carvedilol 6.25 milliGRAM(s) Oral every 12 hours  cloNIDine 0.3 milliGRAM(s) Oral three times a day  dextrose 5%. 1000 milliLiter(s) (50 mL/Hr) IV Continuous <Continuous>  dextrose 50% Injectable 12.5 Gram(s) IV Push once  dextrose 50% Injectable 25 Gram(s) IV Push once  dextrose 50% Injectable 25 Gram(s) IV Push once  docusate sodium 100 milliGRAM(s) Oral two times a day  doxazosin 2 milliGRAM(s) Oral at bedtime  ethyl chloride Spray 1 Application(s) Topical <User Schedule>  heparin  Injectable 5000 Unit(s) SubCutaneous every 12 hours  hydrALAZINE 100 milliGRAM(s) Oral every 12 hours  influenza   Vaccine 0.5 milliLiter(s) IntraMuscular once  insulin glargine Injectable (LANTUS) 15 Unit(s) SubCutaneous at bedtime  insulin lispro (HumaLOG) corrective regimen sliding scale   SubCutaneous three times a day before meals  insulin lispro (HumaLOG) corrective regimen sliding scale   SubCutaneous at bedtime  lidocaine/prilocaine Cream 1 Application(s) Topical daily  piperacillin/tazobactam IVPB. 3.375 Gram(s) IV Intermittent every 12 hours  valsartan 40 milliGRAM(s) Oral daily    MEDICATIONS  (PRN):  acetaminophen   Tablet. 650 milliGRAM(s) Oral every 8 hours PRN Mild Pain (1 - 3)  dextrose Gel 1 Dose(s) Oral once PRN Blood Glucose LESS THAN 70 milliGRAM(s)/deciliter  glucagon  Injectable 1 milliGRAM(s) IntraMuscular once PRN Glucose LESS THAN 70 milligrams/deciliter  melatonin 3 milliGRAM(s) Oral at bedtime PRN Insomnia    Pertinent Labs: 03-16 Na134 mmol/L<L> Glu 169 mg/dL<H> K+ 3.7 mmol/L Cr  4.43 mg/dL<H> BUN 59 mg/dL<H> 03-10 Alb 2.6 g/dL<L> 03-09 [id number] 8.1 %<H>     CAPILLARY BLOOD GLUCOSE      POCT Blood Glucose.: 206 mg/dL (16 Mar 2018 07:37)  POCT Blood Glucose.: 249 mg/dL (15 Mar 2018 21:14)  POCT Blood Glucose.: 109 mg/dL (15 Mar 2018 16:23)    Skin: WDL    Estimated Needs:   [ ] no change since previous assessment  [ x] recalculated: for protein and fluids: 60-70 grams(1.2-1.4 gram protein/kg IBW) + 1200 ml daily     Previous Nutrition Diagnosis:   [ ] Inadequate Energy Intake [ ]Inadequate Oral Intake [ ] Excessive Energy Intake   [ ] Underweight [ ] Increased Nutrient Needs [ ] Overweight/Obesity   [ ] Altered GI Function [ ] Unintended Weight Loss [ ] Food & Nutrition Related Knowledge Deficit [x ] Malnutrition : moderate malnutrition of chronic illness ongoing    Nutrition Diagnosis is [ x] ongoing  [ ] resolved [ ] not applicable     New Nutrition Diagnosis: [x ] not applicable       Interventions:   Recommend  [ x] Change Diet To: renal for renal replacement , 1200 ml fluid restriction, consistent carbohydrate c evening snack , Nepro= 425 calories, 19 grams protein per serving   [ ] Nutrition Supplement  [ ] Nutrition Support  [x ] Other: Recommend Nephro-Bruce daily     Monitoring and Evaluation:   [x ] PO intake [ x ] Tolerance to diet prescription [ x ] weights [ x ] labs[ x ] follow up per protocol  [ ] other: Pt s/p dialysis, c overall improved oral intake, consumed <75% of lunch meal today.     Nutrition focused physical exam conducted; Subcutaneous fat Exam;  [  moderate]  Orbital fat pads region,  [ WNL ]Buccal fat region,  [ moderat ]triceps region, [  WNL]ribs region.  Muscle Exam; [ mild ]temples region, [ mild ]clavicle region, [ WNL ]shoulder region, [ WNL ]Scapula region, [  moderate]Interosseous region, [ moderate]thigh region, [ moderate ]Calf region    Pt c acute respiratory failure c hypoxia, acute pulmonary edema, anemia of chronic renal failure, pneumonia, stage 5 CKD on HD (initiated 03-12)    Factors impacting intake: [x ] none at present  [ ] nausea  [ ] vomiting [ ] diarrhea [ ] constipation  [ ]chewing problems [ ] swallowing issues  [ ] other:     Diet Prescription: Diet, Consistent Carbohydrate w/Evening Snack:   No Concentrated Potassium  Low Sodium  No Concentrated Phosphorus  Supplement Feeding Modality:  Oral  Glucerna Shake Cans or Servings Per Day:  3       Frequency:  Daily (03-09-18 @ 13:42)    Intake: overall intake >75% +Glucerna Shakes 2 x day    Current Weight: 03-16, 73.1 kg, 03-08 72.3 kg, 0.8 kg wt. gain   % Weight Change:  1.10%  no edema noted  I/O: 1425/0(+1425), 03-11, voided output 200 ml    Pertinent Medications: MEDICATIONS  (STANDING):  ALBUTerol/ipratropium for Nebulization 3 milliLiter(s) Nebulizer every 8 hours  atorvastatin 20 milliGRAM(s) Oral at bedtime  carvedilol 6.25 milliGRAM(s) Oral every 12 hours  cloNIDine 0.3 milliGRAM(s) Oral three times a day  dextrose 5%. 1000 milliLiter(s) (50 mL/Hr) IV Continuous <Continuous>  dextrose 50% Injectable 12.5 Gram(s) IV Push once  dextrose 50% Injectable 25 Gram(s) IV Push once  dextrose 50% Injectable 25 Gram(s) IV Push once  docusate sodium 100 milliGRAM(s) Oral two times a day  doxazosin 2 milliGRAM(s) Oral at bedtime  ethyl chloride Spray 1 Application(s) Topical <User Schedule>  heparin  Injectable 5000 Unit(s) SubCutaneous every 12 hours  hydrALAZINE 100 milliGRAM(s) Oral every 12 hours  influenza   Vaccine 0.5 milliLiter(s) IntraMuscular once  insulin glargine Injectable (LANTUS) 15 Unit(s) SubCutaneous at bedtime  insulin lispro (HumaLOG) corrective regimen sliding scale   SubCutaneous three times a day before meals  insulin lispro (HumaLOG) corrective regimen sliding scale   SubCutaneous at bedtime  lidocaine/prilocaine Cream 1 Application(s) Topical daily  piperacillin/tazobactam IVPB. 3.375 Gram(s) IV Intermittent every 12 hours  valsartan 40 milliGRAM(s) Oral daily    MEDICATIONS  (PRN):  acetaminophen   Tablet. 650 milliGRAM(s) Oral every 8 hours PRN Mild Pain (1 - 3)  dextrose Gel 1 Dose(s) Oral once PRN Blood Glucose LESS THAN 70 milliGRAM(s)/deciliter  glucagon  Injectable 1 milliGRAM(s) IntraMuscular once PRN Glucose LESS THAN 70 milligrams/deciliter  melatonin 3 milliGRAM(s) Oral at bedtime PRN Insomnia    Pertinent Labs: 03-16 Na134 mmol/L<L> Glu 169 mg/dL<H> K+ 3.7 mmol/L Cr  4.43 mg/dL<H> BUN 59 mg/dL<H> 03-10 Alb 2.6 g/dL<L> 03-09 [id number] 8.1 %<H>     CAPILLARY BLOOD GLUCOSE      POCT Blood Glucose.: 206 mg/dL (16 Mar 2018 07:37)  POCT Blood Glucose.: 249 mg/dL (15 Mar 2018 21:14)  POCT Blood Glucose.: 109 mg/dL (15 Mar 2018 16:23)    Skin: WDL  GI: WDL  Estimated Needs:   [ ] no change since previous assessment  [ x] recalculated: for protein and fluids: 60-70 grams(1.2-1.4 gram protein/kg IBW) + 1200 ml daily     Previous Nutrition Diagnosis:   [ ] Inadequate Energy Intake [ ]Inadequate Oral Intake [ ] Excessive Energy Intake   [ ] Underweight [ ] Increased Nutrient Needs [ ] Overweight/Obesity   [ ] Altered GI Function [ ] Unintended Weight Loss [ ] Food & Nutrition Related Knowledge Deficit [x ] Malnutrition : moderate malnutrition of chronic illness ongoing    Nutrition Diagnosis is [ x] ongoing  [ ] resolved [ ] not applicable     New Nutrition Diagnosis: [x ] not applicable       Interventions:   Recommend  [ x] Change Diet To: renal for renal replacement , 1200 ml fluid restriction, consistent carbohydrate c evening snack , Nepro= 425 calories, 19 grams protein per serving   [ ] Nutrition Supplement  [ ] Nutrition Support  [x ] Other: Recommend Nephro-Bruce daily     Monitoring and Evaluation:   [x ] PO intake [ x ] Tolerance to diet prescription [ x ] weights [ x ] labs[ x ] follow up per protocol  [ ] other:

## 2018-03-16 NOTE — DISCHARGE NOTE ADULT - HOSPITAL COURSE
71 y/o F with a pmhx of HTN, HL, DM, CKD stage 5, has AVF, also with CHF p/w sob x 3 days, sec to fluid overload vs pna.  Pt started on dialysis on 3/12/18. Xray Chest 1 View AP/PA. (03.08.18 @ 07:34) >Bilateral lung opacities, suspicious for sespsis/pneumoniaa and started on antibiotics.  Pt agreed for hemodialysis and started and tolerated. Pt cleared for discharge and to follow up as an outpatient        ·  Problem: Acute respiratory failure with hypoxia.  Plan: - likely 2/2 to volume overload due to CKD 5 - resp. status better now.  - breathing well on room air         ·  Problem: Anemia of chronic renal failure, stage 5.  - follow up with pcp and renal       ·  Problem: Pneumonia of both lungs due to other aerobic Gram-negative bacteria, unspecified part of lung.    - switch to oral meds and complete as prescibed         diabetes mellitus with diabetic nephropathy, unspecified long term insulin use status.  Plan: -hbA1c 8.1  - home meds

## 2018-03-16 NOTE — PROGRESS NOTE ADULT - SUBJECTIVE AND OBJECTIVE BOX
patient with pre hd HA;   MAPs noted    MEDICATIONS  (STANDING):  ALBUTerol/ipratropium for Nebulization 3 milliLiter(s) Nebulizer every 8 hours  atorvastatin 20 milliGRAM(s) Oral at bedtime  carvedilol 6.25 milliGRAM(s) Oral every 12 hours  cloNIDine 0.3 milliGRAM(s) Oral three times a day  dextrose 5%. 1000 milliLiter(s) (50 mL/Hr) IV Continuous <Continuous>  dextrose 50% Injectable 12.5 Gram(s) IV Push once  dextrose 50% Injectable 25 Gram(s) IV Push once  dextrose 50% Injectable 25 Gram(s) IV Push once  docusate sodium 100 milliGRAM(s) Oral two times a day  doxazosin 2 milliGRAM(s) Oral at bedtime  heparin  Injectable 5000 Unit(s) SubCutaneous every 12 hours  hydrALAZINE 100 milliGRAM(s) Oral every 12 hours  influenza   Vaccine 0.5 milliLiter(s) IntraMuscular once  insulin glargine Injectable (LANTUS) 15 Unit(s) SubCutaneous at bedtime  insulin lispro (HumaLOG) corrective regimen sliding scale   SubCutaneous three times a day before meals  insulin lispro (HumaLOG) corrective regimen sliding scale   SubCutaneous at bedtime  lidocaine/prilocaine Cream 1 Application(s) Topical daily  piperacillin/tazobactam IVPB. 3.375 Gram(s) IV Intermittent every 12 hours    MEDICATIONS  (PRN):  acetaminophen   Tablet. 650 milliGRAM(s) Oral every 8 hours PRN Mild Pain (1 - 3)  dextrose Gel 1 Dose(s) Oral once PRN Blood Glucose LESS THAN 70 milliGRAM(s)/deciliter  glucagon  Injectable 1 milliGRAM(s) IntraMuscular once PRN Glucose LESS THAN 70 milligrams/deciliter  melatonin 3 milliGRAM(s) Oral at bedtime PRN Insomnia      03-13-18 @ 07:01  -  03-14-18 @ 07:00  --------------------------------------------------------  IN: 240 mL / OUT: 2000 mL / NET: -1760 mL      PHYSICAL EXAM:      T(C): 36.7 (03-14-18 @ 05:12), Max: 36.7 (03-14-18 @ 05:12)  HR: 75 (03-14-18 @ 08:00) (67 - 79)  BP: 192/69 (03-14-18 @ 05:12) (180/75 - 193/76)  RR: 15 (03-14-18 @ 05:12) (15 - 18)  SpO2: 98% (03-14-18 @ 05:53) (94% - 100%)  Wt(kg): --  Respiratory: clear anteriorly, decreased BS at bases  Cardiovascular: S1 S2  Gastrointestinal: soft NT ND +BS  Extremities: 1  edema                                    8.2    10.88 )-----------( 312      ( 14 Mar 2018 11:46 )             25.3     03-13    142  |  105  |  69<H>  ----------------------------<  147<H>  3.9   |  26  |  5.31<H>    Ca    7.4<L>      13 Mar 2018 15:33            Assessment and Plan:  ESRDL; UF as tolerated  Add Coreg and qhs Cardura;  Tylenol for pain;   Next HD Friday; tolerating well;   Discharge planning; PT re evaluation for home PT
Breathing easier; no distress.    MEDICATIONS  (STANDING):  ALBUTerol/ipratropium for Nebulization 3 milliLiter(s) Nebulizer every 8 hours  atorvastatin 20 milliGRAM(s) Oral at bedtime  cloNIDine 0.3 milliGRAM(s) Oral three times a day  dextrose 5%. 1000 milliLiter(s) (50 mL/Hr) IV Continuous <Continuous>  dextrose 50% Injectable 12.5 Gram(s) IV Push once  dextrose 50% Injectable 25 Gram(s) IV Push once  dextrose 50% Injectable 25 Gram(s) IV Push once  docusate sodium 100 milliGRAM(s) Oral two times a day  heparin  Injectable 5000 Unit(s) SubCutaneous every 12 hours  hydrALAZINE 100 milliGRAM(s) Oral every 12 hours  influenza   Vaccine 0.5 milliLiter(s) IntraMuscular once  insulin glargine Injectable (LANTUS) 15 Unit(s) SubCutaneous at bedtime  insulin lispro (HumaLOG) corrective regimen sliding scale   SubCutaneous three times a day before meals  insulin lispro (HumaLOG) corrective regimen sliding scale   SubCutaneous at bedtime  lidocaine/prilocaine Cream 1 Application(s) Topical daily  piperacillin/tazobactam IVPB. 3.375 Gram(s) IV Intermittent every 12 hours    MEDICATIONS  (PRN):  dextrose Gel 1 Dose(s) Oral once PRN Blood Glucose LESS THAN 70 milliGRAM(s)/deciliter  glucagon  Injectable 1 milliGRAM(s) IntraMuscular once PRN Glucose LESS THAN 70 milligrams/deciliter  melatonin 3 milliGRAM(s) Oral at bedtime PRN Insomnia      03-12-18 @ 07:01  -  03-13-18 @ 07:00  --------------------------------------------------------  IN: 537 mL / OUT: 1000 mL / NET: -463 mL    03-13-18 @ 07:01  - 03-13-18 @ 16:21  --------------------------------------------------------  IN: 240 mL / OUT: 0 mL / NET: 240 mL      PHYSICAL EXAM:      T(C): 36.6 (03-13-18 @ 15:00), Max: 36.6 (03-13-18 @ 15:00)  HR: 77 (03-13-18 @ 15:00) (72 - 79)  BP: 193/76 (03-13-18 @ 15:00) (159/72 - 204/88)  RR: 16 (03-13-18 @ 15:00) (16 - 18)  SpO2: 98% (03-13-18 @ 15:00) (92% - 100%)  Wt(kg): --  Respiratory: clear anteriorly, decreased BS at bases  Cardiovascular: S1 S2  Gastrointestinal: soft NT ND +BS  Extremities:  1 edema                                    8.4    12.41 )-----------( 309      ( 13 Mar 2018 15:33 )             25.7     03-13    142  |  105  |  69<H>  ----------------------------<  147<H>  3.9   |  26  |  5.31<H>    Ca    7.4<L>      13 Mar 2018 15:33            Assessment and Plan:  HD #2, stable;  IV venofer;   UF as tolerated;   Will follow
CHIEF COMPLAINT/INTERVAL HISTORY:    Patient is a 72y old  Female who presents with a chief complaint of SOB (08 Mar 2018 16:08)      HPI:  Pt is a 71 yo lady with a pmhx of HTN, HL, DM, CKD in process of starting dialysis, CHF who presents to the ED with sob x 3 days. Has had increasing sob, with difficulty lying back, as well as bilateral leg heaviness and swelling. Has had similar presentations for CHF exacerbations in the past. Has had a cough for past week, nonproductive, no fevers. No n/v/d, no pleuritic chest pain, no chest pain.- patient complain of nonproductive cough or fever (08 Mar 2018 10:15)      SUBJECTIVE & OBJECTIVE: Pt seen and examined at bedside.   Poor historian  Denies any complaints except fatigue.    Vital Signs Last 24 Hrs  T(C): 36.9 (10 Mar 2018 10:49), Max: 36.9 (10 Mar 2018 10:49)  T(F): 98.4 (10 Mar 2018 10:49), Max: 98.4 (10 Mar 2018 10:49)  HR: 86 (10 Mar 2018 10:49) (81 - 92)  BP: 177/65 (10 Mar 2018 10:49) (177/65 - 193/74)  BP(mean): --  ABP: --  ABP(mean): --  RR: 18 (10 Mar 2018 10:49) (17 - 18)  SpO2: 98% (10 Mar 2018 10:49) (96% - 98%)        MEDICATIONS  (STANDING):  ALBUTerol/ipratropium for Nebulization 3 milliLiter(s) Nebulizer every 8 hours  atorvastatin 20 milliGRAM(s) Oral at bedtime  cloNIDine 0.3 milliGRAM(s) Oral two times a day  dextrose 5%. 1000 milliLiter(s) (50 mL/Hr) IV Continuous <Continuous>  dextrose 50% Injectable 12.5 Gram(s) IV Push once  dextrose 50% Injectable 25 Gram(s) IV Push once  dextrose 50% Injectable 25 Gram(s) IV Push once  docusate sodium 100 milliGRAM(s) Oral two times a day  furosemide   Injectable 80 milliGRAM(s) IV Push every 12 hours  heparin  Injectable 5000 Unit(s) SubCutaneous every 12 hours  hydrALAZINE 100 milliGRAM(s) Oral every 12 hours  influenza   Vaccine 0.5 milliLiter(s) IntraMuscular once  insulin glargine Injectable (LANTUS) 15 Unit(s) SubCutaneous at bedtime  insulin lispro (HumaLOG) corrective regimen sliding scale   SubCutaneous three times a day before meals  insulin lispro (HumaLOG) corrective regimen sliding scale   SubCutaneous at bedtime  piperacillin/tazobactam IVPB. 3.375 Gram(s) IV Intermittent every 12 hours    MEDICATIONS  (PRN):  dextrose Gel 1 Dose(s) Oral once PRN Blood Glucose LESS THAN 70 milliGRAM(s)/deciliter  glucagon  Injectable 1 milliGRAM(s) IntraMuscular once PRN Glucose LESS THAN 70 milligrams/deciliter        PHYSICAL EXAM:    GENERAL: NAD, well-developed  HEAD:  Atraumatic, Normocephalic  EYES: EOMI, PERRLA, conjunctiva and sclera clear  ENMT: Moist mucous membranes  NECK: Supple, No JVD  NERVOUS SYSTEM:  non focal  CHEST/LUNG: Clear to auscultation bilaterally; No rales, rhonchi, wheezing, or rubs  HEART: Regular rate and rhythm;  ABDOMEN: Soft, Nontender, Nondistended; Bowel sounds present  EXTREMITIES: no cyanosis, or edema, LUE AVF +    LABS:                        7.2    15.55 )-----------( 384      ( 10 Mar 2018 07:54 )             22.0     03-10    142  |  105  |  90<H>  ----------------------------<  138<H>  4.0   |  24  |  6.29<H>    Ca    7.8<L>      10 Mar 2018 07:54        CAPILLARY BLOOD GLUCOSE      POCT Blood Glucose.: 222 mg/dL (10 Mar 2018 11:41)  POCT Blood Glucose.: 154 mg/dL (10 Mar 2018 07:44)  POCT Blood Glucose.: 116 mg/dL (09 Mar 2018 21:16)  POCT Blood Glucose.: 157 mg/dL (09 Mar 2018 16:59)    < from: Xray Chest 1 View AP/PA. (03.08.18 @ 07:34) >    IMPRESSION:    Bilateral lung opacities, suspicious for pneumonia.        < end of copied text >
CHIEF COMPLAINT/INTERVAL HISTORY:    Patient is a 72y old  Female who presents with a chief complaint of SOB (08 Mar 2018 16:08)      HPI:  Pt is a 73 yo lady with a pmhx of HTN, HL, DM, CKD in process of starting dialysis, CHF who presents to the ED with sob x 3 days. Has had increasing sob, with difficulty lying back, as well as bilateral leg heaviness and swelling. Has had similar presentations for CHF exacerbations in the past. Has had a cough for past week, nonproductive, no fevers. No n/v/d, no pleuritic chest pain, no chest pain.- patient complain of nonproductive cough or fever (08 Mar 2018 10:15)      SUBJECTIVE & OBJECTIVE: Pt seen and examined at bedside.   Denies chest pain/SOB, nausea/vomiting/diarrhea, No cough, dizziness, HA or blurry vision, all other ROS negative.  Poor historian.    Vital Signs Last 24 Hrs  T(C): 36.9 (11 Mar 2018 12:03), Max: 36.9 (11 Mar 2018 12:03)  T(F): 98.4 (11 Mar 2018 12:03), Max: 98.4 (11 Mar 2018 12:03)  HR: 82 (11 Mar 2018 12:03) (72 - 87)  BP: 142/61 (11 Mar 2018 12:03) (142/61 - 185/59)  BP(mean): --  ABP: --  ABP(mean): --  RR: 18 (11 Mar 2018 12:03) (16 - 18)  SpO2: 95% (11 Mar 2018 12:03) (92% - 97%)        MEDICATIONS  (STANDING):  ALBUTerol/ipratropium for Nebulization 3 milliLiter(s) Nebulizer every 8 hours  atorvastatin 20 milliGRAM(s) Oral at bedtime  cloNIDine 0.3 milliGRAM(s) Oral two times a day  dextrose 5%. 1000 milliLiter(s) (50 mL/Hr) IV Continuous <Continuous>  dextrose 50% Injectable 12.5 Gram(s) IV Push once  dextrose 50% Injectable 25 Gram(s) IV Push once  dextrose 50% Injectable 25 Gram(s) IV Push once  docusate sodium 100 milliGRAM(s) Oral two times a day  heparin  Injectable 5000 Unit(s) SubCutaneous every 12 hours  hydrALAZINE 100 milliGRAM(s) Oral every 12 hours  influenza   Vaccine 0.5 milliLiter(s) IntraMuscular once  insulin glargine Injectable (LANTUS) 15 Unit(s) SubCutaneous at bedtime  insulin lispro (HumaLOG) corrective regimen sliding scale   SubCutaneous three times a day before meals  insulin lispro (HumaLOG) corrective regimen sliding scale   SubCutaneous at bedtime  piperacillin/tazobactam IVPB. 3.375 Gram(s) IV Intermittent every 12 hours    MEDICATIONS  (PRN):  dextrose Gel 1 Dose(s) Oral once PRN Blood Glucose LESS THAN 70 milliGRAM(s)/deciliter  glucagon  Injectable 1 milliGRAM(s) IntraMuscular once PRN Glucose LESS THAN 70 milligrams/deciliter  melatonin 3 milliGRAM(s) Oral at bedtime PRN Insomnia        PHYSICAL EXAM:      GENERAL: NAD, well-developed  HEAD:  Atraumatic, Normocephalic  EYES: EOMI, PERRLA, conjunctiva and sclera clear  ENMT: Moist mucous membranes  NECK: Supple, No JVD  NERVOUS SYSTEM:  non focal  CHEST/LUNG: Clear to auscultation bilaterally; No rales, rhonchi, wheezing, or rubs  HEART: Regular rate and rhythm;  ABDOMEN: Soft, Nontender, Nondistended; Bowel sounds present  EXTREMITIES: no cyanosis, or edema, LUE AVF +    LABS:                        7.2    14.98 )-----------( 354      ( 11 Mar 2018 08:20 )             22.3     03-11    142  |  105  |  113<H>  ----------------------------<  237<H>  4.1   |  25  |  6.60<H>    Ca    7.5<L>      11 Mar 2018 08:20    TPro  5.3<L>  /  Alb  x   /  TBili  x   /  DBili  x   /  AST  x   /  ALT  x   /  AlkPhos  x   03-10          CAPILLARY BLOOD GLUCOSE      POCT Blood Glucose.: 197 mg/dL (11 Mar 2018 11:29)  POCT Blood Glucose.: 249 mg/dL (11 Mar 2018 08:07)  POCT Blood Glucose.: 271 mg/dL (10 Mar 2018 21:08)  POCT Blood Glucose.: 242 mg/dL (10 Mar 2018 16:10)
Feeling much better today;  Less SOB;   Daughter at bedside.    MEDICATIONS  (STANDING):  ALBUTerol/ipratropium for Nebulization 3 milliLiter(s) Nebulizer every 8 hours  atorvastatin 20 milliGRAM(s) Oral at bedtime  cloNIDine 0.3 milliGRAM(s) Oral two times a day  dextrose 5%. 1000 milliLiter(s) (50 mL/Hr) IV Continuous <Continuous>  dextrose 50% Injectable 12.5 Gram(s) IV Push once  dextrose 50% Injectable 25 Gram(s) IV Push once  dextrose 50% Injectable 25 Gram(s) IV Push once  docusate sodium 100 milliGRAM(s) Oral two times a day  furosemide   Injectable 80 milliGRAM(s) IV Push every 12 hours  heparin  Injectable 5000 Unit(s) SubCutaneous every 12 hours  hydrALAZINE 100 milliGRAM(s) Oral every 12 hours  influenza   Vaccine 0.5 milliLiter(s) IntraMuscular once  insulin glargine Injectable (LANTUS) 15 Unit(s) SubCutaneous at bedtime  insulin lispro (HumaLOG) corrective regimen sliding scale   SubCutaneous three times a day before meals  insulin lispro (HumaLOG) corrective regimen sliding scale   SubCutaneous at bedtime  piperacillin/tazobactam IVPB. 3.375 Gram(s) IV Intermittent every 12 hours    MEDICATIONS  (PRN):  dextrose Gel 1 Dose(s) Oral once PRN Blood Glucose LESS THAN 70 milliGRAM(s)/deciliter  glucagon  Injectable 1 milliGRAM(s) IntraMuscular once PRN Glucose LESS THAN 70 milligrams/deciliter      03-09-18 @ 07:01  -  03-10-18 @ 07:00  --------------------------------------------------------  IN: 0 mL / OUT: 1700 mL / NET: -1700 mL      PHYSICAL EXAM:      T(C): 36.9 (03-10-18 @ 10:49), Max: 36.9 (03-10-18 @ 10:49)  HR: 86 (03-10-18 @ 10:49) (81 - 92)  BP: 177/65 (03-10-18 @ 10:49) (177/65 - 193/74)  RR: 18 (03-10-18 @ 10:49) (17 - 18)  SpO2: 98% (03-10-18 @ 10:49) (96% - 98%)  Wt(kg): --  Respiratory: clear anteriorly, decreased BS at bases crackles improved  Cardiovascular: S1 S2  Gastrointestinal: soft NT ND +BS  Extremities:  2 edema AVG + bruit and thrill                                    7.2    15.55 )-----------( 384      ( 10 Mar 2018 07:54 )             22.0     03-10    142  |  105  |  90<H>  ----------------------------<  138<H>  4.0   |  24  |  6.29<H>    Ca    7.8<L>      10 Mar 2018 07:54    TPro  5.3<L>  /  Alb  x   /  TBili  x   /  DBili  x   /  AST  x   /  ALT  x   /  AlkPhos  x   03-10      LIVER FUNCTIONS - ( 10 Mar 2018 10:58 )  Alb: x     / Pro: 5.3 g/dL / ALK PHOS: x     / ALT: x     / AST: x     / GGT: x             Assessment and Plan:    CKD 4-5; anemia, with Fe deficiency;  CHF; cardiorenal syndrome.  Will transition to po Bumex; d/c [facility] tomorrow am  No immediate indication for HD; anticipate requirement in next 2-4 weeks;
No new events, dyspnea on exertion; comfortable at rest;       MEDICATIONS  (STANDING):  ALBUTerol/ipratropium for Nebulization 3 milliLiter(s) Nebulizer every 8 hours  atorvastatin 20 milliGRAM(s) Oral at bedtime  cloNIDine 0.3 milliGRAM(s) Oral three times a day  dextrose 5%. 1000 milliLiter(s) (50 mL/Hr) IV Continuous <Continuous>  dextrose 50% Injectable 12.5 Gram(s) IV Push once  dextrose 50% Injectable 25 Gram(s) IV Push once  dextrose 50% Injectable 25 Gram(s) IV Push once  diphenoxylate/atropine 1 Tablet(s) Oral every 8 hours  docusate sodium 100 milliGRAM(s) Oral two times a day  heparin  Injectable 5000 Unit(s) SubCutaneous every 12 hours  hydrALAZINE 100 milliGRAM(s) Oral every 12 hours  influenza   Vaccine 0.5 milliLiter(s) IntraMuscular once  insulin glargine Injectable (LANTUS) 15 Unit(s) SubCutaneous at bedtime  insulin lispro (HumaLOG) corrective regimen sliding scale   SubCutaneous three times a day before meals  insulin lispro (HumaLOG) corrective regimen sliding scale   SubCutaneous at bedtime  lidocaine/prilocaine Cream 1 Application(s) Topical daily  piperacillin/tazobactam IVPB. 3.375 Gram(s) IV Intermittent every 12 hours    MEDICATIONS  (PRN):  dextrose Gel 1 Dose(s) Oral once PRN Blood Glucose LESS THAN 70 milliGRAM(s)/deciliter  glucagon  Injectable 1 milliGRAM(s) IntraMuscular once PRN Glucose LESS THAN 70 milligrams/deciliter  melatonin 3 milliGRAM(s) Oral at bedtime PRN Insomnia      03-11-18 @ 07:01  -  03-12-18 @ 07:00  --------------------------------------------------------  IN: 810 mL / OUT: 600 mL / NET: 210 mL      PHYSICAL EXAM:      T(C): 36.9 (03-12-18 @ 11:50), Max: 36.9 (03-12-18 @ 11:50)  HR: 75 (03-12-18 @ 11:50) (72 - 84)  BP: 180/66 (03-12-18 @ 11:50) (173/61 - 180/66)  RR: 18 (03-12-18 @ 11:50) (18 - 19)  SpO2: 99% (03-12-18 @ 11:50) (97% - 99%)  Wt(kg): --  Respiratory: clear anteriorly, decreased BS at bases  Cardiovascular: S1 S2  Gastrointestinal: soft NT ND +BS  Extremities:  1 edema                                    6.8    14.62 )-----------( 336      ( 12 Mar 2018 06:42 )             21.4     03-12    141  |  104  |  113<H>  ----------------------------<  157<H>  4.1   |  25  |  7.15<H>    Ca    7.4<L>      12 Mar 2018 06:42        Assessment and Plan:    KATHERINE CKD 5; fluid overload; modest diuresis with worsening creatinine trend;   Discussed with patient and daughter; to start HD;  PRBC tx one unit today;   Mild diarrhea; lomotil for comfort.  Will follow.
Patient feels well no complaints today.    MEDICATIONS  (STANDING):  ALBUTerol/ipratropium for Nebulization 3 milliLiter(s) Nebulizer every 8 hours  atorvastatin 20 milliGRAM(s) Oral at bedtime  carvedilol 6.25 milliGRAM(s) Oral every 12 hours  cloNIDine 0.3 milliGRAM(s) Oral three times a day  dextrose 5%. 1000 milliLiter(s) (50 mL/Hr) IV Continuous <Continuous>  dextrose 50% Injectable 12.5 Gram(s) IV Push once  dextrose 50% Injectable 25 Gram(s) IV Push once  dextrose 50% Injectable 25 Gram(s) IV Push once  docusate sodium 100 milliGRAM(s) Oral two times a day  doxazosin 2 milliGRAM(s) Oral at bedtime  ethyl chloride Spray 1 Application(s) Topical <User Schedule>  heparin  Injectable 5000 Unit(s) SubCutaneous every 12 hours  hydrALAZINE 100 milliGRAM(s) Oral every 12 hours  influenza   Vaccine 0.5 milliLiter(s) IntraMuscular once  insulin glargine Injectable (LANTUS) 15 Unit(s) SubCutaneous at bedtime  insulin lispro (HumaLOG) corrective regimen sliding scale   SubCutaneous three times a day before meals  insulin lispro (HumaLOG) corrective regimen sliding scale   SubCutaneous at bedtime  lidocaine/prilocaine Cream 1 Application(s) Topical daily  piperacillin/tazobactam IVPB. 3.375 Gram(s) IV Intermittent every 12 hours  valsartan 40 milliGRAM(s) Oral daily    MEDICATIONS  (PRN):  acetaminophen   Tablet. 650 milliGRAM(s) Oral every 8 hours PRN Mild Pain (1 - 3)  dextrose Gel 1 Dose(s) Oral once PRN Blood Glucose LESS THAN 70 milliGRAM(s)/deciliter  glucagon  Injectable 1 milliGRAM(s) IntraMuscular once PRN Glucose LESS THAN 70 milligrams/deciliter  melatonin 3 milliGRAM(s) Oral at bedtime PRN Insomnia      03-15-18 @ 07:01  -  03-16-18 @ 07:00  --------------------------------------------------------  IN: 1425 mL / OUT: 0 mL / NET: 1425 mL      PHYSICAL EXAM:      T(C): 36.2 (03-16-18 @ 13:40), Max: 37 (03-15-18 @ 16:43)  HR: 69 (03-16-18 @ 13:40) (62 - 70)  BP: 171/69 (03-16-18 @ 13:40) (163/70 - 198/75)  RR: 20 (03-16-18 @ 13:40) (17 - 20)  SpO2: 97% (03-16-18 @ 13:40) (95% - 99%)  Wt(kg): --  Respiratory: clear anteriorly, decreased BS at bases  Cardiovascular: S1 S2  Gastrointestinal: soft NT ND +BS  Extremities:   1 edema L avg                                    7.7    10.44 )-----------( 264      ( 16 Mar 2018 10:20 )             24.2     03-16    134<L>  |  98  |  59<H>  ----------------------------<  169<H>  3.7   |  25  |  4.43<H>    Ca    7.5<L>      16 Mar 2018 10:20            Assessment and Plan:    ESRD; maintenance HD  Epogen 5000 with Venofer;   discharge planning; overall status improved;
Patient feels well no complaints today. SOB improved;     MEDICATIONS  (STANDING):  ALBUTerol/ipratropium for Nebulization 3 milliLiter(s) Nebulizer every 8 hours  atorvastatin 20 milliGRAM(s) Oral at bedtime  cloNIDine 0.3 milliGRAM(s) Oral three times a day  dextrose 5%. 1000 milliLiter(s) (50 mL/Hr) IV Continuous <Continuous>  dextrose 50% Injectable 12.5 Gram(s) IV Push once  dextrose 50% Injectable 25 Gram(s) IV Push once  dextrose 50% Injectable 25 Gram(s) IV Push once  docusate sodium 100 milliGRAM(s) Oral two times a day  heparin  Injectable 5000 Unit(s) SubCutaneous every 12 hours  hydrALAZINE 100 milliGRAM(s) Oral every 12 hours  influenza   Vaccine 0.5 milliLiter(s) IntraMuscular once  insulin glargine Injectable (LANTUS) 15 Unit(s) SubCutaneous at bedtime  insulin lispro (HumaLOG) corrective regimen sliding scale   SubCutaneous three times a day before meals  insulin lispro (HumaLOG) corrective regimen sliding scale   SubCutaneous at bedtime  piperacillin/tazobactam IVPB. 3.375 Gram(s) IV Intermittent every 12 hours    MEDICATIONS  (PRN):  dextrose Gel 1 Dose(s) Oral once PRN Blood Glucose LESS THAN 70 milliGRAM(s)/deciliter  glucagon  Injectable 1 milliGRAM(s) IntraMuscular once PRN Glucose LESS THAN 70 milligrams/deciliter  melatonin 3 milliGRAM(s) Oral at bedtime PRN Insomnia      03-10-18 @ 06:01  -  03-11-18 @ 07:00  --------------------------------------------------------  IN: 560 mL / OUT: 1800 mL / NET: -1240 mL    03-11-18 @ 07:01  -  03-11-18 @ 16:18  --------------------------------------------------------  IN: 560 mL / OUT: 400 mL / NET: 160 mL      PHYSICAL EXAM:      T(C): 36.9 (03-11-18 @ 12:03), Max: 36.9 (03-11-18 @ 12:03)  HR: 79 (03-11-18 @ 13:39) (79 - 87)  BP: 142/61 (03-11-18 @ 12:03) (142/61 - 185/59)  RR: 18 (03-11-18 @ 12:03) (16 - 18)  SpO2: 99% (03-11-18 @ 13:39) (92% - 99%)  Wt(kg): --  Respiratory: clear anteriorly, decreased BS at bases with scant crackles  Cardiovascular: S1 S2  Gastrointestinal: soft NT ND +BS  Extremities:  2 edema                                    7.2    14.98 )-----------( 354      ( 11 Mar 2018 08:20 )             22.3     03-11    142  |  105  |  113<H>  ----------------------------<  237<H>  4.1   |  25  |  6.60<H>    Ca    7.5<L>      11 Mar 2018 08:20    TPro  5.3<L>  /  Alb  x   /  TBili  x   /  DBili  x   /  AST  x   /  ALT  x   /  AlkPhos  x   03-10      LIVER FUNCTIONS - ( 10 Mar 2018 10:58 )  Alb: x     / Pro: 5.3 g/dL / ALK PHOS: x     / ALT: x     / AST: x     / GGT: x             Assessment and Plan:  KATHERINE CKD 4-5; CHD fluid overload;   Warranted diuretic rx; mature AVG;  Consider PRBC tx tomorrow;   Will likely need to initiate HD this admission.
Patient is a 72y old  Female who presents with a chief complaint of SOB (08 Mar 2018 16:08)       OVERNIGHT EVENTS:    MEDICATIONS  (STANDING):  atorvastatin 20 milliGRAM(s) Oral at bedtime  cloNIDine 0.3 milliGRAM(s) Oral two times a day  dextrose 5%. 1000 milliLiter(s) (50 mL/Hr) IV Continuous <Continuous>  dextrose 50% Injectable 12.5 Gram(s) IV Push once  dextrose 50% Injectable 25 Gram(s) IV Push once  dextrose 50% Injectable 25 Gram(s) IV Push once  docusate sodium 100 milliGRAM(s) Oral two times a day  furosemide   Injectable 80 milliGRAM(s) IV Push every 12 hours  heparin  Injectable 5000 Unit(s) SubCutaneous every 12 hours  hydrALAZINE 100 milliGRAM(s) Oral every 12 hours  influenza   Vaccine 0.5 milliLiter(s) IntraMuscular once  insulin glargine Injectable (LANTUS) 15 Unit(s) SubCutaneous at bedtime  insulin lispro (HumaLOG) corrective regimen sliding scale   SubCutaneous three times a day before meals  insulin lispro (HumaLOG) corrective regimen sliding scale   SubCutaneous at bedtime  piperacillin/tazobactam IVPB. 3.375 Gram(s) IV Intermittent every 12 hours    MEDICATIONS  (PRN):  dextrose Gel 1 Dose(s) Oral once PRN Blood Glucose LESS THAN 70 milliGRAM(s)/deciliter  glucagon  Injectable 1 milliGRAM(s) IntraMuscular once PRN Glucose LESS THAN 70 milligrams/deciliter      Vital Signs Last 24 Hrs  T(C): 35.9 (09 Mar 2018 11:57), Max: 36.5 (09 Mar 2018 03:31)  T(F): 96.7 (09 Mar 2018 11:57), Max: 97.7 (09 Mar 2018 03:31)  HR: 85 (09 Mar 2018 11:57) (82 - 97)  BP: 160/70 (09 Mar 2018 11:57) (160/70 - 198/79)  BP(mean): --  RR: 19 (09 Mar 2018 11:57) (18 - 22)  SpO2: 95% (09 Mar 2018 11:57) (92% - 97%)    PHYSICAL EXAM:  GENERAL: NAD, thin built  HEAD:  Atraumatic, Normocephalic  EYES: EOMI, PERRLA, conjunctiva and sclera clear  ENMT: No tonsillar erythema, exudates, or enlargement; Moist mucous membranes   NECK: Supple, No JVD   NERVOUS SYSTEM:  Alert & Oriented X3  CHEST/LUNG: Bibasilar crackles  HEART: Regular rate and rhythm; No murmurs, rubs, or gallops  ABDOMEN: Soft, Nontender, Nondistended; Bowel sounds present  EXTREMITIES:  2+ Peripheral Pulses, No clubbing, cyanosis, or edema  LYMPH: No lymphadenopathy noted  SKIN:  left arm AV graft     LABS:                        7.3    16.46 )-----------( 385      ( 09 Mar 2018 08:15 )             21.9     03-    141  |  106  |  97<H>  ----------------------------<  153<H>  4.3   |  24  |  5.86<H>    Ca    7.9<L>      09 Mar 2018 08:15    TPro  6.8  /  Alb  2.6<L>  /  TBili  0.4  /  DBili  x   /  AST  56<H>  /  ALT  60  /  AlkPhos  237<H>  03-08       cardiac markers   Urinalysis Basic - ( 08 Mar 2018 07:47 )    Color: Yellow / Appearance: Clear / S.010 / pH: x  Gluc: x / Ketone: Negative  / Bili: Negative / Urobili: Negative mg/dL   Blood: x / Protein: 500 mg/dL / Nitrite: Negative   Leuk Esterase: Negative / RBC: 0-2 /HPF / WBC 0-2   Sq Epi: x / Non Sq Epi: x / Bacteria: Occasional      CAPILLARY BLOOD GLUCOSE      POCT Blood Glucose.: 194 mg/dL (09 Mar 2018 11:35)  POCT Blood Glucose.: 164 mg/dL (09 Mar 2018 07:49)  POCT Blood Glucose.: 181 mg/dL (08 Mar 2018 22:44)  POCT Blood Glucose.: 111 mg/dL (08 Mar 2018 17:05)    Cultures    RADIOLOGY & ADDITIONAL TESTS:    Imaging Personally Reviewed:  [x ] YES  [ ] NO    Consultant(s) Notes Reviewed:  [ x] YES  [ ] NO    Care Discussed with Consultants/Other Providers [x ] YES  [ ] NO
Patient is a 72y old  Female who presents with a chief complaint of SOB (08 Mar 2018 16:08)      OVERNIGHT EVENTS: none    REVIEW OF SYSTEMS: denies chest pain/SOB, diaphoresis, no F/C, cough, dizziness, headache, blurry vision, nausea, vomiting, abdominal pain. All others review of systems negative     MEDICATIONS  (STANDING):  ALBUTerol/ipratropium for Nebulization 3 milliLiter(s) Nebulizer every 8 hours  atorvastatin 20 milliGRAM(s) Oral at bedtime  carvedilol 6.25 milliGRAM(s) Oral every 12 hours  cloNIDine 0.3 milliGRAM(s) Oral three times a day  dextrose 5%. 1000 milliLiter(s) (50 mL/Hr) IV Continuous <Continuous>  dextrose 50% Injectable 12.5 Gram(s) IV Push once  dextrose 50% Injectable 25 Gram(s) IV Push once  dextrose 50% Injectable 25 Gram(s) IV Push once  docusate sodium 100 milliGRAM(s) Oral two times a day  doxazosin 2 milliGRAM(s) Oral at bedtime  heparin  Injectable 5000 Unit(s) SubCutaneous every 12 hours  hydrALAZINE 100 milliGRAM(s) Oral every 12 hours  influenza   Vaccine 0.5 milliLiter(s) IntraMuscular once  insulin glargine Injectable (LANTUS) 15 Unit(s) SubCutaneous at bedtime  insulin lispro (HumaLOG) corrective regimen sliding scale   SubCutaneous three times a day before meals  insulin lispro (HumaLOG) corrective regimen sliding scale   SubCutaneous at bedtime  lidocaine/prilocaine Cream 1 Application(s) Topical daily  piperacillin/tazobactam IVPB. 3.375 Gram(s) IV Intermittent every 12 hours    MEDICATIONS  (PRN):  acetaminophen   Tablet. 650 milliGRAM(s) Oral every 8 hours PRN Mild Pain (1 - 3)  dextrose Gel 1 Dose(s) Oral once PRN Blood Glucose LESS THAN 70 milliGRAM(s)/deciliter  glucagon  Injectable 1 milliGRAM(s) IntraMuscular once PRN Glucose LESS THAN 70 milligrams/deciliter  melatonin 3 milliGRAM(s) Oral at bedtime PRN Insomnia      Allergies    No Known Allergies    Intolerances        T(F): 98 (03-14-18 @ 05:12), Max: 98 (03-14-18 @ 05:12)  HR: 75 (03-14-18 @ 08:00) (67 - 79)  BP: 163/60 (03-14-18 @ 08:00) (163/60 - 193/76)  RR: 16 (03-14-18 @ 08:00) (15 - 18)  SpO2: 99% (03-14-18 @ 08:00) (94% - 100%)  Wt(kg): --    PHYSICAL EXAM:  GENERAL: NAD, well-groomed, well-developed  HEAD:  Atraumatic, Normocephalic  EYES: EOMI, PERRLA, conjunctiva and sclera clear  ENMT: No tonsillar erythema, exudates, or enlargement; Moist mucous membranes, Good dentition, No lesions  NECK: Supple, No JVD, Normal thyroid  NERVOUS SYSTEM:  Alert & Oriented X3, Good concentration; Motor Strength 5/5 B/L upper and lower extremities; DTRs 2+ intact and symmetric  CHEST/LUNG: decreased BS bilaterally;   HEART: Regular rate and rhythm; No murmurs, rubs, or gallops  ABDOMEN: Soft, Nontender, Nondistended; Bowel sounds present  EXTREMITIES:  2+ Peripheral Pulses, No clubbing, cyanosis, or edema BL LE, LUE AVF +  LYMPH: No lymphadenopathy noted  SKIN: No rashes or lesions    LABS:                        8.2    10.88 )-----------( 312      ( 14 Mar 2018 11:46 )             25.3     03-14    141  |  103  |  42<H>  ----------------------------<  63<L>  3.4<L>   |  30  |  3.79<H>    Ca    7.6<L>      14 Mar 2018 11:46          Cultures;   CAPILLARY BLOOD GLUCOSE      POCT Blood Glucose.: 282 mg/dL (14 Mar 2018 07:42)  POCT Blood Glucose.: 222 mg/dL (13 Mar 2018 21:13)    RADIOLOGY & ADDITIONAL TESTS:    Imaging Personally Reviewed:  [x ] YES   < from: Xray Chest 1 View AP/PA. (03.08.18 @ 07:34) >  Bilateral lung opacities, suspicious for pneumonia.    < from: TTE Echo Doppler w/o Cont (05.17.17 @ 17:27) >  1. Left ventricular ejection fraction, by visual estimation, is 55 to   60%.   2. Mildly increased LV wall thickness.   3. Spectral Doppler shows impaired relaxation pattern of left   ventricular myocardial filling (Grade I diastolic dysfunction).   4. There is borderline concentric left ventricular hypertrophy.   5. Normal right ventricular size and function.   6. The left atrium is normal in size.   7. The right atrium is normal in size.   8. There is no evidence of pericardial effusion.   9. Mild mitral annular calcification.  10. Mild to moderate mitral valve regurgitation.  11. Thickening of the anterior and posterior mitral valve leaflets.    < end of copied text >  < from: TTE Echo Doppler w/o Cont (05.17.17 @ 17:27) >  1. Left ventricular ejection fraction, by visual estimation, is 55 to   60%.   2. Mildly increased LV wall thickness.   3. Spectral Doppler shows impaired relaxation pattern of left   ventricular myocardial filling (Grade I diastolic dysfunction).   4. There is borderline concentric left ventricular hypertrophy.   5. Normal right ventricular size and function.   6. The left atrium is normal in size.   7. The right atrium is normal in size.   8. There is no evidence of pericardial effusion.   9. Mild mitral annular calcification.  10. Mild to moderate mitral valve regurgitation.  11. Thickening of the anterior and posterior mitral valve leaflets.  12. Structurally normal tricuspid valve, with normal leaflet excursion.  13. Mild aortic regurgitation.  14. Normal trileaflet aortic valve with normal opening.  15. Structurally normal pulmonic valve, with normal leaflet excursion.    < end of copied text >    Consultant(s) Notes Reviewed:  [x ] YES     Care Discussed with [x ] Consultants [X ] Patient [ ] Family  [x ]    [x ]  Other; RN
Patient is a 72y old  Female who presents with a chief complaint of SOB (08 Mar 2018 16:08)      OVERNIGHT EVENTS: none    REVIEW OF SYSTEMS: denies chest pain/SOB, diaphoresis, no F/C, cough, dizziness, headache, blurry vision, nausea, vomiting, abdominal pain. All others review of systems negative     MEDICATIONS  (STANDING):  ALBUTerol/ipratropium for Nebulization 3 milliLiter(s) Nebulizer every 8 hours  atorvastatin 20 milliGRAM(s) Oral at bedtime  carvedilol 6.25 milliGRAM(s) Oral every 12 hours  cloNIDine 0.3 milliGRAM(s) Oral three times a day  dextrose 5%. 1000 milliLiter(s) (50 mL/Hr) IV Continuous <Continuous>  dextrose 50% Injectable 12.5 Gram(s) IV Push once  dextrose 50% Injectable 25 Gram(s) IV Push once  dextrose 50% Injectable 25 Gram(s) IV Push once  docusate sodium 100 milliGRAM(s) Oral two times a day  doxazosin 2 milliGRAM(s) Oral at bedtime  heparin  Injectable 5000 Unit(s) SubCutaneous every 12 hours  hydrALAZINE 100 milliGRAM(s) Oral every 12 hours  influenza   Vaccine 0.5 milliLiter(s) IntraMuscular once  insulin glargine Injectable (LANTUS) 15 Unit(s) SubCutaneous at bedtime  insulin lispro (HumaLOG) corrective regimen sliding scale   SubCutaneous three times a day before meals  insulin lispro (HumaLOG) corrective regimen sliding scale   SubCutaneous at bedtime  lidocaine/prilocaine Cream 1 Application(s) Topical daily  piperacillin/tazobactam IVPB. 3.375 Gram(s) IV Intermittent every 12 hours  valsartan 40 milliGRAM(s) Oral daily    MEDICATIONS  (PRN):  acetaminophen   Tablet. 650 milliGRAM(s) Oral every 8 hours PRN Mild Pain (1 - 3)  dextrose Gel 1 Dose(s) Oral once PRN Blood Glucose LESS THAN 70 milliGRAM(s)/deciliter  glucagon  Injectable 1 milliGRAM(s) IntraMuscular once PRN Glucose LESS THAN 70 milligrams/deciliter  melatonin 3 milliGRAM(s) Oral at bedtime PRN Insomnia      Allergies    No Known Allergies    Intolerances        T(F): 97.6 (03-15-18 @ 11:00), Max: 97.6 (03-14-18 @ 23:36)  HR: 62 (03-15-18 @ 13:58) (62 - 89)  BP: 174/63 (03-15-18 @ 11:00) (160/57 - 213/80)  RR: 18 (03-15-18 @ 11:00) (17 - 18)  SpO2: 99% (03-15-18 @ 13:58) (97% - 99%)  Wt(kg): --    PHYSICAL EXAM:  GENERAL: NAD, well-groomed, well-developed  HEAD:  Atraumatic, Normocephalic  EYES: EOMI, PERRLA, conjunctiva and sclera clear  ENMT: No tonsillar erythema, exudates, or enlargement; Moist mucous membranes, Good dentition, No lesions  NECK: Supple, No JVD, Normal thyroid  NERVOUS SYSTEM:  Alert & Oriented X3, Good concentration; Motor Strength 5/5 B/L upper and lower extremities; DTRs 2+ intact and symmetric  CHEST/LUNG: decreased BS bilaterally;   HEART: Regular rate and rhythm; No murmurs, rubs, or gallops  ABDOMEN: Soft, Nontender, Nondistended; Bowel sounds present  EXTREMITIES:  2+ Peripheral Pulses, No clubbing, cyanosis, or edema BL LE, LUE AVF +  LYMPH: No lymphadenopathy noted  SKIN: No rashes or lesions    LABS:                        8.2    10.88 )-----------( 312      ( 14 Mar 2018 11:46 )             25.3     03-14    141  |  103  |  42<H>  ----------------------------<  63<L>  3.4<L>   |  30  |  3.79<H>    Ca    7.6<L>      14 Mar 2018 11:46          Cultures;   CAPILLARY BLOOD GLUCOSE      POCT Blood Glucose.: 97 mg/dL (15 Mar 2018 11:18)  POCT Blood Glucose.: 105 mg/dL (15 Mar 2018 07:44)  POCT Blood Glucose.: 199 mg/dL (14 Mar 2018 23:12)  POCT Blood Glucose.: 227 mg/dL (14 Mar 2018 22:03)  POCT Blood Glucose.: 179 mg/dL (14 Mar 2018 16:38)    Lipid panel:       RADIOLOGY & ADDITIONAL TESTS:    Imaging Personally Reviewed:  [x ] YES   < from: Xray Chest 1 View AP/PA. (03.08.18 @ 07:34) >  Bilateral lung opacities, suspicious for pneumonia.    < from: TTE Echo Doppler w/o Cont (05.17.17 @ 17:27) >  1. Left ventricular ejection fraction, by visual estimation, is 55 to   60%.   2. Mildly increased LV wall thickness.   3. Spectral Doppler shows impaired relaxation pattern of left   ventricular myocardial filling (Grade I diastolic dysfunction).   4. There is borderline concentric left ventricular hypertrophy.   5. Normal right ventricular size and function.   6. The left atrium is normal in size.   7. The right atrium is normal in size.   8. There is no evidence of pericardial effusion.   9. Mild mitral annular calcification.  10. Mild to moderate mitral valve regurgitation.  11. Thickening of the anterior and posterior mitral valve leaflets.    < end of copied text >  < from: TTE Echo Doppler w/o Cont (05.17.17 @ 17:27) >  1. Left ventricular ejection fraction, by visual estimation, is 55 to   60%.   2. Mildly increased LV wall thickness.   3. Spectral Doppler shows impaired relaxation pattern of left   ventricular myocardial filling (Grade I diastolic dysfunction).   4. There is borderline concentric left ventricular hypertrophy.   5. Normal right ventricular size and function.   6. The left atrium is normal in size.   7. The right atrium is normal in size.   8. There is no evidence of pericardial effusion.   9. Mild mitral annular calcification.  10. Mild to moderate mitral valve regurgitation.  11. Thickening of the anterior and posterior mitral valve leaflets.  12. Structurally normal tricuspid valve, with normal leaflet excursion.  13. Mild aortic regurgitation.  14. Normal trileaflet aortic valve with normal opening.  15. Structurally normal pulmonic valve, with normal leaflet excursion.    < end of copied text >    Consultant(s) Notes Reviewed:  [x ] YES     Care Discussed with [x ] Consultants [X ] Patient [ ] Family  [x ]    [x ]  Other; RN
Patient is a 72y old  Female who presents with a chief complaint of SOB (08 Mar 2018 16:08)      OVERNIGHT EVENTS: none    REVIEW OF SYSTEMS: denies chest pain/SOB, diaphoresis, no F/C, cough, dizziness, headache, blurry vision, nausea, vomiting, abdominal pain. All others review of systems negative     MEDICATIONS  (STANDING):  ALBUTerol/ipratropium for Nebulization 3 milliLiter(s) Nebulizer every 8 hours  atorvastatin 20 milliGRAM(s) Oral at bedtime  cloNIDine 0.3 milliGRAM(s) Oral three times a day  dextrose 5%. 1000 milliLiter(s) (50 mL/Hr) IV Continuous <Continuous>  dextrose 50% Injectable 12.5 Gram(s) IV Push once  dextrose 50% Injectable 25 Gram(s) IV Push once  dextrose 50% Injectable 25 Gram(s) IV Push once  diphenoxylate/atropine 1 Tablet(s) Oral every 8 hours  docusate sodium 100 milliGRAM(s) Oral two times a day  heparin  Injectable 5000 Unit(s) SubCutaneous every 12 hours  hydrALAZINE 100 milliGRAM(s) Oral every 12 hours  influenza   Vaccine 0.5 milliLiter(s) IntraMuscular once  insulin glargine Injectable (LANTUS) 15 Unit(s) SubCutaneous at bedtime  insulin lispro (HumaLOG) corrective regimen sliding scale   SubCutaneous three times a day before meals  insulin lispro (HumaLOG) corrective regimen sliding scale   SubCutaneous at bedtime  lidocaine/prilocaine Cream 1 Application(s) Topical daily  piperacillin/tazobactam IVPB. 3.375 Gram(s) IV Intermittent every 12 hours    MEDICATIONS  (PRN):  dextrose Gel 1 Dose(s) Oral once PRN Blood Glucose LESS THAN 70 milliGRAM(s)/deciliter  glucagon  Injectable 1 milliGRAM(s) IntraMuscular once PRN Glucose LESS THAN 70 milligrams/deciliter  melatonin 3 milliGRAM(s) Oral at bedtime PRN Insomnia      Allergies    No Known Allergies    Intolerances        T(F): 98.4 (03-12-18 @ 11:50), Max: 98.4 (03-12-18 @ 11:50)  HR: 75 (03-12-18 @ 11:50) (72 - 84)  BP: 180/66 (03-12-18 @ 11:50) (173/61 - 180/66)  RR: 18 (03-12-18 @ 11:50) (18 - 19)  SpO2: 99% (03-12-18 @ 11:50) (97% - 99%)  Wt(kg): --    PHYSICAL EXAM:  GENERAL: NAD, well-groomed, well-developed  HEAD:  Atraumatic, Normocephalic  EYES: EOMI, PERRLA, conjunctiva and sclera clear  ENMT: No tonsillar erythema, exudates, or enlargement; Moist mucous membranes, Good dentition, No lesions  NECK: Supple, No JVD, Normal thyroid  NERVOUS SYSTEM:  Alert & Oriented X3, Good concentration; Motor Strength 5/5 B/L upper and lower extremities; DTRs 2+ intact and symmetric  CHEST/LUNG: decreased BS bilaterally;   HEART: Regular rate and rhythm; No murmurs, rubs, or gallops  ABDOMEN: Soft, Nontender, Nondistended; Bowel sounds present  EXTREMITIES:  2+ Peripheral Pulses, No clubbing, cyanosis, or edema BL LE, LUE AVF +  LYMPH: No lymphadenopathy noted  SKIN: No rashes or lesions    LABS:                        6.8    14.62 )-----------( 336      ( 12 Mar 2018 06:42 )             21.4     03-12    141  |  104  |  113<H>  ----------------------------<  157<H>  4.1   |  25  |  7.15<H>    Ca    7.4<L>      12 Mar 2018 06:42          Cultures;   CAPILLARY BLOOD GLUCOSE      POCT Blood Glucose.: 330 mg/dL (12 Mar 2018 11:15)  POCT Blood Glucose.: 131 mg/dL (12 Mar 2018 07:52)  POCT Blood Glucose.: 264 mg/dL (11 Mar 2018 21:35)  POCT Blood Glucose.: 300 mg/dL (11 Mar 2018 16:06)    Lipid panel:       RADIOLOGY & ADDITIONAL TESTS:    Imaging Personally Reviewed:  [x ] YES   < from: Xray Chest 1 View AP/PA. (03.08.18 @ 07:34) >  Bilateral lung opacities, suspicious for pneumonia.    Consultant(s) Notes Reviewed:  [x ] YES     Care Discussed with [x ] Consultants [X ] Patient [ ] Family  [x ]    [x ]  Other; RN
Patient is a 72y old  Female who presents with a chief complaint of SOB (08 Mar 2018 16:08)      OVERNIGHT EVENTS: none. had dialysis 1st session for 2 hrs yesterday, tolerated well    REVIEW OF SYSTEMS: denies chest pain/SOB, diaphoresis, no F/C, cough, dizziness, headache, blurry vision, nausea, vomiting, abdominal pain. All others review of systems negative     MEDICATIONS  (STANDING):  ALBUTerol/ipratropium for Nebulization 3 milliLiter(s) Nebulizer every 8 hours  atorvastatin 20 milliGRAM(s) Oral at bedtime  cloNIDine 0.3 milliGRAM(s) Oral three times a day  dextrose 5%. 1000 milliLiter(s) (50 mL/Hr) IV Continuous <Continuous>  dextrose 50% Injectable 12.5 Gram(s) IV Push once  dextrose 50% Injectable 25 Gram(s) IV Push once  dextrose 50% Injectable 25 Gram(s) IV Push once  docusate sodium 100 milliGRAM(s) Oral two times a day  heparin  Injectable 5000 Unit(s) SubCutaneous every 12 hours  hydrALAZINE 100 milliGRAM(s) Oral every 12 hours  influenza   Vaccine 0.5 milliLiter(s) IntraMuscular once  insulin glargine Injectable (LANTUS) 15 Unit(s) SubCutaneous at bedtime  insulin lispro (HumaLOG) corrective regimen sliding scale   SubCutaneous three times a day before meals  insulin lispro (HumaLOG) corrective regimen sliding scale   SubCutaneous at bedtime  lidocaine/prilocaine Cream 1 Application(s) Topical daily  piperacillin/tazobactam IVPB. 3.375 Gram(s) IV Intermittent every 12 hours    MEDICATIONS  (PRN):  dextrose Gel 1 Dose(s) Oral once PRN Blood Glucose LESS THAN 70 milliGRAM(s)/deciliter  glucagon  Injectable 1 milliGRAM(s) IntraMuscular once PRN Glucose LESS THAN 70 milligrams/deciliter  melatonin 3 milliGRAM(s) Oral at bedtime PRN Insomnia      Allergies    No Known Allergies    Intolerances        T(F): 97.2 (03-13-18 @ 10:59), Max: 97.8 (03-12-18 @ 16:20)  HR: 75 (03-13-18 @ 10:59) (72 - 80)  BP: 165/63 (03-13-18 @ 10:59) (159/72 - 204/88)  RR: 16 (03-13-18 @ 10:59) (16 - 18)  SpO2: 92% (03-13-18 @ 10:59) (92% - 100%)  Wt(kg): --    PHYSICAL EXAM:  GENERAL: NAD, well-groomed, well-developed  HEAD:  Atraumatic, Normocephalic  EYES: EOMI, PERRLA, conjunctiva and sclera clear  ENMT: No tonsillar erythema, exudates, or enlargement; Moist mucous membranes, Good dentition, No lesions  NECK: Supple, No JVD, Normal thyroid  NERVOUS SYSTEM:  Alert & Oriented X3, Good concentration; Motor Strength 5/5 B/L upper and lower extremities; DTRs 2+ intact and symmetric  CHEST/LUNG: decreased BS bilaterally;   HEART: Regular rate and rhythm; No murmurs, rubs, or gallops  ABDOMEN: Soft, Nontender, Nondistended; Bowel sounds present  EXTREMITIES:  2+ Peripheral Pulses, No clubbing, cyanosis, or edema BL LE, LUE AVF +  LYMPH: No lymphadenopathy noted  SKIN: No rashes or lesions      LABS:                        6.8    14.62 )-----------( 336      ( 12 Mar 2018 06:42 )             21.4     03-12    141  |  104  |  113<H>  ----------------------------<  157<H>  4.1   |  25  |  7.15<H>    Ca    7.4<L>      12 Mar 2018 06:42          Cultures;   CAPILLARY BLOOD GLUCOSE      POCT Blood Glucose.: 148 mg/dL (13 Mar 2018 11:19)  POCT Blood Glucose.: 176 mg/dL (13 Mar 2018 07:23)  POCT Blood Glucose.: 136 mg/dL (12 Mar 2018 21:01)    RADIOLOGY & ADDITIONAL TESTS:    Imaging Personally Reviewed:  [x ] YES   < from: Xray Chest 1 View AP/PA. (03.08.18 @ 07:34) >  Bilateral lung opacities, suspicious for pneumonia.    Consultant(s) Notes Reviewed:  [x ] YES     Care Discussed with [x ] Consultants [X ] Patient [ ] Family  [x ]    [x ]  Other; RN
Subjective: no complaints.       MEDICATIONS  (STANDING):  ALBUTerol/ipratropium for Nebulization 3 milliLiter(s) Nebulizer every 8 hours  atorvastatin 20 milliGRAM(s) Oral at bedtime  carvedilol 6.25 milliGRAM(s) Oral every 12 hours  cloNIDine 0.3 milliGRAM(s) Oral three times a day  dextrose 5%. 1000 milliLiter(s) (50 mL/Hr) IV Continuous <Continuous>  dextrose 50% Injectable 12.5 Gram(s) IV Push once  dextrose 50% Injectable 25 Gram(s) IV Push once  dextrose 50% Injectable 25 Gram(s) IV Push once  docusate sodium 100 milliGRAM(s) Oral two times a day  doxazosin 2 milliGRAM(s) Oral at bedtime  heparin  Injectable 5000 Unit(s) SubCutaneous every 12 hours  hydrALAZINE 100 milliGRAM(s) Oral every 12 hours  influenza   Vaccine 0.5 milliLiter(s) IntraMuscular once  insulin glargine Injectable (LANTUS) 15 Unit(s) SubCutaneous at bedtime  insulin lispro (HumaLOG) corrective regimen sliding scale   SubCutaneous three times a day before meals  insulin lispro (HumaLOG) corrective regimen sliding scale   SubCutaneous at bedtime  lidocaine/prilocaine Cream 1 Application(s) Topical daily  piperacillin/tazobactam IVPB. 3.375 Gram(s) IV Intermittent every 12 hours    MEDICATIONS  (PRN):  acetaminophen   Tablet. 650 milliGRAM(s) Oral every 8 hours PRN Mild Pain (1 - 3)  dextrose Gel 1 Dose(s) Oral once PRN Blood Glucose LESS THAN 70 milliGRAM(s)/deciliter  glucagon  Injectable 1 milliGRAM(s) IntraMuscular once PRN Glucose LESS THAN 70 milligrams/deciliter  melatonin 3 milliGRAM(s) Oral at bedtime PRN Insomnia          T(C): 36.2 (03-15-18 @ 05:00), Max: 36.4 (03-14-18 @ 14:32)  HR: 64 (03-15-18 @ 05:33) (64 - 89)  BP: 174/64 (03-15-18 @ 05:00) (160/57 - 213/80)  RR: 18 (03-15-18 @ 05:00) (17 - 20)  SpO2: 99% (03-15-18 @ 05:33) (97% - 100%)  Wt(kg): --        I&O's Detail    14 Mar 2018 07:01  -  15 Mar 2018 07:00  --------------------------------------------------------  IN:    Oral Fluid: 810 mL  Total IN: 810 mL    OUT:  Total OUT: 0 mL    Total NET: 810 mL      15 Mar 2018 07:01  -  15 Mar 2018 10:43  --------------------------------------------------------  IN:    Oral Fluid: 360 mL  Total IN: 360 mL    OUT:  Total OUT: 0 mL    Total NET: 360 mL               PHYSICAL EXAM:    GENERAL: comfortable  EYES: EOMI, PERRLA, conjunctiva and sclera clear  NECK: Supple, no inc in JVP  CHEST/LUNG: Clear  HEART: S1S2  ABDOMEN: Soft, Nontender, Nondistended; Bowel sounds present  EXTREMITIES:  trace to 1+ edema  NEURO: no asterixis  ACCESS: L AVF pos thrill, bruit      LABS:  CBC Full  -  ( 14 Mar 2018 11:46 )  WBC Count : 10.88 K/uL  Hemoglobin : 8.2 g/dL  Hematocrit : 25.3 %  Platelet Count - Automated : 312 K/uL  Mean Cell Volume : 83.2 fl  Mean Cell Hemoglobin : 27.0 pg  Mean Cell Hemoglobin Concentration : 32.4 gm/dL  Auto Neutrophil # : x  Auto Lymphocyte # : x  Auto Monocyte # : x  Auto Eosinophil # : x  Auto Basophil # : x  Auto Neutrophil % : x  Auto Lymphocyte % : x  Auto Monocyte % : x  Auto Eosinophil % : x  Auto Basophil % : x    03-14    141  |  103  |  42<H>  ----------------------------<  63<L>  3.4<L>   |  30  |  3.79<H>    Ca    7.6<L>      14 Mar 2018 11:46          Impression:  * 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 Initiated on HD  * Mature AVF  * PNA, clinically improved  * Volume overload, better  * Acute on chronic anemia    Recommendations:  * Next dialysis 3/16 as Rxed  * Awaiting outpt HD spot prior to dc  * Renal/diabetic diet, PO FR  * Add ARB for BP control
patient with mild tachypnea; no chest pain.    MEDICATIONS  (STANDING):  ALBUTerol/ipratropium for Nebulization 3 milliLiter(s) Nebulizer every 8 hours  atorvastatin 20 milliGRAM(s) Oral at bedtime  cloNIDine 0.3 milliGRAM(s) Oral two times a day  dextrose 5%. 1000 milliLiter(s) (50 mL/Hr) IV Continuous <Continuous>  dextrose 50% Injectable 12.5 Gram(s) IV Push once  dextrose 50% Injectable 25 Gram(s) IV Push once  dextrose 50% Injectable 25 Gram(s) IV Push once  docusate sodium 100 milliGRAM(s) Oral two times a day  furosemide   Injectable 80 milliGRAM(s) IV Push every 12 hours  heparin  Injectable 5000 Unit(s) SubCutaneous every 12 hours  hydrALAZINE 100 milliGRAM(s) Oral every 12 hours  influenza   Vaccine 0.5 milliLiter(s) IntraMuscular once  insulin glargine Injectable (LANTUS) 15 Unit(s) SubCutaneous at bedtime  insulin lispro (HumaLOG) corrective regimen sliding scale   SubCutaneous three times a day before meals  insulin lispro (HumaLOG) corrective regimen sliding scale   SubCutaneous at bedtime  piperacillin/tazobactam IVPB. 3.375 Gram(s) IV Intermittent every 12 hours    MEDICATIONS  (PRN):  dextrose Gel 1 Dose(s) Oral once PRN Blood Glucose LESS THAN 70 milliGRAM(s)/deciliter  glucagon  Injectable 1 milliGRAM(s) IntraMuscular once PRN Glucose LESS THAN 70 milligrams/deciliter      03-08-18 @ 07:01  -  03-09-18 @ 07:00  --------------------------------------------------------  IN: 0 mL / OUT: 1200 mL / NET: -1200 mL      PHYSICAL EXAM:      T(C): 35.9 (03-09-18 @ 11:57), Max: 36.5 (03-09-18 @ 03:31)  HR: 85 (03-09-18 @ 11:57) (82 - 97)  BP: 160/70 (03-09-18 @ 11:57) (160/70 - 198/79)  RR: 19 (03-09-18 @ 11:57) (18 - 22)  SpO2: 95% (03-09-18 @ 11:57) (92% - 97%)  Wt(kg): --  Respiratory: decreased BS at bases with crackles  Cardiovascular: S1 S2  Gastrointestinal: soft NT ND +BS  Extremities:   1 edema  + avg bruit and thrill                              7.3    16.46 )-----------( 385      ( 09 Mar 2018 08:15 )             21.9     03-09    141  |  106  |  97<H>  ----------------------------<  153<H>  4.3   |  24  |  5.86<H>    Ca    7.9<L>      09 Mar 2018 08:15    TPro  6.8  /  Alb  2.6<L>  /  TBili  0.4  /  DBili  x   /  AST  56<H>  /  ALT  60  /  AlkPhos  237<H>  03-08      LIVER FUNCTIONS - ( 08 Mar 2018 06:50 )  Alb: 2.6 g/dL / Pro: 6.8 gm/dL / ALK PHOS: 237 U/L / ALT: 60 U/L / AST: 56 U/L / GGT: x             Assessment and Plan:    KATHERINE CKD 4-5; PNA fluid overload;   Trial of IV abx and lasix;   AV graft mature to use, will decide on initiation of HD in next 24-48 hrs;

## 2018-03-16 NOTE — DISCHARGE NOTE ADULT - MEDICATION SUMMARY - MEDICATIONS TO TAKE
I will START or STAY ON the medications listed below when I get home from the hospital:    lifestyle glucose test strips  -- 1 each subcutaneous 3 times a day (before meals)   -- Indication: For dm    valsartan 320 mg oral tablet  -- 1 tab(s) by mouth once a day  -- Indication: For Htn     cloNIDine 0.3 mg oral tablet  -- 1 tab(s) by mouth 2 times a day  -- Indication: For Htn     doxazosin 2 mg oral tablet  -- 1 tab(s) by mouth once a day (at bedtime)  -- Indication: For Htn     HumaLOG 100 units/mL subcutaneous solution  --  subcutaneous 3 times a day (before meals); 1 Unit(s) if Glucose 151 - 200  2 Unit(s) if Glucose 201 - 250  3 Unit(s) if Glucose 251 - 300  4 Unit(s) if Glucose 301 - 350  5 Unit(s) if Glucose 351 - 400  6 Unit(s) if Glucose Greater Than 400  -- Indication: For dm    insulin glargine  -- 15 unit(s) subcutaneous once a day (at bedtime)  -- Indication: For dm    atorvastatin 20 mg oral tablet  -- 1 tab(s) by mouth once a day (at bedtime)  -- Indication: For Hld     carvedilol 6.25 mg oral tablet  -- 1 tab(s) by mouth every 12 hours  -- Indication: For Htn     amLODIPine 10 mg oral tablet  -- 1 tab(s) by mouth once  -- Indication: For Htn     docusate sodium 100 mg oral capsule  -- 1 cap(s) by mouth 2 times a day  -- Indication: For constipation    hydrALAZINE 100 mg oral tablet  -- 1 tab(s) by mouth 2 times a day  -- Indication: For Htn

## 2018-03-16 NOTE — DISCHARGE NOTE ADULT - CARE PROVIDER_API CALL
Stef Mares), Internal Medicine; Nephrology  [address] [id number]  Phone: [phone]  Fax: [phone]    pcp,   Phone: (   )    -  Fax: (   )    -

## 2018-03-16 NOTE — PROGRESS NOTE ADULT - PROVIDER SPECIALTY LIST ADULT
Hospitalist
Nephrology

## 2018-03-16 NOTE — DISCHARGE NOTE ADULT - NSTOBACCOHOTLINE_GEN_A_CS
[facility] Smokers Quitline ([phone]) [facility] Smokers Quitline ([phone])

## 2018-03-16 NOTE — DISCHARGE NOTE ADULT - OTHER SIGNIFICANT FINDINGS
< from: TTE Echo Doppler w/o Cont (05.17.17 @ 17:27) >   Summary:   1. Left ventricular ejection fraction, by visual estimation, is 55 to   60%.   2. Mildly increased LV wall thickness.   3. Spectral Doppler shows impaired relaxation pattern of left   ventricular myocardial filling (Grade I diastolic dysfunction).   4. There is borderline concentric left ventricular hypertrophy.   5. Normal right ventricular size and function.   6. The left atrium is normal in size.   7. The right atrium is normal in size.   8. There is no evidence of pericardial effusion.   9. Mild mitral annular calcification.  10. Mild to moderate mitral valve regurgitation.  11. Thickening of the anterior and posterior mitral valve leaflets.  12. Structurally normal tricuspid valve, with normal leaflet excursion.  13. Mild aortic regurgitation.  14. Normal trileaflet aortic valve with normal opening.  15. Structurally normal pulmonic valve, with normal leaflet excursion.      < end of copied text >

## 2018-03-16 NOTE — DISCHARGE NOTE ADULT - MEDICATION SUMMARY - MEDICATIONS TO STOP TAKING
I will STOP taking the medications listed below when I get home from the hospital:    furosemide 80 mg oral tablet  -- 1 tab(s) by mouth 2 times a day    see medication reconciliation record    see medication reconciliation record    Lasix 80 mg oral tablet  -- orally 2 times a day

## 2018-03-16 NOTE — DISCHARGE NOTE ADULT - CARE PLAN
Principal Discharge DX:	Acute pulmonary edema  Goal:	follow up with dialysis  Assessment and plan of treatment:	monitor fluid intake   follow up with renal  Secondary Diagnosis:	Stage 5 chronic kidney disease on chronic dialysis

## 2018-03-16 NOTE — DISCHARGE NOTE ADULT - PATIENT PORTAL LINK FT
You can access the [facility] Patient Portal, offered by [facility], by registering with the following website: [email or web address]

## 2018-03-20 DIAGNOSIS — E11.21 TYPE 2 DIABETES MELLITUS WITH DIABETIC NEPHROPATHY: ICD-10-CM

## 2018-03-20 DIAGNOSIS — D63.1 ANEMIA IN CHRONIC KIDNEY DISEASE: ICD-10-CM

## 2018-03-20 DIAGNOSIS — E78.5 HYPERLIPIDEMIA, UNSPECIFIED: ICD-10-CM

## 2018-03-20 DIAGNOSIS — G47.00 INSOMNIA, UNSPECIFIED: ICD-10-CM

## 2018-03-20 DIAGNOSIS — A41.9 SEPSIS, UNSPECIFIED ORGANISM: ICD-10-CM

## 2018-03-20 DIAGNOSIS — Z79.4 LONG TERM (CURRENT) USE OF INSULIN: ICD-10-CM

## 2018-03-20 DIAGNOSIS — J96.01 ACUTE RESPIRATORY FAILURE WITH HYPOXIA: ICD-10-CM

## 2018-03-20 DIAGNOSIS — E44.0 MODERATE PROTEIN-CALORIE MALNUTRITION: ICD-10-CM

## 2018-03-20 DIAGNOSIS — E11.22 TYPE 2 DIABETES MELLITUS WITH DIABETIC CHRONIC KIDNEY DISEASE: ICD-10-CM

## 2018-03-20 DIAGNOSIS — I50.33 ACUTE ON CHRONIC DIASTOLIC (CONGESTIVE) HEART FAILURE: ICD-10-CM

## 2018-03-20 DIAGNOSIS — I08.0 RHEUMATIC DISORDERS OF BOTH MITRAL AND AORTIC VALVES: ICD-10-CM

## 2018-03-20 DIAGNOSIS — R01.1 CARDIAC MURMUR, UNSPECIFIED: ICD-10-CM

## 2018-03-20 DIAGNOSIS — K21.9 GASTRO-ESOPHAGEAL REFLUX DISEASE WITHOUT ESOPHAGITIS: ICD-10-CM

## 2018-03-20 DIAGNOSIS — N18.5 CHRONIC KIDNEY DISEASE, STAGE 5: ICD-10-CM

## 2018-03-20 DIAGNOSIS — Z87.891 PERSONAL HISTORY OF NICOTINE DEPENDENCE: ICD-10-CM

## 2018-03-20 DIAGNOSIS — I13.2 HYPERTENSIVE HEART AND CHRONIC KIDNEY DISEASE WITH HEART FAILURE AND WITH STAGE 5 CHRONIC KIDNEY DISEASE, OR END STAGE RENAL DISEASE: ICD-10-CM

## 2018-03-20 DIAGNOSIS — E87.70 FLUID OVERLOAD, UNSPECIFIED: ICD-10-CM

## 2018-03-20 DIAGNOSIS — J15.6 PNEUMONIA DUE TO OTHER GRAM-NEGATIVE BACTERIA: ICD-10-CM

## 2018-04-12 ENCOUNTER — INPATIENT (INPATIENT)
Facility: HOSPITAL | Age: 73
LOS: 1 days | Discharge: HOME | End: 2018-04-14
Attending: INTERNAL MEDICINE | Admitting: INTERNAL MEDICINE
Payer: MEDICARE

## 2018-04-12 VITALS
RESPIRATION RATE: 18 BRPM | HEIGHT: 66 IN | OXYGEN SATURATION: 99 % | WEIGHT: 143.3 LBS | TEMPERATURE: 98 F | HEART RATE: 67 BPM | SYSTOLIC BLOOD PRESSURE: 142 MMHG | DIASTOLIC BLOOD PRESSURE: 68 MMHG

## 2018-04-12 DIAGNOSIS — Z98.890 OTHER SPECIFIED POSTPROCEDURAL STATES: Chronic | ICD-10-CM

## 2018-04-12 LAB
ALBUMIN SERPL ELPH-MCNC: 2.8 G/DL — LOW (ref 3.3–5)
ALLERGY+IMMUNOLOGY DIAG STUDY NOTE: SIGNIFICANT CHANGE UP
ALP SERPL-CCNC: 88 U/L — SIGNIFICANT CHANGE UP (ref 40–120)
ALT FLD-CCNC: 22 U/L — SIGNIFICANT CHANGE UP (ref 12–78)
ANION GAP SERPL CALC-SCNC: 12 MMOL/L — SIGNIFICANT CHANGE UP (ref 5–17)
APTT BLD: 25.9 SEC — LOW (ref 27.5–37.4)
AST SERPL-CCNC: 21 U/L — SIGNIFICANT CHANGE UP (ref 15–37)
BASOPHILS # BLD AUTO: 0 K/UL — SIGNIFICANT CHANGE UP (ref 0–0.2)
BASOPHILS NFR BLD AUTO: 0 % — SIGNIFICANT CHANGE UP (ref 0–2)
BILIRUB SERPL-MCNC: 0.2 MG/DL — SIGNIFICANT CHANGE UP (ref 0.2–1.2)
BLD GP AB SCN SERPL QL: ABNORMAL
BUN SERPL-MCNC: 59 MG/DL — HIGH (ref 7–23)
BURR CELLS BLD QL SMEAR: PRESENT — SIGNIFICANT CHANGE UP
CALCIUM SERPL-MCNC: 7.9 MG/DL — LOW (ref 8.5–10.1)
CHLORIDE SERPL-SCNC: 100 MMOL/L — SIGNIFICANT CHANGE UP (ref 96–108)
CO2 SERPL-SCNC: 26 MMOL/L — SIGNIFICANT CHANGE UP (ref 22–31)
CREAT SERPL-MCNC: 5.65 MG/DL — HIGH (ref 0.5–1.3)
DIR ANTIGLOB POLYSPECIFIC INTERPRETATION: SIGNIFICANT CHANGE UP
ELLIPTOCYTES BLD QL SMEAR: SLIGHT — SIGNIFICANT CHANGE UP
EOSINOPHIL # BLD AUTO: 0.34 K/UL — SIGNIFICANT CHANGE UP (ref 0–0.5)
EOSINOPHIL NFR BLD AUTO: 4 % — SIGNIFICANT CHANGE UP (ref 0–6)
GLUCOSE SERPL-MCNC: 305 MG/DL — HIGH (ref 70–99)
HCT VFR BLD CALC: 23.4 % — LOW (ref 34.5–45)
HGB BLD-MCNC: 7 G/DL — CRITICAL LOW (ref 11.5–15.5)
HYPOCHROMIA BLD QL: SIGNIFICANT CHANGE UP
INR BLD: 0.96 RATIO — SIGNIFICANT CHANGE UP (ref 0.88–1.16)
LYMPHOCYTES # BLD AUTO: 1.76 K/UL — SIGNIFICANT CHANGE UP (ref 1–3.3)
LYMPHOCYTES # BLD AUTO: 21 % — SIGNIFICANT CHANGE UP (ref 13–44)
MACROCYTES BLD QL: SLIGHT — SIGNIFICANT CHANGE UP
MANUAL SMEAR VERIFICATION: YES — SIGNIFICANT CHANGE UP
MCHC RBC-ENTMCNC: 27.6 PG — SIGNIFICANT CHANGE UP (ref 27–34)
MCHC RBC-ENTMCNC: 29.9 GM/DL — LOW (ref 32–36)
MCV RBC AUTO: 92.1 FL — SIGNIFICANT CHANGE UP (ref 80–100)
MICROCYTES BLD QL: SLIGHT — SIGNIFICANT CHANGE UP
MONOCYTES # BLD AUTO: 0.75 K/UL — SIGNIFICANT CHANGE UP (ref 0–0.9)
MONOCYTES NFR BLD AUTO: 9 % — SIGNIFICANT CHANGE UP (ref 2–14)
NEUTROPHILS # BLD AUTO: 5.53 K/UL — SIGNIFICANT CHANGE UP (ref 1.8–7.4)
NEUTROPHILS NFR BLD AUTO: 66 % — SIGNIFICANT CHANGE UP (ref 43–77)
NRBC # BLD: 0 /100 — SIGNIFICANT CHANGE UP (ref 0–0)
NRBC # BLD: SIGNIFICANT CHANGE UP /100 WBCS (ref 0–0)
OB PNL STL: NEGATIVE — SIGNIFICANT CHANGE UP
PLAT MORPH BLD: NORMAL — SIGNIFICANT CHANGE UP
PLATELET # BLD AUTO: 279 K/UL — SIGNIFICANT CHANGE UP (ref 150–400)
POIKILOCYTOSIS BLD QL AUTO: SLIGHT — SIGNIFICANT CHANGE UP
POLYCHROMASIA BLD QL SMEAR: SLIGHT — SIGNIFICANT CHANGE UP
POTASSIUM SERPL-MCNC: 4.1 MMOL/L — SIGNIFICANT CHANGE UP (ref 3.5–5.3)
POTASSIUM SERPL-SCNC: 4.1 MMOL/L — SIGNIFICANT CHANGE UP (ref 3.5–5.3)
PROT SERPL-MCNC: 6 GM/DL — SIGNIFICANT CHANGE UP (ref 6–8.3)
PROTHROM AB SERPL-ACNC: 10.5 SEC — SIGNIFICANT CHANGE UP (ref 9.8–12.7)
RBC # BLD: 2.54 M/UL — LOW (ref 3.8–5.2)
RBC # FLD: 20.5 % — HIGH (ref 10.3–14.5)
RBC BLD AUTO: ABNORMAL
SCHISTOCYTES BLD QL AUTO: SLIGHT — SIGNIFICANT CHANGE UP
SODIUM SERPL-SCNC: 138 MMOL/L — SIGNIFICANT CHANGE UP (ref 135–145)
TARGETS BLD QL SMEAR: SLIGHT — SIGNIFICANT CHANGE UP
WBC # BLD: 8.38 K/UL — SIGNIFICANT CHANGE UP (ref 3.8–10.5)
WBC # FLD AUTO: 8.38 K/UL — SIGNIFICANT CHANGE UP (ref 3.8–10.5)

## 2018-04-12 PROCEDURE — 99285 EMERGENCY DEPT VISIT HI MDM: CPT

## 2018-04-12 PROCEDURE — 71045 X-RAY EXAM CHEST 1 VIEW: CPT | Mod: 26

## 2018-04-12 PROCEDURE — 86077 PHYS BLOOD BANK SERV XMATCH: CPT

## 2018-04-12 PROCEDURE — 99223 1ST HOSP IP/OBS HIGH 75: CPT

## 2018-04-12 RX ORDER — INSULIN LISPRO 100/ML
VIAL (ML) SUBCUTANEOUS
Qty: 0 | Refills: 0 | Status: DISCONTINUED | OUTPATIENT
Start: 2018-04-12 | End: 2018-04-14

## 2018-04-12 RX ORDER — VALSARTAN 80 MG/1
320 TABLET ORAL DAILY
Qty: 0 | Refills: 0 | Status: DISCONTINUED | OUTPATIENT
Start: 2018-04-12 | End: 2018-04-14

## 2018-04-12 RX ORDER — AMLODIPINE BESYLATE 2.5 MG/1
10 TABLET ORAL ONCE
Qty: 0 | Refills: 0 | Status: COMPLETED | OUTPATIENT
Start: 2018-04-12 | End: 2018-04-13

## 2018-04-12 RX ORDER — SODIUM CHLORIDE 9 MG/ML
1000 INJECTION, SOLUTION INTRAVENOUS
Qty: 0 | Refills: 0 | Status: DISCONTINUED | OUTPATIENT
Start: 2018-04-12 | End: 2018-04-14

## 2018-04-12 RX ORDER — INSULIN LISPRO 100/ML
VIAL (ML) SUBCUTANEOUS AT BEDTIME
Qty: 0 | Refills: 0 | Status: DISCONTINUED | OUTPATIENT
Start: 2018-04-12 | End: 2018-04-14

## 2018-04-12 RX ORDER — INSULIN GLARGINE 100 [IU]/ML
15 INJECTION, SOLUTION SUBCUTANEOUS AT BEDTIME
Qty: 0 | Refills: 0 | Status: DISCONTINUED | OUTPATIENT
Start: 2018-04-12 | End: 2018-04-14

## 2018-04-12 RX ORDER — DOXAZOSIN MESYLATE 4 MG
2 TABLET ORAL AT BEDTIME
Qty: 0 | Refills: 0 | Status: DISCONTINUED | OUTPATIENT
Start: 2018-04-12 | End: 2018-04-13

## 2018-04-12 RX ORDER — INSULIN HUMAN 100 [IU]/ML
5 INJECTION, SOLUTION SUBCUTANEOUS ONCE
Qty: 0 | Refills: 0 | Status: COMPLETED | OUTPATIENT
Start: 2018-04-12 | End: 2018-04-12

## 2018-04-12 RX ORDER — DEXTROSE 50 % IN WATER 50 %
12.5 SYRINGE (ML) INTRAVENOUS ONCE
Qty: 0 | Refills: 0 | Status: DISCONTINUED | OUTPATIENT
Start: 2018-04-12 | End: 2018-04-14

## 2018-04-12 RX ORDER — DEXTROSE 50 % IN WATER 50 %
1 SYRINGE (ML) INTRAVENOUS ONCE
Qty: 0 | Refills: 0 | Status: DISCONTINUED | OUTPATIENT
Start: 2018-04-12 | End: 2018-04-14

## 2018-04-12 RX ORDER — CARVEDILOL PHOSPHATE 80 MG/1
6.25 CAPSULE, EXTENDED RELEASE ORAL EVERY 12 HOURS
Qty: 0 | Refills: 0 | Status: DISCONTINUED | OUTPATIENT
Start: 2018-04-12 | End: 2018-04-14

## 2018-04-12 RX ORDER — ATORVASTATIN CALCIUM 80 MG/1
20 TABLET, FILM COATED ORAL AT BEDTIME
Qty: 0 | Refills: 0 | Status: DISCONTINUED | OUTPATIENT
Start: 2018-04-12 | End: 2018-04-14

## 2018-04-12 RX ORDER — GLUCAGON INJECTION, SOLUTION 0.5 MG/.1ML
1 INJECTION, SOLUTION SUBCUTANEOUS ONCE
Qty: 0 | Refills: 0 | Status: DISCONTINUED | OUTPATIENT
Start: 2018-04-12 | End: 2018-04-14

## 2018-04-12 RX ORDER — HYDRALAZINE HCL 50 MG
100 TABLET ORAL EVERY 12 HOURS
Qty: 0 | Refills: 0 | Status: DISCONTINUED | OUTPATIENT
Start: 2018-04-12 | End: 2018-04-14

## 2018-04-12 RX ORDER — DEXTROSE 50 % IN WATER 50 %
25 SYRINGE (ML) INTRAVENOUS ONCE
Qty: 0 | Refills: 0 | Status: DISCONTINUED | OUTPATIENT
Start: 2018-04-12 | End: 2018-04-14

## 2018-04-12 RX ADMIN — INSULIN HUMAN 5 UNIT(S): 100 INJECTION, SOLUTION SUBCUTANEOUS at 23:05

## 2018-04-12 NOTE — H&P ADULT - ASSESSMENT
72 year old woman with PMH of HTN, HL, DM, ESRD on HD MWF, CHF sent in by nephrologist for symptomatic Anemia. Patient will require admission for at least 2 midnights as detailed below:    IMPROVE VTE Individual Risk Assessment          RISK                                                          Points    [  ] Previous VTE                                                3    [  ] Thrombophilia                                             2    [  ] Lower limb paralysis                                    2        (unable to hold up >15 seconds)      [  ] Current Cancer                                             2         (within 6 months)    [ x ] Immobilization > 24 hrs                              1    [  ] ICU/CCU stay > 24 hours                            1    [x  ] Age > 60                                                    1    IMPROVE VTE Score ________2_

## 2018-04-12 NOTE — ED ADULT TRIAGE NOTE - CHIEF COMPLAINT QUOTE
pt sent by PMD for low hgb. as per daughter pt's hgb below 7. pt also missed today's dialysis and needs to be dialyzed. history of dm, htn and ESRD

## 2018-04-12 NOTE — ED ADULT NURSE NOTE - HARM RISK FACTORS
01/31/17 1008   Discharge Assessment   Assessment Type Discharge Planning Assessment   Confirmed/corrected address and phone number on facesheet? Yes   Assessment information obtained from? Patient   Expected Length of Stay (days) 2   Communicated expected length of stay with patient/caregiver yes   Type of Healthcare Directive Received (Declined)   Prior to hospitilization cognitive status: Alert/Oriented   Prior to hospitalization functional status: Assistive Equipment;Needs Assistance   Current cognitive status: Alert/Oriented   Current Functional Status: Assistive Equipment;Needs Assistance   Arrived From home or self-care   Lives With alone   Able to Return to Prior Arrangements yes   Is patient able to care for self after discharge? Yes  (Patient has a sitter Monday through Friday.)   How many people do you have in your home that can help with your care after discharge? 1   Who are your caregiver(s) and their phone number(s)? DaughterCecile [phone]   Patient's perception of discharge disposition home or selfcare   Readmission Within The Last 30 Days no previous admission in last 30 days   Patient currently being followed by outpatient case management? No   Patient currently receives home health services? No   Does the patient currently use HME? Yes   Name and contact number for HME provider: Unknown   Patient currently receives private duty nursing? No   Patient currently receives any other outside agency services? No   Equipment Currently Used at Home walker, rolling;shower chair   Do you have any problems affording any of your prescribed medications? No   Is the patient taking medications as prescribed? yes   Do you have any financial concerns preventing you from receiving the healthcare you need? No   Does the patient have transportation to healthcare appointments? Yes   Transportation Available family or friend will provide   On Dialysis? No   Does the patient receive services at the  [facility]? No   Are there any open cases? No   Discharge Plan A Home   Discharge Plan B Home   Patient/Family In Agreement With Plan yes   Patient is a 89 year old male coming to the hospital from home where he lives alone.  He is alert and engages in conversation.  Patient has his sitter with him.  She reports she sits with him Monday through Friday.  His daughter Cecile lives next door and usually takes care of his business.  Patient states he has four children but Cecile is the main one who cares for him.  He was informed of the observation status and given a copy of the MOON form.  He was also given the discharge planning check list to review.  Patient and sitter have no questions or concerns for .  Patient anticipates returning to his home.  No other discharge needs.   no

## 2018-04-12 NOTE — H&P ADULT - PROBLEM SELECTOR PLAN 1
Monitor for fluid overload  2 units pRBC, Trend repeat CBC q12hr  Monitor electrolytes and replace as needed  Avoid antiplatelets and antithrombotic medication  Discuss further management as ED endorsed Dr. Mares concerned for causes other than ESRD.  Will send Anemia work up-Retic Count, Fe, TIBC, Ferritin, B12, Folate, haptoglobin, LDH; no evidence of GIB

## 2018-04-12 NOTE — ED ADULT NURSE REASSESSMENT NOTE - NS ED NURSE REASSESS COMMENT FT1
UNA Ruiz, [facility] called.  - pt belongs to the [place] cohort.   [phone]
called blood bank, awaiting antibody before blood to be given

## 2018-04-12 NOTE — H&P ADULT - HISTORY OF PRESENT ILLNESS
Admitted for symptomatic Anemia, in progress. 72 year old woman with PMH of HTN, HL, DM, ESRD on HD MWF, CHF sent in by nephrologist for symptomatic Anemia.  She was seen in the office and complained of fatigue and mild SOB which she says does not feel similar to "CHF issues" in the past.  As per notes, in the office hgb was <7 so she was sent here.  She denies chest pain, palpitations, dark or bloody stool, gum bleeding, easy bruising.    In the ED, h/h on low side around baseline.  CMP consistent with ESRD.  ED ordered 2 units pRBC.

## 2018-04-12 NOTE — ED PROVIDER NOTE - MEDICAL DECISION MAKING DETAILS
pt with symptomatic anemia - dyspnea on exertion to give blood transfusion for anemia - Dr. Garcia to admit Dr. Mares's group aware of need for dialysis today.

## 2018-04-12 NOTE — H&P ADULT - NSHPLABSRESULTS_GEN_ALL_CORE
Vital Signs Last 24 Hrs  T(C): 36.6 (12 Apr 2018 23:09), Max: 36.6 (12 Apr 2018 19:33)  T(F): 97.8 (12 Apr 2018 23:09), Max: 97.8 (12 Apr 2018 19:33)  HR: 73 (12 Apr 2018 23:09) (67 - 73)  BP: 154/56 (12 Apr 2018 23:09) (142/68 - 154/56)  BP(mean): --  RR: 16 (12 Apr 2018 23:09) (16 - 18)  SpO2: 100% (12 Apr 2018 23:09) (99% - 100%)        LABS:                        7.0    8.38  )-----------( 279      ( 12 Apr 2018 20:50 )             23.4     04-12    138  |  100  |  59<H>  ----------------------------<  305<H>  4.1   |  26  |  5.65<H>    Ca    7.9<L>      12 Apr 2018 20:50    TPro  6.0  /  Alb  2.8<L>  /  TBili  0.2  /  DBili  x   /  AST  21  /  ALT  22  /  AlkPhos  88  04-12    PT/INR - ( 12 Apr 2018 20:50 )   PT: 10.5 sec;   INR: 0.96 ratio         PTT - ( 12 Apr 2018 20:50 )  PTT:25.9 sec      RADIOLOGY & ADDITIONAL STUDIES:    CXR:  Clear chest

## 2018-04-12 NOTE — ED ADULT NURSE NOTE - OBJECTIVE STATEMENT
73 y/o female PMhx DM, CHF, anemia, GERD sent to the ED by MD segura for low hgb. Patient c/o intermittent dyspena on exertion. Denies dizziness, chest pain, SOB

## 2018-04-13 DIAGNOSIS — N18.6 END STAGE RENAL DISEASE: ICD-10-CM

## 2018-04-13 DIAGNOSIS — E11.22 TYPE 2 DIABETES MELLITUS WITH DIABETIC CHRONIC KIDNEY DISEASE: ICD-10-CM

## 2018-04-13 DIAGNOSIS — I50.32 CHRONIC DIASTOLIC (CONGESTIVE) HEART FAILURE: ICD-10-CM

## 2018-04-13 DIAGNOSIS — D64.9 ANEMIA, UNSPECIFIED: ICD-10-CM

## 2018-04-13 DIAGNOSIS — E78.2 MIXED HYPERLIPIDEMIA: ICD-10-CM

## 2018-04-13 DIAGNOSIS — Z29.9 ENCOUNTER FOR PROPHYLACTIC MEASURES, UNSPECIFIED: ICD-10-CM

## 2018-04-13 LAB
ANION GAP SERPL CALC-SCNC: 10 MMOL/L — SIGNIFICANT CHANGE UP (ref 5–17)
ANTIBODY INTERPRETATION 2: SIGNIFICANT CHANGE UP
BUN SERPL-MCNC: 28 MG/DL — HIGH (ref 7–23)
CALCIUM SERPL-MCNC: 8 MG/DL — LOW (ref 8.5–10.1)
CHLORIDE SERPL-SCNC: 101 MMOL/L — SIGNIFICANT CHANGE UP (ref 96–108)
CO2 SERPL-SCNC: 30 MMOL/L — SIGNIFICANT CHANGE UP (ref 22–31)
CREAT SERPL-MCNC: 3.33 MG/DL — HIGH (ref 0.5–1.3)
GLUCOSE SERPL-MCNC: 249 MG/DL — HIGH (ref 70–99)
HCT VFR BLD CALC: 31.8 % — LOW (ref 34.5–45)
HGB BLD-MCNC: 10 G/DL — LOW (ref 11.5–15.5)
MCHC RBC-ENTMCNC: 27.7 PG — SIGNIFICANT CHANGE UP (ref 27–34)
MCHC RBC-ENTMCNC: 31.4 GM/DL — LOW (ref 32–36)
MCV RBC AUTO: 88.1 FL — SIGNIFICANT CHANGE UP (ref 80–100)
NRBC # BLD: 0 /100 WBCS — SIGNIFICANT CHANGE UP (ref 0–0)
PLATELET # BLD AUTO: 252 K/UL — SIGNIFICANT CHANGE UP (ref 150–400)
POTASSIUM SERPL-MCNC: 3.7 MMOL/L — SIGNIFICANT CHANGE UP (ref 3.5–5.3)
POTASSIUM SERPL-SCNC: 3.7 MMOL/L — SIGNIFICANT CHANGE UP (ref 3.5–5.3)
RBC # BLD: 3.61 M/UL — LOW (ref 3.8–5.2)
RBC # FLD: 20.2 % — HIGH (ref 10.3–14.5)
SODIUM SERPL-SCNC: 141 MMOL/L — SIGNIFICANT CHANGE UP (ref 135–145)
WBC # BLD: 9.08 K/UL — SIGNIFICANT CHANGE UP (ref 3.8–10.5)
WBC # FLD AUTO: 9.08 K/UL — SIGNIFICANT CHANGE UP (ref 3.8–10.5)

## 2018-04-13 PROCEDURE — 99233 SBSQ HOSP IP/OBS HIGH 50: CPT

## 2018-04-13 PROCEDURE — 93010 ELECTROCARDIOGRAM REPORT: CPT

## 2018-04-13 RX ORDER — DOCUSATE SODIUM 100 MG
100 CAPSULE ORAL
Qty: 0 | Refills: 0 | Status: DISCONTINUED | OUTPATIENT
Start: 2018-04-13 | End: 2018-04-14

## 2018-04-13 RX ORDER — POLYETHYLENE GLYCOL 3350 17 G/17G
17 POWDER, FOR SOLUTION ORAL DAILY
Qty: 0 | Refills: 0 | Status: DISCONTINUED | OUTPATIENT
Start: 2018-04-13 | End: 2018-04-14

## 2018-04-13 RX ORDER — LANOLIN ALCOHOL/MO/W.PET/CERES
3 CREAM (GRAM) TOPICAL ONCE
Qty: 0 | Refills: 0 | Status: COMPLETED | OUTPATIENT
Start: 2018-04-13 | End: 2018-04-13

## 2018-04-13 RX ORDER — SENNA PLUS 8.6 MG/1
2 TABLET ORAL AT BEDTIME
Qty: 0 | Refills: 0 | Status: DISCONTINUED | OUTPATIENT
Start: 2018-04-13 | End: 2018-04-14

## 2018-04-13 RX ADMIN — Medication 2: at 08:26

## 2018-04-13 RX ADMIN — ATORVASTATIN CALCIUM 20 MILLIGRAM(S): 80 TABLET, FILM COATED ORAL at 21:23

## 2018-04-13 RX ADMIN — Medication 4: at 11:19

## 2018-04-13 RX ADMIN — CARVEDILOL PHOSPHATE 6.25 MILLIGRAM(S): 80 CAPSULE, EXTENDED RELEASE ORAL at 06:37

## 2018-04-13 RX ADMIN — Medication 3 MILLIGRAM(S): at 02:08

## 2018-04-13 RX ADMIN — Medication 2: at 17:19

## 2018-04-13 RX ADMIN — VALSARTAN 320 MILLIGRAM(S): 80 TABLET ORAL at 06:37

## 2018-04-13 RX ADMIN — AMLODIPINE BESYLATE 10 MILLIGRAM(S): 2.5 TABLET ORAL at 04:45

## 2018-04-13 RX ADMIN — Medication 100 MILLIGRAM(S): at 17:25

## 2018-04-13 RX ADMIN — Medication 0.1 MILLIGRAM(S): at 17:25

## 2018-04-13 RX ADMIN — Medication 0.3 MILLIGRAM(S): at 06:37

## 2018-04-13 RX ADMIN — Medication 100 MILLIGRAM(S): at 06:38

## 2018-04-13 RX ADMIN — CARVEDILOL PHOSPHATE 6.25 MILLIGRAM(S): 80 CAPSULE, EXTENDED RELEASE ORAL at 17:25

## 2018-04-13 RX ADMIN — INSULIN GLARGINE 15 UNIT(S): 100 INJECTION, SOLUTION SUBCUTANEOUS at 21:23

## 2018-04-13 RX ADMIN — Medication 3 MILLIGRAM(S): at 22:53

## 2018-04-13 RX ADMIN — INSULIN GLARGINE 15 UNIT(S): 100 INJECTION, SOLUTION SUBCUTANEOUS at 01:02

## 2018-04-13 NOTE — CONSULT NOTE ADULT - SUBJECTIVE AND OBJECTIVE BOX
Information from chart:  "Patient is a 72y old  Female who presents with a chief complaint of SOB (12 Apr 2018 23:42)    HPI:  72 year old woman with PMH of HTN, HL, DM, ESRD on HD MWF, CHF sent in by nephrologist for symptomatic Anemia.  She was seen in the office and complained of fatigue and mild SOB which she says does not feel similar to "CHF issues" in the past.  As per notes, in the office hgb was <7 so she was sent here.  She denies chest pain, palpitations, dark or bloody stool, gum bleeding, easy bruising.    In the ED, h/h on low side around baseline.  CMP consistent with ESRD.  ED ordered 2 units pRBC. 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; CXR 10/10/17  Diabetic neuropathy  Heart murmur  GERD (gastroesophageal reflux disease)  Anemia  Hypercholesteremia  Chronic kidney disease  Congestive heart failure, unspecified congestive heart failure chronicity, unspecified congestive heart failure type: last 5/17  Diabetes: x 15 years ; FS  Hypertension  H/O endoscopy    FAMILY HISTORY:  No pertinent family history in first degree relatives    Allergies    No Known Allergies    Intolerances      Home Medications:  amLODIPine 10 mg oral tablet: 1 tab(s) orally once (16 Mar 2018 17:28)  atorvastatin 20 mg oral tablet: 1 tab(s) orally once a day (at bedtime) (08 Mar 2018 07:06)  cloNIDine 0.3 mg oral tablet: 1 tab(s) orally 2 times a day (08 Mar 2018 07:06)  docusate sodium 100 mg oral capsule: 1 cap(s) orally 2 times a day (08 Mar 2018 07:06)  HumaLOG 100 units/mL subcutaneous solution:  subcutaneous 3 times a day (before meals); 1 Unit(s) if Glucose 151 - 200  2 Unit(s) if Glucose 201 - 250  3 Unit(s) if Glucose 251 - 300  4 Unit(s) if Glucose 301 - 350  5 Unit(s) if Glucose 351 - 400  6 Unit(s) if Glucose Greater Than 400 (08 Mar 2018 07:06)  hydrALAZINE 100 mg oral tablet: 1 tab(s) orally 2 times a day (08 Mar 2018 07:06)  valsartan 320 mg oral tablet: 1 tab(s) orally once a day (08 Mar 2018 07:06)    MEDICATIONS  (STANDING):  atorvastatin 20 milliGRAM(s) Oral at bedtime  carvedilol 6.25 milliGRAM(s) Oral every 12 hours  cloNIDine 0.1 milliGRAM(s) Oral every 12 hours  dextrose 5%. 1000 milliLiter(s) (50 mL/Hr) IV Continuous <Continuous>  dextrose 50% Injectable 12.5 Gram(s) IV Push once  dextrose 50% Injectable 25 Gram(s) IV Push once  dextrose 50% Injectable 25 Gram(s) IV Push once  docusate sodium 100 milliGRAM(s) Oral two times a day  hydrALAZINE 100 milliGRAM(s) Oral every 12 hours  insulin glargine Injectable (LANTUS) 15 Unit(s) SubCutaneous at bedtime  insulin lispro (HumaLOG) corrective regimen sliding scale   SubCutaneous three times a day before meals  insulin lispro (HumaLOG) corrective regimen sliding scale   SubCutaneous at bedtime  senna 2 Tablet(s) Oral at bedtime  valsartan 320 milliGRAM(s) Oral daily    MEDICATIONS  (PRN):  dextrose Gel 1 Dose(s) Oral once PRN Blood Glucose LESS THAN 70 milliGRAM(s)/deciliter  glucagon  Injectable 1 milliGRAM(s) IntraMuscular once PRN Glucose LESS THAN 70 milligrams/deciliter  polyethylene glycol 3350 17 Gram(s) Oral daily PRN Constipation    Vital Signs Last 24 Hrs  T(C): 36.2 (13 Apr 2018 11:53), Max: 36.6 (12 Apr 2018 19:33)  T(F): 97.1 (13 Apr 2018 11:53), Max: 97.8 (12 Apr 2018 19:33)  HR: 63 (13 Apr 2018 11:53) (61 - 80)  BP: 115/43 (13 Apr 2018 11:53) (114/59 - 159/59)  BP(mean): --  RR: 17 (13 Apr 2018 11:53) (16 - 18)  SpO2: 97% (13 Apr 2018 11:53) (97% - 100%)    Daily Height in cm: 167.64 (12 Apr 2018 19:33)    Daily     04-12-18 @ 07:01  -  04-13-18 @ 07:00  --------------------------------------------------------  IN: 317 mL / OUT: 0 mL / NET: 317 mL    04-13-18 @ 07:01  -  04-13-18 @ 15:17  --------------------------------------------------------  IN: 1066 mL / OUT: 0 mL / NET: 1066 mL      CAPILLARY BLOOD GLUCOSE      POCT Blood Glucose.: 216 mg/dL (13 Apr 2018 11:17)  POCT Blood Glucose.: 196 mg/dL (13 Apr 2018 07:43)  POCT Blood Glucose.: 361 mg/dL (13 Apr 2018 01:00)  POCT Blood Glucose.: 362 mg/dL (12 Apr 2018 23:05)    PHYSICAL EXAM:      T(C): 36.2 (04-13-18 @ 11:53), Max: 36.6 (04-12-18 @ 19:33)  HR: 63 (04-13-18 @ 11:53) (61 - 80)  BP: 115/43 (04-13-18 @ 11:53) (114/59 - 159/59)  RR: 17 (04-13-18 @ 11:53) (16 - 18)  SpO2: 97% (04-13-18 @ 11:53) (97% - 100%)  Wt(kg): --  Respiratory: clear anteriorly, decreased BS at bases  Cardiovascular: S1 S2  Gastrointestinal: soft NT ND +BS  Extremities:  tr edema              04-12    138  |  100  |  59<H>  ----------------------------<  305<H>  4.1   |  26  |  5.65<H>    Ca    7.9<L>      12 Apr 2018 20:50    TPro  6.0  /  Alb  2.8<L>  /  TBili  0.2  /  DBili  x   /  AST  21  /  ALT  22  /  AlkPhos  88  04-12                          7.0    8.38  )-----------( 279      ( 12 Apr 2018 20:50 )             23.4       Assessment and Plan  Maintenance HD  UF 2-2.5 kg pot PRBC;   Will follow; stable from renal view.

## 2018-04-13 NOTE — PROGRESS NOTE ADULT - ASSESSMENT
72 year old woman with PMH of HTN, HL, DM, ESRD on HD MWF, CHF sent in by nephrologist for symptomatic Anemia. Patient will require admission for at least 2 midnights as detailed below:

## 2018-04-13 NOTE — PROGRESS NOTE ADULT - PROBLEM SELECTOR PLAN 1
Monitor for fluid overload  sp 2 units pRBC, repeat CBC  Monitor electrolytes and replace as needed  for hd today  f/u Anemia work up-Retic Count, Fe, TIBC, Ferritin, B12, Folate, haptoglobin, LDH; no evidence of GIB

## 2018-04-13 NOTE — PROGRESS NOTE ADULT - SUBJECTIVE AND OBJECTIVE BOX
Patient is a 72y old  Female who presents with a chief complaint of SOB (12 Apr 2018 23:42)      INTERVAL HPI/OVERNIGHT EVENTS: no events overnight     MEDICATIONS  (STANDING):  atorvastatin 20 milliGRAM(s) Oral at bedtime  carvedilol 6.25 milliGRAM(s) Oral every 12 hours  cloNIDine 0.1 milliGRAM(s) Oral every 12 hours  dextrose 5%. 1000 milliLiter(s) (50 mL/Hr) IV Continuous <Continuous>  dextrose 50% Injectable 12.5 Gram(s) IV Push once  dextrose 50% Injectable 25 Gram(s) IV Push once  dextrose 50% Injectable 25 Gram(s) IV Push once  hydrALAZINE 100 milliGRAM(s) Oral every 12 hours  insulin glargine Injectable (LANTUS) 15 Unit(s) SubCutaneous at bedtime  insulin lispro (HumaLOG) corrective regimen sliding scale   SubCutaneous three times a day before meals  insulin lispro (HumaLOG) corrective regimen sliding scale   SubCutaneous at bedtime  valsartan 320 milliGRAM(s) Oral daily    MEDICATIONS  (PRN):  dextrose Gel 1 Dose(s) Oral once PRN Blood Glucose LESS THAN 70 milliGRAM(s)/deciliter  glucagon  Injectable 1 milliGRAM(s) IntraMuscular once PRN Glucose LESS THAN 70 milligrams/deciliter      Allergies    No Known Allergies    Intolerances            Vital Signs Last 24 Hrs  T(C): 36.2 (13 Apr 2018 11:53), Max: 36.6 (12 Apr 2018 19:33)  T(F): 97.1 (13 Apr 2018 11:53), Max: 97.8 (12 Apr 2018 19:33)  HR: 63 (13 Apr 2018 11:53) (61 - 80)  BP: 115/43 (13 Apr 2018 11:53) (114/59 - 159/59)  BP(mean): --  RR: 17 (13 Apr 2018 11:53) (16 - 18)  SpO2: 97% (13 Apr 2018 11:53) (97% - 100%)    PHYSICAL EXAM:  GENERAL: NAD, well-groomed, well-developed  HEAD:  Atraumatic, Normocephalic  EYES: EOMI, PERRLA, conjunctiva and sclera clear  ENMT: No tonsillar erythema, exudates, or enlargement; Moist mucous membranes, Good dentition, No lesions  NECK: Supple, No JVD, Normal thyroid  NERVOUS SYSTEM:  Alert & Oriented X3, Good concentration; Motor Strength 5/5 B/L upper and lower extremities; DTRs 2+ intact and symmetric  CHEST/LUNG: Clear to percussion bilaterally; No rales, rhonchi, wheezing, or rubs  HEART: Regular rate and rhythm; No murmurs, rubs, or gallops  ABDOMEN: Soft, Nontender, Nondistended; Bowel sounds present  EXTREMITIES:  2+ Peripheral Pulses, No clubbing, cyanosis, or edema  LYMPH: No lymphadenopathy noted  SKIN: No rashes or lesions    LABS:                        7.0    8.38  )-----------( 279      ( 12 Apr 2018 20:50 )             23.4     04-12    138  |  100  |  59<H>  ----------------------------<  305<H>  4.1   |  26  |  5.65<H>    Ca    7.9<L>      12 Apr 2018 20:50    TPro  6.0  /  Alb  2.8<L>  /  TBili  0.2  /  DBili  x   /  AST  21  /  ALT  22  /  AlkPhos  88  04-12    PT/INR - ( 12 Apr 2018 20:50 )   PT: 10.5 sec;   INR: 0.96 ratio         PTT - ( 12 Apr 2018 20:50 )  PTT:25.9 sec    CAPILLARY BLOOD GLUCOSE      POCT Blood Glucose.: 216 mg/dL (13 Apr 2018 11:17)  POCT Blood Glucose.: 196 mg/dL (13 Apr 2018 07:43)  POCT Blood Glucose.: 361 mg/dL (13 Apr 2018 01:00)  POCT Blood Glucose.: 362 mg/dL (12 Apr 2018 23:05)      RADIOLOGY & ADDITIONAL TESTS:    Imaging Personally Reviewed:  [ ] YES  [ ] NO    Consultant(s) Notes Reviewed:  [ ] YES  [ ] NO    Care Discussed with Consultants/Other Providers [ ] YES  [ ] NO

## 2018-04-14 ENCOUNTER — TRANSCRIPTION ENCOUNTER (OUTPATIENT)
Age: 73
End: 2018-04-14

## 2018-04-14 VITALS
HEART RATE: 63 BPM | DIASTOLIC BLOOD PRESSURE: 60 MMHG | OXYGEN SATURATION: 98 % | RESPIRATION RATE: 18 BRPM | TEMPERATURE: 98 F | SYSTOLIC BLOOD PRESSURE: 167 MMHG

## 2018-04-14 LAB
ANION GAP SERPL CALC-SCNC: 10 MMOL/L — SIGNIFICANT CHANGE UP (ref 5–17)
BUN SERPL-MCNC: 41 MG/DL — HIGH (ref 7–23)
CALCIUM SERPL-MCNC: 7.8 MG/DL — LOW (ref 8.5–10.1)
CHLORIDE SERPL-SCNC: 103 MMOL/L — SIGNIFICANT CHANGE UP (ref 96–108)
CO2 SERPL-SCNC: 28 MMOL/L — SIGNIFICANT CHANGE UP (ref 22–31)
CREAT SERPL-MCNC: 4.07 MG/DL — HIGH (ref 0.5–1.3)
GLUCOSE SERPL-MCNC: 90 MG/DL — SIGNIFICANT CHANGE UP (ref 70–99)
HCT VFR BLD CALC: 31 % — LOW (ref 34.5–45)
HGB BLD-MCNC: 9.7 G/DL — LOW (ref 11.5–15.5)
MCHC RBC-ENTMCNC: 27.5 PG — SIGNIFICANT CHANGE UP (ref 27–34)
MCHC RBC-ENTMCNC: 31.3 GM/DL — LOW (ref 32–36)
MCV RBC AUTO: 87.8 FL — SIGNIFICANT CHANGE UP (ref 80–100)
NRBC # BLD: 0 /100 WBCS — SIGNIFICANT CHANGE UP (ref 0–0)
PLATELET # BLD AUTO: 259 K/UL — SIGNIFICANT CHANGE UP (ref 150–400)
POTASSIUM SERPL-MCNC: 3.4 MMOL/L — LOW (ref 3.5–5.3)
POTASSIUM SERPL-SCNC: 3.4 MMOL/L — LOW (ref 3.5–5.3)
RBC # BLD: 3.53 M/UL — LOW (ref 3.8–5.2)
RBC # FLD: 20 % — HIGH (ref 10.3–14.5)
SODIUM SERPL-SCNC: 141 MMOL/L — SIGNIFICANT CHANGE UP (ref 135–145)
WBC # BLD: 7.93 K/UL — SIGNIFICANT CHANGE UP (ref 3.8–10.5)
WBC # FLD AUTO: 7.93 K/UL — SIGNIFICANT CHANGE UP (ref 3.8–10.5)

## 2018-04-14 PROCEDURE — 99239 HOSP IP/OBS DSCHRG MGMT >30: CPT

## 2018-04-14 RX ADMIN — Medication 2: at 11:30

## 2018-04-14 RX ADMIN — Medication 0.1 MILLIGRAM(S): at 05:20

## 2018-04-14 RX ADMIN — CARVEDILOL PHOSPHATE 6.25 MILLIGRAM(S): 80 CAPSULE, EXTENDED RELEASE ORAL at 05:19

## 2018-04-14 RX ADMIN — Medication 100 MILLIGRAM(S): at 05:19

## 2018-04-14 RX ADMIN — VALSARTAN 320 MILLIGRAM(S): 80 TABLET ORAL at 05:19

## 2018-04-14 NOTE — DISCHARGE NOTE ADULT - SECONDARY DIAGNOSIS.
Chronic diastolic congestive heart failure ESRD (end stage renal disease) on dialysis Mixed hyperlipidemia Type 2 diabetes mellitus with chronic kidney disease on chronic dialysis, with long-term current use of insulin

## 2018-04-14 NOTE — DISCHARGE NOTE ADULT - HOSPITAL COURSE
72 year old woman with PMH of HTN, HL, DM, ESRD on HD MWF, CHF sent in by nephrologist for symptomatic Anemia.        Problem/Plan - 1:  ·  Problem: Symptomatic anemia.  Plan:   sp 2 units pRBC, with good response  anemia of chronic renal disease  no evidence of GIB.      Problem/Plan - 2:  ·  Problem: ESRD (end stage renal disease) on dialysis.  Plan: HD MWF  Monitor ins/outs.      Problem/Plan - 3:  ·  Problem: Type 2 diabetes mellitus with chronic kidney disease on chronic dialysis, with long-term current use of insulin.  Plan: home meds     Problem/Plan - 4:  ·  Problem: Chronic diastolic congestive heart failure.  Plan: Continue carvedilol, hydralazine, ARB.      Problem/Plan - 5:  ·  Problem: Mixed hyperlipidemia.  Plan: Continue Lipitor.         Attending Attestation:   I was physically present for the key portions of the evaluation and management (E/M) service provided.  I agree with the above history, physical, and plan which I have reviewed and edited where appropriate.     40 minutes spent on total dc encounter; more than 50% of the visit was spent counseling and/or coordinating care by the attending physician.

## 2018-04-14 NOTE — DISCHARGE NOTE ADULT - MEDICATION SUMMARY - MEDICATIONS TO TAKE
I will START or STAY ON the medications listed below when I get home from the hospital:    valsartan 320 mg oral tablet  -- 1 tab(s) by mouth once a day  -- Indication: For htn    cloNIDine 0.3 mg oral tablet  -- 1 tab(s) by mouth 2 times a day  -- Indication: For htn    doxazosin 2 mg oral tablet  -- 1 tab(s) by mouth once a day (at bedtime)  -- Indication: For htn    insulin glargine  -- 15 unit(s) subcutaneous once a day (at bedtime)  -- Indication: For dm    HumaLOG 100 units/mL subcutaneous solution  --  subcutaneous 3 times a day (before meals); 1 Unit(s) if Glucose 151 - 200  2 Unit(s) if Glucose 201 - 250  3 Unit(s) if Glucose 251 - 300  4 Unit(s) if Glucose 301 - 350  5 Unit(s) if Glucose 351 - 400  6 Unit(s) if Glucose Greater Than 400  -- Indication: For dm    atorvastatin 20 mg oral tablet  -- 1 tab(s) by mouth once a day (at bedtime)  -- Indication: For hld    carvedilol 6.25 mg oral tablet  -- 1 tab(s) by mouth every 12 hours  -- Indication: For htn    amLODIPine 10 mg oral tablet  -- 1 tab(s) by mouth once  -- Indication: For htn    docusate sodium 100 mg oral capsule  -- 1 cap(s) by mouth 2 times a day  -- Indication: For Preventive measure    hydrALAZINE 100 mg oral tablet  -- 1 tab(s) by mouth 2 times a day  -- Indication: For htn

## 2018-04-14 NOTE — DISCHARGE NOTE ADULT - PATIENT PORTAL LINK FT
You can access the [facility] Patient Portal, offered by [facility], by registering with the following website: [email or web address]

## 2018-04-14 NOTE — DISCHARGE NOTE ADULT - INSTRUCTIONS
Pt and dtr verbalized understanding of discharge instructions, medications, f/u with MD and s/s to report to MD/return to ER.  Written instructions given.

## 2018-04-16 ENCOUNTER — RX RENEWAL (OUTPATIENT)
Age: 73
End: 2018-04-16

## 2018-04-17 DIAGNOSIS — R76.11 NONSPECIFIC REACTION TO TUBERCULIN SKIN TEST WITHOUT ACTIVE TUBERCULOSIS: ICD-10-CM

## 2018-04-17 DIAGNOSIS — I13.2 HYPERTENSIVE HEART AND CHRONIC KIDNEY DISEASE WITH HEART FAILURE AND WITH STAGE 5 CHRONIC KIDNEY DISEASE, OR END STAGE RENAL DISEASE: ICD-10-CM

## 2018-04-17 DIAGNOSIS — N18.6 END STAGE RENAL DISEASE: ICD-10-CM

## 2018-04-17 DIAGNOSIS — I50.32 CHRONIC DIASTOLIC (CONGESTIVE) HEART FAILURE: ICD-10-CM

## 2018-04-17 DIAGNOSIS — D64.9 ANEMIA, UNSPECIFIED: ICD-10-CM

## 2018-04-17 DIAGNOSIS — E11.22 TYPE 2 DIABETES MELLITUS WITH DIABETIC CHRONIC KIDNEY DISEASE: ICD-10-CM

## 2018-04-17 DIAGNOSIS — E11.40 TYPE 2 DIABETES MELLITUS WITH DIABETIC NEUROPATHY, UNSPECIFIED: ICD-10-CM

## 2018-04-17 DIAGNOSIS — Z99.2 DEPENDENCE ON RENAL DIALYSIS: ICD-10-CM

## 2018-04-17 DIAGNOSIS — K21.9 GASTRO-ESOPHAGEAL REFLUX DISEASE WITHOUT ESOPHAGITIS: ICD-10-CM

## 2018-04-17 DIAGNOSIS — E78.2 MIXED HYPERLIPIDEMIA: ICD-10-CM

## 2018-05-04 ENCOUNTER — RX RENEWAL (OUTPATIENT)
Age: 73
End: 2018-05-04

## 2018-05-04 ENCOUNTER — APPOINTMENT (OUTPATIENT)
Dept: INTERNAL MEDICINE | Facility: CLINIC | Age: 73
End: 2018-05-04
Payer: MEDICARE

## 2018-05-04 VITALS
SYSTOLIC BLOOD PRESSURE: 150 MMHG | RESPIRATION RATE: 17 BRPM | DIASTOLIC BLOOD PRESSURE: 61 MMHG | TEMPERATURE: 97.6 F | WEIGHT: 148 LBS | HEIGHT: 62 IN | HEART RATE: 83 BPM | OXYGEN SATURATION: 96 % | BODY MASS INDEX: 27.23 KG/M2

## 2018-05-04 DIAGNOSIS — K27.9 PEPTIC ULCER, SITE UNSPECIFIED, UNSPECIFIED AS ACUTE OR CHRONIC, W/OUT HEMORRHAGE OR PERFORATION: ICD-10-CM

## 2018-05-04 PROCEDURE — G0439: CPT

## 2018-05-04 RX ORDER — ACETAMINOPHEN AND CODEINE 300; 30 MG/1; MG/1
300-30 TABLET ORAL
Qty: 20 | Refills: 0 | Status: DISCONTINUED | COMMUNITY
Start: 2018-01-30 | End: 2018-05-04

## 2018-05-04 RX ORDER — DOCUSATE SODIUM 100 MG/1
100 CAPSULE, LIQUID FILLED ORAL
Qty: 60 | Refills: 0 | Status: DISCONTINUED | COMMUNITY
Start: 2017-05-18 | End: 2018-05-04

## 2018-05-04 RX ORDER — CHLORHEXIDINE GLUCONATE 4 %
325 (65 FE) LIQUID (ML) TOPICAL
Refills: 0 | Status: DISCONTINUED | COMMUNITY
End: 2018-05-04

## 2018-05-04 RX ORDER — CALCITRIOL 0.25 UG/1
0.25 CAPSULE, LIQUID FILLED ORAL
Qty: 30 | Refills: 0 | Status: DISCONTINUED | COMMUNITY
Start: 2018-01-15 | End: 2018-05-04

## 2018-05-04 RX ORDER — PANTOPRAZOLE 40 MG/1
40 TABLET, DELAYED RELEASE ORAL DAILY
Qty: 1 | Refills: 0 | Status: DISCONTINUED | COMMUNITY
Start: 2017-10-25 | End: 2018-05-04

## 2018-05-04 RX ORDER — MULTIVITAMIN WITH FOLIC ACID 400 MCG
TABLET ORAL
Qty: 90 | Refills: 0 | Status: DISCONTINUED | COMMUNITY
Start: 2016-10-14 | End: 2018-05-04

## 2018-05-04 RX ORDER — ERYTHROPOIETIN 10000 [IU]/ML
10000 INJECTION, SOLUTION INTRAVENOUS; SUBCUTANEOUS
Refills: 0 | Status: DISCONTINUED | COMMUNITY
End: 2018-05-04

## 2018-05-07 PROBLEM — K27.9 PEPTIC ULCER: Status: ACTIVE | Noted: 2017-10-25

## 2018-05-22 ENCOUNTER — RX RENEWAL (OUTPATIENT)
Age: 73
End: 2018-05-22

## 2018-05-31 ENCOUNTER — APPOINTMENT (OUTPATIENT)
Dept: GASTROENTEROLOGY | Facility: CLINIC | Age: 73
End: 2018-05-31
Payer: MEDICARE

## 2018-05-31 VITALS
SYSTOLIC BLOOD PRESSURE: 130 MMHG | BODY MASS INDEX: 25.55 KG/M2 | OXYGEN SATURATION: 94 % | WEIGHT: 159 LBS | DIASTOLIC BLOOD PRESSURE: 64 MMHG | HEART RATE: 72 BPM | TEMPERATURE: 97.6 F | HEIGHT: 66 IN

## 2018-05-31 PROCEDURE — 99204 OFFICE O/P NEW MOD 45 MIN: CPT

## 2018-06-13 ENCOUNTER — RX RENEWAL (OUTPATIENT)
Age: 73
End: 2018-06-13

## 2018-06-18 ENCOUNTER — RX RENEWAL (OUTPATIENT)
Age: 73
End: 2018-06-18

## 2018-06-21 ENCOUNTER — OTHER (OUTPATIENT)
Age: 73
End: 2018-06-21

## 2018-06-21 LAB — HEMOCCULT STL QL IA: NEGATIVE

## 2018-07-11 ENCOUNTER — RX RENEWAL (OUTPATIENT)
Age: 73
End: 2018-07-11

## 2018-07-12 ENCOUNTER — MEDICATION RENEWAL (OUTPATIENT)
Age: 73
End: 2018-07-12

## 2018-07-13 ENCOUNTER — OTHER (OUTPATIENT)
Age: 73
End: 2018-07-13

## 2018-07-18 PROBLEM — D64.9 ANEMIA, UNSPECIFIED: Chronic | Status: ACTIVE | Noted: 2017-12-01

## 2018-07-18 PROBLEM — E11.40 TYPE 2 DIABETES MELLITUS WITH DIABETIC NEUROPATHY, UNSPECIFIED: Chronic | Status: ACTIVE | Noted: 2017-12-01

## 2018-07-18 PROBLEM — K21.9 GASTRO-ESOPHAGEAL REFLUX DISEASE WITHOUT ESOPHAGITIS: Chronic | Status: ACTIVE | Noted: 2017-12-01

## 2018-07-18 PROBLEM — E78.00 PURE HYPERCHOLESTEROLEMIA, UNSPECIFIED: Chronic | Status: ACTIVE | Noted: 2017-12-01

## 2018-07-18 PROBLEM — R76.11 NONSPECIFIC REACTION TO TUBERCULIN SKIN TEST WITHOUT ACTIVE TUBERCULOSIS: Chronic | Status: ACTIVE | Noted: 2017-12-01

## 2018-07-18 PROBLEM — R01.1 CARDIAC MURMUR, UNSPECIFIED: Chronic | Status: ACTIVE | Noted: 2017-12-01

## 2018-07-19 ENCOUNTER — TRANSCRIPTION ENCOUNTER (OUTPATIENT)
Age: 73
End: 2018-07-19

## 2018-07-20 ENCOUNTER — RX RENEWAL (OUTPATIENT)
Age: 73
End: 2018-07-20

## 2018-07-20 ENCOUNTER — APPOINTMENT (OUTPATIENT)
Dept: GASTROENTEROLOGY | Facility: HOSPITAL | Age: 73
End: 2018-07-20
Payer: MEDICARE

## 2018-07-20 ENCOUNTER — OUTPATIENT (OUTPATIENT)
Dept: OUTPATIENT SERVICES | Facility: HOSPITAL | Age: 73
LOS: 1 days | End: 2018-07-20
Payer: MEDICARE

## 2018-07-20 ENCOUNTER — RESULT REVIEW (OUTPATIENT)
Age: 73
End: 2018-07-20

## 2018-07-20 DIAGNOSIS — E61.1 IRON DEFICIENCY: ICD-10-CM

## 2018-07-20 DIAGNOSIS — D64.9 ANEMIA, UNSPECIFIED: ICD-10-CM

## 2018-07-20 DIAGNOSIS — K25.0 ACUTE GASTRIC ULCER WITH HEMORRHAGE: ICD-10-CM

## 2018-07-20 DIAGNOSIS — K25.9 GASTRIC ULCER, UNSPECIFIED AS ACUTE OR CHRONIC, W/OUT HEMORRHAGE OR PERFORATION: ICD-10-CM

## 2018-07-20 DIAGNOSIS — Z98.890 OTHER SPECIFIED POSTPROCEDURAL STATES: Chronic | ICD-10-CM

## 2018-07-20 LAB
ANION GAP SERPL CALC-SCNC: 11 MMOL/L — SIGNIFICANT CHANGE UP (ref 5–17)
BUN SERPL-MCNC: 67 MG/DL — HIGH (ref 7–23)
CALCIUM SERPL-MCNC: 8.8 MG/DL — SIGNIFICANT CHANGE UP (ref 8.5–10.1)
CHLORIDE SERPL-SCNC: 105 MMOL/L — SIGNIFICANT CHANGE UP (ref 96–108)
CO2 SERPL-SCNC: 28 MMOL/L — SIGNIFICANT CHANGE UP (ref 22–31)
CREAT SERPL-MCNC: 6.4 MG/DL — HIGH (ref 0.5–1.3)
GLUCOSE SERPL-MCNC: 98 MG/DL — SIGNIFICANT CHANGE UP (ref 70–99)
POTASSIUM SERPL-MCNC: 3.7 MMOL/L — SIGNIFICANT CHANGE UP (ref 3.5–5.3)
POTASSIUM SERPL-SCNC: 3.7 MMOL/L — SIGNIFICANT CHANGE UP (ref 3.5–5.3)
SODIUM SERPL-SCNC: 144 MMOL/L — SIGNIFICANT CHANGE UP (ref 135–145)

## 2018-07-20 PROCEDURE — 88312 SPECIAL STAINS GROUP 1: CPT | Mod: 26

## 2018-07-20 PROCEDURE — 43239 EGD BIOPSY SINGLE/MULTIPLE: CPT

## 2018-07-20 PROCEDURE — 88305 TISSUE EXAM BY PATHOLOGIST: CPT | Mod: 26

## 2018-07-20 PROCEDURE — 45381 COLONOSCOPY SUBMUCOUS NJX: CPT

## 2018-07-20 PROCEDURE — 88313 SPECIAL STAINS GROUP 2: CPT | Mod: 26

## 2018-07-20 PROCEDURE — 88313 SPECIAL STAINS GROUP 2: CPT

## 2018-07-20 PROCEDURE — 45385 COLONOSCOPY W/LESION REMOVAL: CPT

## 2018-07-20 PROCEDURE — 88305 TISSUE EXAM BY PATHOLOGIST: CPT

## 2018-07-20 PROCEDURE — 82962 GLUCOSE BLOOD TEST: CPT

## 2018-07-20 PROCEDURE — 80048 BASIC METABOLIC PNL TOTAL CA: CPT

## 2018-07-20 PROCEDURE — 88312 SPECIAL STAINS GROUP 1: CPT

## 2018-07-20 PROCEDURE — C1889: CPT

## 2018-07-23 LAB — SURGICAL PATHOLOGY FINAL REPORT - CH: SIGNIFICANT CHANGE UP

## 2018-07-24 ENCOUNTER — OTHER (OUTPATIENT)
Age: 73
End: 2018-07-24

## 2018-08-01 ENCOUNTER — MEDICATION RENEWAL (OUTPATIENT)
Age: 73
End: 2018-08-01

## 2018-08-14 ENCOUNTER — APPOINTMENT (OUTPATIENT)
Dept: CARDIOLOGY | Facility: CLINIC | Age: 73
End: 2018-08-14
Payer: MEDICARE

## 2018-08-14 ENCOUNTER — NON-APPOINTMENT (OUTPATIENT)
Age: 73
End: 2018-08-14

## 2018-08-14 ENCOUNTER — APPOINTMENT (OUTPATIENT)
Dept: CARDIOLOGY | Facility: CLINIC | Age: 73
End: 2018-08-14

## 2018-08-14 ENCOUNTER — RX RENEWAL (OUTPATIENT)
Age: 73
End: 2018-08-14

## 2018-08-14 VITALS — SYSTOLIC BLOOD PRESSURE: 180 MMHG | DIASTOLIC BLOOD PRESSURE: 86 MMHG

## 2018-08-14 VITALS
SYSTOLIC BLOOD PRESSURE: 188 MMHG | WEIGHT: 160 LBS | DIASTOLIC BLOOD PRESSURE: 72 MMHG | HEART RATE: 71 BPM | RESPIRATION RATE: 17 BRPM | OXYGEN SATURATION: 95 % | BODY MASS INDEX: 25.71 KG/M2 | TEMPERATURE: 98.1 F | HEIGHT: 66 IN

## 2018-08-14 PROCEDURE — 99214 OFFICE O/P EST MOD 30 MIN: CPT

## 2018-08-14 PROCEDURE — 93000 ELECTROCARDIOGRAM COMPLETE: CPT

## 2018-08-15 ENCOUNTER — MEDICATION RENEWAL (OUTPATIENT)
Age: 73
End: 2018-08-15

## 2018-09-04 ENCOUNTER — APPOINTMENT (OUTPATIENT)
Dept: CARDIOLOGY | Facility: CLINIC | Age: 73
End: 2018-09-04

## 2018-09-23 NOTE — PROGRESS NOTE ADULT - PROBLEM SELECTOR PROBLEM 4
exposure, bloodborne pathogen Pneumonia of both lungs due to other aerobic Gram-negative bacteria, unspecified part of lung

## 2018-10-29 ENCOUNTER — MEDICATION RENEWAL (OUTPATIENT)
Age: 73
End: 2018-10-29

## 2018-11-21 NOTE — PATIENT PROFILE ADULT. - NS PRO AD NO ADVANCE DIRECTIVE
Reason For Visit  Reason For Visit: Date: 12/4/17   AMALIA MAHONEY () is a(n) 52 year old existing patient here for a(n) 1 week follow-up visit COREY/BSO on 10/26/17 with a wound dehiscence.    Pt was evaluated in the [facility] wound center on 12/1/17.   Wound # 1 - abdominal. Full thickness surgical wound. Measurements are 9.2 cm length, 1.2 cm width and 12 cm depth. Large amount of serosanguineous drainage noted which has a mild odor. Recommendations included a 10 minute soak with 1/4 strength Dakin's solution prior to granufoam placement to reduce bacterial load within the depth of the wound..   AMALIA MAHONEY is chaperoned by Office Staff Kandace.   She is accompanied by her family member and Sister.   :  services not used.     Providers:   Referring: Dr. Lynetet Dhaliwal ([facility])  Cardiology - Dr Raman Gamble ( [facility] )   Urologist - Dr Antonio   GI- Dr Marrufo   Pneumologist - Dr Best fax # [phone]        History of Present Illness  HPI:     Systemic Symptoms: no abdominal pain, no abdominal swelling.   Breast Symptoms: no breast lump, no pain in breast, no breast enlargement, no nipple discharge.   GI Symptoms: no constipation, no diarrhea.   Urinary Symptoms: hematuria , but no dysuria, no pain in the flank, no urinary loss of control, no increase in urinary frequency, no feelings of urinary urgency.   Vaginal Symptoms: no postcoital bleeding. no vaginal bleeding no pain during intercourse no vaginal discharge   Pt doing much better today she feels. She was seen by the wound care team at [facility]. She has another appointment today with them after she leaves here. She still continues to have home health RN come to her house 3 times a week to help with the wound vac. This will change after this week to 2 times week and 1 time wee to the wound care clinic. Pt states the smell from her wound is much better and there is no smell. She does still have drainage into her wound vac. Pt  also stated that she is still taking the antibiotics for her infection. Pt also still has blood in her urine but that it is getting much better that she mostly notices it in the morning with her first void and sometimes that following void. She mentioned in the past when she has had this happen she was seen by a urologist and that did a cystoscopy and did not find anything in her bladder.   Pathology Summary:   Date Specimen Collected: 10/26/17 Accession #: [id number]  Date Specimen Received: 10/27/17  Date Reported: 10/30/2017 14:08 Location: [id number]    ______________________________________________________________________________  Pathologic Diagnosis :    A: Right tube and ovary, right salpingo-oophorectomy:  - Benign ovarian serous cystadenofibroma  - Fallopian tube with no pathologic change    B: Left tube and ovary, left salpingo-oophorectomy:  - Benign ovarian serous cystadenofibroma  - Fallopian tube with no pathologic change    C: Uterus and cervix, hysterectomy:  - Disordered proliferative endometrium  - Myometrium and cervix with no specific pathologic change    Markus Marquez M.D.  ** Electronic Signature (BA) 10/30/2017 14:08 **  ______________________________________________________________________________________    AP - Surgical  Name: AMALIA MAHONEY MRN: [id number]  /Age:1965 (Age: 52) Visit#: [id number]  Sex:F  Surgical Pathology Report  Client: [facility]  Submitting Physician: Rolo Marrufo MD  Additional Physician(s): Lynette Dhaliwal MD  Date Specimen Collected: 17 Accession #: [id number]  Date Specimen Received: 17  Date Reported: 2017 16:50 Location: [facility]  ______________________________________________________________________________  Pathologic Diagnosis :  A: Duodenum bx:  - Fragments of small intestinal mucosa with preserved villous architecture  and intraepithelial lymphocytes within normal limits.  B: Gastric bx:  - Fragments  of gastric mucosa with mild chronic inflammation and focal  intestinal metaplasia. Negative for dysplasia. Immunostain negative for  Helicobacter pylori.  C: Terminal ileum bx:  - Fragments of small intestinal mucosa with preserved villous architecture  and intraepithelial lymphocytes within normal limits.  D: Random colon bx:  -Fragments of colonic mucosa without significant microscopic abnormalities.  Trichrome stain examined.  Tommy Cox M.D.       Screening  Last Screening:   Last Labs:  pre op labs   Last Pap/Colposcopy:  normal   Last Mammogram:  normal   Last Colorectal Screenin/17 EGD and Colonoscopy [facility]   Last CT:   [phone]       Print        Patient Name AMALIA MAHONEY MRN [id number] Date of Birth 1965 Gender FEMALE Phone # [phone]   Height 154.94 cm 10/26/2017   Allergies No Known Medication Allergies;         Weight 169.1 kg 10/26/2017        Billing # [id number] Admit Date 2017 Discharge Date 2017         Radiology Results      Test Description: CT ABDOMEN AND PELVIS W CON      Ordered By BUTCH CELESTIN Order # [id number] Status COMPLETED   Procedure Date 2017 20:08 PM Orig Dictated By CLYDE AWAN Orig Approved By CLYDE AWAN       * Final Report *          EXAM: CT ABDOMEN AND PELVIS W CON    CLINICAL INDICATION: Supra-fascial wound dehiscence; diabetes.    COMPARISON: 2017.    TECHNIQUE: Routine protocol for contrast-enhanced multidetector thin section transaxial CT imaging of the abdomen and pelvis with a limited amount of oral contrast is performed uneventfully from the lung bases through the pubic symphysis with delayed imaging. Reformatted coronal and sagittal imaging is performed and reviewed.    Adjustment of the mA and/or kV was done according to the patient's size.    CONTRAST: 100 cc of Omnipaque 300 are administered intravenously.    FINDINGS: CT of the abdomen demonstrates clear lung bases in the presence of minimal  bibasilar subsegmental atelectasis. No pleural effusion is seen.    The solid visceral organs are unremarkable. The patient is status post cholecystectomy. Multifocal nonobstructing bilateral nephrolithiasis, greater right side, is noted. With delayed imaging, there is excretion into nondilated pelvicalyceal systems and ureters which can be traced intermittently to the level of their insertion into the urinary bladder which is of decreased capacity. No air in the lumen is seen. The bowel is normal in caliber. No free air or ascites is noted. No pathologic adenopathy is seen.    There is hazy infiltration of the subcutaneous fat involving the anterolateral abdominal wall. Anteriorly at the level of the true pelvis, there are internal air bubbles within an oblong region of heterogeneous hazy infiltration. These communicate with a large soft tissue defect overlying this region. There is mild skin thickening.    CT of the pelvis does not identify ascites. Nonenlarged lymph nodes are noted along the pelvic sidewalls. No inguinal hernia is seen.    Bony structures of grossly are intact.    IMPRESSION: New since 08/09/2017 is a band of subcutaneous edema and inflammatory change involving the lower anterolateral abdominal wall with communication to a large superficial cutaneous defect/open wound which may account for the smaller air bubbles deeper within the inflammatory/edematous region. Alternatively, these deeper air bubbles within this region reflect phlegmon/early abscess although there is no associated fluid/fluid level or air-fluid level. No discrete, loculated or drainable fluid collection is noted. No intra-abdominal or pelvic abscess is seen.           **** FINAL ****    Signature Line      Electronically Signed     CLYDE AWAN         Quality  Quality:   Smoking Cessation CI no tobacco use.      Allergies   1. No Known Drug Allergies    Current Meds   1. Acyclovir 400 MG Oral Tablet; TAKE 1 TABLET BY MOUTH 3  TIMES A DAY FOR 5 DAYS AS   NEEDED FOR COLD SORES;   Therapy: 70Eut8588 to (Evaluate:26Yrs8177)  Requested for: 59Zia3021; Last   Rx:52Frk9581 Ordered   2. Acyclovir 5 % External Ointment; APPLY A SMALL AMOUNT 3 TIMES DAILY AS DIRECTED;   Therapy: 12Jun2015 to (Last Rx:56Tzi6200)  Requested for: 65Mjx9275 Ordered   3. Advair Diskus 500-50 MCG/DOSE Inhalation Aerosol Powder Breath Activated; INHALE 1   PUFF TWICE DAILY;   Therapy: 03May2016 to (Evaluate:61Jgg2228)  Requested for: 20Nov2017; Last   Rx:20Nov2017 Ordered   4. Amoxicillin-Pot Clavulanate 500-125 MG Oral Tablet; TAKE 1 TABLET TWICE DAILY,    WITH MORNING AND EVENING MEAL;   Therapy: 30Nov2017 to (Evaluate:58Joi1668)  Requested for: 30Nov2017; Last   Rx:30Nov2017 Ordered   5. Atorvastatin Calcium 20 MG Oral Tablet; TAKE ONE TABLET BY MOUTH NIGHTLY AT   BEDTIME;   Therapy: 19Jan2015 to (Evaluate:14Apr2018)  Requested for: 34Agz3093; Last   Rx:45Cgq2481 Ordered   6. Azelastine HCl - 0.1 % Nasal Solution; INSERT 2 SQUIRTS IN EACH NOSTRIL TWICE   DAILY;   Therapy: 19May2016 to (Last Rx:56Ogc4247) Ordered   7. Blood Glucose Monitor System w/Device Kit; USE 4 TIMES DAILY;   Therapy: 91Agc2668 to (Last Rx:53Rdj8492)  Requested for: 29Vhs8233 Ordered   8. Blood Glucose Monitor System w/Device Kit; USE TO CHECK GLUCOSE TWICE DAILY;   Therapy: 94Qyb5340 to (Last Rx:03Jan2017)  Requested for: 03Jan2017 Ordered   9. Blood Glucose Test In Vitro Strip; TEST four times daily, with meals and bedtime;   Therapy: 53Ljc9769 to (Evaluate:16Oct2017)  Requested for: 48Ahe1570; Last   Rx:62Ren5088 Ordered   10. Cephalexin 500 MG Oral Tablet; TAKE 1 TABLET 3 TIMES DAILY;    Therapy: 06Nov2017 to (Evaluate:20Nov2017)  Requested for: 06Nov2017; Last    Rx:06Nov2017 Ordered   11. Eliquis 5 MG Oral Tablet; TAKE 1 TABLET BY MOUTH EVERY 12 HOURS;    Therapy: 05Apr2016 to (Evaluate:54Fap4115)  Requested for: 48Bcv9782; Last    Rx:04Dbt0582 Ordered   12. Flecainide Acetate 50 MG Oral  Tablet; Take 1 and 1/2 tablets by mouth every 12 hours;    Therapy: 11Mar2016 to (Evaluate:87Agt9884)  Requested for: 59Zor3839; Last    Rx:34Nui0041 Ordered   13. Fluconazole 150 MG Oral Tablet; TAKE 1 TABLET 1 TIME ONLY;    Therapy: 27Nov2017 to (Evaluate:11Wrh8397)  Requested for: 27Nov2017; Last    Rx:27Nov2017 Ordered   14. Insupen Ultrafin 31G X 6 MM; USE 5 TIMES DAILY WITH INSULIN;    Therapy: 03Mar2016 to (Last Rx:25Wju7509)  Requested for: 39Azu4567 Ordered   15. Iron 325 (65 Fe) MG Oral Tablet;    Therapy: (Recorded:35Nka4236) to Recorded   16. Lancets; TEST BLOOD GLUCOSE 4 TIMES DAILY;    Therapy: 03Dec2015 to (Last Rx:55Rir3521)  Requested for: 98Lff2567 Ordered   17. Lantus SoloStar 100 UNIT/ML Subcutaneous Solution Pen-injector; INJECT    SUBCUTANEOUSLY AS DIRECTED;    Therapy: 03Dec2015 to (Evaluate:27Mar2018)  Requested for: 27Nov2017; Last    Rx:27Nov2017 Ordered   18. Losartan Potassium 100 MG Oral Tablet; TAKE 1 TABLET BY MOUTH DAILY;    Therapy: 12Jun2015 to (Evaluate:83Fov5918)  Requested for: 80Xhf6676; Last    Rx:59Jcq6429 Ordered   19. MetFORMIN HCl - 1000 MG Oral Tablet; TAKE 1 TABLET BY MOUTH EVERY 12 HOURS     Requested for: 82Btd0698; Last Rx:98Tnq5826 Ordered   20. Metoprolol Succinate ER 25 MG Oral Tablet Extended Release 24 Hour; TAKE 1 TABLET    BY MOUTH EVERY DAY;    Therapy: 11Mar2016 to (Evaluate:77Hni1716)  Requested for: 27Ush0186; Last    Rx:54Mnh0865 Ordered   21. MetroNIDAZOLE 500 MG Oral Tablet; TAKE 1 TABLET TWICE DAILY UNTIL FINISHED;    Therapy: 27Nov2017 to (Evaluate:55Ked5188)  Requested for: 27Nov2017; Last    Rx:27Nov2017 Ordered   22. Montelukast Sodium 10 MG Oral Tablet; TAKE 1 TABLET DAILY;    Therapy: 17Mar2016 to (Evaluate:87Yqh6651)  Requested for: 85Uzm3754; Last    Rx:96Dzy6454 Ordered   23. NovoLOG FlexPen 100 UNIT/ML Subcutaneous Solution Pen-injector; INJECT 16U    BREAKFAST, 18U LUNCH, 15U DINNER; 15 MINUTES BEFORE OR AFTER    MEALS;    Therapy: 03Mar2016  to (Evaluate:29Apr2017)  Requested for: 67Grv0668; Last    Rx:96Rwf8634 Ordered   24. Pen Needles 5/16\" 31G X 8 MM; USE AS DIRECTED;    Therapy: 88Ymb9539 to (Last Rx:99Euq9191)  Requested for: 93Nwz3683 Ordered   25. PrednisoLONE Acetate 1 % Ophthalmic Suspension; INSTILL 1 DROP INTO LEFT EYE 3    TIMES DAILY;    Therapy: 11Mar2016 to Recorded   26. PredniSONE 50 MG Oral Tablet; TAKE 1 TABLET DAILY;    Therapy: 11Kkr4576 to (Evaluate:25Nov2017)  Requested for: 20Nov2017; Last    Rx:20Nov2017 Ordered   27. ProAir  (90 Base) MCG/ACT Inhalation Aerosol Solution; INHALE 1 TO 2 PUFFS    BY MOUTH EVERY 4 TO 6 HOURS AS NEEDED;    Therapy: 64Kdp3797 to (Last Rx:51Omp1150)  Requested for: 74Ywf1672 Ordered   28. Triamcinolone Acetonide 0.5 % External Cream; APPLY 2-3 TIMES DAILY TO AFFECTED    AREA(S);    Therapy: 70Bed3313 to (Last Rx:26Ioe0476)  Requested for: 40Wel8075 Ordered   29. Trifluridine 1 % Ophthalmic Solution; INSTILL 1 DROP IN THE LEFT EYE DAILY;    Therapy: 12Jan2016 to Recorded   30. Vitamin D (Ergocalciferol) 17346 UNIT Oral Capsule; TAKE 1 CAPSULE WEEKLY;    Therapy: 11Nky0986 to (Evaluate:35Ilc3152)  Requested for: 72Tqc5630; Last    Rx:61Szg5701 Ordered   31. Zilactin-B 10 % Mouth/Throat Gel; USE AS DIRECTED;    Therapy: 46Gco9565 to (Last Rx:74Hnj8191)  Requested for: 07Egc4925 Ordered  [facility] Current Meds Free Text:   No Medication Changes.       Active Problems   1. Abnormal mammogram (R92.8)   2. Acute left eye pain (H57.12)   3. Anemia (D64.9)   4. Asthma (J45.909)   5. Atrial fibrillation (I48.91)   6. Atrial flutter (I48.92)   7. Bacterial vaginosis (N76.0,B96.89)   8. Bilateral leg edema (R60.0)   9. Breast mass, left (N63.20)   10. Candida vaginitis (B37.3)   11. Cough (R05)   12. Dehiscence of wound (T81.30XA)   13. Dermatitis (L30.9)   14. Diabetes mellitus (E11.9)   15. Gastritis without bleeding (K29.70)   16. H/O cold sores (Z86.19)   17. Hematuria (R31.9)   18. 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 Hypertension (I10)   20. Loose stools (R19.5)   21. Morbid obesity (E66.01)   22. OAB (overactive bladder) (N32.81)   23. Obstructive sleep apnea, adult (G47.33)   24. Postoperative follow-up (Z09)   25. Rib pain on left side (R07.81)   26. Vaginal atrophy (N95.2)   27. Vitamin D deficiency (E55.9)   28. Wound infection after surgery (T81.4XXA)    OB/Gyn History  Gyn History:   Prior pregnancies: : 0. Para:. She is not sexually active. She is currently Postmenopausal.      Past Medical History   1. Asthma (J45.909)   2. Atrial flutter (I48.92)   3. History of Atypical chest pain (R07.89)   4. History of Colon cancer screening (Z12.11)   5. History of Complex cyst of left ovary (N83.292)   6. History of Complex cyst of uterine adnexa (N83.8)   7. History of Cough (R05)   8. History of Cysts of both ovaries (N83.201,N83.202)   9. Diabetes mellitus (E11.9)   10. History of Eczema (L30.9)   11. History of Encounter for immunization (Z23)   12. History of Encounter for immunization (Z23)   13. History of Epidermoid cyst of neck (L72.0)   14. History of Eye pain (H57.10)   15. History of H. pylori infection (A04.8)   16. History of Herpes gingivostomatitis (B00.2)   17. 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 History of abdominal pain (Z87.898)   19. History of atrial fibrillation (Z86.79)   20. History of hematuria (Z87.448)   21. History of screening mammography (Z92.89)   22. History of sleep apnea (Z86.69)   23. History of urinary tract infection (Z87.440)   24. History of vaginal bleeding (Z87.42)   25. History of viral warts (Z86.19)   26. History of Hospitalization within last 30 days (Z92.89)   27. 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 History of Human bite (W50.3XXA)   29. Hypertension (I10)   30. History of Hypoxia (R09.02)   31. History of Long toenail (L60.2)   32. History of Need for MMR vaccine (Z23)   33. History of Pap smear,  low-risk (Z01.419)   34. History of Paronychia of toe of left foot (L03.032)   35. History of Post-menopausal bleeding (N95.0)   36. History of Preoperative cardiovascular examination (Z01.810)   37. History of Pre-operative clearance (Z01.818)   38. History of Shortness of breath at rest (R06.02)   39. History of Throat pain (R07.0)   40. History of Wheezing on auscultation (R06.2)  Past Medical History Review:   past medical history was reviewed.      Surgical History   1. History of Cholecystotomy   2. History of Diagnostic Cystoscopy   · Normal   3. History of Total Abdominal Hysterectomy With Removal Of Both Ovaries    Family History   1. Family history of COPD (chronic obstructive pulmonary disease) : Mother   2. Family history of breast cancer (Z80.3) : Sister   3. Family history of diabetes mellitus (Z83.3) : Mother   4. Family history of lupus erythematosus (Z84.0) : Uncle, Cousin   5. Family history of von Willebrand disease (Z83.2) : Sister   6. Family history of Fibromyalgia : Sister  Family History Review:   Family medical history was reviewed      Social History   · Never smoker   · No drug use   · Occasional alcohol use   · Occupation   ·    · Single   · Unable to drive  Social History Review: social history was reviewed      Vitals  Signs   Recorded: 25Nov2017 08:01AM   Weight: 365 lb   BMI Calculated: 68.97  BSA Calculated: 2.44  Systolic: 142, LUE, Sitting  Diastolic: 50, LUE, Sitting  Temperature: 97.2 F, Oral  Heart Rate: 72  Respiration: 20  Recorded: 20Nov2017 04:31PM   Weight: 365 lb 8 oz  BMI Calculated: 69.06  BSA Calculated: 2.44  Systolic: 145, RUE, Sitting  Diastolic: 69, RUE, Sitting  Temperature: 97.4 F  Heart Rate: 79  Respiration: 16    Assessment   1. Hematuria (R31.9)   2. Dehiscence of wound (T81.30XA)    Plan   1. BUN/CREATININE; Status:Active; Requested for:23Fwj5077;    2. VENIPUNCTURE; Status:Complete;   Done: 91Vpn2606  Plan:         Return to Office: 4 week(s) or  No sooner PRN.   Spent 10 minutes speaking to patient. Plan will be to f/u with wound care weekly while home health continues wound vac changes. Asked pt to f/u in 4 weeks for evaluation. Will also f/u on c/o blood per urine. Has been seen by urology with negative cystoscope. Will consider referral to nephrology.      Signatures   Electronically signed by : BUTCH CELESTIN MD; Dec  4 2017  4:36PM CST (Author)

## 2018-12-11 ENCOUNTER — RX RENEWAL (OUTPATIENT)
Age: 73
End: 2018-12-11

## 2018-12-28 NOTE — ED ADULT NURSE NOTE - DURATION
yesterday [facility] Counseling: I discussed with the patient the risks of ixekizumab including but not limited to immunosuppression, serious infections, worsening of inflammatory bowel disease and drug reactions.  The patient understands that monitoring is required including a PPD at baseline and must alert us or the primary physician if symptoms of infection or other concerning signs are noted.

## 2019-02-26 ENCOUNTER — APPOINTMENT (OUTPATIENT)
Dept: CARDIOLOGY | Facility: CLINIC | Age: 74
End: 2019-02-26
Payer: MEDICARE

## 2019-02-26 ENCOUNTER — NON-APPOINTMENT (OUTPATIENT)
Age: 74
End: 2019-02-26

## 2019-02-26 VITALS
HEIGHT: 66 IN | OXYGEN SATURATION: 97 % | TEMPERATURE: 98.1 F | DIASTOLIC BLOOD PRESSURE: 72 MMHG | WEIGHT: 172 LBS | RESPIRATION RATE: 17 BRPM | HEART RATE: 66 BPM | BODY MASS INDEX: 27.64 KG/M2 | SYSTOLIC BLOOD PRESSURE: 170 MMHG

## 2019-02-26 VITALS — DIASTOLIC BLOOD PRESSURE: 70 MMHG | SYSTOLIC BLOOD PRESSURE: 140 MMHG

## 2019-02-26 PROCEDURE — 93000 ELECTROCARDIOGRAM COMPLETE: CPT

## 2019-02-26 PROCEDURE — 99214 OFFICE O/P EST MOD 30 MIN: CPT

## 2019-02-26 NOTE — REVIEW OF SYSTEMS
[Recent Weight Gain (___ Lbs)] : recent [unfilled] ~Ulb weight gain [Feeling Fatigued] : not feeling fatigued [Recent Weight Loss (___ Lbs)] : no recent weight loss [see HPI] : see HPI [Dyspnea on exertion] : not dyspnea during exertion [Lower Ext Edema] : no extremity edema [Negative] : Psychiatric

## 2019-02-26 NOTE — DISCUSSION/SUMMARY
[Patient] : the patient [Risks] : risks [Benefits] : benefits [Alternatives] : alternatives [___ Month(s)] : [unfilled] month(s) [FreeTextEntry1] : Discussed with patient and daughter regarding compliance on medication. Cardinal symptoms of aortic valve disease reviewed and discussed. Cardiac follow up  6 months , echo  to be obtained.

## 2019-02-26 NOTE — ASSESSMENT
[FreeTextEntry1] : 73-year-old woman with hypertension diabetes, end-stage renal disease on dialysis.\par \par Aortic valve disease currently asymptomatic.

## 2019-02-26 NOTE — HISTORY OF PRESENT ILLNESS
[FreeTextEntry1] : \par She has dialysis treatments 3 times a week and reports no difficulty. No significant hypotension reported by patient or her daughter. Daughter has not been given patient's valsartan however.\par \par Patient denies chest pain dyspnea palpitations or edema. She reports no significant interval health problems.\par

## 2019-02-26 NOTE — REASON FOR VISIT
[Aortic Stenosis] : aortic stenosis [Hypertension] : hypertension [Medication Management] : Medication management [Other: _____] : [unfilled]

## 2019-02-26 NOTE — PHYSICAL EXAM
[General Appearance - Well Developed] : well developed [General Appearance - Well Nourished] : well nourished [General Appearance - In No Acute Distress] : no acute distress [Normal Conjunctiva] : the conjunctiva exhibited no abnormalities [Eyelids - No Xanthelasma] : the eyelids demonstrated no xanthelasmas [No Oral Pallor] : no oral pallor [No Oral Cyanosis] : no oral cyanosis [Exaggerated Use Of Accessory Muscles For Inspiration] : no accessory muscle use [Auscultation Breath Sounds / Voice Sounds] : lungs were clear to auscultation bilaterally [Heart Rate And Rhythm] : heart rate and rhythm were normal [Heart Sounds] : normal S1 and S2 [Edema] : no peripheral edema present [Abdomen Soft] : soft [Abdomen Tenderness] : non-tender [] : no hepato-splenomegaly [Abdomen Mass (___ Cm)] : no abdominal mass palpated [FreeTextEntry1] : ambulates slowly unaided. [Nail Clubbing] : no clubbing of the fingernails [Cyanosis, Localized] : no localized cyanosis [Skin Color & Pigmentation] : normal skin color and pigmentation [Skin Turgor] : normal skin turgor [Affect] : the affect was normal [Mood] : the mood was normal

## 2019-05-07 ENCOUNTER — APPOINTMENT (OUTPATIENT)
Dept: INTERNAL MEDICINE | Facility: CLINIC | Age: 74
End: 2019-05-07
Payer: MEDICARE

## 2019-05-07 VITALS
DIASTOLIC BLOOD PRESSURE: 63 MMHG | HEART RATE: 67 BPM | RESPIRATION RATE: 17 BRPM | TEMPERATURE: 97.4 F | BODY MASS INDEX: 28.61 KG/M2 | HEIGHT: 66 IN | WEIGHT: 178 LBS | OXYGEN SATURATION: 95 % | SYSTOLIC BLOOD PRESSURE: 120 MMHG

## 2019-05-07 DIAGNOSIS — K02.9 DENTAL CARIES, UNSPECIFIED: ICD-10-CM

## 2019-05-07 DIAGNOSIS — Z00.00 ENCOUNTER FOR GENERAL ADULT MEDICAL EXAMINATION W/OUT ABNORMAL FINDINGS: ICD-10-CM

## 2019-05-07 PROCEDURE — G0439: CPT

## 2019-05-07 RX ORDER — INSULIN GLARGINE 100 [IU]/ML
100 INJECTION, SOLUTION SUBCUTANEOUS
Qty: 1 | Refills: 3 | Status: DISCONTINUED | COMMUNITY
Start: 2018-08-01 | End: 2019-05-07

## 2019-05-07 RX ORDER — INSULIN GLARGINE 100 [IU]/ML
100 INJECTION, SOLUTION SUBCUTANEOUS
Qty: 3 | Refills: 3 | Status: DISCONTINUED | COMMUNITY
End: 2019-05-07

## 2019-05-07 RX ORDER — POLYETHYLENE GLYCOL-3350 AND ELECTROLYTES 236; 6.74; 5.86; 2.97; 22.74 G/274.31G; G/274.31G; G/274.31G; G/274.31G; G/274.31G
236 POWDER, FOR SOLUTION ORAL
Qty: 1 | Refills: 0 | Status: DISCONTINUED | COMMUNITY
Start: 2018-05-31 | End: 2019-05-07

## 2019-05-07 RX ORDER — DOCUSATE SODIUM 100 MG/1
100 CAPSULE ORAL
Qty: 60 | Refills: 2 | Status: DISCONTINUED | COMMUNITY
Start: 2017-10-25 | End: 2019-05-07

## 2019-05-07 RX ORDER — INSULIN LISPRO 100 [IU]/ML
100 INJECTION, SOLUTION INTRAVENOUS; SUBCUTANEOUS
Qty: 1 | Refills: 3 | Status: DISCONTINUED | COMMUNITY
Start: 2017-03-29 | End: 2019-05-07

## 2019-05-07 RX ORDER — INSULIN DETEMIR 100 [IU]/ML
100 INJECTION, SOLUTION SUBCUTANEOUS
Qty: 1 | Refills: 1 | Status: DISCONTINUED | COMMUNITY
Start: 2018-11-07 | End: 2019-05-07

## 2019-07-31 ENCOUNTER — OTHER (OUTPATIENT)
Age: 74
End: 2019-07-31

## 2019-07-31 ENCOUNTER — RX RENEWAL (OUTPATIENT)
Age: 74
End: 2019-07-31

## 2019-08-06 ENCOUNTER — NON-APPOINTMENT (OUTPATIENT)
Age: 74
End: 2019-08-06

## 2019-08-06 ENCOUNTER — APPOINTMENT (OUTPATIENT)
Dept: CARDIOLOGY | Facility: CLINIC | Age: 74
End: 2019-08-06
Payer: MEDICARE

## 2019-08-06 VITALS
DIASTOLIC BLOOD PRESSURE: 69 MMHG | HEART RATE: 63 BPM | SYSTOLIC BLOOD PRESSURE: 156 MMHG | WEIGHT: 183 LBS | OXYGEN SATURATION: 97 % | HEIGHT: 66 IN | BODY MASS INDEX: 29.41 KG/M2

## 2019-08-06 DIAGNOSIS — R42 DIZZINESS AND GIDDINESS: ICD-10-CM

## 2019-08-06 PROCEDURE — 93306 TTE W/DOPPLER COMPLETE: CPT

## 2019-08-06 PROCEDURE — 99214 OFFICE O/P EST MOD 30 MIN: CPT

## 2019-08-06 PROCEDURE — 93000 ELECTROCARDIOGRAM COMPLETE: CPT

## 2019-08-06 NOTE — REASON FOR VISIT
[Aortic Stenosis] : aortic stenosis [Hypertension] : hypertension [Medication Management] : Medication management

## 2019-08-06 NOTE — PHYSICAL EXAM
[General Appearance - Well Developed] : well developed [General Appearance - Well Nourished] : well nourished [General Appearance - In No Acute Distress] : no acute distress [Normal Conjunctiva] : the conjunctiva exhibited no abnormalities [Eyelids - No Xanthelasma] : the eyelids demonstrated no xanthelasmas [No Oral Pallor] : no oral pallor [No Oral Cyanosis] : no oral cyanosis [Auscultation Breath Sounds / Voice Sounds] : lungs were clear to auscultation bilaterally [Exaggerated Use Of Accessory Muscles For Inspiration] : no accessory muscle use [Heart Rate And Rhythm] : heart rate and rhythm were normal [Edema] : no peripheral edema present [Heart Sounds] : normal S1 and S2 [Abdomen Soft] : soft [Abdomen Tenderness] : non-tender [] : no hepato-splenomegaly [FreeTextEntry1] : ambulates slowly unaided. [Abdomen Mass (___ Cm)] : no abdominal mass palpated [Nail Clubbing] : no clubbing of the fingernails [Cyanosis, Localized] : no localized cyanosis [Skin Turgor] : normal skin turgor [Skin Color & Pigmentation] : normal skin color and pigmentation [Affect] : the affect was normal [Mood] : the mood was normal

## 2019-08-06 NOTE — REVIEW OF SYSTEMS
[Recent Weight Gain (___ Lbs)] : recent [unfilled] ~Ulb weight gain [Feeling Fatigued] : not feeling fatigued [Recent Weight Loss (___ Lbs)] : no recent weight loss [Dyspnea on exertion] : not dyspnea during exertion [Lower Ext Edema] : no extremity edema [see HPI] : see HPI [Dizziness] : dizziness [Negative] : Psychiatric

## 2019-08-06 NOTE — DISCUSSION/SUMMARY
[Patient] : the patient [Risks] : risks [Benefits] : benefits [Alternatives] : alternatives [___ Month(s)] : [unfilled] month(s) [FreeTextEntry1] : Review of echo findings from today with patient. Advised me for blood pressure control. She has dialysis treatment schedule today. Followup PMD and nephrology. Cardiac follow up once or twice annually.

## 2019-08-06 NOTE — ASSESSMENT
[FreeTextEntry1] : 74-year-old woman with hypertension hypertensive heart disease mild aortic valve disease diabetes end-stage renal disease on hemodialysis. Overweight.

## 2019-09-05 ENCOUNTER — RX RENEWAL (OUTPATIENT)
Age: 74
End: 2019-09-05

## 2019-09-18 ENCOUNTER — MEDICATION RENEWAL (OUTPATIENT)
Age: 74
End: 2019-09-18

## 2019-09-19 ENCOUNTER — MEDICATION RENEWAL (OUTPATIENT)
Age: 74
End: 2019-09-19

## 2019-10-02 ENCOUNTER — MEDICATION RENEWAL (OUTPATIENT)
Age: 74
End: 2019-10-02

## 2019-10-02 RX ORDER — PEN NEEDLE, DIABETIC 29 G X1/2"
31G X 5 MM NEEDLE, DISPOSABLE MISCELLANEOUS
Qty: 4 | Refills: 1 | Status: ACTIVE | COMMUNITY
Start: 2019-10-02 | End: 1900-01-01

## 2019-10-11 ENCOUNTER — OTHER (OUTPATIENT)
Age: 74
End: 2019-10-11

## 2019-10-11 ENCOUNTER — MESSAGE (OUTPATIENT)
Age: 74
End: 2019-10-11

## 2019-10-22 NOTE — H&P PST ADULT - CIGARETTES, NUMBER OF YRS
Mirna Eason was interviewed, examined and the H and P reviewed.  There has been no interval change in her History and Physical.  
42

## 2020-01-06 NOTE — ED ADULT TRIAGE NOTE - AS TEMP SITE
----- Message from Ceci Kitchen CST  To: Ceci Kitchen MD    Hi-  Critical access Belgica      oral

## 2020-01-08 ENCOUNTER — RX RENEWAL (OUTPATIENT)
Age: 75
End: 2020-01-08

## 2020-01-21 ENCOUNTER — NON-APPOINTMENT (OUTPATIENT)
Age: 75
End: 2020-01-21

## 2020-01-21 ENCOUNTER — APPOINTMENT (OUTPATIENT)
Dept: INTERNAL MEDICINE | Facility: CLINIC | Age: 75
End: 2020-01-21
Payer: MEDICARE

## 2020-01-21 VITALS
WEIGHT: 174 LBS | SYSTOLIC BLOOD PRESSURE: 236 MMHG | HEIGHT: 66 IN | DIASTOLIC BLOOD PRESSURE: 76 MMHG | BODY MASS INDEX: 27.97 KG/M2 | HEART RATE: 8 BPM | RESPIRATION RATE: 17 BRPM | TEMPERATURE: 98.4 F | OXYGEN SATURATION: 95 %

## 2020-01-21 DIAGNOSIS — E11.41 TYPE 2 DIABETES MELLITUS WITH DIABETIC MONONEUROPATHY: ICD-10-CM

## 2020-01-21 LAB
BASOPHILS # BLD AUTO: 0.04 K/UL
BASOPHILS NFR BLD AUTO: 0.4 %
EOSINOPHIL # BLD AUTO: 0.18 K/UL
EOSINOPHIL NFR BLD AUTO: 1.9 %
HCT VFR BLD CALC: 28 %
HGB BLD-MCNC: 8.3 G/DL
IMM GRANULOCYTES NFR BLD AUTO: 0.5 %
LYMPHOCYTES # BLD AUTO: 1.12 K/UL
LYMPHOCYTES NFR BLD AUTO: 12 %
MAN DIFF?: NORMAL
MCHC RBC-ENTMCNC: 26.3 PG
MCHC RBC-ENTMCNC: 29.6 GM/DL
MCV RBC AUTO: 88.6 FL
MONOCYTES # BLD AUTO: 0.54 K/UL
MONOCYTES NFR BLD AUTO: 5.8 %
NEUTROPHILS # BLD AUTO: 7.37 K/UL
NEUTROPHILS NFR BLD AUTO: 79.4 %
PLATELET # BLD AUTO: 347 K/UL
RBC # BLD: 3.16 M/UL
RBC # FLD: 15.9 %
WBC # FLD AUTO: 9.3 K/UL

## 2020-01-21 PROCEDURE — 99214 OFFICE O/P EST MOD 30 MIN: CPT

## 2020-01-21 RX ORDER — INSULIN GLARGINE 100 [IU]/ML
100 INJECTION, SOLUTION SUBCUTANEOUS
Qty: 1 | Refills: 2 | Status: DISCONTINUED | COMMUNITY
Start: 2018-01-24 | End: 2020-01-21

## 2020-01-21 NOTE — ASSESSMENT
[FreeTextEntry1] : patient scheduled for HD today; refill of all medications done today; low sodium diet stressed, routine labs today, start gabapentin for diabetic neuropathy; will follow up in two weeks

## 2020-01-21 NOTE — HISTORY OF PRESENT ILLNESS
[FreeTextEntry1] : follow up type 2 diabetes; hypertension, ESRD [de-identified] : 74 years old female with type 2 diabetes, hypertension, ESRD on HD, here for follow up, last seen in May last year, complains of high BP, denies headache, no dyspnea, no chest pain, complains of chronic burning sensation on right  thigh for about three months associated with numbness

## 2020-01-21 NOTE — PHYSICAL EXAM
[No Acute Distress] : no acute distress [Well Nourished] : well nourished [Well Developed] : well developed [Well-Appearing] : well-appearing [Normal Sclera/Conjunctiva] : normal sclera/conjunctiva [PERRL] : pupils equal round and reactive to light [EOMI] : extraocular movements intact [Normal Oropharynx] : the oropharynx was normal [Normal Outer Ear/Nose] : the outer ears and nose were normal in appearance [No Lymphadenopathy] : no lymphadenopathy [No JVD] : no jugular venous distention [Supple] : supple [Thyroid Normal, No Nodules] : the thyroid was normal and there were no nodules present [No Accessory Muscle Use] : no accessory muscle use [No Respiratory Distress] : no respiratory distress  [Clear to Auscultation] : lungs were clear to auscultation bilaterally [Normal Rate] : normal rate  [Normal S1, S2] : normal S1 and S2 [Regular Rhythm] : with a regular rhythm [No Murmur] : no murmur heard [No Carotid Bruits] : no carotid bruits [No Abdominal Bruit] : a ~M bruit was not heard ~T in the abdomen [Pedal Pulses Present] : the pedal pulses are present [No Edema] : there was no peripheral edema [No Varicosities] : no varicosities [No Extremity Clubbing/Cyanosis] : no extremity clubbing/cyanosis [No Palpable Aorta] : no palpable aorta [Soft] : abdomen soft [No Masses] : no abdominal mass palpated [Non Tender] : non-tender [Non-distended] : non-distended [No HSM] : no HSM [Normal Bowel Sounds] : normal bowel sounds [Normal Anterior Cervical Nodes] : no anterior cervical lymphadenopathy [Normal Posterior Cervical Nodes] : no posterior cervical lymphadenopathy [No Spinal Tenderness] : no spinal tenderness [No CVA Tenderness] : no CVA  tenderness [No Joint Swelling] : no joint swelling [Grossly Normal Strength/Tone] : grossly normal strength/tone [No Rash] : no rash [Coordination Grossly Intact] : coordination grossly intact [Normal Gait] : normal gait [No Focal Deficits] : no focal deficits [Deep Tendon Reflexes (DTR)] : deep tendon reflexes were 2+ and symmetric [Normal Affect] : the affect was normal [Normal Insight/Judgement] : insight and judgment were intact

## 2020-01-22 LAB
25(OH)D3 SERPL-MCNC: 21.8 NG/ML
ALBUMIN SERPL ELPH-MCNC: 4.2 G/DL
ALP BLD-CCNC: 101 U/L
ALT SERPL-CCNC: 18 U/L
ANION GAP SERPL CALC-SCNC: 18 MMOL/L
AST SERPL-CCNC: 26 U/L
BILIRUB SERPL-MCNC: 0.3 MG/DL
BUN SERPL-MCNC: 77 MG/DL
CALCIUM SERPL-MCNC: 9.1 MG/DL
CHLORIDE SERPL-SCNC: 101 MMOL/L
CHOLEST SERPL-MCNC: 195 MG/DL
CHOLEST/HDLC SERPL: 3.2 RATIO
CO2 SERPL-SCNC: 23 MMOL/L
CREAT SERPL-MCNC: 10.42 MG/DL
ESTIMATED AVERAGE GLUCOSE: 235 MG/DL
GLUCOSE SERPL-MCNC: 179 MG/DL
HBA1C MFR BLD HPLC: 9.8 %
HDLC SERPL-MCNC: 61 MG/DL
LDLC SERPL CALC-MCNC: 123 MG/DL
POTASSIUM SERPL-SCNC: 5.4 MMOL/L
PROT SERPL-MCNC: 6.8 G/DL
SODIUM SERPL-SCNC: 141 MMOL/L
T4 FREE SERPL-MCNC: 1.2 NG/DL
TRIGL SERPL-MCNC: 56 MG/DL
TSH SERPL-ACNC: 2.05 UIU/ML
VIT B12 SERPL-MCNC: 1765 PG/ML

## 2020-02-04 ENCOUNTER — APPOINTMENT (OUTPATIENT)
Dept: CARDIOLOGY | Facility: CLINIC | Age: 75
End: 2020-02-04

## 2020-02-18 ENCOUNTER — INPATIENT (INPATIENT)
Facility: HOSPITAL | Age: 75
LOS: 3 days | Discharge: HOME | End: 2020-02-22
Attending: INTERNAL MEDICINE | Admitting: INTERNAL MEDICINE
Payer: MEDICARE

## 2020-02-18 VITALS
SYSTOLIC BLOOD PRESSURE: 178 MMHG | WEIGHT: 174.17 LBS | RESPIRATION RATE: 26 BRPM | OXYGEN SATURATION: 90 % | HEART RATE: 93 BPM | TEMPERATURE: 98 F | DIASTOLIC BLOOD PRESSURE: 87 MMHG | HEIGHT: 55 IN

## 2020-02-18 DIAGNOSIS — Z98.890 OTHER SPECIFIED POSTPROCEDURAL STATES: Chronic | ICD-10-CM

## 2020-02-18 LAB
ALBUMIN SERPL ELPH-MCNC: 3.1 G/DL — LOW (ref 3.3–5)
ALP SERPL-CCNC: 123 U/L — HIGH (ref 40–120)
ALT FLD-CCNC: 27 U/L — SIGNIFICANT CHANGE UP (ref 12–78)
ANION GAP SERPL CALC-SCNC: 10 MMOL/L — SIGNIFICANT CHANGE UP (ref 5–17)
APTT BLD: 25.8 SEC — LOW (ref 28.5–37)
AST SERPL-CCNC: 26 U/L — SIGNIFICANT CHANGE UP (ref 15–37)
BASE EXCESS BLDA CALC-SCNC: 5 MMOL/L — HIGH (ref -2–2)
BASOPHILS # BLD AUTO: 0.02 K/UL — SIGNIFICANT CHANGE UP (ref 0–0.2)
BASOPHILS NFR BLD AUTO: 0.1 % — SIGNIFICANT CHANGE UP (ref 0–2)
BILIRUB SERPL-MCNC: 0.5 MG/DL — SIGNIFICANT CHANGE UP (ref 0.2–1.2)
BLOOD GAS COMMENTS: SIGNIFICANT CHANGE UP
BLOOD GAS COMMENTS: SIGNIFICANT CHANGE UP
BLOOD GAS SOURCE: SIGNIFICANT CHANGE UP
BUN SERPL-MCNC: 34 MG/DL — HIGH (ref 7–23)
CALCIUM SERPL-MCNC: 9.3 MG/DL — SIGNIFICANT CHANGE UP (ref 8.5–10.1)
CHLORIDE SERPL-SCNC: 99 MMOL/L — SIGNIFICANT CHANGE UP (ref 96–108)
CK MB CFR SERPL CALC: 1.5 NG/ML — SIGNIFICANT CHANGE UP (ref 0.5–3.6)
CO2 SERPL-SCNC: 28 MMOL/L — SIGNIFICANT CHANGE UP (ref 22–31)
CREAT SERPL-MCNC: 3.69 MG/DL — HIGH (ref 0.5–1.3)
D DIMER BLD IA.RAPID-MCNC: 660 NG/ML DDU — HIGH
EOSINOPHIL # BLD AUTO: 0.01 K/UL — SIGNIFICANT CHANGE UP (ref 0–0.5)
EOSINOPHIL NFR BLD AUTO: 0.1 % — SIGNIFICANT CHANGE UP (ref 0–6)
FLU A RESULT: DETECTED
FLU A RESULT: DETECTED
FLUAV AG NPH QL: DETECTED
FLUBV AG NPH QL: SIGNIFICANT CHANGE UP
GLUCOSE SERPL-MCNC: 345 MG/DL — HIGH (ref 70–99)
HCO3 BLDA-SCNC: 29 MMOL/L — SIGNIFICANT CHANGE UP (ref 21–29)
HCT VFR BLD CALC: 28.8 % — LOW (ref 34.5–45)
HGB BLD-MCNC: 9 G/DL — LOW (ref 11.5–15.5)
HOROWITZ INDEX BLDA+IHG-RTO: 50 — SIGNIFICANT CHANGE UP
IMM GRANULOCYTES NFR BLD AUTO: 0.8 % — SIGNIFICANT CHANGE UP (ref 0–1.5)
INR BLD: 1 RATIO — SIGNIFICANT CHANGE UP (ref 0.88–1.16)
LYMPHOCYTES # BLD AUTO: 0.3 K/UL — LOW (ref 1–3.3)
LYMPHOCYTES # BLD AUTO: 1.9 % — LOW (ref 13–44)
MCHC RBC-ENTMCNC: 26.7 PG — LOW (ref 27–34)
MCHC RBC-ENTMCNC: 31.3 GM/DL — LOW (ref 32–36)
MCV RBC AUTO: 85.5 FL — SIGNIFICANT CHANGE UP (ref 80–100)
MONOCYTES # BLD AUTO: 0.4 K/UL — SIGNIFICANT CHANGE UP (ref 0–0.9)
MONOCYTES NFR BLD AUTO: 2.5 % — SIGNIFICANT CHANGE UP (ref 2–14)
NEUTROPHILS # BLD AUTO: 14.91 K/UL — HIGH (ref 1.8–7.4)
NEUTROPHILS NFR BLD AUTO: 94.6 % — HIGH (ref 43–77)
NRBC # BLD: 0 /100 WBCS — SIGNIFICANT CHANGE UP (ref 0–0)
NT-PROBNP SERPL-SCNC: HIGH PG/ML (ref 0–125)
PCO2 BLDA: 45 MMHG — SIGNIFICANT CHANGE UP (ref 32–46)
PH BLD: 7.42 — SIGNIFICANT CHANGE UP (ref 7.35–7.45)
PLATELET # BLD AUTO: 381 K/UL — SIGNIFICANT CHANGE UP (ref 150–400)
PO2 BLDA: 218 MMHG — HIGH (ref 74–108)
POTASSIUM SERPL-MCNC: 4.1 MMOL/L — SIGNIFICANT CHANGE UP (ref 3.5–5.3)
POTASSIUM SERPL-SCNC: 4.1 MMOL/L — SIGNIFICANT CHANGE UP (ref 3.5–5.3)
PROT SERPL-MCNC: 7.9 GM/DL — SIGNIFICANT CHANGE UP (ref 6–8.3)
PROTHROM AB SERPL-ACNC: 11.2 SEC — SIGNIFICANT CHANGE UP (ref 10–12.9)
RBC # BLD: 3.37 M/UL — LOW (ref 3.8–5.2)
RBC # FLD: 18.3 % — HIGH (ref 10.3–14.5)
RSV RESULT: SIGNIFICANT CHANGE UP
RSV RNA RESP QL NAA+PROBE: SIGNIFICANT CHANGE UP
SAO2 % BLDA: 99 % — HIGH (ref 92–96)
SODIUM SERPL-SCNC: 137 MMOL/L — SIGNIFICANT CHANGE UP (ref 135–145)
TROPONIN I SERPL-MCNC: <.015 NG/ML — SIGNIFICANT CHANGE UP (ref 0.01–0.04)
WBC # BLD: 15.77 K/UL — HIGH (ref 3.8–10.5)
WBC # FLD AUTO: 15.77 K/UL — HIGH (ref 3.8–10.5)

## 2020-02-18 PROCEDURE — 99285 EMERGENCY DEPT VISIT HI MDM: CPT

## 2020-02-18 PROCEDURE — 93010 ELECTROCARDIOGRAM REPORT: CPT

## 2020-02-18 PROCEDURE — 71045 X-RAY EXAM CHEST 1 VIEW: CPT | Mod: 26

## 2020-02-18 RX ORDER — HYDRALAZINE HCL 50 MG
5 TABLET ORAL ONCE
Refills: 0 | Status: COMPLETED | OUTPATIENT
Start: 2020-02-18 | End: 2020-02-18

## 2020-02-18 RX ORDER — ALBUTEROL 90 UG/1
2.5 AEROSOL, METERED ORAL
Refills: 0 | Status: COMPLETED | OUTPATIENT
Start: 2020-02-18 | End: 2020-02-18

## 2020-02-18 RX ADMIN — Medication 5 MILLIGRAM(S): at 20:13

## 2020-02-18 RX ADMIN — ALBUTEROL 2.5 MILLIGRAM(S): 90 AEROSOL, METERED ORAL at 20:13

## 2020-02-18 RX ADMIN — ALBUTEROL 2.5 MILLIGRAM(S): 90 AEROSOL, METERED ORAL at 19:49

## 2020-02-18 NOTE — ED ADULT TRIAGE NOTE - CHIEF COMPLAINT QUOTE
Ems states, " Pt sent fro dialysis center pt began desatting , and bp was elevated in the field , Pt has had cough for few days and sob now ,unable to complete dialysis today , hx chf "

## 2020-02-18 NOTE — ED PROVIDER NOTE - CLINICAL SUMMARY MEDICAL DECISION MAKING FREE TEXT BOX
75 yo F with sob, possible ACS, PE, doubt pna, pulm edema likely   -basic labs, coags, trop. ckmb, blood cx, dimer, bnp, trop, CTA chest, cxr, ekg, iv, monitor, rectal temp, oxygen, albuterol prn, hydralazine for pressure, monitor, IV  -f/u results, reeval

## 2020-02-18 NOTE — ED PROVIDER NOTE - PHYSICAL EXAMINATION
Vitals: HTN at 178/87, otherwise WNL  Gen: AAOx3, NAD, sitting uncomfortably in stretcher, visibly tachypneic   Head: ncat, perrla, eomi b/l  Neck: supple, no lymphadenopathy, no midline deviation  Heart: rrr, no m/r/g  Lungs: b/l wheezing  Abd: soft, nontender, non-distended, no rebound or guarding  Ext: no clubbing/cyanosis/edema  Neuro: sensation and muscle strength intact b/l

## 2020-02-18 NOTE — ED PROVIDER NOTE - PRINCIPAL DIAGNOSIS
SOB (shortness of breath) Pneumonia of both lungs due to infectious organism, unspecified part of lung

## 2020-02-18 NOTE — ED PROVIDER NOTE - OBJECTIVE STATEMENT
73 yo F with sob and hypoxia for 3 days.  Pt. went to dialysis today, completed session, was told to come back tomorrow for more dialysis.  Pt. came to ER instead for eval of sob.  She admits to feeling like she's catching a cold with a lot of chest congestion, but no cough or fever.  She denies chest pain, no other inciting event.    ROS: negative for fever, cough, headache, chest pain, abd pain, nausea, vomiting, diarrhea, rash, paresthesia, and weakness--all other systems reviewed are negative.   PMH: negative; Meds: Denies; SH: Denies smoking/drinking/drug use   D[facility]: [facility], nandini Prado 75 yo F with sob and hypoxia for 3 days.  Pt. went to dialysis today, completed session, was told to come back tomorrow for more dialysis.  Pt. came to ER instead for eval of sob.  She admits to feeling like she's catching a cold with a lot of chest congestion, but no cough or fever.  She denies chest pain, no other inciting event.    ROS: negative for fever, cough, headache, chest pain, abd pain, nausea, vomiting, diarrhea, rash, paresthesia, and weakness--all other systems reviewed are negative.   PMH: HTN, HL, DM, ESRD (on HD T, TH, SAT--[facility]), CHF; Meds: See EMR; SH: Denies smoking/drinking/drug use   Dialysis Center: [facility], follows Dr. Prado

## 2020-02-18 NOTE — ED PROVIDER NOTE - CARE PLAN
Principal Discharge DX:	SOB (shortness of breath) Principal Discharge DX:	Pneumonia of both lungs due to infectious organism, unspecified part of lung  Secondary Diagnosis:	Influenza

## 2020-02-18 NOTE — ED ADULT NURSE NOTE - NSIMPLEMENTINTERV_GEN_ALL_ED
Implemented All Fall Risk Interventions:  [place] to call system. Call bell, personal items and telephone within reach. Instruct patient to call for assistance. Room bathroom lighting operational. Non-slip footwear when patient is off stretcher. Physically safe environment: no spills, clutter or unnecessary equipment. Stretcher in lowest position, wheels locked, appropriate side rails in place. Provide visual cue, wrist band, yellow gown, etc. Monitor gait and stability. Monitor for mental status changes and reorient to person, place, and time. Review medications for side effects contributing to fall risk. Reinforce activity limits and safety measures with patient and family.

## 2020-02-18 NOTE — ED ADULT NURSE NOTE - OBJECTIVE STATEMENT
Patient was sent from dialysis because her O2 was dropping and bp elevated. Pt has had cough for few days and sob now ,unable to complete dialysis today , hx chf. htn, dm.

## 2020-02-19 DIAGNOSIS — I16.0 HYPERTENSIVE URGENCY: ICD-10-CM

## 2020-02-19 DIAGNOSIS — E78.5 HYPERLIPIDEMIA, UNSPECIFIED: ICD-10-CM

## 2020-02-19 DIAGNOSIS — J18.9 PNEUMONIA, UNSPECIFIED ORGANISM: ICD-10-CM

## 2020-02-19 DIAGNOSIS — Z29.9 ENCOUNTER FOR PROPHYLACTIC MEASURES, UNSPECIFIED: ICD-10-CM

## 2020-02-19 DIAGNOSIS — E11.9 TYPE 2 DIABETES MELLITUS WITHOUT COMPLICATIONS: ICD-10-CM

## 2020-02-19 DIAGNOSIS — J11.1 INFLUENZA DUE TO UNIDENTIFIED INFLUENZA VIRUS WITH OTHER RESPIRATORY MANIFESTATIONS: ICD-10-CM

## 2020-02-19 LAB
BASE EXCESS BLDA CALC-SCNC: 4 MMOL/L — HIGH (ref -2–2)
BLOOD GAS COMMENTS: SIGNIFICANT CHANGE UP
BLOOD GAS COMMENTS: SIGNIFICANT CHANGE UP
BLOOD GAS SOURCE: SIGNIFICANT CHANGE UP
GLUCOSE BLDC GLUCOMTR-MCNC: 415 MG/DL — HIGH (ref 70–99)
HCO3 BLDA-SCNC: 29 MMOL/L — SIGNIFICANT CHANGE UP (ref 21–29)
HOROWITZ INDEX BLDA+IHG-RTO: 50 — SIGNIFICANT CHANGE UP
PCO2 BLDA: 50 MMHG — HIGH (ref 32–46)
PH BLD: 7.39 — SIGNIFICANT CHANGE UP (ref 7.35–7.45)
PO2 BLDA: 141 MMHG — HIGH (ref 74–108)
SAO2 % BLDA: 99 % — HIGH (ref 92–96)

## 2020-02-19 PROCEDURE — 71275 CT ANGIOGRAPHY CHEST: CPT | Mod: 26

## 2020-02-19 PROCEDURE — 93010 ELECTROCARDIOGRAM REPORT: CPT

## 2020-02-19 PROCEDURE — 99222 1ST HOSP IP/OBS MODERATE 55: CPT

## 2020-02-19 PROCEDURE — 12345: CPT | Mod: NC

## 2020-02-19 RX ORDER — VANCOMYCIN HCL 1 G
1000 VIAL (EA) INTRAVENOUS ONCE
Refills: 0 | Status: COMPLETED | OUTPATIENT
Start: 2020-02-19 | End: 2020-02-19

## 2020-02-19 RX ORDER — DEXTROSE 50 % IN WATER 50 %
15 SYRINGE (ML) INTRAVENOUS ONCE
Refills: 0 | Status: DISCONTINUED | OUTPATIENT
Start: 2020-02-19 | End: 2020-02-22

## 2020-02-19 RX ORDER — INSULIN LISPRO 100/ML
VIAL (ML) SUBCUTANEOUS
Refills: 0 | Status: DISCONTINUED | OUTPATIENT
Start: 2020-02-19 | End: 2020-02-22

## 2020-02-19 RX ORDER — LABETALOL HCL 100 MG
10 TABLET ORAL ONCE
Refills: 0 | Status: COMPLETED | OUTPATIENT
Start: 2020-02-19 | End: 2020-02-19

## 2020-02-19 RX ORDER — HEPARIN SODIUM 5000 [USP'U]/ML
5000 INJECTION INTRAVENOUS; SUBCUTANEOUS EVERY 12 HOURS
Refills: 0 | Status: DISCONTINUED | OUTPATIENT
Start: 2020-02-19 | End: 2020-02-22

## 2020-02-19 RX ORDER — SODIUM CHLORIDE 9 MG/ML
1000 INJECTION, SOLUTION INTRAVENOUS
Refills: 0 | Status: DISCONTINUED | OUTPATIENT
Start: 2020-02-19 | End: 2020-02-22

## 2020-02-19 RX ORDER — INSULIN GLARGINE 100 [IU]/ML
10 INJECTION, SOLUTION SUBCUTANEOUS AT BEDTIME
Refills: 0 | Status: DISCONTINUED | OUTPATIENT
Start: 2020-02-19 | End: 2020-02-22

## 2020-02-19 RX ORDER — HYDRALAZINE HCL 50 MG
100 TABLET ORAL
Refills: 0 | Status: DISCONTINUED | OUTPATIENT
Start: 2020-02-19 | End: 2020-02-22

## 2020-02-19 RX ORDER — DEXTROSE 50 % IN WATER 50 %
25 SYRINGE (ML) INTRAVENOUS ONCE
Refills: 0 | Status: DISCONTINUED | OUTPATIENT
Start: 2020-02-19 | End: 2020-02-22

## 2020-02-19 RX ORDER — PIPERACILLIN AND TAZOBACTAM 4; .5 G/20ML; G/20ML
3.38 INJECTION, POWDER, LYOPHILIZED, FOR SOLUTION INTRAVENOUS EVERY 12 HOURS
Refills: 0 | Status: DISCONTINUED | OUTPATIENT
Start: 2020-02-19 | End: 2020-02-21

## 2020-02-19 RX ORDER — GLUCAGON INJECTION, SOLUTION 0.5 MG/.1ML
1 INJECTION, SOLUTION SUBCUTANEOUS ONCE
Refills: 0 | Status: DISCONTINUED | OUTPATIENT
Start: 2020-02-19 | End: 2020-02-22

## 2020-02-19 RX ORDER — INSULIN LISPRO 100/ML
5 VIAL (ML) SUBCUTANEOUS
Refills: 0 | Status: DISCONTINUED | OUTPATIENT
Start: 2020-02-19 | End: 2020-02-22

## 2020-02-19 RX ORDER — DEXTROSE 50 % IN WATER 50 %
12.5 SYRINGE (ML) INTRAVENOUS ONCE
Refills: 0 | Status: DISCONTINUED | OUTPATIENT
Start: 2020-02-19 | End: 2020-02-22

## 2020-02-19 RX ORDER — HYDRALAZINE HCL 50 MG
10 TABLET ORAL ONCE
Refills: 0 | Status: COMPLETED | OUTPATIENT
Start: 2020-02-19 | End: 2020-02-19

## 2020-02-19 RX ORDER — CARVEDILOL PHOSPHATE 80 MG/1
6.25 CAPSULE, EXTENDED RELEASE ORAL EVERY 12 HOURS
Refills: 0 | Status: DISCONTINUED | OUTPATIENT
Start: 2020-02-19 | End: 2020-02-22

## 2020-02-19 RX ORDER — PIPERACILLIN AND TAZOBACTAM 4; .5 G/20ML; G/20ML
3.38 INJECTION, POWDER, LYOPHILIZED, FOR SOLUTION INTRAVENOUS ONCE
Refills: 0 | Status: COMPLETED | OUTPATIENT
Start: 2020-02-19 | End: 2020-02-19

## 2020-02-19 RX ORDER — ATORVASTATIN CALCIUM 80 MG/1
20 TABLET, FILM COATED ORAL AT BEDTIME
Refills: 0 | Status: DISCONTINUED | OUTPATIENT
Start: 2020-02-19 | End: 2020-02-22

## 2020-02-19 RX ORDER — LOSARTAN POTASSIUM 100 MG/1
100 TABLET, FILM COATED ORAL DAILY
Refills: 0 | Status: DISCONTINUED | OUTPATIENT
Start: 2020-02-19 | End: 2020-02-22

## 2020-02-19 RX ORDER — AMLODIPINE BESYLATE 2.5 MG/1
10 TABLET ORAL ONCE
Refills: 0 | Status: COMPLETED | OUTPATIENT
Start: 2020-02-19 | End: 2020-02-19

## 2020-02-19 RX ADMIN — LOSARTAN POTASSIUM 100 MILLIGRAM(S): 100 TABLET, FILM COATED ORAL at 05:14

## 2020-02-19 RX ADMIN — INSULIN GLARGINE 10 UNIT(S): 100 INJECTION, SOLUTION SUBCUTANEOUS at 22:32

## 2020-02-19 RX ADMIN — Medication 100 MILLIGRAM(S): at 05:14

## 2020-02-19 RX ADMIN — PIPERACILLIN AND TAZOBACTAM 200 GRAM(S): 4; .5 INJECTION, POWDER, LYOPHILIZED, FOR SOLUTION INTRAVENOUS at 01:35

## 2020-02-19 RX ADMIN — Medication 0.3 MILLIGRAM(S): at 18:54

## 2020-02-19 RX ADMIN — Medication 30 MILLIGRAM(S): at 18:53

## 2020-02-19 RX ADMIN — ATORVASTATIN CALCIUM 20 MILLIGRAM(S): 80 TABLET, FILM COATED ORAL at 22:32

## 2020-02-19 RX ADMIN — PIPERACILLIN AND TAZOBACTAM 25 GRAM(S): 4; .5 INJECTION, POWDER, LYOPHILIZED, FOR SOLUTION INTRAVENOUS at 11:17

## 2020-02-19 RX ADMIN — AMLODIPINE BESYLATE 10 MILLIGRAM(S): 2.5 TABLET ORAL at 05:14

## 2020-02-19 RX ADMIN — HEPARIN SODIUM 5000 UNIT(S): 5000 INJECTION INTRAVENOUS; SUBCUTANEOUS at 05:20

## 2020-02-19 RX ADMIN — CARVEDILOL PHOSPHATE 6.25 MILLIGRAM(S): 80 CAPSULE, EXTENDED RELEASE ORAL at 18:54

## 2020-02-19 RX ADMIN — Medication 6: at 08:11

## 2020-02-19 RX ADMIN — Medication 5 UNIT(S): at 18:58

## 2020-02-19 RX ADMIN — Medication 0.3 MILLIGRAM(S): at 05:14

## 2020-02-19 RX ADMIN — PIPERACILLIN AND TAZOBACTAM 25 GRAM(S): 4; .5 INJECTION, POWDER, LYOPHILIZED, FOR SOLUTION INTRAVENOUS at 22:31

## 2020-02-19 RX ADMIN — Medication 6: at 11:17

## 2020-02-19 RX ADMIN — Medication 10 MILLIGRAM(S): at 01:53

## 2020-02-19 RX ADMIN — Medication 250 MILLIGRAM(S): at 05:15

## 2020-02-19 RX ADMIN — Medication 10 MILLIGRAM(S): at 03:55

## 2020-02-19 RX ADMIN — Medication 30 MILLIGRAM(S): at 04:14

## 2020-02-19 RX ADMIN — HEPARIN SODIUM 5000 UNIT(S): 5000 INJECTION INTRAVENOUS; SUBCUTANEOUS at 18:55

## 2020-02-19 RX ADMIN — CARVEDILOL PHOSPHATE 6.25 MILLIGRAM(S): 80 CAPSULE, EXTENDED RELEASE ORAL at 05:14

## 2020-02-19 NOTE — CONSULT NOTE ADULT - ASSESSMENT
end stage renal disease  flu but barely symptomatic   high wbc is worrisome   empiric antibiotics   will follow

## 2020-02-19 NOTE — H&P ADULT - NSICDXPASTMEDICALHX_GEN_ALL_CORE_FT
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

## 2020-02-19 NOTE — CONSULT NOTE ADULT - SUBJECTIVE AND OBJECTIVE BOX
HPI:  Patient is a 75 YO F with a PMH of ESRD on HD TTS, DM, HTN, HLD who presents to the ED for dyspnea.  Patient currently on BiPAP, hx limited.  Patient reportedly complained of dyspnea during her HD session yesterday.  Had full fluid removal but continued to complain of dyspnea after treatement.  Patient was sent to the ED.  Patient reports long hx of smoking, quit 10 years ago.  CT chest shows bilateral ground glass opacities.  Labs show elevated white count, elevated BNP.  Patient found to be influenza A positive.  Will admit patient to tele for PNA/ influenza. 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; CXR 10/10/17  Diabetic neuropathy  Heart murmur  GERD (gastroesophageal reflux disease)  Anemia  Hypercholesteremia  Chronic kidney disease  Congestive heart failure, unspecified congestive heart failure chronicity, unspecified congestive heart failure type: last 5/17  Diabetes: x 15 years ; FS  Hypertension  H/O endoscopy      SOCHX:   tobacco,  -  alcohol    FMHX: FA/MO  - contributory       Recent Travel:    Immunizations:    Allergies    No Known Allergies    Intolerances        MEDICATIONS  (STANDING):  atorvastatin 20 milliGRAM(s) Oral at bedtime  carvedilol 6.25 milliGRAM(s) Oral every 12 hours  cloNIDine 0.3 milliGRAM(s) Oral two times a day  dextrose 5%. 1000 milliLiter(s) (50 mL/Hr) IV Continuous <Continuous>  dextrose 50% Injectable 12.5 Gram(s) IV Push once  dextrose 50% Injectable 25 Gram(s) IV Push once  dextrose 50% Injectable 25 Gram(s) IV Push once  heparin  Injectable 5000 Unit(s) SubCutaneous every 12 hours  hydrALAZINE 100 milliGRAM(s) Oral two times a day  insulin glargine Injectable (LANTUS) 10 Unit(s) SubCutaneous at bedtime  insulin lispro (HumaLOG) corrective regimen sliding scale   SubCutaneous three times a day before meals  insulin lispro Injectable (HumaLOG) 5 Unit(s) SubCutaneous three times a day before meals  losartan 100 milliGRAM(s) Oral daily  oseltamivir 30 milliGRAM(s) Oral once  piperacillin/tazobactam IVPB.. 3.375 Gram(s) IV Intermittent every 12 hours    MEDICATIONS  (PRN):  dextrose 40% Gel 15 Gram(s) Oral once PRN Blood Glucose LESS THAN 70 milliGRAM(s)/deciliter  glucagon  Injectable 1 milliGRAM(s) IntraMuscular once PRN Glucose LESS THAN 70 milligrams/deciliter      REVIEW OF SYSTEMS:  CONSTITUTIONAL: No fever, weight loss, or fatigue  EYES: No eye pain, visual disturbances, or discharge  ENMT:  No difficulty hearing, tinnitus, vertigo; No sinus or throat pain  NECK: No pain or stiffness  BREASTS: No pain, masses, or nipple discharge  RESPIRATORY: No cough, wheezing, chills or hemoptysis; No shortness of breath  CARDIOVASCULAR: No chest pain, palpitations, dizziness, or leg swelling  GASTROINTESTINAL: No abdominal or epigastric pain. No nausea, vomiting, or hematemesis; No diarrhea or constipation. No melena or hematochezia.  GENITOURINARY: No dysuria, frequency, hematuria, or incontinence  NEUROLOGICAL: No headaches, memory loss, loss of strength, numbness, or tremors  SKIN: No itching, burning, rashes, or lesions   LYMPH NODES: No enlarged glands  ENDOCRINE: No heat or cold intolerance; No hair loss  MUSCULOSKELETAL: No joint pain or swelling; No muscle, back, or extremity pain  PSYCHIATRIC: No depression, anxiety, mood swings, or difficulty sleeping  HEME/LYMPH: No easy bruising, or bleeding gums  ALLERGY AND IMMUNOLOGIC: No hives or eczema    VITAL SIGNS:    T(C): 36.3 (02-19-20 @ 11:05), Max: 37.3 (02-19-20 @ 06:00)  T(F): 97.3 (02-19-20 @ 11:05), Max: 99.2 (02-19-20 @ 06:00)  HR: 64 (02-19-20 @ 12:40) (61 - 93)  BP: 127/55 (02-19-20 @ 11:05) (125/48 - 206/77)  RR: 18 (02-19-20 @ 11:05) (18 - 28)  SpO2: 98% (02-19-20 @ 12:40) (90% - 100%)    PHYSICAL EXAM:    GENERAL: NAD, well-groomed, well-developed  HEAD:  Atraumatic, Normocephalic  EYES: EOMI, PERRLA, conjunctiva and sclera clear  ENMT: No tonsillar erythema, exudates, or enlargement; Moist mucous membranes, Good dentition, No lesions  NECK: Supple, No JVD, Normal thyroid  NERVOUS SYSTEM:  Alert & Oriented X3, Good concentration; Motor Strength 5/5 B/L upper and lower extremities; DTRs 2+ intact and symmetric  CHEST/LUNG: Clear to auscultation bilaterally; No rales, rhonchi, wheezing bilaterally  HEART: Regular rate and rhythm; No murmurs, rubs, or gallops  ABDOMEN: Soft, Nontender, Nondistended; Bowel sounds present  EXTREMITIES:  2+ Peripheral Pulses, No clubbing, cyanosis, or edema  LYMPH: No lymphadenopathy noted  SKIN: No rashes or lesions  BACK: no pressor sore     LABS:                         9.0    15.77 )-----------( 381      ( 18 Feb 2020 20:09 )             28.8     02-18    137  |  99  |  34<H>  ----------------------------<  345<H>  4.1   |  28  |  3.69<H>    Ca    9.3      18 Feb 2020 20:09    TPro  7.9  /  Alb  3.1<L>  /  TBili  0.5  /  DBili  x   /  AST  26  /  ALT  27  /  AlkPhos  123<H>  02-18    LIVER FUNCTIONS - ( 18 Feb 2020 20:09 )  Alb: 3.1 g/dL / Pro: 7.9 gm/dL / ALK PHOS: 123 U/L / ALT: 27 U/L / AST: 26 U/L / GGT: x           PT/INR - ( 18 Feb 2020 20:09 )   PT: 11.2 sec;   INR: 1.00 ratio         PTT - ( 18 Feb 2020 20:09 )  PTT:25.8 sec    ABG - ( 19 Feb 2020 03:16 )  pH, Arterial: x     pH, Blood: 7.39  /  pCO2: 50    /  pO2: 141   / HCO3: 29    / Base Excess: 4.0   /  SaO2: 99                CARDIAC MARKERS ( 18 Feb 2020 20:09 )  <.015 ng/mL / x     / x     / x     / 1.5 ng/mL                                  Radiology: HPI:  Patient is a 75 YO F with a PMH of ESRD on HD TTS, DM, HTN, HLD who presents to the ED for dyspnea.  Patient currently on BiPAP, hx limited.  Patient reportedly complained of dyspnea during her HD session yesterday.  Had full fluid removal but continued to complain of dyspnea after treatement.  Patient was sent to the ED.  Patient reports long hx of smoking, quit 10 years ago.  CT chest shows bilateral ground glass opacities.  Labs show elevated white count, elevated BNP.  Patient found to be influenza A positive.  Will admit patient to tele for PNA/ influenza. 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; CXR 10/10/17  Diabetic neuropathy  Heart murmur  GERD (gastroesophageal reflux disease)  Anemia  Hypercholesteremia  Chronic kidney disease  Congestive heart failure, unspecified congestive heart failure chronicity, unspecified congestive heart failure type: last 5/17  Diabetes: x 15 years ; FS  Hypertension  H/O endoscopy      SOCHX:  no  tobacco,  no-  alcohol    FMHX: FA/MO  -non contributory       Recent Travel:    Immunizations:    Allergies    No Known Allergies    Intolerances        MEDICATIONS  (STANDING):  atorvastatin 20 milliGRAM(s) Oral at bedtime  carvedilol 6.25 milliGRAM(s) Oral every 12 hours  cloNIDine 0.3 milliGRAM(s) Oral two times a day  dextrose 5%. 1000 milliLiter(s) (50 mL/Hr) IV Continuous <Continuous>  dextrose 50% Injectable 12.5 Gram(s) IV Push once  dextrose 50% Injectable 25 Gram(s) IV Push once  dextrose 50% Injectable 25 Gram(s) IV Push once  heparin  Injectable 5000 Unit(s) SubCutaneous every 12 hours  hydrALAZINE 100 milliGRAM(s) Oral two times a day  insulin glargine Injectable (LANTUS) 10 Unit(s) SubCutaneous at bedtime  insulin lispro (HumaLOG) corrective regimen sliding scale   SubCutaneous three times a day before meals  insulin lispro Injectable (HumaLOG) 5 Unit(s) SubCutaneous three times a day before meals  losartan 100 milliGRAM(s) Oral daily  oseltamivir 30 milliGRAM(s) Oral once  piperacillin/tazobactam IVPB.. 3.375 Gram(s) IV Intermittent every 12 hours    MEDICATIONS  (PRN):  dextrose 40% Gel 15 Gram(s) Oral once PRN Blood Glucose LESS THAN 70 milliGRAM(s)/deciliter  glucagon  Injectable 1 milliGRAM(s) IntraMuscular once PRN Glucose LESS THAN 70 milligrams/deciliter      REVIEW OF SYSTEMS:  CONSTITUTIONAL: has  fever  no, weight loss, or fatigue  EYES: No eye pain, visual disturbances, or discharge  ENMT:  No difficulty hearing, tinnitus, vertigo; No sinus or throat pain  NECK: No pain or stiffness  BREASTS: No pain, masses, or nipple discharge  RESPIRATORY: No cough, wheezing, chills or hemoptysis; No shortness of breath  CARDIOVASCULAR: No chest pain, palpitations, dizziness, or leg swelling  GASTROINTESTINAL: No abdominal or epigastric pain. No nausea, vomiting, or hematemesis; No diarrhea or constipation. No melena or hematochezia.  GENITOURINARY: No dysuria, frequency, hematuria, or incontinence  NEUROLOGICAL: No headaches, memory loss, loss of strength, numbness, or tremors  SKIN: No itching, burning, rashes, or lesions   LYMPH NODES: No enlarged glands  ENDOCRINE: No heat or cold intolerance; No hair loss  MUSCULOSKELETAL: No joint pain or swelling; No muscle, back, or extremity pain  PSYCHIATRIC: No depression, anxiety, mood swings, or difficulty sleeping  HEME/LYMPH: No easy bruising, or bleeding gums  ALLERGY AND IMMUNOLOGIC: No hives or eczema    VITAL SIGNS:    T(C): 36.3 (02-19-20 @ 11:05), Max: 37.3 (02-19-20 @ 06:00)  T(F): 97.3 (02-19-20 @ 11:05), Max: 99.2 (02-19-20 @ 06:00)  HR: 64 (02-19-20 @ 12:40) (61 - 93)  BP: 127/55 (02-19-20 @ 11:05) (125/48 - 206/77)  RR: 18 (02-19-20 @ 11:05) (18 - 28)  SpO2: 98% (02-19-20 @ 12:40) (90% - 100%)    PHYSICAL EXAM:    GENERAL: NAD, well-groomed, well-developed  HEAD:  Atraumatic, Normocephalic  EYES: EOMI, PERRLA, conjunctiva and sclera clear  ENMT: ; Moist mucous membranes,   NECK: Supple, No JVD, Normal thyroid  NERVOUS SYSTEM:  Alert & Oriented X3, Good concentration;   CHEST/LUNG: Clear to auscultation bilaterally; No rales, rhonchi, wheezing bilaterally  HEART: Regular rate and rhythm; No murmurs, rubs, or gallops  ABDOMEN: Soft, Nontender, Nondistended; Bowel sounds present  EXTREMITIES:  2+ Peripheral Pulses, No clubbing, cyanosis, or edema  LYMPH: No lymphadenopathy noted  SKIN: No rashes or lesions  BACK: no pressor sore     LABS:                         9.0    15.77 )-----------( 381      ( 18 Feb 2020 20:09 )             28.8     02-18    137  |  99  |  34<H>  ----------------------------<  345<H>  4.1   |  28  |  3.69<H>    Ca    9.3      18 Feb 2020 20:09    TPro  7.9  /  Alb  3.1<L>  /  TBili  0.5  /  DBili  x   /  AST  26  /  ALT  27  /  AlkPhos  123<H>  02-18    LIVER FUNCTIONS - ( 18 Feb 2020 20:09 )  Alb: 3.1 g/dL / Pro: 7.9 gm/dL / ALK PHOS: 123 U/L / ALT: 27 U/L / AST: 26 U/L / GGT: x           PT/INR - ( 18 Feb 2020 20:09 )   PT: 11.2 sec;   INR: 1.00 ratio         PTT - ( 18 Feb 2020 20:09 )  PTT:25.8 sec    ABG - ( 19 Feb 2020 03:16 )  pH, Arterial: x     pH, Blood: 7.39  /  pCO2: 50    /  pO2: 141   / HCO3: 29    / Base Excess: 4.0   /  SaO2: 99                CARDIAC MARKERS ( 18 Feb 2020 20:09 )  <.015 ng/mL / x     / x     / x     / 1.5 ng/mL                                  Radiology:    < from: Xray Chest 1 View- PORTABLE-Urgent (02.18.20 @ 19:04) >  Impression:    No acute pulmonary process demonstrated.                      FLAKO HINTON M.D., ATTENDING RADIOLOGIST  This document has been electronically signed. Feb 19 2020  8:38AM          < end of copied text >

## 2020-02-19 NOTE — H&P ADULT - NSHPLABSRESULTS_GEN_ALL_CORE
Recent Vitals  T(C): 36.9 (02-18-20 @ 23:50), Max: 36.9 (02-18-20 @ 18:34)  HR: 80 (02-19-20 @ 03:22) (80 - 93)  BP: 206/77 (02-19-20 @ 01:24) (178/87 - 206/77)  RR: 28 (02-19-20 @ 01:24) (24 - 28)  SpO2: 98% (02-19-20 @ 03:22) (90% - 100%)                        9.0    15.77 )-----------( 381      ( 18 Feb 2020 20:09 )             28.8     02-18    137  |  99  |  34<H>  ----------------------------<  345<H>  4.1   |  28  |  3.69<H>    Ca    9.3      18 Feb 2020 20:09    TPro  7.9  /  Alb  3.1<L>  /  TBili  0.5  /  DBili  x   /  AST  26  /  ALT  27  /  AlkPhos  123<H>  02-18    PT/INR - ( 18 Feb 2020 20:09 )   PT: 11.2 sec;   INR: 1.00 ratio         PTT - ( 18 Feb 2020 20:09 )  PTT:25.8 sec  LIVER FUNCTIONS - ( 18 Feb 2020 20:09 )  Alb: 3.1 g/dL / Pro: 7.9 gm/dL / ALK PHOS: 123 U/L / ALT: 27 U/L / AST: 26 U/L / GGT: x               Home Medications:  amLODIPine 10 mg oral tablet: 1 tab(s) orally once (19 Feb 2020 02:10)  atorvastatin 20 mg oral tablet: 1 tab(s) orally once a day (at bedtime) (19 Feb 2020 02:10)  cloNIDine 0.3 mg oral tablet: 1 tab(s) orally 2 times a day (19 Feb 2020 02:10)  docusate sodium 100 mg oral capsule: 1 cap(s) orally 2 times a day (19 Feb 2020 02:10)  HumaLOG 100 units/mL subcutaneous solution:  subcutaneous 3 times a day (before meals); 1 Unit(s) if Glucose 151 - 200  2 Unit(s) if Glucose 201 - 250  3 Unit(s) if Glucose 251 - 300  4 Unit(s) if Glucose 301 - 350  5 Unit(s) if Glucose 351 - 400  6 Unit(s) if Glucose Greater Than 400 (19 Feb 2020 02:10)  hydrALAZINE 100 mg oral tablet: 1 tab(s) orally 2 times a day (19 Feb 2020 02:10)  valsartan 320 mg oral tablet: 1 tab(s) orally once a day (19 Feb 2020 02:10)  \

## 2020-02-19 NOTE — H&P ADULT - NSHPPHYSICALEXAM_GEN_ALL_CORE
Physical exam:  General: patient in no acute distress, on BiPAP currently  Cardio: S1/S2 +ve, regular rate and rhythm, no M/G/R  Resp: end expiratory wheezes, mild scattered rales  GI: abdomen soft, nontender, non distended, no guarding, BS +ve x 4  Ext: +1 pedal edema  Neuro: CN 2-12 intact, no significant motor or sensory deficits.

## 2020-02-19 NOTE — CHART NOTE - NSCHARTNOTEFT_GEN_A_CORE
Patient is a 75 YO F with a PMH of ESRD on HD TTS, DM, HTN, HLD who presents to the ED for dyspnea.  Patient currently on BiPAP, hx limited.  Patient reportedly complained of dyspnea during her HD session yesterday.  Had full fluid removal but continued to complain of dyspnea after treatement.  Patient was sent to the ED.  Patient reports long hx of smoking, quit 10 years ago.  CT chest shows bilateral ground glass opacities.  Labs show elevated white count, elevated BNP.  Patient found to be influenza A positive.     Continue Tamiflu  Continue zosyn. Check vanc tr in am, re-dose in HD  Add pre meal insulin.   Continue O2, BIPAP as needed.

## 2020-02-19 NOTE — H&P ADULT - HISTORY OF PRESENT ILLNESS
Patient is a 73 YO F with a PMH of ESRD on HD TTS, DM, HTN, HLD who presents to the ED for dyspnea.  Patient currently on BiPAP, hx limited.  Patient reportedly complained of dyspnea during her HD session yesterday.  Had full fluid removal but continued to complain of dyspnea after treatement.  Patient was sent to the ED.  Patient reports long hx of smoking, quit 10 years ago.  CT chest shows bilateral ground glass opacities.  Labs show elevated white count, elevated BNP.  Patient found to be influenza A positive.  Will admit patient to tele for PNA/ influenza.

## 2020-02-19 NOTE — H&P ADULT - PROBLEM SELECTOR PLAN 1
Started on Zosyn and Vanco in the ED, will continue.  Follow blood cultures - de-escalate antibiotics as needed   ID consult in Am - Mehreen

## 2020-02-19 NOTE — CONSULT NOTE ADULT - SUBJECTIVE AND OBJECTIVE BOX
Patient chart reviewed, full consult to follow.     Will arrange UF today    Thank you for the courtesy of this consultation.

## 2020-02-20 LAB
ALBUMIN SERPL ELPH-MCNC: 2.3 G/DL — LOW (ref 3.3–5)
ALP SERPL-CCNC: 85 U/L — SIGNIFICANT CHANGE UP (ref 40–120)
ALT FLD-CCNC: 17 U/L — SIGNIFICANT CHANGE UP (ref 12–78)
ANION GAP SERPL CALC-SCNC: 12 MMOL/L — SIGNIFICANT CHANGE UP (ref 5–17)
AST SERPL-CCNC: 17 U/L — SIGNIFICANT CHANGE UP (ref 15–37)
BILIRUB SERPL-MCNC: 0.3 MG/DL — SIGNIFICANT CHANGE UP (ref 0.2–1.2)
BUN SERPL-MCNC: 71 MG/DL — HIGH (ref 7–23)
CALCIUM SERPL-MCNC: 7.9 MG/DL — LOW (ref 8.5–10.1)
CHLORIDE SERPL-SCNC: 96 MMOL/L — SIGNIFICANT CHANGE UP (ref 96–108)
CO2 SERPL-SCNC: 24 MMOL/L — SIGNIFICANT CHANGE UP (ref 22–31)
CREAT SERPL-MCNC: 7.32 MG/DL — HIGH (ref 0.5–1.3)
GLUCOSE SERPL-MCNC: 210 MG/DL — HIGH (ref 70–99)
HBA1C BLD-MCNC: 10.4 % — HIGH (ref 4–5.6)
HCT VFR BLD CALC: 28.2 % — LOW (ref 34.5–45)
HGB BLD-MCNC: 8.6 G/DL — LOW (ref 11.5–15.5)
MCHC RBC-ENTMCNC: 26.4 PG — LOW (ref 27–34)
MCHC RBC-ENTMCNC: 30.5 GM/DL — LOW (ref 32–36)
MCV RBC AUTO: 86.5 FL — SIGNIFICANT CHANGE UP (ref 80–100)
NRBC # BLD: 1 /100 WBCS — HIGH (ref 0–0)
PLATELET # BLD AUTO: 339 K/UL — SIGNIFICANT CHANGE UP (ref 150–400)
POTASSIUM SERPL-MCNC: 4.1 MMOL/L — SIGNIFICANT CHANGE UP (ref 3.5–5.3)
POTASSIUM SERPL-SCNC: 4.1 MMOL/L — SIGNIFICANT CHANGE UP (ref 3.5–5.3)
PROT SERPL-MCNC: 6.2 GM/DL — SIGNIFICANT CHANGE UP (ref 6–8.3)
RBC # BLD: 3.26 M/UL — LOW (ref 3.8–5.2)
RBC # FLD: 17.7 % — HIGH (ref 10.3–14.5)
SODIUM SERPL-SCNC: 132 MMOL/L — LOW (ref 135–145)
VANCOMYCIN FLD-MCNC: 15.2 UG/ML — SIGNIFICANT CHANGE UP
WBC # BLD: 5.6 K/UL — SIGNIFICANT CHANGE UP (ref 3.8–10.5)
WBC # FLD AUTO: 5.6 K/UL — SIGNIFICANT CHANGE UP (ref 3.8–10.5)

## 2020-02-20 PROCEDURE — 99233 SBSQ HOSP IP/OBS HIGH 50: CPT

## 2020-02-20 RX ORDER — VANCOMYCIN HCL 1 G
500 VIAL (EA) INTRAVENOUS ONCE
Refills: 0 | Status: COMPLETED | OUTPATIENT
Start: 2020-02-20 | End: 2020-02-20

## 2020-02-20 RX ADMIN — INSULIN GLARGINE 10 UNIT(S): 100 INJECTION, SOLUTION SUBCUTANEOUS at 22:40

## 2020-02-20 RX ADMIN — LOSARTAN POTASSIUM 100 MILLIGRAM(S): 100 TABLET, FILM COATED ORAL at 06:24

## 2020-02-20 RX ADMIN — Medication 0.3 MILLIGRAM(S): at 06:23

## 2020-02-20 RX ADMIN — Medication 100 MILLIGRAM(S): at 21:15

## 2020-02-20 RX ADMIN — CARVEDILOL PHOSPHATE 6.25 MILLIGRAM(S): 80 CAPSULE, EXTENDED RELEASE ORAL at 06:23

## 2020-02-20 RX ADMIN — HEPARIN SODIUM 5000 UNIT(S): 5000 INJECTION INTRAVENOUS; SUBCUTANEOUS at 22:42

## 2020-02-20 RX ADMIN — HEPARIN SODIUM 5000 UNIT(S): 5000 INJECTION INTRAVENOUS; SUBCUTANEOUS at 06:23

## 2020-02-20 RX ADMIN — Medication 5 UNIT(S): at 07:42

## 2020-02-20 RX ADMIN — Medication 5 UNIT(S): at 11:59

## 2020-02-20 RX ADMIN — ATORVASTATIN CALCIUM 20 MILLIGRAM(S): 80 TABLET, FILM COATED ORAL at 22:41

## 2020-02-20 RX ADMIN — PIPERACILLIN AND TAZOBACTAM 25 GRAM(S): 4; .5 INJECTION, POWDER, LYOPHILIZED, FOR SOLUTION INTRAVENOUS at 06:24

## 2020-02-20 RX ADMIN — Medication 100 MILLIGRAM(S): at 06:24

## 2020-02-20 RX ADMIN — Medication 30 MILLIGRAM(S): at 21:15

## 2020-02-20 RX ADMIN — Medication 1: at 11:58

## 2020-02-20 RX ADMIN — PIPERACILLIN AND TAZOBACTAM 25 GRAM(S): 4; .5 INJECTION, POWDER, LYOPHILIZED, FOR SOLUTION INTRAVENOUS at 22:41

## 2020-02-20 RX ADMIN — CARVEDILOL PHOSPHATE 6.25 MILLIGRAM(S): 80 CAPSULE, EXTENDED RELEASE ORAL at 22:41

## 2020-02-20 RX ADMIN — Medication 2: at 07:41

## 2020-02-20 NOTE — PROGRESS NOTE ADULT - SUBJECTIVE AND OBJECTIVE BOX
HPI:  Patient is a 75 YO F with a PMH of ESRD on HD TTS, DM, HTN, HLD who presents to the ED for dyspnea.  Patient currently on BiPAP, hx limited.  Patient reportedly complained of dyspnea during her HD session yesterday.  Had full fluid removal but continued to complain of dyspnea after treatement.  Patient was sent to the ED.  Patient reports long hx of smoking, quit 10 years ago.  CT chest shows bilateral ground glass opacities.  Labs show elevated white count, elevated BNP.  Patient found to be influenza A positive.  Will admit patient to tele for PNA/ influenza. (19 Feb 2020 03:42)      Allergies    No Known Allergies    Intolerances        MEDICATIONS  (STANDING):  atorvastatin 20 milliGRAM(s) Oral at bedtime  carvedilol 6.25 milliGRAM(s) Oral every 12 hours  cloNIDine 0.3 milliGRAM(s) Oral two times a day  dextrose 5%. 1000 milliLiter(s) (50 mL/Hr) IV Continuous <Continuous>  dextrose 50% Injectable 12.5 Gram(s) IV Push once  dextrose 50% Injectable 25 Gram(s) IV Push once  dextrose 50% Injectable 25 Gram(s) IV Push once  heparin  Injectable 5000 Unit(s) SubCutaneous every 12 hours  hydrALAZINE 100 milliGRAM(s) Oral two times a day  insulin glargine Injectable (LANTUS) 10 Unit(s) SubCutaneous at bedtime  insulin lispro (HumaLOG) corrective regimen sliding scale   SubCutaneous three times a day before meals  insulin lispro Injectable (HumaLOG) 5 Unit(s) SubCutaneous three times a day before meals  losartan 100 milliGRAM(s) Oral daily  piperacillin/tazobactam IVPB.. 3.375 Gram(s) IV Intermittent every 12 hours    MEDICATIONS  (PRN):  dextrose 40% Gel 15 Gram(s) Oral once PRN Blood Glucose LESS THAN 70 milliGRAM(s)/deciliter  glucagon  Injectable 1 milliGRAM(s) IntraMuscular once PRN Glucose LESS THAN 70 milligrams/deciliter      REVIEW OF SYSTEMS:    CONSTITUTIONAL: No fever, chills, weight loss, or fatigue  HEENT: No sore throat, runny nose, ear ache  RESPIRATORY: No cough, wheezing, No shortness of breath  CARDIOVASCULAR: No chest pain, palpitations, dizziness  GASTROINTESTINAL: No abdominal pain. No nausea, vomiting, diarrhea  GENITOURINARY: No dysuria, increase frequency, hematuria, or incontinence  NEUROLOGICAL: No headaches, memory loss, loss of strength, numbness, or tremors, no weakness  EXTREMITY: No pedal edema BLE  SKIN: No itching, burning, rashes, or lesions     VITAL SIGNS:  T(C): 35.9 (02-20-20 @ 05:23), Max: 36.7 (02-19-20 @ 16:00)  T(F): 96.6 (02-20-20 @ 05:23), Max: 98.1 (02-19-20 @ 16:00)  HR: 54 (02-20-20 @ 09:25) (54 - 71)  BP: 122/54 (02-20-20 @ 05:23) (117/45 - 148/62)  RR: 18 (02-20-20 @ 05:23) (16 - 18)  SpO2: 98% (02-20-20 @ 09:25) (96% - 99%)  Wt(kg): --    PHYSICAL EXAM:    GENERAL: not in any distress  HEENT: Neck is supple, normocephalic, atraumatic   CHEST/LUNG: Clear to percussion bilaterally; No rales, rhonchi, wheezing  HEART: Regular rate and rhythm; No murmurs, rubs, or gallops  ABDOMEN: Soft, Nontender, Nondistended; Bowel sounds present, no rebound   EXTREMITIES:  2+ Peripheral Pulses, No clubbing, cyanosis, or edema  GENITOURINARY:   SKIN: No rashes or lesions  BACK: no pressor sore   NERVOUS SYSTEM:  Alert & Oriented X3, Good concentration  PSYCH: normal affect     LABS:                         8.6    5.60  )-----------( 339      ( 20 Feb 2020 07:07 )             28.2     02-20    132<L>  |  96  |  71<H>  ----------------------------<  210<H>  4.1   |  24  |  7.32<H>    Ca    7.9<L>      20 Feb 2020 07:07    TPro  6.2  /  Alb  2.3<L>  /  TBili  0.3  /  DBili  x   /  AST  17  /  ALT  17  /  AlkPhos  85  02-20    LIVER FUNCTIONS - ( 20 Feb 2020 07:07 )  Alb: 2.3 g/dL / Pro: 6.2 gm/dL / ALK PHOS: 85 U/L / ALT: 17 U/L / AST: 17 U/L / GGT: x           PT/INR - ( 18 Feb 2020 20:09 )   PT: 11.2 sec;   INR: 1.00 ratio         PTT - ( 18 Feb 2020 20:09 )  PTT:25.8 sec    ABG - ( 19 Feb 2020 03:16 )  pH, Arterial: x     pH, Blood: 7.39  /  pCO2: 50    /  pO2: 141   / HCO3: 29    / Base Excess: 4.0   /  SaO2: 99                CARDIAC MARKERS ( 18 Feb 2020 20:09 )  <.015 ng/mL / x     / x     / x     / 1.5 ng/mL                    Culture Results:   No growth to date. (02-18 @ 22:54)  Culture Results:   No growth to date. (02-18 @ 22:54)                Radiology:

## 2020-02-20 NOTE — PROGRESS NOTE ADULT - ASSESSMENT
End stage renal disease  flu with poos PNA   high wbc is worrisome , down to normal today   continue empiric antibiotics   on nasal cannula, sitting on bed, does not look toxic or distress on exam

## 2020-02-20 NOTE — PROGRESS NOTE ADULT - SUBJECTIVE AND OBJECTIVE BOX
Patient is a 74y old  Female who presents with a chief complaint of dyspnea (20 Feb 2020 12:04)      INTERVAL HPI/OVERNIGHT EVENTS:  pt was seen and examined, no acute events.    MEDICATIONS  (STANDING):  atorvastatin 20 milliGRAM(s) Oral at bedtime  carvedilol 6.25 milliGRAM(s) Oral every 12 hours  cloNIDine 0.3 milliGRAM(s) Oral two times a day  dextrose 5%. 1000 milliLiter(s) (50 mL/Hr) IV Continuous <Continuous>  dextrose 50% Injectable 12.5 Gram(s) IV Push once  dextrose 50% Injectable 25 Gram(s) IV Push once  dextrose 50% Injectable 25 Gram(s) IV Push once  heparin  Injectable 5000 Unit(s) SubCutaneous every 12 hours  hydrALAZINE 100 milliGRAM(s) Oral two times a day  insulin glargine Injectable (LANTUS) 10 Unit(s) SubCutaneous at bedtime  insulin lispro (HumaLOG) corrective regimen sliding scale   SubCutaneous three times a day before meals  insulin lispro Injectable (HumaLOG) 5 Unit(s) SubCutaneous three times a day before meals  losartan 100 milliGRAM(s) Oral daily  piperacillin/tazobactam IVPB.. 3.375 Gram(s) IV Intermittent every 12 hours  vancomycin  IVPB 500 milliGRAM(s) IV Intermittent once    MEDICATIONS  (PRN):  dextrose 40% Gel 15 Gram(s) Oral once PRN Blood Glucose LESS THAN 70 milliGRAM(s)/deciliter  glucagon  Injectable 1 milliGRAM(s) IntraMuscular once PRN Glucose LESS THAN 70 milligrams/deciliter      Allergies  No Known Allergies        Vital Signs Last 24 Hrs  T(C): 36.6 (20 Feb 2020 13:23), Max: 36.6 (19 Feb 2020 18:35)  T(F): 97.8 (20 Feb 2020 13:23), Max: 97.8 (19 Feb 2020 18:35)  HR: 55 (20 Feb 2020 13:23) (54 - 66)  BP: 108/48 (20 Feb 2020 13:23) (108/48 - 148/62)  BP(mean): --  RR: 18 (20 Feb 2020 13:23) (16 - 18)  SpO2: 96% (20 Feb 2020 13:23) (96% - 99%)      PHYSICAL EXAM:  GENERAL: NAD  HEAD:  Atraumatic  EYES: PERRLA  NERVOUS SYSTEM:  Awake, alert  CHEST/LUNG: Clear  HEART: RRR  ABDOMEN: Soft, non tender  EXTREMITIES:  no edema      LABS:                        8.6    5.60  )-----------( 339      ( 20 Feb 2020 07:07 )             28.2     02-20    132<L>  |  96  |  71<H>  ----------------------------<  210<H>  4.1   |  24  |  7.32<H>    Ca    7.9<L>      20 Feb 2020 07:07    TPro  6.2  /  Alb  2.3<L>  /  TBili  0.3  /  DBili  x   /  AST  17  /  ALT  17  /  AlkPhos  85  02-20    PT/INR - ( 18 Feb 2020 20:09 )   PT: 11.2 sec;   INR: 1.00 ratio         PTT - ( 18 Feb 2020 20:09 )  PTT:25.8 sec    CAPILLARY BLOOD GLUCOSE      POCT Blood Glucose.: 164 mg/dL (20 Feb 2020 11:57)  POCT Blood Glucose.: 221 mg/dL (20 Feb 2020 07:38)  POCT Blood Glucose.: 118 mg/dL (19 Feb 2020 22:09)      Culture - Blood (collected 18 Feb 2020 22:54)  Source: .Blood Blood-Venous  Preliminary Report (19 Feb 2020 23:01):    No growth to date.    Culture - Blood (collected 18 Feb 2020 22:54)  Source: .Blood Blood-Peripheral  Preliminary Report (19 Feb 2020 23:01):    No growth to date.      RADIOLOGY & ADDITIONAL TESTS:    Imaging Personally Reviewed:  [ ] YES  [ ] NO    Consultant(s) Notes Reviewed:  [ ] YES  [ ] NO    Care Discussed with Consultants/Other Providers [ ] YES  [ ] NO

## 2020-02-20 NOTE — PROGRESS NOTE ADULT - ASSESSMENT
Patient is a 75 YO F with a PMH of ESRD on HD TTS, DM, HTN, HLD who presents to the ED for dyspnea.  Patient currently on BiPAP, hx limited.  Patient reportedly complained of dyspnea during her HD session yesterday.  Had full fluid removal but continued to complain of dyspnea after treatement.  Patient was sent to the ED.  Patient reports long hx of smoking, quit 10 years ago.  CT chest shows bilateral ground glass opacities.  Labs show elevated white count, elevated BNP.  Patient found to be influenza A positive.    PNA:  - + influenza A with possible superimposed PNA  - Continue Tamiflu after HD for 5 days  - Continue Zosyn, vanc after HD  - Check procal  - ID follwoing    ESRD on HD:  - HD today  - Renal diet    HTN:  - BP on lower side.  - Continue current meds with holding parameter    DM with nephropathy:  - Uncontrolled  - HbA 1c 10.4  - Continue basal/ bolus insulin with SS    Hyponatremia: -Secondary to ESRD:  - Stable      HLD:  - Continue statin       DVT ppx:  - S/Q heparin Patient is a 73 YO F with a PMH of ESRD on HD TTS, DM, HTN, HLD who presents to the ED for dyspnea.  Patient currently on BiPAP, hx limited.  Patient reportedly complained of dyspnea during her HD session yesterday.  Had full fluid removal but continued to complain of dyspnea after treatement.  Patient was sent to the ED.  Patient reports long hx of smoking, quit 10 years ago.  CT chest shows bilateral ground glass opacities.  Labs show elevated white count, elevated BNP.  Patient found to be influenza A positive.    PNA:  - + influenza A with possible superimposed PNA ( sepsis POA, secondary to flu PNA)  - Continue Tamiflu after HD for 5 days  - Continue Zosyn, vanc after HD  - Check procal  - ID following    ESRD on HD:  - HD today  - Renal diet    HTN:  - BP on lower side.  - Continue current meds with holding parameter    DM with nephropathy:  - Uncontrolled  - HbA 1c 10.4  - Continue basal/ bolus insulin with SS    Hyponatremia: -Secondary to ESRD:  - Stable      HLD:  - Continue statin       DVT ppx:  - S/Q heparin Patient is a 73 YO F with a PMH of ESRD on HD TTS, DM, HTN, HLD who presents to the ED for dyspnea.  Patient currently on BiPAP, hx limited.  Patient reportedly complained of dyspnea during her HD session yesterday.  Had full fluid removal but continued to complain of dyspnea after treatement.  Patient was sent to the ED.  Patient reports long hx of smoking, quit 10 years ago.  CT chest shows bilateral ground glass opacities.  Labs show elevated white count, elevated BNP.  Patient found to be influenza A positive.    PNA:  - + influenza A with possible superimposed PNA ( sepsis POA, secondary to flu PNA)  - Continue Tamiflu after HD for 5 days  - Continue Zosyn, vanc after HD  - Check procal  - ID following    ESRD on HD:  - HD today  - Renal diet    HTN:  - BP on lower side.  - Continue current meds with holding parameter    DM with nephropathy:  - Uncontrolled  - HbA 1c 10.4  - Continue basal/ bolus insulin with SS    Acute on Chronic diastolic CHF:  - UF as tolerated    Hyponatremia:   - Secondary to ESRD:  - Stable      HLD:  - Continue statin       DVT ppx:  - S/Q heparin

## 2020-02-20 NOTE — PROGRESS NOTE ADULT - SUBJECTIVE AND OBJECTIVE BOX
NEPHROLOGY PROGRESS NOTE    CHIEF COMPLAINT:  ESRD    HPI:  Dyspnea resolved after 2 kg UFR yesterday.  No fever noted.  Leukocytosis resolved.    ROS:  denies cough    EXAM:  T(F): 96.6 (02-20-20 @ 05:23)  HR: 54 (02-20-20 @ 09:25)  BP: 122/54 (02-20-20 @ 05:23)  RR: 18 (02-20-20 @ 05:23)  SpO2: 98% (02-20-20 @ 09:25)    Conversant, in no apparent distress  Normal respiratory effort, lungs clear bilaterally  Heart RRR with no murmur, no peripheral edema         LABS                             8.6    5.60  )-----------( 339      ( 20 Feb 2020 07:07 )             28.2          02-20    132<L>  |  96  |  71<H>  ----------------------------<  210<H>  4.1   |  24  |  7.32<H>    Ca    7.9<L>      20 Feb 2020 07:07    TPro  6.2  /  Alb  2.3<L>  /  TBili  0.3  /  DBili  x   /  AST  17  /  ALT  17  /  AlkPhos  85  02-20           CT scan shows multifocal tree in bud, nodular and ground glass densities    ASSESSMENT:  1.  Influenza  2.  ESRD on hemodialysis  3.  Dyspnea - flu pneumonia?    PLAN:  Full dialysis session today with 2.5 kg UFR ordered

## 2020-02-21 LAB
ALBUMIN SERPL ELPH-MCNC: 2.3 G/DL — LOW (ref 3.3–5)
ALP SERPL-CCNC: 83 U/L — SIGNIFICANT CHANGE UP (ref 40–120)
ALT FLD-CCNC: 18 U/L — SIGNIFICANT CHANGE UP (ref 12–78)
ANION GAP SERPL CALC-SCNC: 9 MMOL/L — SIGNIFICANT CHANGE UP (ref 5–17)
AST SERPL-CCNC: 17 U/L — SIGNIFICANT CHANGE UP (ref 15–37)
BILIRUB SERPL-MCNC: 0.3 MG/DL — SIGNIFICANT CHANGE UP (ref 0.2–1.2)
BUN SERPL-MCNC: 38 MG/DL — HIGH (ref 7–23)
CALCIUM SERPL-MCNC: 7.8 MG/DL — LOW (ref 8.5–10.1)
CHLORIDE SERPL-SCNC: 96 MMOL/L — SIGNIFICANT CHANGE UP (ref 96–108)
CO2 SERPL-SCNC: 29 MMOL/L — SIGNIFICANT CHANGE UP (ref 22–31)
CREAT SERPL-MCNC: 5.47 MG/DL — HIGH (ref 0.5–1.3)
GLUCOSE SERPL-MCNC: 293 MG/DL — HIGH (ref 70–99)
HCT VFR BLD CALC: 29.3 % — LOW (ref 34.5–45)
HGB BLD-MCNC: 8.9 G/DL — LOW (ref 11.5–15.5)
MCHC RBC-ENTMCNC: 26.7 PG — LOW (ref 27–34)
MCHC RBC-ENTMCNC: 30.4 GM/DL — LOW (ref 32–36)
MCV RBC AUTO: 88 FL — SIGNIFICANT CHANGE UP (ref 80–100)
NRBC # BLD: 1 /100 WBCS — HIGH (ref 0–0)
PLATELET # BLD AUTO: 336 K/UL — SIGNIFICANT CHANGE UP (ref 150–400)
POTASSIUM SERPL-MCNC: 3.8 MMOL/L — SIGNIFICANT CHANGE UP (ref 3.5–5.3)
POTASSIUM SERPL-SCNC: 3.8 MMOL/L — SIGNIFICANT CHANGE UP (ref 3.5–5.3)
PROCALCITONIN SERPL-MCNC: 2.73 NG/ML — HIGH (ref 0.02–0.1)
PROT SERPL-MCNC: 6.4 GM/DL — SIGNIFICANT CHANGE UP (ref 6–8.3)
RBC # BLD: 3.33 M/UL — LOW (ref 3.8–5.2)
RBC # FLD: 17.6 % — HIGH (ref 10.3–14.5)
SODIUM SERPL-SCNC: 134 MMOL/L — LOW (ref 135–145)
WBC # BLD: 7.08 K/UL — SIGNIFICANT CHANGE UP (ref 3.8–10.5)
WBC # FLD AUTO: 7.08 K/UL — SIGNIFICANT CHANGE UP (ref 3.8–10.5)

## 2020-02-21 PROCEDURE — 99233 SBSQ HOSP IP/OBS HIGH 50: CPT

## 2020-02-21 RX ORDER — LOPERAMIDE HCL 2 MG
2 TABLET ORAL ONCE
Refills: 0 | Status: COMPLETED | OUTPATIENT
Start: 2020-02-21 | End: 2020-02-21

## 2020-02-21 RX ORDER — ERYTHROPOIETIN 10000 [IU]/ML
20000 INJECTION, SOLUTION INTRAVENOUS; SUBCUTANEOUS ONCE
Refills: 0 | Status: COMPLETED | OUTPATIENT
Start: 2020-02-22 | End: 2020-02-22

## 2020-02-21 RX ORDER — PANTOPRAZOLE SODIUM 20 MG/1
40 TABLET, DELAYED RELEASE ORAL
Refills: 0 | Status: DISCONTINUED | OUTPATIENT
Start: 2020-02-21 | End: 2020-02-22

## 2020-02-21 RX ADMIN — PIPERACILLIN AND TAZOBACTAM 25 GRAM(S): 4; .5 INJECTION, POWDER, LYOPHILIZED, FOR SOLUTION INTRAVENOUS at 06:27

## 2020-02-21 RX ADMIN — ATORVASTATIN CALCIUM 20 MILLIGRAM(S): 80 TABLET, FILM COATED ORAL at 21:36

## 2020-02-21 RX ADMIN — CARVEDILOL PHOSPHATE 6.25 MILLIGRAM(S): 80 CAPSULE, EXTENDED RELEASE ORAL at 11:20

## 2020-02-21 RX ADMIN — Medication 100 MILLIGRAM(S): at 06:27

## 2020-02-21 RX ADMIN — Medication 1: at 11:21

## 2020-02-21 RX ADMIN — HEPARIN SODIUM 5000 UNIT(S): 5000 INJECTION INTRAVENOUS; SUBCUTANEOUS at 06:27

## 2020-02-21 RX ADMIN — Medication 5 UNIT(S): at 07:42

## 2020-02-21 RX ADMIN — HEPARIN SODIUM 5000 UNIT(S): 5000 INJECTION INTRAVENOUS; SUBCUTANEOUS at 17:48

## 2020-02-21 RX ADMIN — Medication 100 MILLIGRAM(S): at 17:49

## 2020-02-21 RX ADMIN — INSULIN GLARGINE 10 UNIT(S): 100 INJECTION, SOLUTION SUBCUTANEOUS at 21:36

## 2020-02-21 RX ADMIN — Medication 3: at 07:42

## 2020-02-21 RX ADMIN — Medication 1 TABLET(S): at 17:49

## 2020-02-21 RX ADMIN — Medication 0.3 MILLIGRAM(S): at 11:20

## 2020-02-21 RX ADMIN — Medication 0.3 MILLIGRAM(S): at 17:47

## 2020-02-21 RX ADMIN — Medication 5 UNIT(S): at 11:22

## 2020-02-21 RX ADMIN — LOSARTAN POTASSIUM 100 MILLIGRAM(S): 100 TABLET, FILM COATED ORAL at 06:26

## 2020-02-21 RX ADMIN — CARVEDILOL PHOSPHATE 6.25 MILLIGRAM(S): 80 CAPSULE, EXTENDED RELEASE ORAL at 17:48

## 2020-02-21 RX ADMIN — Medication 30 MILLILITER(S): at 11:52

## 2020-02-21 RX ADMIN — Medication 2 MILLIGRAM(S): at 21:36

## 2020-02-21 NOTE — DIETITIAN INITIAL EVALUATION ADULT. - ETIOLOGY
Physiological cause (DM) requiring modified CHO intake Inadequate energy intake related to persistent nausea and lack of appetite inability to consume sufficient energy

## 2020-02-21 NOTE — DIETITIAN INITIAL EVALUATION ADULT. - PERTINENT LABORATORY DATA
02-21 Na 134 mmol/L<L> Glu 293 mg/dL<H> K+ 3.8 mmol/L Cr 5.47 mg/dL<H> BUN 38 mg/dL<H> Phos n/a   Alb 2.3 g/dL<L> PAB n/a   Hgb 8.9 g/dL<L> Hct 29.3 %<L> HgA1C 10.4%    Glucose, Serum: 293 mg/dL <H>   24Hr FS:195 mg/dL  294 mg/dL  180 mg/dL  125 mg/dL

## 2020-02-21 NOTE — PROGRESS NOTE ADULT - ASSESSMENT
Patient is a 73 YO F with a PMH of ESRD on HD TTS, DM, HTN, HLD who presents to the ED for dyspnea.  Patient currently on BiPAP, hx limited.  Patient reportedly complained of dyspnea during her HD session yesterday.  Had full fluid removal but continued to complain of dyspnea after treatement.  Patient was sent to the ED.  Patient reports long hx of smoking, quit 10 years ago.  CT chest shows bilateral ground glass opacities.  Labs show elevated white count, elevated BNP.  Patient found to be influenza A positive.    PNA:  - + influenza A with possible superimposed PNA ( sepsis POA, secondary to flu PNA)  - Continue Tamiflu after HD for 5 days  - Continue Zosyn, vanc after HD  - ID following    ESRD on HD:  - HD today  - Renal diet    HTN:  - BP on lower side.  - Continue current meds with holding parameter    DM with nephropathy:  - Uncontrolled  - HbA 1c 10.4  - Continue basal/ bolus insulin with SS    Acute on Chronic diastolic CHF:  - UF as tolerated    Hyponatremia:   - Secondary to ESRD:  - Stable      HLD:  - Continue statin       DVT ppx:  - S/Q heparin        Possible HD after HD tomorrow.

## 2020-02-21 NOTE — DIETITIAN INITIAL EVALUATION ADULT. - PERTINENT MEDS FT
atorvastatin  carvedilol  cloNIDine  dextrose 40% Gel PRN  dextrose 5%.  dextrose 50% Injectable  dextrose 50% Injectable  dextrose 50% Injectable  glucagon  Injectable PRN  heparin  Injectable  hydrALAZINE  insulin glargine Injectable (LANTUS)  insulin lispro (HumaLOG) corrective regimen sliding scale  insulin lispro Injectable (HumaLOG)  losartan  pantoprazole    Tablet  piperacillin/tazobactam IVPB..

## 2020-02-21 NOTE — DIETITIAN INITIAL EVALUATION ADULT. - OTHER INFO
Pt reports persistent lack of appetite and nausea x 1 week c emesis (02-21) . Pt reports no diarrhea, constipation, chewing or swallowing difficulty. Pt couldn't remember UBW. Last dietitian medical record (03-) states body weight 72.3kg. Pt takes insulin at home and SMBG x 2 daily; numbers are usually in 200s.   Pt's daughter does all the shopping and cooking at home. Pt states she follows a low sugar/sweets diet.   Diet hx: pancakes, farina, milk for breakfast; eggs, broccoli, toast for lunch; fish and rice for dinner; coffee ice-cream for dessert.     Educated patient on meal planning with the plate method and carbohydrate counting. Left Meal Planning with the Plate Method handout for family reference.

## 2020-02-21 NOTE — DIETITIAN INITIAL EVALUATION ADULT. - NUTRITIONGOAL OUTCOME1
Pt to verbalize three CHO foods to limit in daily diet; glycemic index goal 140-180 mg/dL Pt to verbalize three CHO foods to limit in daily diet; BG level 140-180 mg/dL

## 2020-02-21 NOTE — PROGRESS NOTE ADULT - SUBJECTIVE AND OBJECTIVE BOX
HPI:  Patient is a 73 YO F with a PMH of ESRD on HD TTS, DM, HTN, HLD who presents to the ED for dyspnea.  Patient currently on BiPAP, hx limited.  Patient reportedly complained of dyspnea during her HD session yesterday.  Had full fluid removal but continued to complain of dyspnea after treatement.  Patient was sent to the ED.  Patient reports long hx of smoking, quit 10 years ago.  CT chest shows bilateral ground glass opacities.  Labs show elevated white count, elevated BNP.  Patient found to be influenza A positive.  Will admit patient to tele for PNA/ influenza. (19 Feb 2020 03:42)      Allergies    No Known Allergies    Intolerances        MEDICATIONS  (STANDING):  atorvastatin 20 milliGRAM(s) Oral at bedtime  carvedilol 6.25 milliGRAM(s) Oral every 12 hours  cloNIDine 0.3 milliGRAM(s) Oral two times a day  dextrose 5%. 1000 milliLiter(s) (50 mL/Hr) IV Continuous <Continuous>  dextrose 50% Injectable 12.5 Gram(s) IV Push once  dextrose 50% Injectable 25 Gram(s) IV Push once  dextrose 50% Injectable 25 Gram(s) IV Push once  heparin  Injectable 5000 Unit(s) SubCutaneous every 12 hours  hydrALAZINE 100 milliGRAM(s) Oral two times a day  insulin glargine Injectable (LANTUS) 10 Unit(s) SubCutaneous at bedtime  insulin lispro (HumaLOG) corrective regimen sliding scale   SubCutaneous three times a day before meals  insulin lispro Injectable (HumaLOG) 5 Unit(s) SubCutaneous three times a day before meals  losartan 100 milliGRAM(s) Oral daily  pantoprazole    Tablet 40 milliGRAM(s) Oral before breakfast  piperacillin/tazobactam IVPB.. 3.375 Gram(s) IV Intermittent every 12 hours    MEDICATIONS  (PRN):  dextrose 40% Gel 15 Gram(s) Oral once PRN Blood Glucose LESS THAN 70 milliGRAM(s)/deciliter  glucagon  Injectable 1 milliGRAM(s) IntraMuscular once PRN Glucose LESS THAN 70 milligrams/deciliter      REVIEW OF SYSTEMS:    CONSTITUTIONAL: No fever, chills, weight loss, or fatigue  HEENT: No sore throat, runny nose, ear ache  RESPIRATORY: No cough, wheezing, No shortness of breath  CARDIOVASCULAR: No chest pain, palpitations, dizziness  GASTROINTESTINAL: No abdominal pain. No nausea, vomiting, diarrhea  GENITOURINARY: No dysuria, increase frequency, hematuria, or incontinence  NEUROLOGICAL: No headaches, memory loss, loss of strength, numbness, or tremors, no weakness  EXTREMITY: No pedal edema BLE  SKIN: No itching, burning, rashes, or lesions     VITAL SIGNS:  T(C): 37.1 (02-21-20 @ 10:32), Max: 37.1 (02-21-20 @ 10:32)  T(F): 98.7 (02-21-20 @ 10:32), Max: 98.7 (02-21-20 @ 10:32)  HR: 76 (02-21-20 @ 10:32) (57 - 76)  BP: 132/68 (02-21-20 @ 10:32) (115/45 - 146/59)  RR: 16 (02-21-20 @ 10:32) (16 - 18)  SpO2: 98% (02-21-20 @ 10:32) (95% - 98%)  Wt(kg): --    PHYSICAL EXAM:    GENERAL: not in any distress  HEENT: Neck is supple, normocephalic, atraumatic   CHEST/LUNG: Clear to percussion bilaterally; No rales, rhonchi, wheezing  HEART: Regular rate and rhythm; No murmurs, rubs, or gallops  ABDOMEN: Soft, Nontender, Nondistended; Bowel sounds present, no rebound   EXTREMITIES:  2+ Peripheral Pulses, No clubbing, cyanosis, or edema  GENITOURINARY:   SKIN: No rashes or lesions  BACK: no pressor sore   NERVOUS SYSTEM:  Alert & Oriented X3, Good concentration      LABS:                         8.9    7.08  )-----------( 336      ( 21 Feb 2020 07:09 )             29.3     02-21    134<L>  |  96  |  38<H>  ----------------------------<  293<H>  3.8   |  29  |  5.47<H>    Ca    7.8<L>      21 Feb 2020 07:09    TPro  6.4  /  Alb  2.3<L>  /  TBili  0.3  /  DBili  x   /  AST  17  /  ALT  18  /  AlkPhos  83  02-21    LIVER FUNCTIONS - ( 21 Feb 2020 07:09 )  Alb: 2.3 g/dL / Pro: 6.4 gm/dL / ALK PHOS: 83 U/L / ALT: 18 U/L / AST: 17 U/L / GGT: x                           Culture Results:   No growth to date. (02-18 @ 22:54)  Culture Results:   No growth to date. (02-18 @ 22:54)                Radiology:

## 2020-02-21 NOTE — DIETITIAN INITIAL EVALUATION ADULT. - ENERGY NEEDS
Height (cm): 165.1 (02-19)  Weight (kg): 78.7 (02-19)  BMI (kg/m2): 28.9 (02-19)  IBW:    56.6kg +/- 10%      % IBW:     139%        UBW:     72.3kg         %UBW: 109%

## 2020-02-21 NOTE — PROGRESS NOTE ADULT - SUBJECTIVE AND OBJECTIVE BOX
Patient is a 74y old  Female who presents with a chief complaint of dyspnea (21 Feb 2020 11:47)      INTERVAL HPI/OVERNIGHT EVENTS:  Pt was seen and examined, no acute events.    MEDICATIONS  (STANDING):  atorvastatin 20 milliGRAM(s) Oral at bedtime  carvedilol 6.25 milliGRAM(s) Oral every 12 hours  cloNIDine 0.3 milliGRAM(s) Oral two times a day  dextrose 5%. 1000 milliLiter(s) (50 mL/Hr) IV Continuous <Continuous>  dextrose 50% Injectable 12.5 Gram(s) IV Push once  dextrose 50% Injectable 25 Gram(s) IV Push once  dextrose 50% Injectable 25 Gram(s) IV Push once  heparin  Injectable 5000 Unit(s) SubCutaneous every 12 hours  hydrALAZINE 100 milliGRAM(s) Oral two times a day  insulin glargine Injectable (LANTUS) 10 Unit(s) SubCutaneous at bedtime  insulin lispro (HumaLOG) corrective regimen sliding scale   SubCutaneous three times a day before meals  insulin lispro Injectable (HumaLOG) 5 Unit(s) SubCutaneous three times a day before meals  losartan 100 milliGRAM(s) Oral daily  pantoprazole    Tablet 40 milliGRAM(s) Oral before breakfast  piperacillin/tazobactam IVPB.. 3.375 Gram(s) IV Intermittent every 12 hours    MEDICATIONS  (PRN):  dextrose 40% Gel 15 Gram(s) Oral once PRN Blood Glucose LESS THAN 70 milliGRAM(s)/deciliter  glucagon  Injectable 1 milliGRAM(s) IntraMuscular once PRN Glucose LESS THAN 70 milligrams/deciliter      Allergies  No Known Allergies      Vital Signs Last 24 Hrs  T(C): 37.1 (21 Feb 2020 10:32), Max: 37.1 (21 Feb 2020 10:32)  T(F): 98.7 (21 Feb 2020 10:32), Max: 98.7 (21 Feb 2020 10:32)  HR: 76 (21 Feb 2020 10:32) (57 - 76)  BP: 132/68 (21 Feb 2020 10:32) (115/45 - 146/59)  BP(mean): --  RR: 16 (21 Feb 2020 10:32) (16 - 18)  SpO2: 98% (21 Feb 2020 10:32) (95% - 98%)    PHYSICAL EXAM:  GENERAL:   HEAD:    EYES:   ENMT:   NECK:   NERVOUS SYSTEM:    CHEST/LUNG:   HEART:   ABDOMEN:   EXTREMITIES:    LYMPH:   SKIN:     LABS:                        8.9    7.08  )-----------( 336      ( 21 Feb 2020 07:09 )             29.3     02-21    134<L>  |  96  |  38<H>  ----------------------------<  293<H>  3.8   |  29  |  5.47<H>    Ca    7.8<L>      21 Feb 2020 07:09    TPro  6.4  /  Alb  2.3<L>  /  TBili  0.3  /  DBili  x   /  AST  17  /  ALT  18  /  AlkPhos  83  02-21        CAPILLARY BLOOD GLUCOSE      POCT Blood Glucose.: 195 mg/dL (21 Feb 2020 11:14)  POCT Blood Glucose.: 294 mg/dL (21 Feb 2020 07:38)  POCT Blood Glucose.: 180 mg/dL (20 Feb 2020 22:26)  POCT Blood Glucose.: 125 mg/dL (20 Feb 2020 18:32)      Culture - Blood (collected 18 Feb 2020 22:54)  Source: .Blood Blood-Venous  Preliminary Report (19 Feb 2020 23:01):    No growth to date.    Culture - Blood (collected 18 Feb 2020 22:54)  Source: .Blood Blood-Peripheral  Preliminary Report (19 Feb 2020 23:01):    No growth to date.      RADIOLOGY & ADDITIONAL TESTS:    Imaging Personally Reviewed:  [ ] YES  [ ] NO    Consultant(s) Notes Reviewed:  [ ] YES  [ ] NO    Care Discussed with Consultants/Other Providers [ ] YES  [ ] NO

## 2020-02-21 NOTE — DIETITIAN INITIAL EVALUATION ADULT. - PHYSICAL APPEARANCE
overweight/other (specify)/BMI: 28.9; +1 b/l legs/feet edema (02-20) Nutrition focused physical exam conducted ;Subcutaneous fat loss: [mild ] Orbital fat pads region, [ WNL ]Buccal fat region, [WNL ]Triceps region,  [ WNL]Ribs region.  Muscle wasting: [mild ]Temples region, [WNL ]Clavicle region, [WNL ]Shoulder region, [Unable ]Scapula region, [ WNL]Interosseous region,  [Unable ]thigh region, [Unable ]Calf region

## 2020-02-21 NOTE — PROGRESS NOTE ADULT - SUBJECTIVE AND OBJECTIVE BOX
[facility] NEPHROLOGY SERVICES, [facility]  NEPHROLOGY AND HYPERTENSION  [address]  [address]  [place] [address]  [phone]    MD REJI MALLOY MD ANDREY GONCHARUK, MD MADHU KORRAPATI, MD YELENA ROSENBERG, MD BINNY KOSHY, MD CHRISTOPHER CAPUTO, MD RUTHIE GALLAGHER MD          Patient with reflux sx;   no chest pain or pressure  no diaphoresis    MEDICATIONS  (STANDING):  aluminum hydroxide/magnesium hydroxide/simethicone Suspension 30 milliLiter(s) Oral once  atorvastatin 20 milliGRAM(s) Oral at bedtime  carvedilol 6.25 milliGRAM(s) Oral every 12 hours  cloNIDine 0.3 milliGRAM(s) Oral two times a day  dextrose 5%. 1000 milliLiter(s) (50 mL/Hr) IV Continuous <Continuous>  dextrose 50% Injectable 12.5 Gram(s) IV Push once  dextrose 50% Injectable 25 Gram(s) IV Push once  dextrose 50% Injectable 25 Gram(s) IV Push once  heparin  Injectable 5000 Unit(s) SubCutaneous every 12 hours  hydrALAZINE 100 milliGRAM(s) Oral two times a day  insulin glargine Injectable (LANTUS) 10 Unit(s) SubCutaneous at bedtime  insulin lispro (HumaLOG) corrective regimen sliding scale   SubCutaneous three times a day before meals  insulin lispro Injectable (HumaLOG) 5 Unit(s) SubCutaneous three times a day before meals  losartan 100 milliGRAM(s) Oral daily  pantoprazole    Tablet 40 milliGRAM(s) Oral before breakfast  piperacillin/tazobactam IVPB.. 3.375 Gram(s) IV Intermittent every 12 hours    MEDICATIONS  (PRN):  dextrose 40% Gel 15 Gram(s) Oral once PRN Blood Glucose LESS THAN 70 milliGRAM(s)/deciliter  glucagon  Injectable 1 milliGRAM(s) IntraMuscular once PRN Glucose LESS THAN 70 milligrams/deciliter      02-20-20 @ 07:01  -  02-21-20 @ 07:00  --------------------------------------------------------  IN: 480 mL / OUT: 0 mL / NET: 480 mL      PHYSICAL EXAM:      T(C): 37.1 (02-21-20 @ 10:32), Max: 37.1 (02-21-20 @ 10:32)  HR: 76 (02-21-20 @ 10:32) (55 - 76)  BP: 132/68 (02-21-20 @ 10:32) (108/48 - 146/59)  RR: 16 (02-21-20 @ 10:32) (16 - 18)  SpO2: 98% (02-21-20 @ 10:32) (95% - 98%)  Wt(kg): --  Respiratory: clear anteriorly, decreased BS at bases  Cardiovascular: S1 S2  Gastrointestinal: soft NT ND +BS  Extremities:   1 edema                                    8.9    7.08  )-----------( 336      ( 21 Feb 2020 07:09 )             29.3     02-21    134<L>  |  96  |  38<H>  ----------------------------<  293<H>  3.8   |  29  |  5.47<H>    Ca    7.8<L>      21 Feb 2020 07:09    TPro  6.4  /  Alb  2.3<L>  /  TBili  0.3  /  DBili  x   /  AST  17  /  ALT  18  /  AlkPhos  83  02-21      LIVER FUNCTIONS - ( 21 Feb 2020 07:09 )  Alb: 2.3 g/dL / Pro: 6.4 gm/dL / ALK PHOS: 83 U/L / ALT: 18 U/L / AST: 17 U/L / GGT: x           Creatinine Trend: 5.47<--, 7.32<--, 3.69<--      Assessment   ESRD; PNA; influenza;   Suspected GERD    Plan:  MOM for one dose; PPI;   HD for tomorrow;   IV abx course;   Discharge planning possible post HD tomorrow.    Stef Mares MD

## 2020-02-21 NOTE — PROGRESS NOTE ADULT - ASSESSMENT
End stage renal disease  obese   flu with poos PNA   high wbc is worrisome , down to normal   continue empiric antibiotics   in RA sitting on bed, does not look toxic or no distress, less cough   clinically ok  will switch to po Augmentin 500 daily for 5 more days upon discharge

## 2020-02-22 ENCOUNTER — TRANSCRIPTION ENCOUNTER (OUTPATIENT)
Age: 75
End: 2020-02-22

## 2020-02-22 VITALS
HEART RATE: 67 BPM | TEMPERATURE: 98 F | OXYGEN SATURATION: 99 % | SYSTOLIC BLOOD PRESSURE: 150 MMHG | DIASTOLIC BLOOD PRESSURE: 64 MMHG | RESPIRATION RATE: 16 BRPM

## 2020-02-22 LAB
ALBUMIN SERPL ELPH-MCNC: 2.3 G/DL — LOW (ref 3.3–5)
ALP SERPL-CCNC: 71 U/L — SIGNIFICANT CHANGE UP (ref 40–120)
ALT FLD-CCNC: 15 U/L — SIGNIFICANT CHANGE UP (ref 12–78)
ANION GAP SERPL CALC-SCNC: 11 MMOL/L — SIGNIFICANT CHANGE UP (ref 5–17)
AST SERPL-CCNC: 13 U/L — LOW (ref 15–37)
BILIRUB SERPL-MCNC: 0.2 MG/DL — SIGNIFICANT CHANGE UP (ref 0.2–1.2)
BUN SERPL-MCNC: 48 MG/DL — HIGH (ref 7–23)
CALCIUM SERPL-MCNC: 7.2 MG/DL — LOW (ref 8.5–10.1)
CHLORIDE SERPL-SCNC: 95 MMOL/L — LOW (ref 96–108)
CO2 SERPL-SCNC: 28 MMOL/L — SIGNIFICANT CHANGE UP (ref 22–31)
CREAT SERPL-MCNC: 7.51 MG/DL — HIGH (ref 0.5–1.3)
GLUCOSE SERPL-MCNC: 73 MG/DL — SIGNIFICANT CHANGE UP (ref 70–99)
HCT VFR BLD CALC: 24.2 % — LOW (ref 34.5–45)
HGB BLD-MCNC: 7.7 G/DL — LOW (ref 11.5–15.5)
MCHC RBC-ENTMCNC: 27.4 PG — SIGNIFICANT CHANGE UP (ref 27–34)
MCHC RBC-ENTMCNC: 31.8 GM/DL — LOW (ref 32–36)
MCV RBC AUTO: 86.1 FL — SIGNIFICANT CHANGE UP (ref 80–100)
NRBC # BLD: 2 /100 WBCS — HIGH (ref 0–0)
PLATELET # BLD AUTO: 347 K/UL — SIGNIFICANT CHANGE UP (ref 150–400)
POTASSIUM SERPL-MCNC: 3.2 MMOL/L — LOW (ref 3.5–5.3)
POTASSIUM SERPL-SCNC: 3.2 MMOL/L — LOW (ref 3.5–5.3)
PROCALCITONIN SERPL-MCNC: 1.22 NG/ML — HIGH (ref 0.02–0.1)
PROT SERPL-MCNC: 5.9 GM/DL — LOW (ref 6–8.3)
RBC # BLD: 2.81 M/UL — LOW (ref 3.8–5.2)
RBC # FLD: 17.7 % — HIGH (ref 10.3–14.5)
SODIUM SERPL-SCNC: 134 MMOL/L — LOW (ref 135–145)
WBC # BLD: 6.47 K/UL — SIGNIFICANT CHANGE UP (ref 3.8–10.5)
WBC # FLD AUTO: 6.47 K/UL — SIGNIFICANT CHANGE UP (ref 3.8–10.5)

## 2020-02-22 PROCEDURE — 99239 HOSP IP/OBS DSCHRG MGMT >30: CPT

## 2020-02-22 RX ADMIN — Medication 5 UNIT(S): at 07:40

## 2020-02-22 RX ADMIN — Medication 1 TABLET(S): at 19:17

## 2020-02-22 RX ADMIN — Medication 0.3 MILLIGRAM(S): at 05:46

## 2020-02-22 RX ADMIN — CARVEDILOL PHOSPHATE 6.25 MILLIGRAM(S): 80 CAPSULE, EXTENDED RELEASE ORAL at 05:46

## 2020-02-22 RX ADMIN — CARVEDILOL PHOSPHATE 6.25 MILLIGRAM(S): 80 CAPSULE, EXTENDED RELEASE ORAL at 19:18

## 2020-02-22 RX ADMIN — Medication 100 MILLIGRAM(S): at 05:46

## 2020-02-22 RX ADMIN — Medication 5 UNIT(S): at 11:44

## 2020-02-22 RX ADMIN — Medication 100 MILLIGRAM(S): at 19:17

## 2020-02-22 RX ADMIN — Medication 2: at 11:42

## 2020-02-22 RX ADMIN — HEPARIN SODIUM 5000 UNIT(S): 5000 INJECTION INTRAVENOUS; SUBCUTANEOUS at 05:46

## 2020-02-22 RX ADMIN — Medication 30 MILLIGRAM(S): at 19:18

## 2020-02-22 RX ADMIN — LOSARTAN POTASSIUM 100 MILLIGRAM(S): 100 TABLET, FILM COATED ORAL at 05:46

## 2020-02-22 RX ADMIN — ERYTHROPOIETIN 20000 UNIT(S): 10000 INJECTION, SOLUTION INTRAVENOUS; SUBCUTANEOUS at 17:04

## 2020-02-22 RX ADMIN — Medication 4: at 07:39

## 2020-02-22 RX ADMIN — Medication 0.3 MILLIGRAM(S): at 19:18

## 2020-02-22 RX ADMIN — HEPARIN SODIUM 5000 UNIT(S): 5000 INJECTION INTRAVENOUS; SUBCUTANEOUS at 19:19

## 2020-02-22 RX ADMIN — PANTOPRAZOLE SODIUM 40 MILLIGRAM(S): 20 TABLET, DELAYED RELEASE ORAL at 05:46

## 2020-02-22 NOTE — PATIENT PROFILE ADULT - PATIENT REPRESENTATIVE: ( YOU CAN CHOOSE ANY PERSON THAT CAN ASSIST YOU WITH YOUR HEALTH CARE PREFERENCES, DOES NOT HAVE TO BE A SPOUSE, IMMEDIATE FAMILY OR SIGNIFICANT OTHER/PARTNER)
"Called pt for TCM. Pt states \"doing ok so far\". She said she has no more wheezing and has been able to lay down in bed as opposed to having to sleep in her recliner. Still with cough and sob with activity. Denies chest pain and fever but states \"always cold\". She states her \"hands and feet are always swollen\" but has improved. She mentioned she woke up with a nose bleed this am but said she has had nose bleeds prior to the hospital. She doesn't have a bp monitor at home but said her bp was high in the hospital. She said her  helps with her ADLs and meal preparations. She denies any questions or needs at time of call and confirms pcp appt 2/13. Advised her to contact pcp or return to ER with new or worsening sxs and pt verb understanding.  "
declines

## 2020-02-22 NOTE — PATIENT PROFILE ADULT - NSPROEDALEARNPREF_GEN_A_NUR
verbal instruction/group instruction/skill demonstration/video/written material/computer/internet/individual instruction/pictorial/audio

## 2020-02-22 NOTE — DISCHARGE NOTE PROVIDER - NSDCCPCAREPLAN_GEN_ALL_CORE_FT
PRINCIPAL DISCHARGE DIAGNOSIS  Diagnosis: Pneumonia of both lungs due to infectious organism, unspecified part of lung  Assessment and Plan of Treatment: - + influenza A with possible superimposed PNA ( sepsis POA, secondary to flu PNA)  - Continue Tamiflu after HD for 5 days  - Continue Augmentin X 3 more days      SECONDARY DISCHARGE DIAGNOSES  Diagnosis: Benign essential HTN  Assessment and Plan of Treatment: Resume home med  Follow up with PCP for further meds adjustment    Diagnosis: Influenza  Assessment and Plan of Treatment: completed tamiflu    Diagnosis: Type 2 diabetes mellitus without complication, without long-term current use of insulin  Assessment and Plan of Treatment: uncontrolled, HbA1c 1.4  Continue home dose insulin now, Follow up with PCP for further meds adjustment

## 2020-02-22 NOTE — PROGRESS NOTE ADULT - ASSESSMENT
73 YO F with a PMH of ESRD on HD TTS, DM, HTN, HLD who was admitted for dyspnea and found to have PNA and flu.  Dialysis today and possible discharge back to rehab if stable. Will follow up labs. 75 YO F with a PMH of ESRD on HD TTS, DM, HTN, HLD who was admitted for dyspnea and found to have PNA and flu.  Dialysis today via the [facility].  Fluid removal as tolerated. will follow.

## 2020-02-22 NOTE — PROGRESS NOTE ADULT - SUBJECTIVE AND OBJECTIVE BOX
ALTHEA FERGUSON  74y  Female    Patient is a 74y old  Female who presents with a chief complaint of dyspnea (22 Feb 2020 11:29)    feels better, less cough today.     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; CXR 10/10/17  Diabetic neuropathy  Heart murmur  GERD (gastroesophageal reflux disease)  Anemia  Hypercholesteremia  Chronic kidney disease  Congestive heart failure, unspecified congestive heart failure chronicity, unspecified congestive heart failure type: last 5/17  Diabetes: x 15 years ; FS  Hypertension  H/O endoscopy          PHYSICAL EXAM:    T(C): 36.1 (02-22-20 @ 11:21), Max: 36.6 (02-21-20 @ 23:54)  HR: 66 (02-22-20 @ 11:21) (62 - 66)  BP: 126/60 (02-22-20 @ 11:21) (121/41 - 134/59)  RR: 18 (02-22-20 @ 11:21) (18 - 18)  SpO2: 100% (02-22-20 @ 11:21) (95% - 100%)  Wt(kg): --    I&O's Detail    21 Feb 2020 07:01  -  22 Feb 2020 07:00  --------------------------------------------------------  IN:    Oral Fluid: 354 mL  Total IN: 354 mL    OUT:  Total OUT: 0 mL    Total NET: 354 mL          Respiratory: clear anteriorly, decreased BS at bases  Cardiovascular: S1 S2  Gastrointestinal: soft NT ND +BS  Extremities: trace edema   Neuro: Awake and alert    MEDICATIONS  (STANDING):  amoxicillin  500 milliGRAM(s)/clavulanate 1 Tablet(s) Oral daily  atorvastatin 20 milliGRAM(s) Oral at bedtime  carvedilol 6.25 milliGRAM(s) Oral every 12 hours  cloNIDine 0.3 milliGRAM(s) Oral two times a day  dextrose 5%. 1000 milliLiter(s) (50 mL/Hr) IV Continuous <Continuous>  dextrose 50% Injectable 12.5 Gram(s) IV Push once  dextrose 50% Injectable 25 Gram(s) IV Push once  dextrose 50% Injectable 25 Gram(s) IV Push once  epoetin marita Injectable 99814 Unit(s) IV Push once  heparin  Injectable 5000 Unit(s) SubCutaneous every 12 hours  hydrALAZINE 100 milliGRAM(s) Oral two times a day  insulin glargine Injectable (LANTUS) 10 Unit(s) SubCutaneous at bedtime  insulin lispro (HumaLOG) corrective regimen sliding scale   SubCutaneous three times a day before meals  insulin lispro Injectable (HumaLOG) 5 Unit(s) SubCutaneous three times a day before meals  losartan 100 milliGRAM(s) Oral daily  oseltamivir 30 milliGRAM(s) Oral once  pantoprazole    Tablet 40 milliGRAM(s) Oral before breakfast    MEDICATIONS  (PRN):  dextrose 40% Gel 15 Gram(s) Oral once PRN Blood Glucose LESS THAN 70 milliGRAM(s)/deciliter  glucagon  Injectable 1 milliGRAM(s) IntraMuscular once PRN Glucose LESS THAN 70 milligrams/deciliter                            8.9    7.08  )-----------( 336      ( 21 Feb 2020 07:09 )             29.3       02-21    134<L>  |  96  |  38<H>  ----------------------------<  293<H>  3.8   |  29  |  5.47<H>    Ca    7.8<L>      21 Feb 2020 07:09    TPro  6.4  /  Alb  2.3<L>  /  TBili  0.3  /  DBili  x   /  AST  17  /  ALT  18  /  AlkPhos  83  02-21      Creatinine Trend: Creatinine Trend: 5.47<--, 7.32<--, 3.69<--

## 2020-02-22 NOTE — DISCHARGE NOTE NURSING/CASE MANAGEMENT/SOCIAL WORK - PATIENT PORTAL LINK FT
You can access the FollowMyHealth Patient Portal offered by [facility] by registering at the following website: [email or web address]. By joining [employer]’s FollowMyHealth portal, you will also be able to view your health information using other applications (apps) compatible with our system.

## 2020-02-23 LAB
CULTURE RESULTS: SIGNIFICANT CHANGE UP
CULTURE RESULTS: SIGNIFICANT CHANGE UP
SPECIMEN SOURCE: SIGNIFICANT CHANGE UP
SPECIMEN SOURCE: SIGNIFICANT CHANGE UP

## 2020-02-24 ENCOUNTER — APPOINTMENT (OUTPATIENT)
Dept: INTERNAL MEDICINE | Facility: CLINIC | Age: 75
End: 2020-02-24
Payer: MEDICARE

## 2020-02-24 VITALS
OXYGEN SATURATION: 90 % | HEART RATE: 81 BPM | TEMPERATURE: 97.4 F | SYSTOLIC BLOOD PRESSURE: 165 MMHG | WEIGHT: 169 LBS | DIASTOLIC BLOOD PRESSURE: 77 MMHG | RESPIRATION RATE: 17 BRPM | BODY MASS INDEX: 27.16 KG/M2 | HEIGHT: 66 IN

## 2020-02-24 DIAGNOSIS — Z09 ENCOUNTER FOR FOLLOW-UP EXAMINATION AFTER COMPLETED TREATMENT FOR CONDITIONS OTHER THAN MALIGNANT NEOPLASM: ICD-10-CM

## 2020-02-24 DIAGNOSIS — E61.1 IRON DEFICIENCY: ICD-10-CM

## 2020-02-24 DIAGNOSIS — G89.29 LOW BACK PAIN: ICD-10-CM

## 2020-02-24 DIAGNOSIS — M54.5 LOW BACK PAIN: ICD-10-CM

## 2020-02-24 PROCEDURE — 99496 TRANSJ CARE MGMT HIGH F2F 7D: CPT

## 2020-02-24 NOTE — PHYSICAL EXAM
[No Acute Distress] : no acute distress [Well Nourished] : well nourished [Well Developed] : well developed [Well-Appearing] : well-appearing [Normal Sclera/Conjunctiva] : normal sclera/conjunctiva [PERRL] : pupils equal round and reactive to light [EOMI] : extraocular movements intact [Normal Outer Ear/Nose] : the outer ears and nose were normal in appearance [Normal Oropharynx] : the oropharynx was normal [No Lymphadenopathy] : no lymphadenopathy [No JVD] : no jugular venous distention [Supple] : supple [Thyroid Normal, No Nodules] : the thyroid was normal and there were no nodules present [No Respiratory Distress] : no respiratory distress  [No Accessory Muscle Use] : no accessory muscle use [Regular Rhythm] : with a regular rhythm [Normal Rate] : normal rate  [Normal S1, S2] : normal S1 and S2 [No Murmur] : no murmur heard [No Carotid Bruits] : no carotid bruits [No Abdominal Bruit] : a ~M bruit was not heard ~T in the abdomen [No Varicosities] : no varicosities [Pedal Pulses Present] : the pedal pulses are present [No Edema] : there was no peripheral edema [No Extremity Clubbing/Cyanosis] : no extremity clubbing/cyanosis [No Palpable Aorta] : no palpable aorta [Non Tender] : non-tender [Soft] : abdomen soft [Non-distended] : non-distended [No Masses] : no abdominal mass palpated [No HSM] : no HSM [Normal Bowel Sounds] : normal bowel sounds [Normal Posterior Cervical Nodes] : no posterior cervical lymphadenopathy [No CVA Tenderness] : no CVA  tenderness [Normal Anterior Cervical Nodes] : no anterior cervical lymphadenopathy [No Spinal Tenderness] : no spinal tenderness [No Joint Swelling] : no joint swelling [No Rash] : no rash [Grossly Normal Strength/Tone] : grossly normal strength/tone [Coordination Grossly Intact] : coordination grossly intact [No Focal Deficits] : no focal deficits [Normal Gait] : normal gait [Deep Tendon Reflexes (DTR)] : deep tendon reflexes were 2+ and symmetric [Normal Affect] : the affect was normal [Normal Insight/Judgement] : insight and judgment were intact [de-identified] : bilateral basilar ronchies  with rales

## 2020-02-24 NOTE — HISTORY OF PRESENT ILLNESS
[Admitted on: ___] : The patient was admitted on [unfilled] [Discharged on ___] : discharged on [unfilled] [Discharge Summary] : discharge summary [Med Reconciliation] : medication reconciliation has been completed [Discharge Med List] : discharge medication list [FreeTextEntry2] : 74 years old female with type 2 diabetes, CKD, here for follow up after hospital discharge on 02/22/2020 admitted for pneumonia and influenza, on Augmentin, states feeling weak, mild cough, no fever, denies dyspnea, poor appetite, she is schedule for HD tomorrow; complains of chronic low back pain, due to herniated disc, as per daughter requesting home oxygen that she was on it, and also increase hours of home health aid,with help on ADL

## 2020-02-24 NOTE — PLAN
[FreeTextEntry1] : 1. pneumonia she will continue Augmentin; clinically improved, to repeat chest x rays in one month.\par 2. CHF cardiology follow up\par 3. uncontrolled type 2 diabetes ; she has scheduled endocrinologist follow up on Saturday\par Dietary counseling given, dietary avoidance discussed, diet and exercise reviewed with patient; patient reminded of importance of aerobic exercise, weight control, dietary compliance and regular glucose monitoring\par compliance issues discussed with patient and daughter.\par 4. CKD for HD tomorrow\par patient with multiple medical conditions, with high morbidity and mortality, she will need increase hours of home health aide at least 8 hours daily for seven days; as well as home oxygen; pulmonary referral; care management referral; will follow up in one month

## 2020-02-26 ENCOUNTER — TRANSCRIPTION ENCOUNTER (OUTPATIENT)
Age: 75
End: 2020-02-26

## 2020-02-26 ENCOUNTER — APPOINTMENT (OUTPATIENT)
Dept: CARE COORDINATION | Facility: HOME HEALTH | Age: 75
End: 2020-02-26
Payer: MEDICARE

## 2020-02-26 VITALS
RESPIRATION RATE: 18 BRPM | SYSTOLIC BLOOD PRESSURE: 122 MMHG | OXYGEN SATURATION: 94 % | TEMPERATURE: 97.6 F | HEART RATE: 72 BPM | DIASTOLIC BLOOD PRESSURE: 50 MMHG

## 2020-02-26 DIAGNOSIS — J15.1 PNEUMONIA DUE TO PSEUDOMONAS: ICD-10-CM

## 2020-02-26 DIAGNOSIS — E11.9 TYPE 2 DIABETES MELLITUS W/OUT COMPLICATIONS: ICD-10-CM

## 2020-02-26 PROCEDURE — 99350 HOME/RES VST EST HIGH MDM 60: CPT

## 2020-02-27 DIAGNOSIS — E11.22 TYPE 2 DIABETES MELLITUS WITH DIABETIC CHRONIC KIDNEY DISEASE: ICD-10-CM

## 2020-02-27 DIAGNOSIS — E11.65 TYPE 2 DIABETES MELLITUS WITH HYPERGLYCEMIA: ICD-10-CM

## 2020-02-27 DIAGNOSIS — R09.02 HYPOXEMIA: ICD-10-CM

## 2020-02-27 DIAGNOSIS — K21.9 GASTRO-ESOPHAGEAL REFLUX DISEASE WITHOUT ESOPHAGITIS: ICD-10-CM

## 2020-02-27 DIAGNOSIS — J10.01 INFLUENZA DUE TO OTHER IDENTIFIED INFLUENZA VIRUS WITH THE SAME OTHER IDENTIFIED INFLUENZA VIRUS PNEUMONIA: ICD-10-CM

## 2020-02-27 DIAGNOSIS — I50.33 ACUTE ON CHRONIC DIASTOLIC (CONGESTIVE) HEART FAILURE: ICD-10-CM

## 2020-02-27 DIAGNOSIS — R06.03 ACUTE RESPIRATORY DISTRESS: ICD-10-CM

## 2020-02-27 DIAGNOSIS — A41.89 OTHER SPECIFIED SEPSIS: ICD-10-CM

## 2020-02-27 DIAGNOSIS — I13.2 HYPERTENSIVE HEART AND CHRONIC KIDNEY DISEASE WITH HEART FAILURE AND WITH STAGE 5 CHRONIC KIDNEY DISEASE, OR END STAGE RENAL DISEASE: ICD-10-CM

## 2020-02-27 DIAGNOSIS — I16.0 HYPERTENSIVE URGENCY: ICD-10-CM

## 2020-02-27 DIAGNOSIS — N18.6 END STAGE RENAL DISEASE: ICD-10-CM

## 2020-02-27 DIAGNOSIS — E78.5 HYPERLIPIDEMIA, UNSPECIFIED: ICD-10-CM

## 2020-02-27 DIAGNOSIS — E11.40 TYPE 2 DIABETES MELLITUS WITH DIABETIC NEUROPATHY, UNSPECIFIED: ICD-10-CM

## 2020-02-27 DIAGNOSIS — E66.9 OBESITY, UNSPECIFIED: ICD-10-CM

## 2020-02-27 DIAGNOSIS — Z99.2 DEPENDENCE ON RENAL DIALYSIS: ICD-10-CM

## 2020-02-27 DIAGNOSIS — E87.1 HYPO-OSMOLALITY AND HYPONATREMIA: ICD-10-CM

## 2020-02-27 NOTE — COUNSELING
[Fall prevention counseling provided] : Fall prevention counseling provided [Adequate lighting] : Adequate lighting [No throw rugs] : No throw rugs [Use recommended devices] : Use recommended devices [Use proper foot wear] : Use proper foot wear [Good understanding] : Patient has a good understanding of disease, goals and obesity follow-up plan [Weigh Self Weekly] : weigh self weekly

## 2020-02-27 NOTE — HISTORY OF PRESENT ILLNESS
[Post-hospitalization from ___ Hospital] : Post-hospitalization from [unfilled] Hospital [Admitted on: ___] : The patient was admitted on [unfilled] [Discharged on ___] : discharged on [unfilled] [Discharge Summary] : discharge summary [Discharge Med List] : discharge medication list [Med Reconciliation] : medication reconciliation has been completed [Patient Contacted By: ____] : and contacted by [unfilled] [FreeTextEntry3] : CC: S/P hospitalization for PNA - TCM home visit, patient high risk for readmission [FreeTextEntry2] : Reviewed and edited from [facility] discharge note: This is a 73 YO F with a PMH of ESRD on HD TTS, DM, HTN, HLD who presents to the ED for dyspnea during her HD session. Patient reports long hx of smoking, quit 10 years ago. CT chest shows bilateral ground glass opacities. Patient admitted for PNA/ influenza. \par TCM home visit today. Patient c/o weakness some what improving. She is alert and oriented x 3, NAD. CN spoke with patient's daughter via telephone call. Patient denies any fever/chills, chest pain, SOB, pleuritic chest pain or dyspnea. Patient had f/u appt with Dr. Pablo 2 days ago2/24/2020 and continues dialysis 3 x weekly

## 2020-02-27 NOTE — REVIEW OF SYSTEMS
[Cough] : cough [Negative] : Psychiatric [Fever] : no fever [Chills] : no chills [Fatigue] : no fatigue [Discharge] : no discharge [Vision Problems] : no vision problems [Earache] : no earache [Nasal Discharge] : no nasal discharge [Sore Throat] : no sore throat [Chest Pain] : no chest pain [Palpitations] : no palpitations [Lower Ext Edema] : no lower extremity edema [Shortness Of Breath] : no shortness of breath [Wheezing] : no wheezing [Dyspnea on Exertion] : no dyspnea on exertion [Abdominal Pain] : no abdominal pain [Nausea] : no nausea [Constipation] : no constipation [Diarrhea] : diarrhea [Vomiting] : no vomiting [Dysuria] : no dysuria [Unsteady Walking] : no ataxia [FreeTextEntry6] : cough improving [de-identified] : assisted device

## 2020-02-27 NOTE — HEALTH RISK ASSESSMENT
[No] : No [0] : 2) Feeling down, depressed, or hopeless: Not at all (0) [] : No [de-identified] : quit 10 years ago [[id number]] : 0

## 2020-02-27 NOTE — ASSESSMENT
[FreeTextEntry1] : CM Assessment (8P) s  1. Principle Diagnosis: PNA 2. Poly-pharmacy/Med Adherence: Multiple medications 3. Psychological Screen (PHQ2): negative 4. Physical Limitations/Pain: Safety precautions reinforced 5. Health Literacy: negative 6. Patient Support: lives daughter Erin 7. Palliative Care: negative 8. Prior Hospitalization: negative Overall Risk:  High; 8 P Risk Score = 4-5/8   \par \par CARE PLAN\par Heart Failure\par Goal: To improve SOB and be compliant with HF guidelines\par Goal Type: Medical \par Start Date: 2/26/2020\par Status: In progress\par Intervention: Reinforced importance of daily weights and to report any weight gain 2-3lbs in 24 hours\par Intervention: Reinforced importance to monitor and report any new/increase in swelling\par Intervention: Follow up with cardiologist outpatient\par Responsible party: patient \par Target Date 3/26/2020\par \par PNA\par Goal: To improve SOB and prevent reinfection\par Goal Type: Medical\par Start Date: 2/26/2020\par Status: In progress\par Intervention: Reinforced importance of medication compliance\par Intervention: Follow up with pulmonologist outpatient\par Intervention: Safety precaution reinforced\par Responsible party: self\par Target Date 3/26/2020\par \par

## 2020-02-27 NOTE — PLAN
[FreeTextEntry1] : Pt was informed about CN’s role/ STARS program and overview of transitional care reviewed with patient. Pt educated on topics of importance such as compliance with all provider visits, prescribed medication regimen, and low salt / heart healthy diet. Pt encouraged calling CN with any issues, concerns or questions, also educated to notify CN if experiencing CP, SOB , cough, increased mucus/phlegm production, abdominal discomfort/swelling, difficulty sleeping, dizziness, lightheadedness, n/v/d/c, swelling to extremities and/or any c/o or concerns. Reassurance provided. Will continue to monitor.

## 2020-03-04 ENCOUNTER — APPOINTMENT (OUTPATIENT)
Dept: PULMONOLOGY | Facility: CLINIC | Age: 75
End: 2020-03-04

## 2020-03-10 ENCOUNTER — APPOINTMENT (OUTPATIENT)
Dept: CARDIOLOGY | Facility: CLINIC | Age: 75
End: 2020-03-10

## 2020-03-19 ENCOUNTER — EMERGENCY (EMERGENCY)
Facility: HOSPITAL | Age: 75
LOS: 1 days | Discharge: HOME | End: 2020-03-21
Attending: EMERGENCY MEDICINE | Admitting: INTERNAL MEDICINE
Payer: MEDICARE

## 2020-03-19 VITALS
WEIGHT: 180.78 LBS | HEART RATE: 81 BPM | DIASTOLIC BLOOD PRESSURE: 39 MMHG | RESPIRATION RATE: 18 BRPM | OXYGEN SATURATION: 98 % | TEMPERATURE: 98 F | HEIGHT: 60 IN | SYSTOLIC BLOOD PRESSURE: 111 MMHG

## 2020-03-19 DIAGNOSIS — R42 DIZZINESS AND GIDDINESS: ICD-10-CM

## 2020-03-19 DIAGNOSIS — N18.6 END STAGE RENAL DISEASE: ICD-10-CM

## 2020-03-19 DIAGNOSIS — E78.5 HYPERLIPIDEMIA, UNSPECIFIED: ICD-10-CM

## 2020-03-19 DIAGNOSIS — E11.22 TYPE 2 DIABETES MELLITUS WITH DIABETIC CHRONIC KIDNEY DISEASE: ICD-10-CM

## 2020-03-19 DIAGNOSIS — R11.2 NAUSEA WITH VOMITING, UNSPECIFIED: ICD-10-CM

## 2020-03-19 DIAGNOSIS — D64.9 ANEMIA, UNSPECIFIED: ICD-10-CM

## 2020-03-19 DIAGNOSIS — Z99.2 DEPENDENCE ON RENAL DIALYSIS: ICD-10-CM

## 2020-03-19 DIAGNOSIS — Z79.4 LONG TERM (CURRENT) USE OF INSULIN: ICD-10-CM

## 2020-03-19 DIAGNOSIS — Z98.890 OTHER SPECIFIED POSTPROCEDURAL STATES: Chronic | ICD-10-CM

## 2020-03-19 DIAGNOSIS — I50.9 HEART FAILURE, UNSPECIFIED: ICD-10-CM

## 2020-03-19 DIAGNOSIS — E11.40 TYPE 2 DIABETES MELLITUS WITH DIABETIC NEUROPATHY, UNSPECIFIED: ICD-10-CM

## 2020-03-19 DIAGNOSIS — I13.2 HYPERTENSIVE HEART AND CHRONIC KIDNEY DISEASE WITH HEART FAILURE AND WITH STAGE 5 CHRONIC KIDNEY DISEASE, OR END STAGE RENAL DISEASE: ICD-10-CM

## 2020-03-19 LAB
ALBUMIN SERPL ELPH-MCNC: 2.4 G/DL — LOW (ref 3.3–5)
ALLERGY+IMMUNOLOGY DIAG STUDY NOTE: SIGNIFICANT CHANGE UP
ALP SERPL-CCNC: 86 U/L — SIGNIFICANT CHANGE UP (ref 40–120)
ALT FLD-CCNC: 11 U/L — LOW (ref 12–78)
AMYLASE P1 CFR SERPL: 47 U/L — SIGNIFICANT CHANGE UP (ref 25–115)
ANION GAP SERPL CALC-SCNC: 9 MMOL/L — SIGNIFICANT CHANGE UP (ref 5–17)
ANISOCYTOSIS BLD QL: SIGNIFICANT CHANGE UP
APTT BLD: 23.4 SEC — LOW (ref 28.5–37)
AST SERPL-CCNC: 10 U/L — LOW (ref 15–37)
BASOPHILS # BLD AUTO: 0 K/UL — SIGNIFICANT CHANGE UP (ref 0–0.2)
BASOPHILS NFR BLD AUTO: 0 % — SIGNIFICANT CHANGE UP (ref 0–2)
BILIRUB SERPL-MCNC: 0.2 MG/DL — SIGNIFICANT CHANGE UP (ref 0.2–1.2)
BLD GP AB SCN SERPL QL: SIGNIFICANT CHANGE UP
BUN SERPL-MCNC: 50 MG/DL — HIGH (ref 7–23)
CALCIUM SERPL-MCNC: 8.5 MG/DL — SIGNIFICANT CHANGE UP (ref 8.5–10.1)
CHLORIDE SERPL-SCNC: 98 MMOL/L — SIGNIFICANT CHANGE UP (ref 96–108)
CO2 SERPL-SCNC: 31 MMOL/L — SIGNIFICANT CHANGE UP (ref 22–31)
CREAT SERPL-MCNC: 4.11 MG/DL — HIGH (ref 0.5–1.3)
DAT IGG-SP REAG RBC-IMP: ABNORMAL
DIR ANTIGLOB POLYSPECIFIC INTERPRETATION: ABNORMAL
EOSINOPHIL # BLD AUTO: 0 K/UL — SIGNIFICANT CHANGE UP (ref 0–0.5)
EOSINOPHIL NFR BLD AUTO: 0 % — SIGNIFICANT CHANGE UP (ref 0–6)
GLUCOSE SERPL-MCNC: 232 MG/DL — HIGH (ref 70–99)
HCT VFR BLD CALC: 16.6 % — CRITICAL LOW (ref 34.5–45)
HGB BLD-MCNC: 5.1 G/DL — CRITICAL LOW (ref 11.5–15.5)
HYPOCHROMIA BLD QL: SLIGHT — SIGNIFICANT CHANGE UP
IAT COMP-SP REAG SERPL QL: SIGNIFICANT CHANGE UP
INR BLD: 1.02 RATIO — SIGNIFICANT CHANGE UP (ref 0.88–1.16)
LACTATE SERPL-SCNC: 0.7 MMOL/L — SIGNIFICANT CHANGE UP (ref 0.7–2)
LIDOCAIN IGE QN: 410 U/L — HIGH (ref 73–393)
LYMPHOCYTES # BLD AUTO: 0.66 K/UL — LOW (ref 1–3.3)
LYMPHOCYTES # BLD AUTO: 4 % — LOW (ref 13–44)
MAGNESIUM SERPL-MCNC: 2.6 MG/DL — SIGNIFICANT CHANGE UP (ref 1.6–2.6)
MANUAL SMEAR VERIFICATION: YES — SIGNIFICANT CHANGE UP
MCHC RBC-ENTMCNC: 27.3 PG — SIGNIFICANT CHANGE UP (ref 27–34)
MCHC RBC-ENTMCNC: 30.7 GM/DL — LOW (ref 32–36)
MCV RBC AUTO: 88.8 FL — SIGNIFICANT CHANGE UP (ref 80–100)
METAMYELOCYTES # FLD: 1 % — HIGH (ref 0–0)
MICROCYTES BLD QL: SLIGHT — SIGNIFICANT CHANGE UP
MONOCYTES # BLD AUTO: 0.16 K/UL — SIGNIFICANT CHANGE UP (ref 0–0.9)
MONOCYTES NFR BLD AUTO: 1 % — LOW (ref 2–14)
NEUTROPHILS # BLD AUTO: 15.4 K/UL — HIGH (ref 1.8–7.4)
NEUTROPHILS NFR BLD AUTO: 93 % — HIGH (ref 43–77)
NEUTS BAND # BLD: 1 % — SIGNIFICANT CHANGE UP (ref 0–8)
NRBC # BLD: 0 /100 — SIGNIFICANT CHANGE UP (ref 0–0)
NRBC # BLD: SIGNIFICANT CHANGE UP /100 WBCS (ref 0–0)
PLAT MORPH BLD: NORMAL — SIGNIFICANT CHANGE UP
PLATELET # BLD AUTO: 290 K/UL — SIGNIFICANT CHANGE UP (ref 150–400)
POLYCHROMASIA BLD QL SMEAR: SLIGHT — SIGNIFICANT CHANGE UP
POTASSIUM SERPL-MCNC: 4.8 MMOL/L — SIGNIFICANT CHANGE UP (ref 3.5–5.3)
POTASSIUM SERPL-SCNC: 4.8 MMOL/L — SIGNIFICANT CHANGE UP (ref 3.5–5.3)
PROT SERPL-MCNC: 6.1 GM/DL — SIGNIFICANT CHANGE UP (ref 6–8.3)
PROTHROM AB SERPL-ACNC: 11.4 SEC — SIGNIFICANT CHANGE UP (ref 10–12.9)
RBC # BLD: 1.87 M/UL — LOW (ref 3.8–5.2)
RBC # FLD: 19.8 % — HIGH (ref 10.3–14.5)
RBC BLD AUTO: SIGNIFICANT CHANGE UP
SCHISTOCYTES BLD QL AUTO: SLIGHT — SIGNIFICANT CHANGE UP
SODIUM SERPL-SCNC: 138 MMOL/L — SIGNIFICANT CHANGE UP (ref 135–145)
WBC # BLD: 16.38 K/UL — HIGH (ref 3.8–10.5)
WBC # FLD AUTO: 16.38 K/UL — HIGH (ref 3.8–10.5)

## 2020-03-19 PROCEDURE — 74176 CT ABD & PELVIS W/O CONTRAST: CPT | Mod: 26

## 2020-03-19 PROCEDURE — 70450 CT HEAD/BRAIN W/O DYE: CPT | Mod: 26

## 2020-03-19 PROCEDURE — 99285 EMERGENCY DEPT VISIT HI MDM: CPT

## 2020-03-19 PROCEDURE — 99219: CPT

## 2020-03-19 PROCEDURE — 86077 PHYS BLOOD BANK SERV XMATCH: CPT

## 2020-03-19 PROCEDURE — 71045 X-RAY EXAM CHEST 1 VIEW: CPT | Mod: 26

## 2020-03-19 RX ORDER — PANTOPRAZOLE SODIUM 20 MG/1
40 TABLET, DELAYED RELEASE ORAL ONCE
Refills: 0 | Status: DISCONTINUED | OUTPATIENT
Start: 2020-03-19 | End: 2020-03-21

## 2020-03-19 RX ORDER — ONDANSETRON 8 MG/1
8 TABLET, FILM COATED ORAL ONCE
Refills: 0 | Status: DISCONTINUED | OUTPATIENT
Start: 2020-03-19 | End: 2020-03-21

## 2020-03-19 RX ORDER — IOHEXOL 300 MG/ML
30 INJECTION, SOLUTION INTRAVENOUS ONCE
Refills: 0 | Status: DISCONTINUED | OUTPATIENT
Start: 2020-03-19 | End: 2020-03-21

## 2020-03-19 NOTE — ED PROVIDER NOTE - NONTENDER LOCATION
right lower quadrant/left costovertebral angle/periumbilical/left lower quadrant/umbilical/suprapubic/right costovertebral angle/left upper quadrant/right upper quadrant

## 2020-03-19 NOTE — ED PROVIDER NOTE - CONSTITUTIONAL, MLM
normal... Well appearing, awake, alert, oriented to person, place, time/situation and in no apparent distress. Speaking in clear full sentences no nasal flaring no shoulders retractions no diaphoresis, no active vomiting, not holding her head/chest/abdomen, appears very comfortable

## 2020-03-19 NOTE — ED ADULT NURSE REASSESSMENT NOTE - NS ED NURSE REASSESS COMMENT FT1
Blood bank Vandana informed RBC will not be available tonight must be sent from another facility Dr. sun made aware.

## 2020-03-19 NOTE — ED PROVIDER NOTE - OBJECTIVE STATEMENT
74 years old female by ems c/o nausea vomiting and dizzy after 4 hours hemodialysis. Pt has a hx of esrd on dialysis next is due in two days. Pt however sts she has been vomiting for three days. Pt denies recent hx of traveling, headache, blurred visions, light sensitivities, focal/distal weakness or numbness, cough, runny nose, body ache, sob, chest pain, nausea, vomiting, fever, chills, abd pain, or irregular bowel movemetns.

## 2020-03-19 NOTE — ED ADULT NURSE REASSESSMENT NOTE - NS ED NURSE REASSESS COMMENT FT1
received @ 1954 pt A  & O x 4 no complaints voiced 20 G intact to R A/C no compilations noted. graft present to LUE no complications noted. pt pending RBC will continue to monitor.

## 2020-03-19 NOTE — ED ADULT TRIAGE NOTE - CHIEF COMPLAINT QUOTE
pt brought in by EMS s/p dialysis treatment for vomiting. Pt c/o vomiting for 3 days denies any abdominal pain. Pt states everything she eats she vomits

## 2020-03-19 NOTE — ED ADULT NURSE REASSESSMENT NOTE - NS ED NURSE REASSESS COMMENT FT1
Informed by blood bank tech Vandana Pt has antibodies RBC will be delayed will continue to joanne Hawthorne made aware

## 2020-03-19 NOTE — ED PROVIDER NOTE - PMH
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

## 2020-03-19 NOTE — ED PROVIDER NOTE - PROGRESS NOTE DETAILS
from [place] Alfredito Linoalvarado [phone] called here and sts she was called by the staff from pt's facility called her after pt was sent here and she sts knows about pt and she will be waiting for psych to call her. Patient was signed to me by Dr. Merida: persistent nausea, vomiting after dialysis. Patient was found to have Hg 5.1, requiring transfusion. Blood bank reports patient has antibodies, will need to get bloods from alternate facility. patient to be admitted. I discussed the case with Dr. Qureshi, patient unable to be discharged without addressing anemia <7

## 2020-03-20 DIAGNOSIS — E78.5 HYPERLIPIDEMIA, UNSPECIFIED: ICD-10-CM

## 2020-03-20 DIAGNOSIS — N18.6 END STAGE RENAL DISEASE: ICD-10-CM

## 2020-03-20 DIAGNOSIS — E11.9 TYPE 2 DIABETES MELLITUS WITHOUT COMPLICATIONS: ICD-10-CM

## 2020-03-20 DIAGNOSIS — I10 ESSENTIAL (PRIMARY) HYPERTENSION: ICD-10-CM

## 2020-03-20 DIAGNOSIS — Z29.9 ENCOUNTER FOR PROPHYLACTIC MEASURES, UNSPECIFIED: ICD-10-CM

## 2020-03-20 DIAGNOSIS — D64.9 ANEMIA, UNSPECIFIED: ICD-10-CM

## 2020-03-20 LAB
ANTIBODY INTERPRETATION 2: SIGNIFICANT CHANGE UP
GLUCOSE BLDC GLUCOMTR-MCNC: 292 MG/DL — HIGH (ref 70–99)

## 2020-03-20 PROCEDURE — 99225: CPT

## 2020-03-20 RX ORDER — AMLODIPINE BESYLATE 2.5 MG/1
10 TABLET ORAL DAILY
Refills: 0 | Status: DISCONTINUED | OUTPATIENT
Start: 2020-03-20 | End: 2020-03-20

## 2020-03-20 RX ORDER — CARVEDILOL PHOSPHATE 80 MG/1
6.25 CAPSULE, EXTENDED RELEASE ORAL EVERY 12 HOURS
Refills: 0 | Status: DISCONTINUED | OUTPATIENT
Start: 2020-03-20 | End: 2020-03-21

## 2020-03-20 RX ORDER — HEPARIN SODIUM 5000 [USP'U]/ML
5000 INJECTION INTRAVENOUS; SUBCUTANEOUS EVERY 12 HOURS
Refills: 0 | Status: DISCONTINUED | OUTPATIENT
Start: 2020-03-20 | End: 2020-03-21

## 2020-03-20 RX ORDER — SODIUM CHLORIDE 9 MG/ML
1000 INJECTION, SOLUTION INTRAVENOUS
Refills: 0 | Status: DISCONTINUED | OUTPATIENT
Start: 2020-03-20 | End: 2020-03-21

## 2020-03-20 RX ORDER — INSULIN LISPRO 100/ML
4 VIAL (ML) SUBCUTANEOUS
Refills: 0 | Status: DISCONTINUED | OUTPATIENT
Start: 2020-03-20 | End: 2020-03-21

## 2020-03-20 RX ORDER — DEXTROSE 50 % IN WATER 50 %
15 SYRINGE (ML) INTRAVENOUS ONCE
Refills: 0 | Status: DISCONTINUED | OUTPATIENT
Start: 2020-03-20 | End: 2020-03-21

## 2020-03-20 RX ORDER — ATORVASTATIN CALCIUM 80 MG/1
20 TABLET, FILM COATED ORAL AT BEDTIME
Refills: 0 | Status: DISCONTINUED | OUTPATIENT
Start: 2020-03-20 | End: 2020-03-21

## 2020-03-20 RX ORDER — AMLODIPINE BESYLATE 2.5 MG/1
10 TABLET ORAL DAILY
Refills: 0 | Status: DISCONTINUED | OUTPATIENT
Start: 2020-03-20 | End: 2020-03-21

## 2020-03-20 RX ORDER — DEXTROSE 50 % IN WATER 50 %
25 SYRINGE (ML) INTRAVENOUS ONCE
Refills: 0 | Status: DISCONTINUED | OUTPATIENT
Start: 2020-03-20 | End: 2020-03-21

## 2020-03-20 RX ORDER — GLUCAGON INJECTION, SOLUTION 0.5 MG/.1ML
1 INJECTION, SOLUTION SUBCUTANEOUS ONCE
Refills: 0 | Status: DISCONTINUED | OUTPATIENT
Start: 2020-03-20 | End: 2020-03-21

## 2020-03-20 RX ORDER — DEXTROSE 50 % IN WATER 50 %
12.5 SYRINGE (ML) INTRAVENOUS ONCE
Refills: 0 | Status: DISCONTINUED | OUTPATIENT
Start: 2020-03-20 | End: 2020-03-21

## 2020-03-20 RX ORDER — INSULIN LISPRO 100/ML
VIAL (ML) SUBCUTANEOUS
Refills: 0 | Status: DISCONTINUED | OUTPATIENT
Start: 2020-03-20 | End: 2020-03-21

## 2020-03-20 RX ORDER — INSULIN GLARGINE 100 [IU]/ML
15 INJECTION, SOLUTION SUBCUTANEOUS AT BEDTIME
Refills: 0 | Status: DISCONTINUED | OUTPATIENT
Start: 2020-03-20 | End: 2020-03-21

## 2020-03-20 RX ADMIN — HEPARIN SODIUM 5000 UNIT(S): 5000 INJECTION INTRAVENOUS; SUBCUTANEOUS at 22:57

## 2020-03-20 RX ADMIN — Medication 4: at 15:38

## 2020-03-20 RX ADMIN — CARVEDILOL PHOSPHATE 6.25 MILLIGRAM(S): 80 CAPSULE, EXTENDED RELEASE ORAL at 08:41

## 2020-03-20 RX ADMIN — INSULIN GLARGINE 15 UNIT(S): 100 INJECTION, SOLUTION SUBCUTANEOUS at 22:58

## 2020-03-20 RX ADMIN — ATORVASTATIN CALCIUM 20 MILLIGRAM(S): 80 TABLET, FILM COATED ORAL at 22:57

## 2020-03-20 RX ADMIN — HEPARIN SODIUM 5000 UNIT(S): 5000 INJECTION INTRAVENOUS; SUBCUTANEOUS at 06:28

## 2020-03-20 RX ADMIN — AMLODIPINE BESYLATE 10 MILLIGRAM(S): 2.5 TABLET ORAL at 08:41

## 2020-03-20 RX ADMIN — Medication 3: at 06:27

## 2020-03-20 RX ADMIN — Medication 6: at 13:10

## 2020-03-20 RX ADMIN — CARVEDILOL PHOSPHATE 6.25 MILLIGRAM(S): 80 CAPSULE, EXTENDED RELEASE ORAL at 22:57

## 2020-03-20 NOTE — H&P ADULT - NSICDXPASTMEDICALHX_GEN_ALL_CORE_FT
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

## 2020-03-20 NOTE — CONSULT NOTE ADULT - SUBJECTIVE AND OBJECTIVE BOX
[facility] NEPHROLOGY SERVICES, [facility]  NEPHROLOGY AND HYPERTENSION  [address]  [address]  [place] [address]  [phone]    MD REJI MALLOY MD ANDREY GONCHARUK, MD MADHU KORRAPATI, MD YELENA ROSENBERG, MD BINNY KOSHY, MD CHRISTOPHER CAPUTO, MD EDWARD BOVER, MD      Information from chart:  "Patient is a 74y old  Female who presents with a chief complaint of weakness, anemia (20 Mar 2020 01:51)    HPI:  Patient is a 73 YO F with a PMH of ESRD on HD TTS, DM, HTN, HLD who presents to the ED for weakness.  Patient initially brought to ED for vomiting after HD earlier today.  Patient reported that she had vomiting for 3 days, resulting in poor PO intake.  Patient currently denies nausea, requests to eat.  Patient complains of generalized weakness starting today.  Denies hx of chest pain, palpitations, dyspnea, cough, abdominal pain, constipation, diarrhea  Found to be anemic in ED, denies hx of melena or hematochezia.  Unsure of last colonoscopy.  Vitals stable.  Labs show H/H 5/16.  To receive two units of PRBCs.  Patient to be placed in observation. 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; CXR 10/10/17  Diabetic neuropathy  Heart murmur  GERD (gastroesophageal reflux disease)  Anemia  Hypercholesteremia  Chronic kidney disease  Congestive heart failure, unspecified congestive heart failure chronicity, unspecified congestive heart failure type: last 5/17  Diabetes: x 15 years ; FS  Hypertension  H/O endoscopy    FAMILY HISTORY:  No pertinent family history in first degree relatives    Allergies    No Known Allergies    Intolerances      Home Medications:  amLODIPine 10 mg oral tablet: 1 tab(s) orally once (19 Feb 2020 02:10)  atorvastatin 20 mg oral tablet: 1 tab(s) orally once a day (at bedtime) (19 Feb 2020 02:10)  cloNIDine 0.3 mg oral tablet: 1 tab(s) orally 2 times a day (19 Feb 2020 02:10)  docusate sodium 100 mg oral capsule: 1 cap(s) orally 2 times a day (19 Feb 2020 02:10)  HumaLOG 100 units/mL subcutaneous solution:  subcutaneous 3 times a day (before meals); 1 Unit(s) if Glucose 151 - 200  2 Unit(s) if Glucose 201 - 250  3 Unit(s) if Glucose 251 - 300  4 Unit(s) if Glucose 301 - 350  5 Unit(s) if Glucose 351 - 400  6 Unit(s) if Glucose Greater Than 400 (19 Feb 2020 02:10)  hydrALAZINE 100 mg oral tablet: 1 tab(s) orally 2 times a day (19 Feb 2020 02:10)  valsartan 320 mg oral tablet: 1 tab(s) orally once a day (19 Feb 2020 02:10)    MEDICATIONS  (STANDING):  amLODIPine   Tablet 10 milliGRAM(s) Oral daily  atorvastatin 20 milliGRAM(s) Oral at bedtime  carvedilol 6.25 milliGRAM(s) Oral every 12 hours  dextrose 5%. 1000 milliLiter(s) (50 mL/Hr) IV Continuous <Continuous>  dextrose 50% Injectable 12.5 Gram(s) IV Push once  dextrose 50% Injectable 25 Gram(s) IV Push once  dextrose 50% Injectable 25 Gram(s) IV Push once  heparin  Injectable 5000 Unit(s) SubCutaneous every 12 hours  insulin glargine Injectable (LANTUS) 15 Unit(s) SubCutaneous at bedtime  insulin lispro (HumaLOG) corrective regimen sliding scale   SubCutaneous three times a day before meals  insulin lispro Injectable (HumaLOG) 4 Unit(s) SubCutaneous three times a day with meals  iohexol 300 mG (iodine)/mL Oral Solution 30 milliLiter(s) Oral once  ondansetron Injectable 8 milliGRAM(s) IV Push Once  pantoprazole  Injectable 40 milliGRAM(s) IV Push Once    MEDICATIONS  (PRN):  dextrose 40% Gel 15 Gram(s) Oral once PRN Blood Glucose LESS THAN 70 milliGRAM(s)/deciliter  glucagon  Injectable 1 milliGRAM(s) IntraMuscular once PRN Glucose LESS THAN 70 milligrams/deciliter    Vital Signs Last 24 Hrs  T(C): 36.9 (20 Mar 2020 21:34), Max: 37.2 (20 Mar 2020 12:00)  T(F): 98.5 (20 Mar 2020 21:34), Max: 99 (20 Mar 2020 12:00)  HR: 86 (20 Mar 2020 21:34) (77 - 88)  BP: 161/47 (20 Mar 2020 21:34) (129/36 - 161/47)  BP(mean): --  RR: 18 (20 Mar 2020 21:34) (13 - 18)  SpO2: 99% (20 Mar 2020 21:34) (98% - 99%)    Daily     Daily     CAPILLARY BLOOD GLUCOSE      POCT Blood Glucose.: 252 mg/dL (20 Mar 2020 21:20)  POCT Blood Glucose.: 312 mg/dL (20 Mar 2020 15:36)  POCT Blood Glucose.: 417 mg/dL (20 Mar 2020 13:12)  POCT Blood Glucose.: 408 mg/dL (20 Mar 2020 12:10)  POCT Blood Glucose.: 292 mg/dL (20 Mar 2020 05:46)    PHYSICAL EXAM:      T(C): 36.9 (03-20-20 @ 21:34), Max: 37.2 (03-20-20 @ 12:00)  HR: 86 (03-20-20 @ 21:34) (77 - 88)  BP: 161/47 (03-20-20 @ 21:34) (129/36 - 161/47)  RR: 18 (03-20-20 @ 21:34) (13 - 18)  SpO2: 99% (03-20-20 @ 21:34) (98% - 99%)  Wt(kg): --  Respiratory: clear anteriorly, decreased BS at bases  Cardiovascular: S1 S2  Gastrointestinal: soft NT ND +BS  Extremities:   tr edema              03-19    138  |  98  |  50<H>  ----------------------------<  232<H>  4.8   |  31  |  4.11<H>    Ca    8.5      19 Mar 2020 18:26  Mg     2.6     03-19    TPro  6.1  /  Alb  2.4<L>  /  TBili  0.2  /  DBili  x   /  AST  10<L>  /  ALT  11<L>  /  AlkPhos  86  03-19                          5.1    16.38 )-----------( 290      ( 19 Mar 2020 18:26 )             16.6     Creatinine Trend: 4.11<--, 7.51<--, 5.47<--, 7.32<--          Assessment   ESRD, acute anemia    Plan  HD today with 2 units PRBC  Maintenance HD tomorrow.,     Stef Mares MD

## 2020-03-20 NOTE — ED ADULT NURSE REASSESSMENT NOTE - NS ED NURSE REASSESS COMMENT FT1
1215: Blood transfusion initiated. Pt remains stable, AOX4, no s/s of any distress noted. IV line in place, IV fluids infusing as per orders, no signs of infiltration noted. Tolerating diet. VS WNL. Call bell in reach, bed in lowest position. Safety maintained, hourly rounding performed. Endorsed to nurse Rubén.

## 2020-03-20 NOTE — H&P ADULT - PROBLEM SELECTOR PROBLEM 1
Symptomatic anemia Complex Repair And Single Advancement Flap Text: The defect edges were debeveled with a #15 scalpel blade.  The primary defect was closed partially with a complex linear closure.  Given the location of the remaining defect, shape of the defect and the proximity to free margins a single advancement flap was deemed most appropriate for complete closure of the defect.  Using a sterile surgical marker, an appropriate advancement flap was drawn incorporating the defect and placing the expected incisions within the relaxed skin tension lines where possible.    The area thus outlined was incised deep to adipose tissue with a #15 scalpel blade.  The skin margins were undermined to an appropriate distance in all directions utilizing iris scissors.

## 2020-03-20 NOTE — ED ADULT NURSE NOTE - ED STAT RN HANDOFF DETAILS
Handoff given UNA Rios pt general condition remains stable. no complications noted to graft to LUE . RN made aware pt pending RBC x 2 delayed due to antibodies

## 2020-03-20 NOTE — H&P ADULT - HISTORY OF PRESENT ILLNESS
Patient is a 75 YO F with a PMH of ESRD on HD TTS, DM, HTN, HLD who presents to the ED for weakness.  Patient initially brought to ED for vomiting after HD earlier today.  Patient reported that she had vomiting for 3 days, resulting in poor PO intake.  Patient currently denies nausea, requests to eat.  Patient complains of generalized weakness starting today.  Denies hx of chest pain, palpitations, dyspnea, cough, abdominal pain, constipation, diarrhea  Found to be anemic in ED, denies hx of melena or hematochezia.  Unsure of last colonoscopy.  Vitals stable.  Labs show H/H 5/16.  To receive two units of PRBCs.  Patient to be placed in observation.

## 2020-03-20 NOTE — CHART NOTE - NSCHARTNOTEFT_GEN_A_CORE
seen and examined, feels well, getting blood  PT consult/ Nephro eval    Vital Signs Last 24 Hrs  T(C): 37.2 (20 Mar 2020 12:30), Max: 37.2 (20 Mar 2020 12:00)  T(F): 99 (20 Mar 2020 12:30), Max: 99 (20 Mar 2020 12:00)  HR: 80 (20 Mar 2020 12:30) (80 - 88)  BP: 143/50 (20 Mar 2020 12:30) (111/39 - 150/67)  BP(mean): --  RR: 16 (20 Mar 2020 12:30) (16 - 18)  SpO2: 99% (20 Mar 2020 12:30) (98% - 99%)                          5.1    16.38 )-----------( 290      ( 19 Mar 2020 18:26 )             16.6     03-19    138  |  98  |  50<H>  ----------------------------<  232<H>  4.8   |  31  |  4.11<H>    Ca    8.5      19 Mar 2020 18:26  Mg     2.6     03-19    TPro  6.1  /  Alb  2.4<L>  /  TBili  0.2  /  DBili  x   /  AST  10<L>  /  ALT  11<L>  /  AlkPhos  86  03-19    PT/INR - ( 19 Mar 2020 18:26 )   PT: 11.4 sec;   INR: 1.02 ratio         PTT - ( 19 Mar 2020 18:26 )  PTT:23.4 sec

## 2020-03-20 NOTE — H&P ADULT - NSHPREVIEWOFSYSTEMS_GEN_ALL_CORE
Constitutional: no fever, chills, night sweats  Ears: no hearing changes or ear pain,   Nose: no nasal congestion, sinus pain, or rhinorrhea  Cardio: no chest pain, orthopnea, edema, or palpitations  Resp: no dyspnea, cough, wheezing  GI: no nausea, diarrhea, constipation.  +ve for vomiting   : no dysuria, urinary frequency, hematuria  MSK: no back pain, neck pain  Skin: no rash, pruritis   Neuro: +ve for weakness, dizziness, lightheadedness.  No syncope   Heme/Lymph: no bruising or bleeding

## 2020-03-20 NOTE — H&P ADULT - ASSESSMENT
Patient is a 73 YO F with a PMH of ESRD on HD TTS, DM, HTN, HLD who presents to the ED for weakness.  Patient initially brought to ED for vomiting after HD earlier today.  Patient reported that she had vomiting for 3 days, resulting in poor PO intake.  Patient currently denies nausea, requests to eat.  Patient complains of generalized weakness starting today.  Denies hx of chest pain, palpitations, dyspnea, cough, abdominal pain, constipation, dirrhea.  Found to be anemic in ED, denies hx of melena or hematochezia.  Unsure of last colonoscopy.  Vitals stable.  Labs show H/H 5/16.  To receive two units of PRBCs.  Patient to be placed in observation.     IMPROVE VTE Individual Risk Assessment          RISK                                                          Points  [  ] Previous VTE                                                3  [  ] Thrombophilia                                             2  [  ] Lower limb paralysis                                    2        (unable to hold up >15 seconds)    [  ] Current Cancer                                             2         (within 6 months)  [  ] Immobilization > 24 hrs                              1  [  ] ICU/CCU stay > 24 hours                            1  [  ] Age > 60                                                    1    IMPROVE VTE Score - 1

## 2020-03-20 NOTE — ED ADULT NURSE REASSESSMENT NOTE - NS ED NURSE REASSESS COMMENT FT1
Pt aox4, received in bed from UNA Taylor at 0715. Blood transfusion pending, Blood type/antibody not available as per blood bank, RN Manager Rakesh albright.

## 2020-03-20 NOTE — H&P ADULT - PROBLEM SELECTOR PLAN 3
amlodipine, carvedilol  Will hold high dose clonidine, valsartan, hydralazine as patient normotensive and anemic

## 2020-03-20 NOTE — H&P ADULT - PROBLEM SELECTOR PLAN 1
Hb 5.1 to receive 2 units of PRBCs.  Has antibodies in blood, delaying transfusion til the am  Will send guaiac

## 2020-03-20 NOTE — H&P ADULT - NSHPLABSRESULTS_GEN_ALL_CORE
Recent Vitals  T(C): 36.7 (03-20-20 @ 00:07), Max: 36.7 (03-20-20 @ 00:07)  HR: 88 (03-20-20 @ 00:07) (81 - 88)  BP: 149/56 (03-20-20 @ 00:07) (111/39 - 149/56)  RR: 18 (03-20-20 @ 00:07) (18 - 18)  SpO2: 98% (03-20-20 @ 00:07) (98% - 98%)                        5.1    16.38 )-----------( 290      ( 19 Mar 2020 18:26 )             16.6     03-19    138  |  98  |  50<H>  ----------------------------<  232<H>  4.8   |  31  |  4.11<H>    Ca    8.5      19 Mar 2020 18:26  Mg     2.6     03-19    TPro  6.1  /  Alb  2.4<L>  /  TBili  0.2  /  DBili  x   /  AST  10<L>  /  ALT  11<L>  /  AlkPhos  86  03-19    PT/INR - ( 19 Mar 2020 18:26 )   PT: 11.4 sec;   INR: 1.02 ratio         PTT - ( 19 Mar 2020 18:26 )  PTT:23.4 sec  LIVER FUNCTIONS - ( 19 Mar 2020 18:26 )  Alb: 2.4 g/dL / Pro: 6.1 gm/dL / ALK PHOS: 86 U/L / ALT: 11 U/L / AST: 10 U/L / GGT: x               Home Medications:  amLODIPine 10 mg oral tablet: 1 tab(s) orally once (19 Feb 2020 02:10)  atorvastatin 20 mg oral tablet: 1 tab(s) orally once a day (at bedtime) (19 Feb 2020 02:10)  cloNIDine 0.3 mg oral tablet: 1 tab(s) orally 2 times a day (19 Feb 2020 02:10)  docusate sodium 100 mg oral capsule: 1 cap(s) orally 2 times a day (19 Feb 2020 02:10)  HumaLOG 100 units/mL subcutaneous solution:  subcutaneous 3 times a day (before meals); 1 Unit(s) if Glucose 151 - 200  2 Unit(s) if Glucose 201 - 250  3 Unit(s) if Glucose 251 - 300  4 Unit(s) if Glucose 301 - 350  5 Unit(s) if Glucose 351 - 400  6 Unit(s) if Glucose Greater Than 400 (19 Feb 2020 02:10)  hydrALAZINE 100 mg oral tablet: 1 tab(s) orally 2 times a day (19 Feb 2020 02:10)  valsartan 320 mg oral tablet: 1 tab(s) orally once a day (19 Feb 2020 02:10)

## 2020-03-21 ENCOUNTER — TRANSCRIPTION ENCOUNTER (OUTPATIENT)
Age: 75
End: 2020-03-21

## 2020-03-21 VITALS — HEART RATE: 85 BPM | DIASTOLIC BLOOD PRESSURE: 77 MMHG | SYSTOLIC BLOOD PRESSURE: 161 MMHG

## 2020-03-21 LAB
ANION GAP SERPL CALC-SCNC: 7 MMOL/L — SIGNIFICANT CHANGE UP (ref 5–17)
BUN SERPL-MCNC: 28 MG/DL — HIGH (ref 7–23)
CALCIUM SERPL-MCNC: 8 MG/DL — LOW (ref 8.5–10.1)
CHLORIDE SERPL-SCNC: 103 MMOL/L — SIGNIFICANT CHANGE UP (ref 96–108)
CO2 SERPL-SCNC: 31 MMOL/L — SIGNIFICANT CHANGE UP (ref 22–31)
CREAT SERPL-MCNC: 4.12 MG/DL — HIGH (ref 0.5–1.3)
GLUCOSE SERPL-MCNC: 162 MG/DL — HIGH (ref 70–99)
HCT VFR BLD CALC: 25.9 % — LOW (ref 34.5–45)
HGB BLD-MCNC: 8.5 G/DL — LOW (ref 11.5–15.5)
MCHC RBC-ENTMCNC: 28.6 PG — SIGNIFICANT CHANGE UP (ref 27–34)
MCHC RBC-ENTMCNC: 32.8 GM/DL — SIGNIFICANT CHANGE UP (ref 32–36)
MCV RBC AUTO: 87.2 FL — SIGNIFICANT CHANGE UP (ref 80–100)
NRBC # BLD: 2 /100 WBCS — HIGH (ref 0–0)
PLATELET # BLD AUTO: 280 K/UL — SIGNIFICANT CHANGE UP (ref 150–400)
POTASSIUM SERPL-MCNC: 3.6 MMOL/L — SIGNIFICANT CHANGE UP (ref 3.5–5.3)
POTASSIUM SERPL-SCNC: 3.6 MMOL/L — SIGNIFICANT CHANGE UP (ref 3.5–5.3)
RBC # BLD: 2.97 M/UL — LOW (ref 3.8–5.2)
RBC # FLD: 17.2 % — HIGH (ref 10.3–14.5)
SODIUM SERPL-SCNC: 141 MMOL/L — SIGNIFICANT CHANGE UP (ref 135–145)
WBC # BLD: 10.77 K/UL — HIGH (ref 3.8–10.5)
WBC # FLD AUTO: 10.77 K/UL — HIGH (ref 3.8–10.5)

## 2020-03-21 PROCEDURE — 99217: CPT

## 2020-03-21 RX ADMIN — CARVEDILOL PHOSPHATE 6.25 MILLIGRAM(S): 80 CAPSULE, EXTENDED RELEASE ORAL at 06:36

## 2020-03-21 RX ADMIN — CARVEDILOL PHOSPHATE 6.25 MILLIGRAM(S): 80 CAPSULE, EXTENDED RELEASE ORAL at 16:25

## 2020-03-21 RX ADMIN — HEPARIN SODIUM 5000 UNIT(S): 5000 INJECTION INTRAVENOUS; SUBCUTANEOUS at 17:13

## 2020-03-21 RX ADMIN — Medication 0.3 MILLIGRAM(S): at 17:32

## 2020-03-21 RX ADMIN — Medication 4 UNIT(S): at 08:06

## 2020-03-21 RX ADMIN — Medication 1: at 08:07

## 2020-03-21 RX ADMIN — Medication 1: at 16:24

## 2020-03-21 RX ADMIN — Medication 4 UNIT(S): at 16:48

## 2020-03-21 RX ADMIN — Medication 4 UNIT(S): at 13:55

## 2020-03-21 RX ADMIN — HEPARIN SODIUM 5000 UNIT(S): 5000 INJECTION INTRAVENOUS; SUBCUTANEOUS at 06:36

## 2020-03-21 RX ADMIN — AMLODIPINE BESYLATE 10 MILLIGRAM(S): 2.5 TABLET ORAL at 06:35

## 2020-03-21 NOTE — PROGRESS NOTE ADULT - ASSESSMENT
Patient is a 73 YO F with a PMH of ESRD on HD TTS, DM, HTN, HLD who presents to the ED for weakness.  Patient initially brought to ED for vomiting after HD earlier today.  Patient reported that she had vomiting for 3 days, resulting in poor PO intake.  Patient currently denies nausea, requests to eat.  Patient complains of generalized weakness starting today.  Denies hx of chest pain, palpitations, dyspnea, cough, abdominal pain, constipation, dirrhea.  Found to be anemic in ED, denies hx of melena or hematochezia.  Unsure of last colonoscopy.  Vitals stable.  Labs show H/H 5/16.  To receive two units of PRBCs.  Patient to be placed in observation.

## 2020-03-21 NOTE — PHYSICAL THERAPY INITIAL EVALUATION ADULT - GENERAL OBSERVATIONS, REHAB EVAL
Chart (EMR) reviewed. Received supine c HOB elevated, NAD. +heplock, +cardiac monitor, +LUE AVF. Alert. Ox4. Able to follow multistep commands/directions.

## 2020-03-21 NOTE — PROGRESS NOTE ADULT - PROBLEM SELECTOR PLAN 1
Hb 5.1 s/p  2 units of PRBCs. good response  No bleeding reported pt stable, may do further w/u as outpt, given the current risk of infection in hospital  Will get PT to eval patient

## 2020-03-21 NOTE — PROGRESS NOTE ADULT - SUBJECTIVE AND OBJECTIVE BOX
Patient is a 74y old  Female who presents with a chief complaint of weakness, anemia (20 Mar 2020 21:46)      INTERVAL HPI/OVERNIGHT EVENTS: no events     MEDICATIONS  (STANDING):  amLODIPine   Tablet 10 milliGRAM(s) Oral daily  atorvastatin 20 milliGRAM(s) Oral at bedtime  carvedilol 6.25 milliGRAM(s) Oral every 12 hours  dextrose 5%. 1000 milliLiter(s) (50 mL/Hr) IV Continuous <Continuous>  dextrose 50% Injectable 12.5 Gram(s) IV Push once  dextrose 50% Injectable 25 Gram(s) IV Push once  dextrose 50% Injectable 25 Gram(s) IV Push once  heparin  Injectable 5000 Unit(s) SubCutaneous every 12 hours  insulin glargine Injectable (LANTUS) 15 Unit(s) SubCutaneous at bedtime  insulin lispro (HumaLOG) corrective regimen sliding scale   SubCutaneous three times a day before meals  insulin lispro Injectable (HumaLOG) 4 Unit(s) SubCutaneous three times a day with meals  iohexol 300 mG (iodine)/mL Oral Solution 30 milliLiter(s) Oral once  ondansetron Injectable 8 milliGRAM(s) IV Push Once  pantoprazole  Injectable 40 milliGRAM(s) IV Push Once    MEDICATIONS  (PRN):  dextrose 40% Gel 15 Gram(s) Oral once PRN Blood Glucose LESS THAN 70 milliGRAM(s)/deciliter  glucagon  Injectable 1 milliGRAM(s) IntraMuscular once PRN Glucose LESS THAN 70 milligrams/deciliter      Allergies    No Known Allergies    Intolerances           Vital Signs Last 24 Hrs  T(C): 36.9 (21 Mar 2020 09:55), Max: 37.2 (20 Mar 2020 12:30)  T(F): 98.4 (21 Mar 2020 09:55), Max: 99 (20 Mar 2020 12:30)  HR: 75 (21 Mar 2020 09:55) (75 - 86)  BP: 127/48 (21 Mar 2020 09:55) (127/48 - 184/88)  BP(mean): --  RR: 20 (21 Mar 2020 09:55) (13 - 20)  SpO2: 98% (21 Mar 2020 09:55) (95% - 99%)    PHYSICAL EXAM:  GENERAL: NAD, well-groomed, well-developed  HEAD:  Atraumatic, Normocephalic  EYES: EOMI, PERRLA, conjunctiva and sclera clear  ENMT: No tonsillar erythema, exudates, or enlargement; Moist mucous membranes, Good dentition, No lesions  NECK: Supple, No JVD, Normal thyroid  NERVOUS SYSTEM:  Alert & Oriented X3, Good concentration; Motor Strength 5/5 B/L upper and lower extremities; DTRs 2+ intact and symmetric  CHEST/LUNG: Clear to percussion bilaterally; No rales, rhonchi, wheezing, or rubs  HEART: Regular rate and rhythm; No murmurs, rubs, or gallops  ABDOMEN: Soft, Nontender, Nondistended; Bowel sounds present  EXTREMITIES:  2+ Peripheral Pulses, No clubbing, cyanosis, or edema  LYMPH: No lymphadenopathy noted  SKIN: No rashes or lesions    LABS:                        8.5    10.77 )-----------( 280      ( 21 Mar 2020 07:17 )             25.9     03-21    141  |  103  |  28<H>  ----------------------------<  162<H>  3.6   |  31  |  4.12<H>    Ca    8.0<L>      21 Mar 2020 07:17  Mg     2.6     03-19    TPro  6.1  /  Alb  2.4<L>  /  TBili  0.2  /  DBili  x   /  AST  10<L>  /  ALT  11<L>  /  AlkPhos  86  03-19    PT/INR - ( 19 Mar 2020 18:26 )   PT: 11.4 sec;   INR: 1.02 ratio         PTT - ( 19 Mar 2020 18:26 )  PTT:23.4 sec    CAPILLARY BLOOD GLUCOSE      POCT Blood Glucose.: 181 mg/dL (21 Mar 2020 07:56)  POCT Blood Glucose.: 276 mg/dL (20 Mar 2020 22:54)  POCT Blood Glucose.: 252 mg/dL (20 Mar 2020 21:20)  POCT Blood Glucose.: 312 mg/dL (20 Mar 2020 15:36)  POCT Blood Glucose.: 417 mg/dL (20 Mar 2020 13:12)      RADIOLOGY & ADDITIONAL TESTS:    Imaging Personally Reviewed:  [ X] YES  [ ] NO    Consultant(s) Notes Reviewed:  [ X] YES  [ ] NO    Care Discussed with Consultants/Other Providers [X ] YES  [ ] NO

## 2020-03-21 NOTE — PROGRESS NOTE ADULT - ASSESSMENT
1.	ESRD on HD  2.	Anemia    HD today. To continue HD TIW as scheduled. Fluid removal as tolerated by BP.   Dietary and PO fluid restriction. Epogen as needed for anemia.

## 2020-03-21 NOTE — DISCHARGE NOTE PROVIDER - NSDCCPCAREPLAN_GEN_ALL_CORE_FT
PRINCIPAL DISCHARGE DIAGNOSIS  Diagnosis: Anemia  Assessment and Plan of Treatment: hgb better after transfusion , further work up as outpatient with your primary doctor

## 2020-03-21 NOTE — DISCHARGE NOTE NURSING/CASE MANAGEMENT/SOCIAL WORK - PATIENT PORTAL LINK FT
You can access the FollowMyHealth Patient Portal offered by [facility] by registering at the following website: [email or web address]. By joining [employer]’s FollowMyHealth portal, you will also be able to view your health information using other applications (apps) compatible with our system.

## 2020-03-21 NOTE — PROGRESS NOTE ADULT - SUBJECTIVE AND OBJECTIVE BOX
Patient is a 74y old  Female who presents with a chief complaint of weakness, anemia (21 Mar 2020 16:30)    Patient seen in follow up for ESRD on HD       PAST MEDICAL HISTORY:  ESRD on hemodialysis  PPD positive  Diabetic neuropathy  Heart murmur  GERD (gastroesophageal reflux disease)  Anemia  Hypercholesteremia  Chronic kidney disease  Congestive heart failure, unspecified congestive heart failure chronicity, unspecified congestive heart failure type  Diabetes  Hypertension    MEDICATIONS  (STANDING):  amLODIPine   Tablet 10 milliGRAM(s) Oral daily  atorvastatin 20 milliGRAM(s) Oral at bedtime  carvedilol 6.25 milliGRAM(s) Oral every 12 hours  cloNIDine 0.3 milliGRAM(s) Oral two times a day  dextrose 5%. 1000 milliLiter(s) (50 mL/Hr) IV Continuous <Continuous>  dextrose 50% Injectable 12.5 Gram(s) IV Push once  dextrose 50% Injectable 25 Gram(s) IV Push once  dextrose 50% Injectable 25 Gram(s) IV Push once  heparin  Injectable 5000 Unit(s) SubCutaneous every 12 hours  insulin glargine Injectable (LANTUS) 15 Unit(s) SubCutaneous at bedtime  insulin lispro (HumaLOG) corrective regimen sliding scale   SubCutaneous three times a day before meals  insulin lispro Injectable (HumaLOG) 4 Unit(s) SubCutaneous three times a day with meals  iohexol 300 mG (iodine)/mL Oral Solution 30 milliLiter(s) Oral once  ondansetron Injectable 8 milliGRAM(s) IV Push Once  pantoprazole  Injectable 40 milliGRAM(s) IV Push Once    MEDICATIONS  (PRN):  dextrose 40% Gel 15 Gram(s) Oral once PRN Blood Glucose LESS THAN 70 milliGRAM(s)/deciliter  glucagon  Injectable 1 milliGRAM(s) IntraMuscular once PRN Glucose LESS THAN 70 milligrams/deciliter    T(C): 37 (03-21-20 @ 13:15), Max: 37.2 (03-20-20 @ 12:00)  HR: 85 (03-21-20 @ 16:23) (75 - 86)  BP: 179/79 (03-21-20 @ 16:23) (127/48 - 184/88)  RR: 18 (03-21-20 @ 13:15) (13 - 20)  SpO2: 98% (03-21-20 @ 15:03) (95% - 99%)  Wt(kg): --  I&O's Detail      PHYSICAL EXAM:  General: No distress  Respiratory: b/l air entry  Cardiovascular: S1 S2  Gastrointestinal: soft  Extremities:  no edema                          8.5    10.77 )-----------( 280      ( 21 Mar 2020 07:17 )             25.9     03-21    141  |  103  |  28<H>  ----------------------------<  162<H>  3.6   |  31  |  4.12<H>    Ca    8.0<L>      21 Mar 2020 07:17  Mg     2.6     03-19    TPro  6.1  /  Alb  2.4<L>  /  TBili  0.2  /  DBili  x   /  AST  10<L>  /  ALT  11<L>  /  AlkPhos  86  03-19        LIVER FUNCTIONS - ( 19 Mar 2020 18:26 )  Alb: 2.4 g/dL / Pro: 6.1 gm/dL / ALK PHOS: 86 U/L / ALT: 11 U/L / AST: 10 U/L / GGT: x               Sodium, Serum: 141 (03-21 @ 07:17)  Sodium, Serum: 138 (03-19 @ 18:26)    Creatinine, Serum: 4.12 (03-21 @ 07:17)  Creatinine, Serum: 4.11 (03-19 @ 18:26)    Potassium, Serum: 3.6 (03-21 @ 07:17)  Potassium, Serum: 4.8 (03-19 @ 18:26)    Hemoglobin: 8.5 (03-21 @ 07:17)  Hemoglobin: 5.1 (03-19 @ 18:26)

## 2020-03-21 NOTE — DISCHARGE NOTE PROVIDER - NSDCMRMEDTOKEN_GEN_ALL_CORE_FT
amLODIPine 10 mg oral tablet: 1 tab(s) orally once  atorvastatin 20 mg oral tablet: 1 tab(s) orally once a day (at bedtime)  carvedilol 6.25 mg oral tablet: 1 tab(s) orally every 12 hours  cloNIDine 0.3 mg oral tablet: 1 tab(s) orally 2 times a day  docusate sodium 100 mg oral capsule: 1 cap(s) orally 2 times a day  doxazosin 2 mg oral tablet: 1 tab(s) orally once a day (at bedtime)  HumaLOG 100 units/mL subcutaneous solution:  subcutaneous 3 times a day (before meals); 1 Unit(s) if Glucose 151 - 200  2 Unit(s) if Glucose 201 - 250  3 Unit(s) if Glucose 251 - 300  4 Unit(s) if Glucose 301 - 350  5 Unit(s) if Glucose 351 - 400  6 Unit(s) if Glucose Greater Than 400  hydrALAZINE 100 mg oral tablet: 1 tab(s) orally 2 times a day  insulin glargine: 15 unit(s) subcutaneous once a day (at bedtime)  valsartan 320 mg oral tablet: 1 tab(s) orally once a day

## 2020-03-21 NOTE — DISCHARGE NOTE PROVIDER - HOSPITAL COURSE
Patient is a 75 YO F with a PMH of ESRD on HD TTS, DM, HTN, HLD who presents to the ED for weakness.  Patient initially brought to ED for vomiting after HD earlier today.  Patient reported that she had vomiting for 3 days, resulting in poor PO intake.  Patient currently denies nausea, requests to eat.  Patient complains of generalized weakness starting today.  Denies hx of chest pain, palpitations, dyspnea, cough, abdominal pain, constipation, dirrhea.  Found to be anemic in ED, denies hx of melena or hematochezia.  Unsure of last colonoscopy.  Vitals stable.  Labs show H/H 5/16.  To receive two units of PRBCs.  Patient to be placed in observation.              Problem/Plan - 1:    ·  Problem: Symptomatic anemia.  Plan: Hb 5.1 s/p  2 units of PRBCs. good response    No bleeding reported pt stable, may do further w/u as outpt, given the current risk of infection from covid in hospital    pt as outpatient         Problem/Plan - 2:    ·  Problem: ESRD on hemodialysis.  Plan: HD TTS as per schedule    renal on board.         Problem/Plan - 3:    ·  Problem: Essential hypertension.  Plan: amlodipine, carvedilol    Will hold high dose clonidine, valsartan, hydralazine as patient normotensive and anemic.         Problem/Plan - 4:    ·  Problem: Dyslipidemia.  Plan: lipitor.         Problem/Plan - 5:    ·  Problem: Type 2 diabetes mellitus without complication, without long-term current use of insulin.  Plan: insulin corrective scale, lantus 15 qhs.

## 2020-03-21 NOTE — PHYSICAL THERAPY INITIAL EVALUATION ADULT - GAIT TRAINING, PT EVAL
Pt will improve ambulation to ~ 300 feet Independently using rolling walker within 2 weeks. Pt will negotiate ~ 3 steps c rail c Supervision within 2 weeks.

## 2020-03-21 NOTE — PHYSICAL THERAPY INITIAL EVALUATION ADULT - ADDITIONAL COMMENTS
Patient lives c daughter in a pvt house c 3 entry steps c R rail up, all amenities on the 1st floor. Independent c all ADL's and ambulation with rolling walker.

## 2020-03-27 ENCOUNTER — APPOINTMENT (OUTPATIENT)
Dept: INTERNAL MEDICINE | Facility: CLINIC | Age: 75
End: 2020-03-27
Payer: MEDICARE

## 2020-03-27 PROCEDURE — 99442: CPT

## 2020-03-27 PROCEDURE — G2012 BRIEF CHECK IN BY MD/QHP: CPT

## 2020-05-29 PROBLEM — N18.6 END STAGE RENAL DISEASE: Chronic | Status: ACTIVE | Noted: 2020-03-19

## 2020-06-03 ENCOUNTER — APPOINTMENT (OUTPATIENT)
Dept: INTERNAL MEDICINE | Facility: CLINIC | Age: 75
End: 2020-06-03
Payer: MEDICARE

## 2020-06-03 VITALS
TEMPERATURE: 98.2 F | OXYGEN SATURATION: 93 % | HEIGHT: 66 IN | HEART RATE: 76 BPM | WEIGHT: 171 LBS | DIASTOLIC BLOOD PRESSURE: 66 MMHG | BODY MASS INDEX: 27.48 KG/M2 | RESPIRATION RATE: 17 BRPM | SYSTOLIC BLOOD PRESSURE: 179 MMHG

## 2020-06-03 DIAGNOSIS — U07.1 COVID-19: ICD-10-CM

## 2020-06-03 PROCEDURE — 99214 OFFICE O/P EST MOD 30 MIN: CPT | Mod: CS

## 2020-06-03 RX ORDER — BLOOD SUGAR DIAGNOSTIC
STRIP MISCELLANEOUS
Qty: 3 | Refills: 0 | Status: ACTIVE | COMMUNITY
Start: 2017-12-09 | End: 1900-01-01

## 2020-06-03 NOTE — HISTORY OF PRESENT ILLNESS
[FreeTextEntry1] : follow up [de-identified] : 75 years old female here for follow up, states feeling fatigued, with dyspnea on exertion, has schedule dialysis for tomorrow, was hospitalized in March for severe anemia , transfused with PRBC 2 units, denies melena or blood in stool , no hematemesis; she states has labs checked at dialysis center every two weeks, hgb been stable around 8 gm ; she is due for labs today; needs cardiology follow up consult, and gastroenterologist consultation for endoscopy; request refill of medications as well. she got covid 19 infection in March now recovered, needs antibodies test

## 2020-06-03 NOTE — REVIEW OF SYSTEMS
[Fatigue] : fatigue [Dyspnea on Exertion] : dyspnea on exertion [Lower Ext Edema] : lower extremity edema [Negative] : Psychiatric

## 2020-06-03 NOTE — PLAN
[FreeTextEntry1] : 1.anemia gastroenterologist referral for EGD; labs ordered for cbc, iron level\par 2. CKD on HD three times a week.\par 3.CHF cardiology consult for follow up; care management evaluation for assessment of home aid hours, home durable medical equipment needs, and possible home oxygen; request pulmonary consult.\par 4.hypertension compliance with medications discussed , stressed importance of taking BP medicines; low sodium diet stressed, reinforced.\par 5.type 2 diabetes mellitus continue same medications\par Dietary counseling given, dietary avoidance discussed, diet and exercise reviewed with patient; patient reminded of importance of aerobic exercise, weight control, dietary compliance and regular glucose monitoring\par 6. COVID 19 infection recovered, check antibodies test.\par follow up in two weeks for labs review and discussion.

## 2020-06-03 NOTE — PHYSICAL EXAM
[No Acute Distress] : no acute distress [Well Nourished] : well nourished [Well Developed] : well developed [Well-Appearing] : well-appearing [Normal Sclera/Conjunctiva] : normal sclera/conjunctiva [PERRL] : pupils equal round and reactive to light [EOMI] : extraocular movements intact [Normal Outer Ear/Nose] : the outer ears and nose were normal in appearance [Normal Oropharynx] : the oropharynx was normal [Supple] : supple [No JVD] : no jugular venous distention [No Lymphadenopathy] : no lymphadenopathy [No Respiratory Distress] : no respiratory distress  [No Accessory Muscle Use] : no accessory muscle use [Thyroid Normal, No Nodules] : the thyroid was normal and there were no nodules present [Regular Rhythm] : with a regular rhythm [Normal Rate] : normal rate  [Clear to Auscultation] : lungs were clear to auscultation bilaterally [Normal S1, S2] : normal S1 and S2 [No Murmur] : no murmur heard [No Varicosities] : no varicosities [No Abdominal Bruit] : a ~M bruit was not heard ~T in the abdomen [No Carotid Bruits] : no carotid bruits [Pedal Pulses Present] : the pedal pulses are present [No Palpable Aorta] : no palpable aorta [Soft] : abdomen soft [No Extremity Clubbing/Cyanosis] : no extremity clubbing/cyanosis [Non-distended] : non-distended [Non Tender] : non-tender [No Masses] : no abdominal mass palpated [No HSM] : no HSM [Normal Posterior Cervical Nodes] : no posterior cervical lymphadenopathy [Normal Anterior Cervical Nodes] : no anterior cervical lymphadenopathy [Normal Bowel Sounds] : normal bowel sounds [No Joint Swelling] : no joint swelling [No Spinal Tenderness] : no spinal tenderness [No CVA Tenderness] : no CVA  tenderness [No Rash] : no rash [Coordination Grossly Intact] : coordination grossly intact [Grossly Normal Strength/Tone] : grossly normal strength/tone [Normal Gait] : normal gait [No Focal Deficits] : no focal deficits [Normal Insight/Judgement] : insight and judgment were intact [Deep Tendon Reflexes (DTR)] : deep tendon reflexes were 2+ and symmetric [Normal Affect] : the affect was normal [de-identified] : 2 + pedal edema

## 2020-06-04 LAB
ALBUMIN SERPL ELPH-MCNC: 3.7 G/DL
ALP BLD-CCNC: 100 U/L
ALT SERPL-CCNC: 11 U/L
ANION GAP SERPL CALC-SCNC: 11 MMOL/L
AST SERPL-CCNC: 15 U/L
BASOPHILS # BLD AUTO: 0.04 K/UL
BASOPHILS NFR BLD AUTO: 0.5 %
BILIRUB SERPL-MCNC: 0.2 MG/DL
BUN SERPL-MCNC: 27 MG/DL
CALCIUM SERPL-MCNC: 8.7 MG/DL
CHLORIDE SERPL-SCNC: 102 MMOL/L
CHOLEST SERPL-MCNC: 124 MG/DL
CHOLEST/HDLC SERPL: 2 RATIO
CO2 SERPL-SCNC: 29 MMOL/L
CREAT SERPL-MCNC: 4.55 MG/DL
EOSINOPHIL # BLD AUTO: 0.11 K/UL
EOSINOPHIL NFR BLD AUTO: 1.5 %
ESTIMATED AVERAGE GLUCOSE: 186 MG/DL
FERRITIN SERPL-MCNC: 697 NG/ML
GLUCOSE SERPL-MCNC: 202 MG/DL
HBA1C MFR BLD HPLC: 8.1 %
HCT VFR BLD CALC: 26.4 %
HDLC SERPL-MCNC: 62 MG/DL
HGB BLD-MCNC: 7.5 G/DL
IMM GRANULOCYTES NFR BLD AUTO: 0.4 %
IRON SATN MFR SERPL: 22 %
IRON SERPL-MCNC: 40 UG/DL
LDLC SERPL CALC-MCNC: 47 MG/DL
LYMPHOCYTES # BLD AUTO: 0.63 K/UL
LYMPHOCYTES NFR BLD AUTO: 8.5 %
MAN DIFF?: NORMAL
MCHC RBC-ENTMCNC: 26.7 PG
MCHC RBC-ENTMCNC: 28.4 GM/DL
MCV RBC AUTO: 94 FL
MONOCYTES # BLD AUTO: 0.68 K/UL
MONOCYTES NFR BLD AUTO: 9.1 %
NEUTROPHILS # BLD AUTO: 5.96 K/UL
NEUTROPHILS NFR BLD AUTO: 80 %
PLATELET # BLD AUTO: 341 K/UL
POTASSIUM SERPL-SCNC: 4.8 MMOL/L
PROT SERPL-MCNC: 6.2 G/DL
RBC # BLD: 2.81 M/UL
RBC # FLD: 19.4 %
SARS-COV-2 IGG SERPL IA-ACNC: 262 AU/ML
SARS-COV-2 IGG SERPL QL IA: POSITIVE
SODIUM SERPL-SCNC: 142 MMOL/L
TIBC SERPL-MCNC: 182 UG/DL
TRIGL SERPL-MCNC: 75 MG/DL
UIBC SERPL-MCNC: 143 UG/DL
WBC # FLD AUTO: 7.45 K/UL

## 2020-06-24 ENCOUNTER — APPOINTMENT (OUTPATIENT)
Dept: INTERNAL MEDICINE | Facility: CLINIC | Age: 75
End: 2020-06-24

## 2020-07-06 ENCOUNTER — APPOINTMENT (OUTPATIENT)
Dept: GASTROENTEROLOGY | Facility: CLINIC | Age: 75
End: 2020-07-06

## 2020-07-30 ENCOUNTER — APPOINTMENT (OUTPATIENT)
Dept: GASTROENTEROLOGY | Facility: CLINIC | Age: 75
End: 2020-07-30
Payer: MEDICARE

## 2020-07-30 VITALS
OXYGEN SATURATION: 92 % | SYSTOLIC BLOOD PRESSURE: 120 MMHG | HEIGHT: 66 IN | WEIGHT: 171 LBS | TEMPERATURE: 97.8 F | BODY MASS INDEX: 27.48 KG/M2 | DIASTOLIC BLOOD PRESSURE: 60 MMHG | HEART RATE: 70 BPM

## 2020-07-30 DIAGNOSIS — Z86.010 PERSONAL HISTORY OF COLONIC POLYPS: ICD-10-CM

## 2020-07-30 DIAGNOSIS — Z87.19 PERSONAL HISTORY OF OTHER DISEASES OF THE DIGESTIVE SYSTEM: ICD-10-CM

## 2020-07-30 PROCEDURE — 99214 OFFICE O/P EST MOD 30 MIN: CPT

## 2020-07-30 NOTE — REASON FOR VISIT
[FreeTextEntry1] : Anemia, currently iron saturation is normal and ferritin is elevated, history of iron deficiency anemia, history of small prepyloric ulcer and hyperplastic colon polyp in 2018

## 2020-07-30 NOTE — CONSULT LETTER
[Dear  ___] : Dear  [unfilled], [Consult Letter:] : I had the pleasure of evaluating your patient, [unfilled]. [Please see my note below.] : Please see my note below. [Consult Closing:] : Thank you very much for allowing me to participate in the care of this patient.  If you have any questions, please do not hesitate to contact me. [FreeTextEntry2] : Dr. Michael Camp\par \par [address], [place] [address]\par \par [phone] [Sincerely,] : Sincerely, [FreeTextEntry3] : Darius Baca MD\par

## 2020-07-30 NOTE — ASSESSMENT
[FreeTextEntry1] : Impression,\par \par Anemia, iron saturation 22, ferritin 600, iron binding capacity is 180, which is slightly low\par \par Upper endoscopy 2 years ago small prepyloric inflammatory ulcer with negative H. pylori\par \par Colonoscopy 2 years ago small hyperplastic polyp\par \par Suggest,\par \par The patient daughter and I spoke at length\par \par Upper endoscopy and colonoscopy was relatively recent, 2 years ago without significant pathology\par \par Unclear based on current labs if she truly has an iron deficiency, with a normal iron saturation, elevated ferritin, mildly depressed iron binding capacity\par \par When I speak with the patient there is no clinical evidence of bleeding such as melena, blood per rectum or other obvious evidence of bleeding\par \par Suggest,\par \par Patient, daughter and I are in agreement that it might be wise for the patient to see hematologist first\par \par The [facility] system was contacted and will make sure that the patient sees a hematologist very soon\par \par Patient should follow-up with me afterwards\par \par If hematologist feels the patient is truly iron deficient, then repeat upper endoscopy and colonoscopy in a hospital setting would be warranted, and then possible small bowel capsule endoscopy\par \par Call or return anytime sooner as needed

## 2020-07-30 NOTE — PHYSICAL EXAM
[General Appearance - Alert] : alert [General Appearance - In No Acute Distress] : in no acute distress [FreeTextEntry1] : Elderly chronically-appearing ill female no acute distress alert oriented x3 he was a daughter [Neck Cervical Mass (___cm)] : no neck mass was observed [Neck Appearance] : the appearance of the neck was normal [Sclera] : the sclera and conjunctiva were normal [Jugular Venous Distention Increased] : there was no jugular-venous distention [Auscultation Breath Sounds / Voice Sounds] : lungs were clear to auscultation bilaterally [Heart Sounds] : normal S1 and S2 [Heart Rate And Rhythm] : heart rate was normal and rhythm regular [Heart Sounds Gallop] : no gallops [Murmurs] : no murmurs [Heart Sounds Pericardial Friction Rub] : no pericardial rub [Full Pulse] : the pedal pulses are present [Edema] : there was no peripheral edema [Bowel Sounds] : normal bowel sounds [Abdomen Soft] : soft [Abdomen Tenderness] : non-tender [Abdomen Mass (___ Cm)] : no abdominal mass palpated [Patient Refused] : rectal exam was refused by the patient [Cervical Lymph Nodes Enlarged Posterior Bilaterally] : posterior cervical [Cervical Lymph Nodes Enlarged Anterior Bilaterally] : anterior cervical [Supraclavicular Lymph Nodes Enlarged Bilaterally] : supraclavicular [Axillary Lymph Nodes Enlarged Bilaterally] : axillary [Femoral Lymph Nodes Enlarged Bilaterally] : femoral [Inguinal Lymph Nodes Enlarged Bilaterally] : inguinal [No CVA Tenderness] : no ~M costovertebral angle tenderness [No Spinal Tenderness] : no spinal tenderness [Skin Color & Pigmentation] : normal skin color and pigmentation [Skin Turgor] : normal skin turgor [] : no rash [No Focal Deficits] : no focal deficits

## 2020-07-30 NOTE — HISTORY OF PRESENT ILLNESS
[de-identified] : Dr. Michael Camp\par \par Doctor\par \par [address], [place] [address]\par \par [phone]\par \par \par Very pleasant 75-year-old female here with her daughter\par \par The patient is on hemodialysis\par \par She is anemic\par \par Recent iron saturation is normal, ferritin is elevated\par \par 2 years ago upper endoscopy showed small prepyloric ulcer with negative biopsies, negative for H. pylori\par \par 2 years ago colonoscopy with polypectomy, hyperplastic polyp\par \par No melena\par \par No blood per rectum\par \par No hematemesis\par \par Eating well\par \par No abdominal pain, heartburn\par \par Patient sent for evaluation for possible gastrointestinal source of blood loss

## 2020-08-05 ENCOUNTER — APPOINTMENT (OUTPATIENT)
Dept: CHRONIC DISEASE MANAGEMENT | Facility: CLINIC | Age: 75
End: 2020-08-05

## 2020-08-07 ENCOUNTER — OUTPATIENT (OUTPATIENT)
Dept: OUTPATIENT SERVICES | Facility: HOSPITAL | Age: 75
LOS: 1 days | Discharge: HOME | End: 2020-08-07

## 2020-08-07 DIAGNOSIS — Z98.890 OTHER SPECIFIED POSTPROCEDURAL STATES: Chronic | ICD-10-CM

## 2020-08-07 DIAGNOSIS — D64.9 ANEMIA, UNSPECIFIED: ICD-10-CM

## 2020-08-11 ENCOUNTER — RESULT REVIEW (OUTPATIENT)
Age: 75
End: 2020-08-11

## 2020-08-11 ENCOUNTER — APPOINTMENT (OUTPATIENT)
Dept: HEMATOLOGY ONCOLOGY | Facility: CLINIC | Age: 75
End: 2020-08-11
Payer: MEDICARE

## 2020-08-11 ENCOUNTER — OUTPATIENT (OUTPATIENT)
Dept: OUTPATIENT SERVICES | Facility: HOSPITAL | Age: 75
LOS: 1 days | End: 2020-08-11
Payer: MEDICARE

## 2020-08-11 VITALS
RESPIRATION RATE: 16 BRPM | SYSTOLIC BLOOD PRESSURE: 184 MMHG | OXYGEN SATURATION: 96 % | HEART RATE: 75 BPM | BODY MASS INDEX: 32.46 KG/M2 | DIASTOLIC BLOOD PRESSURE: 63 MMHG | TEMPERATURE: 98.3 F | WEIGHT: 176.37 LBS | HEIGHT: 61.81 IN

## 2020-08-11 DIAGNOSIS — D64.9 ANEMIA, UNSPECIFIED: ICD-10-CM

## 2020-08-11 DIAGNOSIS — Z98.890 OTHER SPECIFIED POSTPROCEDURAL STATES: Chronic | ICD-10-CM

## 2020-08-11 LAB
BASOPHILS # BLD AUTO: 0.03 K/UL — SIGNIFICANT CHANGE UP (ref 0–0.2)
BASOPHILS NFR BLD AUTO: 0.5 % — SIGNIFICANT CHANGE UP (ref 0–2)
EOSINOPHIL # BLD AUTO: 0.13 K/UL — SIGNIFICANT CHANGE UP (ref 0–0.5)
EOSINOPHIL NFR BLD AUTO: 2 % — SIGNIFICANT CHANGE UP (ref 0–6)
HCT VFR BLD CALC: 35.6 % — SIGNIFICANT CHANGE UP (ref 34.5–45)
HGB BLD-MCNC: 11.2 G/DL — LOW (ref 11.5–15.5)
IMM GRANULOCYTES NFR BLD AUTO: 0.5 % — SIGNIFICANT CHANGE UP (ref 0–1.5)
LYMPHOCYTES # BLD AUTO: 0.73 K/UL — LOW (ref 1–3.3)
LYMPHOCYTES # BLD AUTO: 11.5 % — LOW (ref 13–44)
MCHC RBC-ENTMCNC: 26.7 PG — LOW (ref 27–34)
MCHC RBC-ENTMCNC: 31.5 GM/DL — LOW (ref 32–36)
MCV RBC AUTO: 84.8 FL — SIGNIFICANT CHANGE UP (ref 80–100)
MONOCYTES # BLD AUTO: 0.52 K/UL — SIGNIFICANT CHANGE UP (ref 0–0.9)
MONOCYTES NFR BLD AUTO: 8.2 % — SIGNIFICANT CHANGE UP (ref 2–14)
NEUTROPHILS # BLD AUTO: 4.92 K/UL — SIGNIFICANT CHANGE UP (ref 1.8–7.4)
NEUTROPHILS NFR BLD AUTO: 77.3 % — HIGH (ref 43–77)
NRBC # BLD: 0 /100 WBCS — SIGNIFICANT CHANGE UP (ref 0–0)
PLATELET # BLD AUTO: 320 K/UL — SIGNIFICANT CHANGE UP (ref 150–400)
RBC # BLD: 4.2 M/UL — SIGNIFICANT CHANGE UP (ref 3.8–5.2)
RBC # FLD: 19.7 % — HIGH (ref 10.3–14.5)
WBC # BLD: 6.36 K/UL — SIGNIFICANT CHANGE UP (ref 3.8–10.5)
WBC # FLD AUTO: 6.36 K/UL — SIGNIFICANT CHANGE UP (ref 3.8–10.5)

## 2020-08-11 PROCEDURE — 88189 FLOWCYTOMETRY/READ 16 & >: CPT

## 2020-08-11 PROCEDURE — 99205 OFFICE O/P NEW HI 60 MIN: CPT

## 2020-08-12 LAB
EPO SERPL-MCNC: 43.1 MIU/ML
FOLATE SERPL-MCNC: 9.4 NG/ML
VIT B12 SERPL-MCNC: 1283 PG/ML

## 2020-08-12 RX ORDER — CALCIUM ACETATE 667 MG/1
667 CAPSULE ORAL
Qty: 540 | Refills: 0 | Status: COMPLETED | COMMUNITY
Start: 2020-06-19

## 2020-08-12 RX ORDER — AMOXICILLIN AND CLAVULANATE POTASSIUM 500; 125 MG/1; MG/1
500-125 TABLET, FILM COATED ORAL
Qty: 3 | Refills: 0 | Status: COMPLETED | COMMUNITY
Start: 2020-02-22

## 2020-08-12 RX ORDER — METHYLPREDNISOLONE 4 MG/1
4 TABLET ORAL
Qty: 21 | Refills: 0 | Status: COMPLETED | COMMUNITY
Start: 2020-02-12

## 2020-08-12 NOTE — REVIEW OF SYSTEMS
[Lower Ext Edema] : lower extremity edema [Difficulty Walking] : difficulty walking [Negative] : Heme/Lymph [FreeTextEntry5] : chronic [de-identified] : chronic

## 2020-08-12 NOTE — HISTORY OF PRESENT ILLNESS
[Disease:__________________________] : Disease: [unfilled] [de-identified] : 76 yo female with a hx of poorly controlled diabetes II, ESRD on dialysis, on BARBIE support, chronic anemia, HTN, CAD, sent for hematology referral by GI. Pt is accompanied to the office today with her dtr. She reports her anemia was stable for years however recently she was not responding well to Procrit at dialysis. She was initially referred by nephrology to GI for possible iron deficiency anemia. She has required repeated prbc transfusions in the recent past last given 2 units in March 2020. She recalls EGD and colonoscopy was done in the past 2 years which were relatively benign findings. GI labs showed adequate iron stores with Iron sat of 22%. Pt denies any obvious signs of bleeding. She denies any personal or famhx of blood cancers.  Today she feels in usual state of health.

## 2020-08-12 NOTE — PHYSICAL EXAM
[Ambulatory and capable of all self care but unable to carry out any work activities] : Status 2- Ambulatory and capable of all self care but unable to carry out any work activities. Up and about more than 50% of waking hours [Obese] : obese [Normal] : affect appropriate [de-identified] : chronic venous stasis skin changes in bilateral legs

## 2020-08-13 LAB — TM INTERPRETATION: SIGNIFICANT CHANGE UP

## 2020-08-20 ENCOUNTER — APPOINTMENT (OUTPATIENT)
Dept: INTERNAL MEDICINE | Facility: CLINIC | Age: 75
End: 2020-08-20
Payer: MEDICARE

## 2020-08-20 VITALS
BODY MASS INDEX: 31.91 KG/M2 | SYSTOLIC BLOOD PRESSURE: 175 MMHG | DIASTOLIC BLOOD PRESSURE: 70 MMHG | OXYGEN SATURATION: 94 % | WEIGHT: 169 LBS | HEART RATE: 69 BPM | HEIGHT: 61 IN

## 2020-08-20 DIAGNOSIS — R91.8 OTHER NONSPECIFIC ABNORMAL FINDING OF LUNG FIELD: ICD-10-CM

## 2020-08-20 PROCEDURE — 99204 OFFICE O/P NEW MOD 45 MIN: CPT

## 2020-08-20 RX ORDER — PANTOPRAZOLE 40 MG/1
40 TABLET, DELAYED RELEASE ORAL DAILY
Qty: 90 | Refills: 0 | Status: DISCONTINUED | COMMUNITY
Start: 2018-07-20 | End: 2020-08-20

## 2020-08-20 RX ORDER — GABAPENTIN 100 MG/1
100 CAPSULE ORAL DAILY
Qty: 30 | Refills: 1 | Status: DISCONTINUED | COMMUNITY
Start: 2020-01-21 | End: 2020-08-20

## 2020-09-02 ENCOUNTER — APPOINTMENT (OUTPATIENT)
Dept: CHRONIC DISEASE MANAGEMENT | Facility: CLINIC | Age: 75
End: 2020-09-02

## 2020-09-02 VITALS — HEIGHT: 66 IN | BODY MASS INDEX: 27.16 KG/M2 | WEIGHT: 169 LBS

## 2020-09-04 ENCOUNTER — OUTPATIENT (OUTPATIENT)
Dept: OUTPATIENT SERVICES | Facility: HOSPITAL | Age: 75
LOS: 1 days | Discharge: HOME | End: 2020-09-04

## 2020-09-04 DIAGNOSIS — Z98.890 OTHER SPECIFIED POSTPROCEDURAL STATES: Chronic | ICD-10-CM

## 2020-09-04 DIAGNOSIS — D64.9 ANEMIA, UNSPECIFIED: ICD-10-CM

## 2020-09-07 NOTE — ED ADULT NURSE NOTE - CHPI ED SYMPTOMS POS
Bladder scan post void 120ml retained urine.  Pt requesting sti screening including hiv.  md informed.  Pelvic set up by pct   low hgb

## 2020-09-08 ENCOUNTER — APPOINTMENT (OUTPATIENT)
Dept: CT IMAGING | Facility: CLINIC | Age: 75
End: 2020-09-08
Payer: MEDICARE

## 2020-09-08 ENCOUNTER — OUTPATIENT (OUTPATIENT)
Dept: OUTPATIENT SERVICES | Facility: HOSPITAL | Age: 75
LOS: 1 days | End: 2020-09-08
Payer: MEDICARE

## 2020-09-08 DIAGNOSIS — Z98.890 OTHER SPECIFIED POSTPROCEDURAL STATES: Chronic | ICD-10-CM

## 2020-09-08 DIAGNOSIS — R91.8 OTHER NONSPECIFIC ABNORMAL FINDING OF LUNG FIELD: ICD-10-CM

## 2020-09-08 PROCEDURE — 71250 CT THORAX DX C-: CPT | Mod: 26

## 2020-09-08 PROCEDURE — 88189 FLOWCYTOMETRY/READ 16 & >: CPT

## 2020-09-09 ENCOUNTER — RESULT REVIEW (OUTPATIENT)
Age: 75
End: 2020-09-09

## 2020-09-09 ENCOUNTER — APPOINTMENT (OUTPATIENT)
Dept: HEMATOLOGY ONCOLOGY | Facility: CLINIC | Age: 75
End: 2020-09-09
Payer: MEDICARE

## 2020-09-09 VITALS
HEART RATE: 70 BPM | OXYGEN SATURATION: 97 % | SYSTOLIC BLOOD PRESSURE: 191 MMHG | DIASTOLIC BLOOD PRESSURE: 77 MMHG | RESPIRATION RATE: 16 BRPM | WEIGHT: 165.55 LBS | BODY MASS INDEX: 26.72 KG/M2 | TEMPERATURE: 98.4 F

## 2020-09-09 LAB
BASOPHILS # BLD AUTO: 0.05 K/UL — SIGNIFICANT CHANGE UP (ref 0–0.2)
BASOPHILS NFR BLD AUTO: 0.7 % — SIGNIFICANT CHANGE UP (ref 0–2)
EOSINOPHIL # BLD AUTO: 0.11 K/UL — SIGNIFICANT CHANGE UP (ref 0–0.5)
EOSINOPHIL NFR BLD AUTO: 1.6 % — SIGNIFICANT CHANGE UP (ref 0–6)
HCT VFR BLD CALC: 41.8 % — SIGNIFICANT CHANGE UP (ref 34.5–45)
HGB BLD-MCNC: 13.2 G/DL — SIGNIFICANT CHANGE UP (ref 11.5–15.5)
IMM GRANULOCYTES NFR BLD AUTO: 0.6 % — SIGNIFICANT CHANGE UP (ref 0–1.5)
LYMPHOCYTES # BLD AUTO: 0.74 K/UL — LOW (ref 1–3.3)
LYMPHOCYTES # BLD AUTO: 10.5 % — LOW (ref 13–44)
MCHC RBC-ENTMCNC: 26.2 PG — LOW (ref 27–34)
MCHC RBC-ENTMCNC: 31.6 GM/DL — LOW (ref 32–36)
MCV RBC AUTO: 82.9 FL — SIGNIFICANT CHANGE UP (ref 80–100)
MONOCYTES # BLD AUTO: 0.67 K/UL — SIGNIFICANT CHANGE UP (ref 0–0.9)
MONOCYTES NFR BLD AUTO: 9.5 % — SIGNIFICANT CHANGE UP (ref 2–14)
NEUTROPHILS # BLD AUTO: 5.45 K/UL — SIGNIFICANT CHANGE UP (ref 1.8–7.4)
NEUTROPHILS NFR BLD AUTO: 77.1 % — HIGH (ref 43–77)
NRBC # BLD: 0 /100 WBCS — SIGNIFICANT CHANGE UP (ref 0–0)
PLATELET # BLD AUTO: 245 K/UL — SIGNIFICANT CHANGE UP (ref 150–400)
RBC # BLD: 5.04 M/UL — SIGNIFICANT CHANGE UP (ref 3.8–5.2)
RBC # FLD: 20.1 % — HIGH (ref 10.3–14.5)
WBC # BLD: 7.06 K/UL — SIGNIFICANT CHANGE UP (ref 3.8–10.5)
WBC # FLD AUTO: 7.06 K/UL — SIGNIFICANT CHANGE UP (ref 3.8–10.5)

## 2020-09-09 PROCEDURE — 88313 SPECIAL STAINS GROUP 2: CPT | Mod: 26

## 2020-09-09 PROCEDURE — 88305 TISSUE EXAM BY PATHOLOGIST: CPT | Mod: 26

## 2020-09-09 PROCEDURE — G0452: CPT | Mod: 26

## 2020-09-09 PROCEDURE — 88305 TISSUE EXAM BY PATHOLOGIST: CPT

## 2020-09-09 PROCEDURE — 88313 SPECIAL STAINS GROUP 2: CPT

## 2020-09-09 PROCEDURE — 85097 BONE MARROW INTERPRETATION: CPT

## 2020-09-09 PROCEDURE — 71250 CT THORAX DX C-: CPT

## 2020-09-09 PROCEDURE — 38222 DX BONE MARROW BX & ASPIR: CPT | Mod: RT

## 2020-09-09 NOTE — REASON FOR VISIT
[Bone Marrow Biopsy] : bone marrow biopsy [Bone Marrow Aspiration] : bone marrow aspiration [FreeTextEntry2] : 74 yo Femail w/ anemia of chronic disease, flow cytometry showed increased percentage of nature killer-like T cell, BMBX to r/o leukemia/lymphoma

## 2020-09-09 NOTE — PROCEDURE
[Bone Marrow Biopsy] : bone marrow biopsy [Bone Marrow Aspiration] : bone marrow aspiration  [Patient identification verified] : patient identification verified [Patient] : the patient [Procedure verified and consent obtained] : procedure verified and consent obtained [Correct positioning] : correct positioning [Prone] : prone [The right posterior iliac crest was prepped with betadine and draped, using sterile technique.] : The right posterior iliac crest was prepped with betadine and draped, using sterile technique. [Lidocaine was injected and into the periosteum overlying the site.] : Lidocaine was injected and into the periosteum overlying the site. [Cytogenetics] : cytogenetics [Aspirate] : aspirate [FISH] : FISH [Biopsy] : biopsy [Flow Cytometry] : flow cytometry [] : The patient was instructed to remove the bandage the following AM. The patient may bathe. Acetaminophen may be taken for discomfort, as per package directions.If there are any other problems, the patient was instructed to call the office. The patient verbalized understanding, and is aware of the office contact numbers. [FreeTextEntry1] : 74 yo Femail w/ anemia of chronic disease, flow cytometry showed increased percentage of nature killer-like T cell, BMBX to r/o leukemia/lymphoma  [FreeTextEntry2] : CBC prior to procedure\par WBC 7.06\par Hgb  13.2 Hct 41.8\par Plt 245\par BM Bx and aspiration was performed by TEJ Fernández. 2 lavender + 2 green top tubes of BM aspirate and 1 cassette of BM core specimen sent to lab.\par \par

## 2020-09-10 ENCOUNTER — RESULT REVIEW (OUTPATIENT)
Age: 75
End: 2020-09-10

## 2020-09-10 LAB — TM INTERPRETATION: SIGNIFICANT CHANGE UP

## 2020-09-10 PROCEDURE — 81206 BCR/ABL1 GENE MAJOR BP: CPT

## 2020-09-10 PROCEDURE — 81342 TRG GENE REARRANGEMENT ANAL: CPT

## 2020-09-10 PROCEDURE — 88313 SPECIAL STAINS GROUP 2: CPT

## 2020-09-10 PROCEDURE — 88237 TISSUE CULTURE BONE MARROW: CPT

## 2020-09-10 PROCEDURE — 88305 TISSUE EXAM BY PATHOLOGIST: CPT

## 2020-09-10 PROCEDURE — 85097 BONE MARROW INTERPRETATION: CPT

## 2020-09-10 PROCEDURE — 81207 BCR/ABL1 GENE MINOR BP: CPT

## 2020-09-10 PROCEDURE — 88280 CHROMOSOME KARYOTYPE STUDY: CPT

## 2020-09-10 PROCEDURE — G0452: CPT | Mod: 26

## 2020-09-10 PROCEDURE — 81340 TRB@ GENE REARRANGE AMPLIFY: CPT

## 2020-09-10 PROCEDURE — 87205 SMEAR GRAM STAIN: CPT

## 2020-09-10 PROCEDURE — 88264 CHROMOSOME ANALYSIS 20-25: CPT

## 2020-09-10 PROCEDURE — 88185 FLOWCYTOMETRY/TC ADD-ON: CPT

## 2020-09-10 PROCEDURE — 88184 FLOWCYTOMETRY/ TC 1 MARKER: CPT

## 2020-09-11 LAB — BCR/ABL BY RT - PCR QUANTITATIVE: SIGNIFICANT CHANGE UP

## 2020-09-17 LAB
CHROM ANALY OVERALL INTERP SPEC-IMP: SIGNIFICANT CHANGE UP
DNA PLOIDY SPEC FC-IMP: SIGNIFICANT CHANGE UP

## 2020-10-05 ENCOUNTER — NON-APPOINTMENT (OUTPATIENT)
Age: 75
End: 2020-10-05

## 2020-10-10 ENCOUNTER — OUTPATIENT (OUTPATIENT)
Dept: OUTPATIENT SERVICES | Facility: HOSPITAL | Age: 75
LOS: 1 days | Discharge: HOME | End: 2020-10-10

## 2020-10-10 DIAGNOSIS — D64.9 ANEMIA, UNSPECIFIED: ICD-10-CM

## 2020-10-10 DIAGNOSIS — Z98.890 OTHER SPECIFIED POSTPROCEDURAL STATES: Chronic | ICD-10-CM

## 2020-10-12 ENCOUNTER — RESULT REVIEW (OUTPATIENT)
Age: 75
End: 2020-10-12

## 2020-10-12 ENCOUNTER — APPOINTMENT (OUTPATIENT)
Dept: HEMATOLOGY ONCOLOGY | Facility: CLINIC | Age: 75
End: 2020-10-12
Payer: MEDICARE

## 2020-10-12 VITALS
OXYGEN SATURATION: 9 % | SYSTOLIC BLOOD PRESSURE: 134 MMHG | HEART RATE: 58 BPM | RESPIRATION RATE: 14 BRPM | WEIGHT: 174.17 LBS | TEMPERATURE: 98 F | BODY MASS INDEX: 28.11 KG/M2 | DIASTOLIC BLOOD PRESSURE: 64 MMHG

## 2020-10-12 DIAGNOSIS — D64.9 ANEMIA, UNSPECIFIED: ICD-10-CM

## 2020-10-12 LAB
BASOPHILS # BLD AUTO: 0.03 K/UL — SIGNIFICANT CHANGE UP (ref 0–0.2)
BASOPHILS NFR BLD AUTO: 0.5 % — SIGNIFICANT CHANGE UP (ref 0–2)
EOSINOPHIL # BLD AUTO: 0.15 K/UL — SIGNIFICANT CHANGE UP (ref 0–0.5)
EOSINOPHIL NFR BLD AUTO: 2.4 % — SIGNIFICANT CHANGE UP (ref 0–6)
HCT VFR BLD CALC: 34 % — LOW (ref 34.5–45)
HGB BLD-MCNC: 10.9 G/DL — LOW (ref 11.5–15.5)
IMM GRANULOCYTES NFR BLD AUTO: 0.3 % — SIGNIFICANT CHANGE UP (ref 0–1.5)
LYMPHOCYTES # BLD AUTO: 0.88 K/UL — LOW (ref 1–3.3)
LYMPHOCYTES # BLD AUTO: 14.3 % — SIGNIFICANT CHANGE UP (ref 13–44)
MCHC RBC-ENTMCNC: 25.6 PG — LOW (ref 27–34)
MCHC RBC-ENTMCNC: 32.1 G/DL — SIGNIFICANT CHANGE UP (ref 32–36)
MCV RBC AUTO: 80 FL — SIGNIFICANT CHANGE UP (ref 80–100)
MONOCYTES # BLD AUTO: 0.44 K/UL — SIGNIFICANT CHANGE UP (ref 0–0.9)
MONOCYTES NFR BLD AUTO: 7.1 % — SIGNIFICANT CHANGE UP (ref 2–14)
NEUTROPHILS # BLD AUTO: 4.65 K/UL — SIGNIFICANT CHANGE UP (ref 1.8–7.4)
NEUTROPHILS NFR BLD AUTO: 75.4 % — SIGNIFICANT CHANGE UP (ref 43–77)
NRBC # BLD: 0 /100 WBCS — SIGNIFICANT CHANGE UP (ref 0–0)
PLATELET # BLD AUTO: 216 K/UL — SIGNIFICANT CHANGE UP (ref 150–400)
RBC # BLD: 4.25 M/UL — SIGNIFICANT CHANGE UP (ref 3.8–5.2)
RBC # FLD: 18.1 % — HIGH (ref 10.3–14.5)
WBC # BLD: 6.17 K/UL — SIGNIFICANT CHANGE UP (ref 3.8–10.5)
WBC # FLD AUTO: 6.17 K/UL — SIGNIFICANT CHANGE UP (ref 3.8–10.5)

## 2020-10-12 PROCEDURE — 99214 OFFICE O/P EST MOD 30 MIN: CPT

## 2020-10-13 RX ORDER — FOLIC ACID/VIT B COMPLEX AND C 0.8 MG
TABLET ORAL
Qty: 30 | Refills: 0 | Status: ACTIVE | COMMUNITY
Start: 2020-08-21

## 2020-10-13 NOTE — PHYSICAL EXAM
[Ambulatory and capable of all self care but unable to carry out any work activities] : Status 2- Ambulatory and capable of all self care but unable to carry out any work activities. Up and about more than 50% of waking hours [Obese] : obese [Normal] : affect appropriate [de-identified] : chronic venous stasis skin changes in bilateral legs

## 2020-10-13 NOTE — ED ADULT NURSE NOTE - PT NEEDS ASSIST
Melina Adair RN   Plan last modified:  Melina Adair Twin Cruz MD Referring Internal Medicine [phone]            See the Encounter Report to view Anticoagulation Flowsheet and Dosing Calendar (Go to Encounters tab in chart review, and find the Anticoagulation Therapy Visit)     No encounter.  Instructions faxed back to [facility].      Melina Adair RN                  no

## 2020-10-13 NOTE — RESULTS/DATA
[FreeTextEntry1] : \par  Pathology             Final\par \par No Documents Attached\par \par \par \par \par   [facility] Accession Number : [id number]\par \par ALTHEA FERGUSON                      3\par \par \par \par Hematopathology Report\par \par \par \par \par Final Diagnosis\par 1, 2. Bone marrow biopsy and bone marrow aspirate\par - Trilineage hematopoiesis with maturation, erythroid\par hyperplasia, and increased iron stores (granular pattern)\par \par See note and description.\par \par Diagnostic note:\par Per chart review, the patient has chronic anemia, diabetes, renal\par failure on dialysis, on erythropoietin and ferrous sulfate.  This\par flow cytometry, and the previous peripheral blood flow cytometry\par show lymphopenia (also present in the peripheral blood) with\par increased proportion of natural killer-like T cells, not\par indicative of a lymphoproliferative disorder. The iron stain\par shows a distinctly granular pattern which may be seen with\par previous intravenous iron therapy.\par Comprehensive report with results of pending ancillary studies to\par follow.\par \par Ancillary studies\par Bone marrow aspirate iron stain: Iron stores are increased\par (predominantly granular pattern); no ring sideroblasts are seen.\par \par Flow cytometry:\par - The myeloid immunophenotypic findings show decreased myeloid\par granularity, no increase in myeloid immaturity, normal myeloid\par antigen maturation pattern, and the presence of hematogones\par (which are reported to be low in myelodysplasia).\par - The lymphocyte immunophenotypic findings show no diagnostic\par abnormalities with lymphopenia.\par \par Cytogenetics:  Pending _\par \par Microscopic description:\par 1. Biopsy:  Sections of bone marrow biopsy and bone marrow\par fragments in clot show normocellularity (50% cellularity),\par trilineage hematopoiesis with maturation, erythroid predominance,\par megakaryocytes normal in number and morphology, and iron stores\par increased (granular pattern).\par \par 2. Aspirate:  Cellular spicules are present, adequate for\par interpretation.  Maturing and mature myeloid and erythroid\par elements are present with erythroid predominance (M:E ratio\par 1.2:1).  Megakaryocytes appear normal in number and morphology.\par \par Bone Marrow Aspirate Differential: (100 Cells).\par Type            %    Normal*\par Blast                0%   0-3\par \par \par \par \par \par MARTY, ALTHEA                      3\par \par \par \par Hematopathology Report\par \par \par \par \par Neutrophil and\par Precursors        49%  33-63\par Eosinophil           4%   1-5\par Basophil        0%   0-1\par Pronormoblast        4%   0-2\par Normoblast           39%  15-25\par Monocyte        0%   0-2\par Lymphocyte           4%   10-15\par Plasma cell          1%   0-1\par *Adult Range\par \par Peripheral blood: WBC appears normal with lymphopenia and few\par large granular lymphocytes (11% lymphocytes, 4% large granular\par lymphocytes); RBC and platelets appear unremarkable.\par \par Comment\par Iron stain (examined to evaluate for iron stores; see microscopic\par description) and Giemsa stain (shows appropriate staining\par pattern) are performed and\par evaluated on block(s): 1A, 1B\par \par Verified by: Chantal Fraser\par (Electronic Signature)\par Reported on: 09/11/20 15:02 EDT, [address],\[place] [address]\par Phone: [phone]   Fax: [phone]\par _________________________________________________________________\par \par Clinical History\par 75-year-old female with anemia of chronic disease.  Flow\par cytometry showed increased percentage of natural killer like T\par cell.  Bone marrow biopsy to rule out leukemia/lymphoma.\par \par CBC, 9/9/2020\par \par Test Code Result Reference Range\par WBC 7.06 K/uL 3.80 - 10.50\par RBC 5.04 M/uL 3.80 - 5.20\par HGB 13.2 g/dL 11.5 - 15.5\par HCT 41.8 % 34.5 - 45.0\par MCV 82.9 fl 80.0 - 100.0\par MCH 26.2 pg L 27.0 - 34.0\par MCHC 31.6 gm/dL L 32.0 - 36.0\par RDW 20.1 % H 10.3 - 14.5\par  K/uL f 150 - 400\par Auto NRBC 0 /100 WBCs 0 - 0\par NEUT% 77.1 % H 43.0 - 77.0\par NEUT# 5.45 K/uL 1.80 - 7.40\par \par \par \par \par \par MARTY, ALTHEA                      3\par \par \par \par Hematopathology Report\par \par \par \par \par LYMPH% 10.5 % L 13.0 - 44.0\par LYMPH# 0.74 K/uL L 1.00 - 3.30\par MONO% 9.5 % 2.0 - 14.0\par MONO# 0.67 K/uL 0.00 - 0.90\par EOS% 1.6 % 0.0 - 6.0\par EOS# 0.11 K/uL 0.00 - 0.50\par BASO% 0.7 % 0.0 - 2.0\par BASO# 0.05 K/uL 0.00 - 0.20\par \par Specimen(s) Submitted\par 1     Bone marrow biopsy right pic\par 2     Bone marrow aspirate\par \par Gross Description\par 1. The specimen is received in bouin's fixative and the specimen\par container is labeled: Right PIC.  It consists of two fragments of\par bone marrow core each measuring 0.5 x 0.1 cm with a 1.7 x 1.5 x\par 0.2 cm aggregate of blood clot.  Entirely submitted.  Two\par cassettes: A = bone marrow core; B = blood clot.\par \par 2. Two Armstrong-Giemsa and one iron stained bone marrow aspirate\par smears are submitted [[id number]].\par \par In addition to other data that may appear on the specimen\par container, the label has been inspected to confirm the presence\par of the patient's name and date of birth.\par \par Ralph Gutierrez 09/09/2020 15:03\par \par  \par \par  Ordered by: GE GOMES       Collected/Examined: 09Sep2020 02:37PM       \par Verified by: GE GOMES 14Sep2020 04:44PM       \par  Result Communication: No patient communication needed at this time;\par Stage: Final       \par  Performed at: [facility]       Resulted: 11Sep2020 03:02PM       Last Updated: 14Sep2020 04:44PM       Accession: [id number]

## 2020-10-13 NOTE — HISTORY OF PRESENT ILLNESS
[Disease:__________________________] : Disease: [unfilled] [de-identified] : Aug 11, 2020\par 76 yo female with a hx of poorly controlled diabetes II, ESRD on dialysis, on BARBIE support, chronic anemia, HTN, CAD, sent for hematology referral by GI. Pt is accompanied to the office today with her dtr. She reports her anemia was stable for years however recently she was not responding well to Procrit at dialysis. She was initially referred by nephrology to GI for possible iron deficiency anemia. She has required repeated prbc transfusions in the recent past last given 2 units in March 2020. She recalls EGD and colonoscopy was done in the past 2 years which were relatively benign findings. GI labs showed adequate iron stores with Iron sat of 22%. Pt denies any obvious signs of bleeding. She denies any personal or famhx of blood cancers.  Today she feels in usual state of health. [de-identified] : Pt is here for scheduled appt. In the interim she had a BMBx and labs done which were negative for heme-malignancy. She was also noted to have resolution of anemia, and per dtr pt receives procrit with dialysis. Dtr is also requesting a copy of results to be sent to Dr Michael Prado pt's nephrologist. Pt denies any acute complaints today. She is in stable health.

## 2020-10-22 PROBLEM — Z00.00 MEDICARE ANNUAL WELLNESS VISIT, SUBSEQUENT: Status: ACTIVE | Noted: 2018-05-04

## 2020-10-27 ENCOUNTER — APPOINTMENT (OUTPATIENT)
Dept: CARDIOLOGY | Facility: CLINIC | Age: 75
End: 2020-10-27
Payer: MEDICARE

## 2020-10-27 ENCOUNTER — NON-APPOINTMENT (OUTPATIENT)
Age: 75
End: 2020-10-27

## 2020-10-27 VITALS
HEIGHT: 66 IN | TEMPERATURE: 97.2 F | BODY MASS INDEX: 28.28 KG/M2 | DIASTOLIC BLOOD PRESSURE: 64 MMHG | WEIGHT: 176 LBS | RESPIRATION RATE: 15 BRPM | HEART RATE: 67 BPM | OXYGEN SATURATION: 97 % | SYSTOLIC BLOOD PRESSURE: 162 MMHG

## 2020-10-27 VITALS — SYSTOLIC BLOOD PRESSURE: 143 MMHG | DIASTOLIC BLOOD PRESSURE: 69 MMHG

## 2020-10-27 PROCEDURE — 99214 OFFICE O/P EST MOD 30 MIN: CPT

## 2020-10-27 PROCEDURE — 93306 TTE W/DOPPLER COMPLETE: CPT

## 2020-10-27 PROCEDURE — 93000 ELECTROCARDIOGRAM COMPLETE: CPT

## 2020-10-27 NOTE — PHYSICAL EXAM
[General Appearance - Well Developed] : well developed [General Appearance - Well Nourished] : well nourished [General Appearance - In No Acute Distress] : no acute distress [Normal Conjunctiva] : the conjunctiva exhibited no abnormalities [Eyelids - No Xanthelasma] : the eyelids demonstrated no xanthelasmas [] : no respiratory distress [Exaggerated Use Of Accessory Muscles For Inspiration] : no accessory muscle use [Auscultation Breath Sounds / Voice Sounds] : lungs were clear to auscultation bilaterally [Heart Rate And Rhythm] : heart rate and rhythm were normal [Heart Sounds] : normal S1 and S2 [Edema] : no peripheral edema present [Nail Clubbing] : no clubbing of the fingernails [Cyanosis, Localized] : no localized cyanosis [Skin Color & Pigmentation] : normal skin color and pigmentation [Skin Turgor] : normal skin turgor [Affect] : the affect was normal [Mood] : the mood was normal [FreeTextEntry1] : 3/6 FREDDIE  best RUSB

## 2020-10-27 NOTE — REASON FOR VISIT
[Aortic Stenosis] : aortic stenosis [Hypertension] : hypertension [Medication Management] : Medication management [Family Member] : family member

## 2020-10-27 NOTE — HISTORY OF PRESENT ILLNESS
[FreeTextEntry1] : 75 year old with ESRD on HD seen with daughter for f/u.\par \par Patient denies chest pain dyspnea palpitations. There is occasional mild edema.\par \par Is less active physically, often uses walker.\par \par  She reports no significant interval health problems.\par \par \par

## 2020-10-27 NOTE — DISCUSSION/SUMMARY
[Patient] : the patient [Risks] : risks [Benefits] : benefits [Alternatives] : alternatives [___ Month(s)] : [unfilled] month(s) [FreeTextEntry1] : Review of echo findings from today with patient. Advised regarding blood pressure control. She has dialysis treatment schedule today. Followup PMD and nephrology. Cardiac follow up once or twice annually.

## 2020-10-27 NOTE — ASSESSMENT
[FreeTextEntry1] : 75-year-old woman with hypertension hypertensive heart disease  aortic valve disease diabetes end-stage renal disease on hemodialysis. Overweight. Elevated BP today.

## 2020-10-27 NOTE — REVIEW OF SYSTEMS
[see HPI] : see HPI [Dizziness] : dizziness [Negative] : Psychiatric [Recent Weight Gain (___ Lbs)] : no recent weight gain [Feeling Fatigued] : not feeling fatigued [Recent Weight Loss (___ Lbs)] : no recent weight loss [Dyspnea on exertion] : not dyspnea during exertion [Lower Ext Edema] : no extremity edema

## 2020-10-28 ENCOUNTER — NON-APPOINTMENT (OUTPATIENT)
Age: 75
End: 2020-10-28

## 2020-11-04 ENCOUNTER — NON-APPOINTMENT (OUTPATIENT)
Age: 75
End: 2020-11-04

## 2020-12-09 NOTE — H&P ADULT - REASON FOR ADMISSION
Patient's spouse called and states he is concerned with the cefpodoxime 100 mg   patient was prescribed from the ER doctor but hasn't taken yet because of her Penicillin allergy and the pharmacist was wanting to know how severe her allergy was which made him concerned with her taking this med.     Please advise.    weakness, anemia Health Care Proxy (HCP)

## 2020-12-15 NOTE — H&P PST ADULT - NS SC CAGE ALCOHOL ANNOYED YOU
CAD (coronary artery disease)  stents x 4; 2009 & 7/2019  COPD (chronic obstructive pulmonary disease)    DM2 (diabetes mellitus, type 2)    Erectile dysfunction    HTN (hypertension)    Myocardial infarction  2011   no

## 2021-01-04 ENCOUNTER — RX RENEWAL (OUTPATIENT)
Age: 76
End: 2021-01-04

## 2021-02-18 ENCOUNTER — APPOINTMENT (OUTPATIENT)
Dept: HEMATOLOGY ONCOLOGY | Facility: CLINIC | Age: 76
End: 2021-02-18

## 2021-03-22 ENCOUNTER — RX RENEWAL (OUTPATIENT)
Age: 76
End: 2021-03-22

## 2021-03-29 ENCOUNTER — RX RENEWAL (OUTPATIENT)
Age: 76
End: 2021-03-29

## 2021-04-26 ENCOUNTER — RX RENEWAL (OUTPATIENT)
Age: 76
End: 2021-04-26

## 2021-05-13 ENCOUNTER — NON-APPOINTMENT (OUTPATIENT)
Age: 76
End: 2021-05-13

## 2021-05-13 ENCOUNTER — APPOINTMENT (OUTPATIENT)
Dept: INTERNAL MEDICINE | Facility: CLINIC | Age: 76
End: 2021-05-13
Payer: MEDICAID

## 2021-05-13 VITALS
TEMPERATURE: 97.7 F | HEIGHT: 66 IN | RESPIRATION RATE: 16 BRPM | HEART RATE: 74 BPM | SYSTOLIC BLOOD PRESSURE: 157 MMHG | DIASTOLIC BLOOD PRESSURE: 69 MMHG | WEIGHT: 180 LBS | BODY MASS INDEX: 28.93 KG/M2 | OXYGEN SATURATION: 95 %

## 2021-05-13 DIAGNOSIS — Z92.29 PERSONAL HISTORY OF OTHER DRUG THERAPY: ICD-10-CM

## 2021-05-13 LAB
BASOPHILS # BLD AUTO: 0.05 K/UL
BASOPHILS NFR BLD AUTO: 0.6 %
EOSINOPHIL # BLD AUTO: 0.16 K/UL
EOSINOPHIL NFR BLD AUTO: 2 %
HCT VFR BLD CALC: 31.8 %
HGB BLD-MCNC: 9.3 G/DL
IMM GRANULOCYTES NFR BLD AUTO: 1.1 %
LYMPHOCYTES # BLD AUTO: 0.95 K/UL
LYMPHOCYTES NFR BLD AUTO: 11.7 %
MAN DIFF?: NORMAL
MCHC RBC-ENTMCNC: 26.7 PG
MCHC RBC-ENTMCNC: 29.2 GM/DL
MCV RBC AUTO: 91.4 FL
MONOCYTES # BLD AUTO: 0.61 K/UL
MONOCYTES NFR BLD AUTO: 7.5 %
NEUTROPHILS # BLD AUTO: 6.24 K/UL
NEUTROPHILS NFR BLD AUTO: 77.1 %
PLATELET # BLD AUTO: 368 K/UL
RBC # BLD: 3.48 M/UL
RBC # FLD: 19.9 %
WBC # FLD AUTO: 8.1 K/UL

## 2021-05-13 PROCEDURE — 99214 OFFICE O/P EST MOD 30 MIN: CPT

## 2021-05-13 PROCEDURE — 99072 ADDL SUPL MATRL&STAF TM PHE: CPT

## 2021-05-13 NOTE — ASSESSMENT
[FreeTextEntry1] : 1. CHF chronic\par * patient clinically stable; to follow up with cardiologist\par 2. ESRD on HD\par * patient scheduled for dialysis today\par 3. type 2 diabetes with nephropathy\par * continue Levemir insulin and Novolog\par * check A1c\par * dietary counselling\par 4. hypertension\par * medications refilled\par 5. COVID- 19 vaccine completed\par * check serum antibodies\par * will follow up in three weeks

## 2021-05-13 NOTE — HISTORY OF PRESENT ILLNESS
[FreeTextEntry1] : follow up hypertension, ESRD on HD, type 2 diabetes [de-identified] : 76 years old female here for follow up, last seen June last year, states doing well, on HD three times a week, offers no complains , she received covid vaccine , completed last month; she is requesting refill of all medications,

## 2021-05-14 LAB
25(OH)D3 SERPL-MCNC: 21.5 NG/ML
ALBUMIN SERPL ELPH-MCNC: 4 G/DL
ALP BLD-CCNC: 84 U/L
ALT SERPL-CCNC: 9 U/L
ANION GAP SERPL CALC-SCNC: 16 MMOL/L
AST SERPL-CCNC: 13 U/L
BILIRUB SERPL-MCNC: 0.2 MG/DL
BUN SERPL-MCNC: 59 MG/DL
CALCIUM SERPL-MCNC: 9 MG/DL
CHLORIDE SERPL-SCNC: 100 MMOL/L
CHOLEST SERPL-MCNC: 137 MG/DL
CO2 SERPL-SCNC: 25 MMOL/L
COVID-19 SPIKE DOMAIN ANTIBODY INTERPRETATION: POSITIVE
CREAT SERPL-MCNC: 6.77 MG/DL
ESTIMATED AVERAGE GLUCOSE: 229 MG/DL
GLUCOSE SERPL-MCNC: 133 MG/DL
HBA1C MFR BLD HPLC: 9.6 %
HDLC SERPL-MCNC: 63 MG/DL
LDLC SERPL CALC-MCNC: 61 MG/DL
NONHDLC SERPL-MCNC: 74 MG/DL
POTASSIUM SERPL-SCNC: 5 MMOL/L
PROT SERPL-MCNC: 6.6 G/DL
SARS-COV-2 AB SERPL IA-ACNC: >250 U/ML
SODIUM SERPL-SCNC: 141 MMOL/L
T4 FREE SERPL-MCNC: 1.3 NG/DL
TRIGL SERPL-MCNC: 68 MG/DL
TSH SERPL-ACNC: 3.15 UIU/ML
VIT B12 SERPL-MCNC: 1182 PG/ML

## 2021-06-16 ENCOUNTER — RX RENEWAL (OUTPATIENT)
Age: 76
End: 2021-06-16

## 2021-06-22 ENCOUNTER — RX RENEWAL (OUTPATIENT)
Age: 76
End: 2021-06-22

## 2021-07-12 ENCOUNTER — RX RENEWAL (OUTPATIENT)
Age: 76
End: 2021-07-12

## 2021-08-01 ENCOUNTER — RX RENEWAL (OUTPATIENT)
Age: 76
End: 2021-08-01

## 2021-08-02 ENCOUNTER — RX RENEWAL (OUTPATIENT)
Age: 76
End: 2021-08-02

## 2021-08-31 ENCOUNTER — RX RENEWAL (OUTPATIENT)
Age: 76
End: 2021-08-31

## 2021-09-27 ENCOUNTER — RX RENEWAL (OUTPATIENT)
Age: 76
End: 2021-09-27

## 2021-09-30 ENCOUNTER — RX RENEWAL (OUTPATIENT)
Age: 76
End: 2021-09-30

## 2021-10-06 ENCOUNTER — RX RENEWAL (OUTPATIENT)
Age: 76
End: 2021-10-06

## 2021-10-26 ENCOUNTER — RX RENEWAL (OUTPATIENT)
Age: 76
End: 2021-10-26

## 2021-10-26 RX ORDER — DOXAZOSIN 2 MG/1
2 TABLET ORAL
Qty: 90 | Refills: 0 | Status: ACTIVE | COMMUNITY
Start: 2018-12-11 | End: 1900-01-01

## 2021-11-02 ENCOUNTER — APPOINTMENT (OUTPATIENT)
Dept: CARDIOLOGY | Facility: CLINIC | Age: 76
End: 2021-11-02
Payer: MEDICARE

## 2021-11-02 ENCOUNTER — NON-APPOINTMENT (OUTPATIENT)
Age: 76
End: 2021-11-02

## 2021-11-02 VITALS
RESPIRATION RATE: 16 BRPM | BODY MASS INDEX: 28.45 KG/M2 | OXYGEN SATURATION: 95 % | HEIGHT: 66 IN | WEIGHT: 177 LBS | DIASTOLIC BLOOD PRESSURE: 65 MMHG | SYSTOLIC BLOOD PRESSURE: 147 MMHG | TEMPERATURE: 97.3 F | HEART RATE: 62 BPM

## 2021-11-02 VITALS — DIASTOLIC BLOOD PRESSURE: 60 MMHG | SYSTOLIC BLOOD PRESSURE: 108 MMHG

## 2021-11-02 DIAGNOSIS — I35.9 NONRHEUMATIC AORTIC VALVE DISORDER, UNSPECIFIED: ICD-10-CM

## 2021-11-02 DIAGNOSIS — Z87.891 PERSONAL HISTORY OF NICOTINE DEPENDENCE: ICD-10-CM

## 2021-11-02 DIAGNOSIS — R06.00 DYSPNEA, UNSPECIFIED: ICD-10-CM

## 2021-11-02 DIAGNOSIS — I12.9 HYPERTENSIVE CHRONIC KIDNEY DISEASE WITH STAGE 1 THROUGH STAGE 4 CHRONIC KIDNEY DISEASE, OR UNSPECIFIED CHRONIC KIDNEY DISEASE: ICD-10-CM

## 2021-11-02 DIAGNOSIS — Z86.79 PERSONAL HISTORY OF OTHER DISEASES OF THE CIRCULATORY SYSTEM: ICD-10-CM

## 2021-11-02 DIAGNOSIS — N18.4 CHRONIC KIDNEY DISEASE, STAGE 4 (SEVERE): ICD-10-CM

## 2021-11-02 PROCEDURE — 99214 OFFICE O/P EST MOD 30 MIN: CPT

## 2021-11-02 PROCEDURE — 93000 ELECTROCARDIOGRAM COMPLETE: CPT

## 2021-11-02 RX ORDER — FERROUS SULFATE TAB EC 324 MG (65 MG FE EQUIVALENT) 324 (65 FE) MG
324 (65 FE) TABLET DELAYED RESPONSE ORAL EVERY OTHER DAY
Qty: 45 | Refills: 2 | Status: DISCONTINUED | COMMUNITY
Start: 2020-02-24 | End: 2021-11-02

## 2021-11-02 NOTE — DISCUSSION/SUMMARY
[Patient] : the patient [Risks] : risks [Benefits] : benefits [Alternatives] : alternatives [FreeTextEntry1] : Discussed with patient and daughter.  Recommend ischemia work-up with pharmacologic nuclear stress also echocardiography due to her dyspnea.  Consider further evaluation pending above.  Call me for results.

## 2021-11-02 NOTE — PHYSICAL EXAM
[Obese] : obese [Normal S1, S2] : normal S1, S2 [No Rub] : no rub [Normal] : soft, non-tender, no masses/organomegaly, normal bowel sounds [No Edema] : no edema [No Cyanosis] : no cyanosis [de-identified] : Minimal systolic murmur

## 2021-11-02 NOTE — ASSESSMENT
[FreeTextEntry1] : End-stage renal disease on hemodialysis.  Obesity.  Diabetes.  Left ventricular hypertrophy/hypertensive heart disease.  Dyspnea on exertion.

## 2021-11-02 NOTE — REASON FOR VISIT
[Symptom and Test Evaluation] : symptom and test evaluation [Hypertension] : hypertension [Other: ____] : [unfilled] [Other: _____] : [unfilled] [FreeTextEntry3] : Dr. Pablo

## 2021-11-02 NOTE — HISTORY OF PRESENT ILLNESS
[FreeTextEntry1] : Patient has history of hypertension end-stage renal disease on hemodialysis diabetes obesity history of heart failure.\par \par She feels so-so.  Both she and daughter report increased dyspnea on exertion after walking even a few steps.  She has no chest pain lightheadedness dizziness or edema.  Med list reviewed.\par \par She has dialysis treatments three times a week and [facility]

## 2021-11-02 NOTE — CARDIOLOGY SUMMARY
[de-identified] : November 2, 2021 sinus rhythm LVH with ST-T change [de-identified] : 2020 mild aortic stenosis moderate LVH normal LV systolic function moderate diastolic dysfunction

## 2021-11-02 NOTE — REVIEW OF SYSTEMS
[Dyspnea on exertion] : dyspnea during exertion [Chest Discomfort] : no chest discomfort [Lower Ext Edema] : no extremity edema [Palpitations] : no palpitations

## 2021-11-03 NOTE — H&P PST ADULT - SPIRITUAL FAITH, PROFILE
Care Management Initial Consult    General Information  Assessment completed with: Patient, Care Team Member   Type of CM/SW Visit: Initial Assessment    Primary Care Provider verified and updated as needed: Does not have a PCP  Readmission within the last 30 days: No previous admission in last 30 days      Reason for Consult: Discharge Planning  Advance Care Planning: Reviewed: No concerns identified       Communication Assessment  Patient's communication style: Spoken language (English or Bilingual)    Hearing Difficulty or Deaf: no   Wear Glasses or Blind: yes    Cognitive  Cognitive/Neuro/Behavioral: WDL                      Living Environment:   People in home: Sibling(s), niece    Current living Arrangements: House      Able to return to prior arrangements: Yes     Family/Social Support:  Care provided by: Self  Provides care for: No one             Description of Support System: Supportive, Involved       Current Resources:   Patient receiving home care services: No  Community Resources: None  Equipment currently used at home: Cane, straight  Supplies currently used at home: None    Employment/Financial:  Employment Status: Unemployed        Financial Concerns: No concerns identified     Lifestyle & Psychosocial Needs:  Social Determinants of Health     Tobacco Use: Low Risk      Smoking Tobacco Use: Never Smoker     Smokeless Tobacco Use: Never Used   Alcohol Use:      Frequency of Alcohol Consumption:      Average Number of Drinks:      Frequency of Binge Drinking:    Financial Resource Strain:      Difficulty of Paying Living Expenses:    Food Insecurity:      Worried About Running Out of Food in the Last Year:      Ran Out of Food in the Last Year:    Transportation Needs:      Lack of Transportation (Medical):      Lack of Transportation (Non-Medical):    Physical Activity:      Days of Exercise per Week:      Minutes of Exercise per Session:    Stress:      Feeling of Stress :    Social Connections:       Frequency of Communication with Friends and Family:      Frequency of Social Gatherings with Friends and Family:      Attends Adventist Services:      Active Member of Clubs or Organizations:      Attends Club or Organization Meetings:      Marital Status:    Intimate Partner Violence:      Fear of Current or Ex-Partner:      Emotionally Abused:      Physically Abused:      Sexually Abused:    Depression: Not at risk     PHQ-2 Score: 2   Housing Stability:      Unable to Pay for Housing in the Last Year:      Number of Places Lived in the Last Year:      Unstable Housing in the Last Year:        Functional Status:  Prior to admission patient needed assistance:   Dependent ADLs: Ambulation-cane  Dependent IADLs: Independent  Assesssment of Functional Status: At functional baseline    Mental Health Status:  Mental Health Status: No Current Concerns       Chemical Dependency Status:  Chemical Dependency Status: No Current Concerns           Values/Beliefs:  Spiritual, Cultural Beliefs, Adventist Practices, Values that affect care: No               Additional Information:    Patient is a 46 year old male with 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    Writer asked by MEHRAN to schedule a Neulasta injection on 11/9 or 11/10 and twice weekly labs (CBC w/diff and CMP) through 11/26 at [facility].     Writer called (Phn: [phone]) and spoke with Lance at [facility]. They do not have any availability next week for a Neulasta injection, but would be able to schedule labs with possible transfusions.     The following was scheduled:  11/9: 1030am  11/12: 0830am  11/16: 1030am  11/19: 11am  11/23: 1030am  11/26: 11am    AVS updated. MEHRAN updated. MEHRAN will request a Neulasta injection at the [facility].     Patient is independent at baseline and does not receive any in-home supports or  services. Patient has recently bee residing at his sister's home and she is able to assist him with care needs, as needed. Patient's sister will likely transport him upon discharge.     [facility] will follow.     Roselia Jones RN, BSN, PHN  Care Coordinator   P: [phone], [facility]                  Jainism

## 2021-11-09 ENCOUNTER — RX RENEWAL (OUTPATIENT)
Age: 76
End: 2021-11-09

## 2021-11-09 RX ORDER — VALSARTAN 320 MG/1
320 TABLET, COATED ORAL
Qty: 90 | Refills: 0 | Status: ACTIVE | COMMUNITY
Start: 2018-01-08 | End: 1900-01-01

## 2021-11-16 ENCOUNTER — RX RENEWAL (OUTPATIENT)
Age: 76
End: 2021-11-16

## 2021-11-16 RX ORDER — HYDRALAZINE HYDROCHLORIDE 100 MG/1
100 TABLET ORAL
Qty: 90 | Refills: 0 | Status: ACTIVE | COMMUNITY
Start: 2018-07-11 | End: 1900-01-01

## 2021-11-26 ENCOUNTER — RX RENEWAL (OUTPATIENT)
Age: 76
End: 2021-11-26

## 2021-11-26 ENCOUNTER — APPOINTMENT (OUTPATIENT)
Dept: INTERNAL MEDICINE | Facility: CLINIC | Age: 76
End: 2021-11-26
Payer: MEDICARE

## 2021-11-26 ENCOUNTER — LABORATORY RESULT (OUTPATIENT)
Age: 76
End: 2021-11-26

## 2021-11-26 VITALS
OXYGEN SATURATION: 90 % | DIASTOLIC BLOOD PRESSURE: 69 MMHG | TEMPERATURE: 98.3 F | HEART RATE: 88 BPM | RESPIRATION RATE: 16 BRPM | HEIGHT: 66 IN | SYSTOLIC BLOOD PRESSURE: 177 MMHG | BODY MASS INDEX: 27.48 KG/M2 | WEIGHT: 171 LBS

## 2021-11-26 VITALS — SYSTOLIC BLOOD PRESSURE: 160 MMHG | DIASTOLIC BLOOD PRESSURE: 75 MMHG

## 2021-11-26 DIAGNOSIS — I50.9 HEART FAILURE, UNSPECIFIED: ICD-10-CM

## 2021-11-26 DIAGNOSIS — Z99.2 END STAGE RENAL DISEASE: ICD-10-CM

## 2021-11-26 DIAGNOSIS — N18.6 END STAGE RENAL DISEASE: ICD-10-CM

## 2021-11-26 DIAGNOSIS — Z00.00 ENCOUNTER FOR GENERAL ADULT MEDICAL EXAMINATION W/OUT ABNORMAL FINDINGS: ICD-10-CM

## 2021-11-26 DIAGNOSIS — I10 ESSENTIAL (PRIMARY) HYPERTENSION: ICD-10-CM

## 2021-11-26 DIAGNOSIS — E11.21 TYPE 2 DIABETES MELLITUS WITH DIABETIC NEPHROPATHY: ICD-10-CM

## 2021-11-26 DIAGNOSIS — Z12.11 ENCOUNTER FOR SCREENING FOR MALIGNANT NEOPLASM OF COLON: ICD-10-CM

## 2021-11-26 DIAGNOSIS — Z12.31 ENCOUNTER FOR SCREENING MAMMOGRAM FOR MALIGNANT NEOPLASM OF BREAST: ICD-10-CM

## 2021-11-26 PROCEDURE — G0444 DEPRESSION SCREEN ANNUAL: CPT

## 2021-11-26 PROCEDURE — G0439: CPT

## 2021-11-26 RX ORDER — CARVEDILOL 6.25 MG/1
6.25 TABLET, FILM COATED ORAL
Qty: 180 | Refills: 0 | Status: ACTIVE | COMMUNITY
Start: 2018-03-16 | End: 1900-01-01

## 2021-11-26 RX ORDER — CLONIDINE HYDROCHLORIDE 0.3 MG/1
0.3 TABLET ORAL
Qty: 180 | Refills: 3 | Status: ACTIVE | COMMUNITY
Start: 1900-01-01 | End: 1900-01-01

## 2021-11-26 RX ORDER — ATORVASTATIN CALCIUM 20 MG/1
20 TABLET, FILM COATED ORAL
Qty: 90 | Refills: 3 | Status: ACTIVE | COMMUNITY
Start: 2018-08-14 | End: 1900-01-01

## 2021-11-26 RX ORDER — INSULIN DETEMIR 100 [IU]/ML
100 INJECTION, SOLUTION SUBCUTANEOUS
Qty: 5 | Refills: 1 | Status: ACTIVE | COMMUNITY
Start: 2021-09-27 | End: 1900-01-01

## 2021-11-26 RX ORDER — AMLODIPINE BESYLATE 10 MG/1
10 TABLET ORAL DAILY
Qty: 90 | Refills: 2 | Status: ACTIVE | COMMUNITY
Start: 1900-01-01 | End: 1900-01-01

## 2021-11-26 RX ORDER — INSULIN ASPART 100 [IU]/ML
100 INJECTION, SOLUTION INTRAVENOUS; SUBCUTANEOUS
Qty: 5 | Refills: 3 | Status: ACTIVE | COMMUNITY
Start: 2018-08-01 | End: 1900-01-01

## 2021-11-26 NOTE — HEALTH RISK ASSESSMENT
[Fair] :  ~his/her~ mood as fair [No] : No [No falls in past year] : Patient reported no falls in the past year [0] : 2) Feeling down, depressed, or hopeless: Not at all (0) [Patient reported mammogram was normal] : Patient reported mammogram was normal [Patient reported colonoscopy was abnormal] : Patient reported colonoscopy was abnormal [HIV test declined] : HIV test declined [Hepatitis C test declined] : Hepatitis C test declined [None] : None [With Family] : lives with family [Retired] : retired [High School] : high school [] :  [# Of Children ___] : has [unfilled] children [Feels Safe at Home] : Feels safe at home [Reports changes in vision] : Reports changes in vision [Smoke Detector] : smoke detector [Carbon Monoxide Detector] : carbon monoxide detector [Seat Belt] :  uses seat belt [] : No [[id number]] : 0 [Change in mental status noted] : No change in mental status noted [Sexually Active] : not sexually active [Reports changes in hearing] : Reports no changes in hearing [Reports changes in dental health] : Reports no changes in dental health [Guns at Home] : no guns at home [MammogramDate] : 2015 [ColonoscopyDate] : 07/2018 [ColonoscopyComments] : polyp [de-identified] : requires help, has MANA [de-identified] : dinorah clements

## 2021-11-26 NOTE — HISTORY OF PRESENT ILLNESS
[de-identified] : 76 years old female with ESRD on HD, type 2 diabetes, HTN, CHF here for annual physical exam, states feeling well, no complaints

## 2021-11-26 NOTE — ASSESSMENT
[FreeTextEntry1] : 1. annual physical exam\par * routine general labs done\par * age appropriate health maintenance recommendations reviewed, discussed including healthy diet, regular exercise\par 2. CHF\par * evaluated by cardiologist DR VALENTINE, scheduled for echocardiogram and nuclear stress test\par 3. ESRD on HD\par * on dialysis three times a week\par 4. type 2 diabetes\par * refill of Levemir and Novolog insulin\par 5. breast cancer screening\par * mammogram referral\par 6. colon cancer screening\par * FIT test\par 7. hypertension\par * medication adherence compliance stressed\par * low sodium diet reinforced\par * will follow up one month

## 2021-11-29 LAB
25(OH)D3 SERPL-MCNC: 19.2 NG/ML
ALBUMIN SERPL ELPH-MCNC: 3.7 G/DL
ALP BLD-CCNC: 112 U/L
ALT SERPL-CCNC: 11 U/L
ANION GAP SERPL CALC-SCNC: 17 MMOL/L
AST SERPL-CCNC: 12 U/L
BASOPHILS # BLD AUTO: 0.05 K/UL
BASOPHILS NFR BLD AUTO: 0.6 %
BILIRUB SERPL-MCNC: 0.3 MG/DL
BUN SERPL-MCNC: 29 MG/DL
CALCIUM SERPL-MCNC: 9.3 MG/DL
CHLORIDE SERPL-SCNC: 98 MMOL/L
CHOLEST SERPL-MCNC: 156 MG/DL
CO2 SERPL-SCNC: 27 MMOL/L
CREAT SERPL-MCNC: 4.77 MG/DL
EOSINOPHIL # BLD AUTO: 0.15 K/UL
EOSINOPHIL NFR BLD AUTO: 1.8 %
ESTIMATED AVERAGE GLUCOSE: 258 MG/DL
GLUCOSE SERPL-MCNC: 93 MG/DL
HBA1C MFR BLD HPLC: 10.6 %
HCT VFR BLD CALC: 34.5 %
HDLC SERPL-MCNC: 55 MG/DL
HGB BLD-MCNC: 10.5 G/DL
IMM GRANULOCYTES NFR BLD AUTO: 1 %
LDLC SERPL CALC-MCNC: 85 MG/DL
LYMPHOCYTES # BLD AUTO: 0.69 K/UL
LYMPHOCYTES NFR BLD AUTO: 8.3 %
MAN DIFF?: NORMAL
MCHC RBC-ENTMCNC: 26.3 PG
MCHC RBC-ENTMCNC: 30.4 GM/DL
MCV RBC AUTO: 86.3 FL
MONOCYTES # BLD AUTO: 0.63 K/UL
MONOCYTES NFR BLD AUTO: 7.5 %
NEUTROPHILS # BLD AUTO: 6.75 K/UL
NEUTROPHILS NFR BLD AUTO: 80.8 %
NONHDLC SERPL-MCNC: 102 MG/DL
PLATELET # BLD AUTO: 345 K/UL
POTASSIUM SERPL-SCNC: 4.6 MMOL/L
PROT SERPL-MCNC: 6.9 G/DL
RBC # BLD: 4 M/UL
RBC # FLD: 19.9 %
SODIUM SERPL-SCNC: 142 MMOL/L
T4 FREE SERPL-MCNC: 1.5 NG/DL
TRIGL SERPL-MCNC: 85 MG/DL
TSH SERPL-ACNC: 2.74 UIU/ML
VIT B12 SERPL-MCNC: 1337 PG/ML
WBC # FLD AUTO: 8.35 K/UL

## 2021-12-27 ENCOUNTER — RX RENEWAL (OUTPATIENT)
Age: 76
End: 2021-12-27

## 2021-12-27 RX ORDER — FUROSEMIDE 80 MG/1
80 TABLET ORAL
Qty: 90 | Refills: 0 | Status: ACTIVE | COMMUNITY
Start: 2017-03-27 | End: 1900-01-01

## 2022-01-03 ENCOUNTER — APPOINTMENT (OUTPATIENT)
Dept: CARDIOLOGY | Facility: CLINIC | Age: 77
End: 2022-01-03

## 2022-01-22 ENCOUNTER — APPOINTMENT (OUTPATIENT)
Dept: MAMMOGRAPHY | Facility: CLINIC | Age: 77
End: 2022-01-22

## 2022-01-24 ENCOUNTER — RX RENEWAL (OUTPATIENT)
Age: 77
End: 2022-01-24

## 2022-01-27 ENCOUNTER — APPOINTMENT (OUTPATIENT)
Dept: INTERNAL MEDICINE | Facility: CLINIC | Age: 77
End: 2022-01-27

## 2022-02-23 NOTE — CONSULT NOTE ADULT - CONSULT REQUESTED DATE/TIME
13-Apr-2018 15:17 [FreeTextEntry1] : - Continue Doxepin 10 mg at bedtime\par - Continue RemFresh 2 mg at bedtime\par - Continue Risperidone 0.5 mg at bedtime - needs prior authorization. \par - Continue Vyvanse 10mg daily\par - Follow up in 3 months \par

## 2022-06-20 NOTE — ED PROVIDER NOTE - CPE EDP ENMT NORM
LM#1    Pathologic Diagnosis   Skin, right medial upper chest, shave removal:   -Basal cell carcinoma with superficial to nodular patterns, the margins in the planes of section examined are free of tumor.     ----- Message from Phyllis LOERA MD sent at 6/20/2022 11:33 AM CDT -----  To notify       normal...

## 2022-06-29 NOTE — ED ADULT TRIAGE NOTE - PAIN: PRESENCE, MLM
Addended by: KEILY MONTOYA on: 6/29/2022 02:06 PM     Modules accepted: Orders     denies pain/discomfort

## 2022-08-11 NOTE — PHYSICAL THERAPY INITIAL EVALUATION ADULT - PHYSICAL ASSIST/NONPHYSICAL ASSIST: STAIR NEGOTIATION, REHAB EVAL
Patient: Cyril Quispe is a 49 y.o. female who presents today with the following Chief Complaint(s):  Chief Complaint   Patient presents with    Diabetes    Hypertension         HPI  Here for follow up on DM and HTN  DM: taking metformin 500mg BID A1C has been controlled- has been working out with a  for the last few weeks. HTN: elevated on lisinopril 5mg- will increase to 10mg daily   Binge eating: stable on vyvanse   GERD: not controlled on Prilosec 40mg- will change to protonix   Current Outpatient Medications   Medication Sig Dispense Refill    lisinopril (PRINIVIL;ZESTRIL) 10 MG tablet TAKE ONE TABLET BY MOUTH DAILY 90 tablet 1    [START ON 8/20/2022] lisdexamfetamine (VYVANSE) 70 MG capsule Take 1 capsule by mouth every morning for 30 days. 30 capsule 0    metFORMIN (GLUCOPHAGE) 500 MG tablet TAKE 1 TABLET EVERY NIGHT 90 tablet 1    Blood Pressure KIT Check blood pressure daily 1 kit 0    pantoprazole (PROTONIX) 20 MG tablet Take 1 tablet by mouth every morning (before breakfast) 90 tablet 2    diclofenac (VOLTAREN) 75 MG EC tablet TAKE 1 TABLET TWICE DAILY (APPOINTMENT IS NEEDED FOR REFILLS) 180 tablet 0    DULoxetine (CYMBALTA) 60 MG extended release capsule TAKE 1 CAPSULE TWICE DAILY 180 capsule 1    Naproxen Sodium 220 MG CAPS Take by mouth       No current facility-administered medications for this visit. Patient's past medical history, surgical history, family history, medications,  andallergies  were all reviewed and updated as appropriate today. Review of Systems   Constitutional:  Negative for chills and fever. Respiratory:  Negative for shortness of breath and wheezing. Cardiovascular:  Negative for chest pain and palpitations. Physical Exam  Vitals and nursing note reviewed. Constitutional:       Appearance: Normal appearance. She is well-developed. HENT:      Head: Normocephalic and atraumatic.       Right Ear: Tympanic membrane and external ear normal.      Left Ear: Tympanic membrane and external ear normal.      Nose: Nose normal.      Mouth/Throat:      Pharynx: No oropharyngeal exudate or posterior oropharyngeal erythema. Eyes:      Conjunctiva/sclera: Conjunctivae normal.   Cardiovascular:      Rate and Rhythm: Normal rate and regular rhythm. Heart sounds: Normal heart sounds. No murmur heard. Pulmonary:      Effort: Pulmonary effort is normal. No respiratory distress. Breath sounds: Normal breath sounds. No wheezing or rales. Abdominal:      General: Bowel sounds are normal. There is no distension. Palpations: Abdomen is soft. Tenderness: There is no abdominal tenderness. There is no rebound. Musculoskeletal:         General: Normal range of motion. Cervical back: Normal range of motion and neck supple. Lymphadenopathy:      Cervical: No cervical adenopathy. Skin:     General: Skin is warm and dry. Neurological:      General: No focal deficit present. Mental Status: She is alert and oriented to person, place, and time. Deep Tendon Reflexes: Reflexes are normal and symmetric. Psychiatric:         Mood and Affect: Mood normal.         Behavior: Behavior normal.         Thought Content: Thought content normal.         Judgment: Judgment normal.     Vitals:    08/11/22 0852   BP: (!) 150/80   Pulse:    SpO2:        Assessment:  Encounter Diagnoses   Name Primary? Type 2 diabetes mellitus without complication, without long-term current use of insulin ([facility]) Yes    Binge eating disorder     Body mass index (BMI) 50.0-59.9, adult ([facility])     Primary hypertension     Gastroesophageal reflux disease without esophagitis        Plan:  1. Type 2 diabetes mellitus without complication, without long-term current use of insulin ([facility])  Changed metformin to 500 mg   - POCT glycosylated hemoglobin (Hb A1C) 5.3  - metFORMIN (GLUCOPHAGE) 500 MG tablet; TAKE 1 TABLET EVERY NIGHT  Dispense: 90 tablet; Refill: 1    2.  Binge eating verbal cues/1 person assist

## 2023-02-01 NOTE — PATIENT PROFILE ADULT. - TEACHING/LEARNING FACTORS IMPACT ABILITY TO LEARN
[facility] CARDIOLOGY  [address], [address]  PHONE: [phone]      23    NAME:  Aury Momin  : 1938  MRN: [id number]         SUBJECTIVE:   Aury Momin is a 81 y.o. female seen for a follow up visit regarding the following:     Chief Complaint   Patient presents with    Hypertension              HPI:  Follow up  Hypertension   . Follow up bioprosthetic AVR with progressive stenosis, h/o orthostatic hypotension. Stage 3 COPD followed by pulmonary. Lost her daughter previously to aortic dissection and recently diagnosed atrial fib, see note of 22-opted for conservative approach d/t high risk from procedures. Afib    Her echo shows slow progression of the AV stenosis. She has chronic dyspnea with underlying advanced COPD as well. She has vertigo, no near syncope, went to balance training with some improvement but not much, worse if she turns quickly. Rare chest discomfort. Past cardiac history:   Bioprosthetic 2015 - EF 60-65%, abnormal DF, normal AVR, RVSP - normal SF, DF, normal AVR, RVSP - EF 60-65%, abn DF, AVR with possible stenosis, peak gradient 3.4 m/s, mean gradient 27, RVSP 38, mild to mod MR  2023        EF 60-65%, mild LVH, Abn DF, mean AV gradient 31, peak velocity 3.8 m/s (23 mm Guzman pericardial AVR), mild/mod MR, RVSP 26             Key CAD CHF Meds            apixaban (ELIQUIS) 5 MG TABS tablet (Taking)    Sig - Route: Take 1 tablet by mouth 2 times daily - Oral    carvedilol (COREG) 6.25 MG tablet (Taking)    Sig - Route: Take 1 tablet by mouth 2 times daily - Oral    losartan (COZAAR) 25 MG tablet (Taking)    Sig - Route: Take 1 tablet by mouth daily TAKE ONE TABLET BY MOUTH ONE TIME DAILY - Oral    pravastatin (PRAVACHOL) 40 MG tablet (Taking)    Sig - Route:  Take 1 tablet by mouth daily - Oral    furosemide (LASIX) 40 MG tablet (Taking)    Class: Historical Med Key Antihyperglycemic Medications       Patient is on no antihyperglycemic meds. Past Medical History, Past Surgical History, Family history, Social History, and Medications were all reviewed with the patient today and updated as necessary. Prior to Admission medications    Medication Sig Start Date End Date Taking?  Authorizing Provider   Fexofenadine HCl (ALLEGRA PO) Take by mouth   Yes Historical Provider, MD   apixaban (ELIQUIS) 5 MG TABS tablet Take 1 tablet by mouth 2 times daily 12/29/22  Yes Elvie King MD   carvedilol (COREG) 6.25 MG tablet Take 1 tablet by mouth 2 times daily 8/19/22  Yes Elvie King MD   losartan (COZAAR) 25 MG tablet Take 1 tablet by mouth daily TAKE ONE TABLET BY MOUTH ONE TIME DAILY 8/19/22  Yes Elvie King MD   pravastatin (PRAVACHOL) 40 MG tablet Take 1 tablet by mouth daily 8/19/22  Yes Elvie King MD   OXYGEN 4 lpm qhs   Yes Ar Automatic Reconciliation   famotidine (PEPCID) 20 MG tablet Take 20 mg by mouth 2 times daily 2/22/21  Yes Ar Automatic Reconciliation   fluticasone-vilanterol (BREO ELLIPTA) 100-25 MCG/INH AEPB inhaler Inhale 1 puff into the lungs daily 2/13/18  Yes Ar Automatic Reconciliation   fluticasone (FLONASE) 50 MCG/ACT nasal spray 2 sprays by Nasal route daily 6/11/20  Yes Ar Automatic Reconciliation   furosemide (LASIX) 40 MG tablet Take 40 mg by mouth daily as needed 1/2/16  Yes Ar Automatic Reconciliation   montelukast (SINGULAIR) 10 MG tablet Take 10 mg by mouth daily   Yes Ar Automatic Reconciliation   sertraline (ZOLOFT) 100 MG tablet Take 100 mg by mouth daily 8/6/20  Yes Ar Automatic Reconciliation   tiotropium (SPIRIVA RESPIMAT) 2.5 MCG/ACT AERS inhaler Inhale 2 puffs into the lungs daily 2/28/19  Yes Ar Automatic Reconciliation   albuterol sulfate  (90 Base) MCG/ACT inhaler Inhale 2 puffs into the lungs every 4 hours as needed  Patient not taking: Reported on 2/3/2023    Ar Automatic Reconciliation   albuterol (PROVENTIL) (2.5 MG/3ML) 0.083% nebulizer solution Inhale 2.5 mg into the lungs 4 times daily  Patient not taking: Reported on 2/3/2023 6/26/18   Ar Automatic Reconciliation   fexofenadine-pseudoephedrine (ALLEGRA-D 24HR) 180-240 MG per extended release tablet Take 1 tablet by mouth daily  Patient not taking: Reported on 2/3/2023 7/14/16   Ar Automatic Reconciliation     Allergies   Allergen Reactions    Sulfa Antibiotics Rash     Past Medical History:   Diagnosis Date    Adverse effect of anesthesia     \"O2 level dropped during MARIE\"    Aortic stenosis     s/p AVR 2015 with Dr. Jose Fleming    Atrial fibrillation ([facility])     after AVR 2015 for short duration - no evidence of recurrence per card note 16    Chronic sinusitis     COPD (chronic obstructive pulmonary disease) ([facility])     Per pulm note 11/15/16: \"Severe COPD now very well controlled on ICS/LABA, LAMA and CYNTHIA (no use in last month, no exacerbations since last seen)...\"    Depression     GERD (gastroesophageal reflux disease)     pepcid    Heart murmur     High cholesterol     Hypertension     Kidney stones     Osteoporosis     PVD (peripheral vascular disease) ([facility])     per card note 16    Thyroid disease     Tinnitus of both ears     Varicose vein of leg      Past Surgical History:   Procedure Laterality Date    AORTIC VALVE REPLACEMENT  May 2015    BREAST LUMPECTOMY  1970    benign     SECTION      x3    CHOLECYSTECTOMY  2016    THYROIDECTOMY  1970    partial, 2/2 goiter    UROLOGICAL SURGERY      kidney stone     Family History   Problem Relation Age of Onset    Heart Disease Mother     Heart Disease Father     Heart Disease Sister      Social History     Tobacco Use    Smoking status: Former     Packs/day: 1.00     Types: Cigarettes     Quit date: 1990     Years since quittin.5    Smokeless tobacco: Never   Substance Use Topics    Alcohol use: No       ROS:    Review of Systems  Cardiovascular:  Positive for chest pain. Respiratory:  Positive for shortness of breath. Neurological:  Positive for vertigo. PHYSICAL EXAM:   /82   Pulse 68   Ht 5' 3\" (1.6 m)   Wt 163 lb 6.4 oz (74.1 kg)   BMI 28.95 kg/m²      Wt Readings from Last 3 Encounters:   02/03/23 163 lb 6.4 oz (74.1 kg)   01/20/23 166 lb (75.3 kg)   12/29/22 166 lb 3.2 oz (75.4 kg)     BP Readings from Last 3 Encounters:   02/03/23 134/82   01/20/23 132/70   12/29/22 132/70     Pulse Readings from Last 3 Encounters:   02/03/23 68   12/29/22 94   06/27/22 76           Physical Exam  Constitutional:       Appearance: Normal appearance. HENT:      Head: Normocephalic and atraumatic. Eyes:      Conjunctiva/sclera: Conjunctivae normal.   Neck:      Vascular: No carotid bruit. Cardiovascular:      Rate and Rhythm: Normal rate and regular rhythm. Heart sounds: Murmur heard. Early systolic murmur is present with a grade of 2/6 at the upper right sternal border radiating to the neck. No friction rub. No gallop. Pulmonary:      Effort: No respiratory distress. Breath sounds: No wheezing or rales. Musculoskeletal:         General: No swelling. Cervical back: Neck supple. Skin:     General: Skin is warm and dry. Neurological:      General: No focal deficit present. Mental Status: She is alert. Psychiatric:         Mood and Affect: Mood normal.         Behavior: Behavior normal.       Medical problems and test results were reviewed with the patient today.      DATA REVIEW    LIPID PANEL     Lab Results   Component Value Date    CHOL 207 (H) 07/28/2020     Lab Results   Component Value Date    TRIG 59 07/28/2020     Lab Results   Component Value Date    HDL 92 07/28/2020     Lab Results   Component Value Date    LDLCALC 103 (H) 07/28/2020     Lab Results   Component Value Date    LABVLDL 12 07/28/2020     No results found for: [facility]    BMP  Lab Results   Component Value Date/Time  07/28/2020 02:11 PM    K 4.2 07/28/2020 02:11 PM     07/28/2020 02:11 PM    CO2 24 07/28/2020 02:11 PM    BUN 24 07/28/2020 02:11 PM    CREATININE 0.81 07/28/2020 02:11 PM    GLUCOSE 89 07/28/2020 02:11 PM    CALCIUM 9.1 07/28/2020 02:11 PM          EKG        CXR/IMAGING        DEVICE INTERROGATION        OUTSIDE RECORDS REVIEW    Records from outside providers have been reviewed and summarized as noted in the HPI, past history and data review sections of this note, and reviewed with patient. .       ASSESSMENT and PLAN    Chato Kaur was seen today for hypertension. Diagnoses and all orders for this visit:    Status post aortic valve replacement    Stenosis of prosthetic aortic valve, subsequent encounter  -     Transthoracic echocardiogram (TTE) complete with contrast, bubble, strain, and 3D PRN; Future    Essential hypertension    COPD, severe (HCC)    Persistent atrial fibrillation (HCC)        IMPRESSION:      Slowly progressive prosthetic valve stenosis. Repeat echo 6 months. TAVR evaluation when the time comes, reviewed with patient and her daughter. Rate controlled, anticoagulated, continue meds    BP at target, continue meds      Return in about 6 months (around 8/3/2023) for ECHO BEFORE VISIT. Thank you for allowing me to participate in this patient's care. Please call or contact me if there are any questions or concerns regarding the above.       Charles Valenzuela MD  02/03/23  3:48 PM none

## 2023-02-13 NOTE — ED ADULT NURSE NOTE - INTEGUMENTARY WDL
Color consistent with ethnicity/race, warm, dry intact, resilient. Itraconazole Pregnancy And Lactation Text: This medication is Pregnancy Category C and it isn't know if it is safe during pregnancy. It is also excreted in breast milk.

## 2023-03-02 NOTE — DISCHARGE NOTE ADULT - CARE PLAN
Final Anesthesia Post-op Assessment    Patient: Emilee MIGUEL Emery  Procedure(s) Performed: LABOR ANALGESIA  Anesthesia type: L&D Epidural    Vitals Value Taken Time   Temp 37.2 °C (99 °F) 03/02/23 1204   Pulse 96 03/02/23 1055   Resp 12 03/02/23 1245   SpO2 99 % 03/02/23 1225   /104 03/02/23 1055   Vitals shown include unvalidated device data.      Patient Location: Labor and Delivery  Post-op Vital Signs:stable  Level of Consciousness: participates in exam, awake, oriented, answers questions appropriately and alert  Respiratory Status: spontaneous ventilation and room air  Cardiovascular blood pressure returned to baseline  Hydration: euvolemic  Pain Management: well controlled  Nausea: None  Airway Patency:patent  Post-op Assessment: awake, alert, appropriately conversant, or baseline, no complications and patient tolerated procedure well with no complications      No notable events documented.   
Principal Discharge DX:	Symptomatic anemia  Goal:	resolved f/u with primary doctor  Assessment and plan of treatment:	continue with scheduled dialysis  f/u pcp  resume home medications  Secondary Diagnosis:	Chronic diastolic congestive heart failure  Secondary Diagnosis:	ESRD (end stage renal disease) on dialysis  Secondary Diagnosis:	Mixed hyperlipidemia  Secondary Diagnosis:	Type 2 diabetes mellitus with chronic kidney disease on chronic dialysis, with long-term current use of insulin

## 2023-04-13 NOTE — PATIENT PROFILE ADULT. - BRADEN SCORE (IF 18 OR LESS ACTIVATE SKIN INJURY RISK INCREASED GUIDELINE), MLM
Spironolactone Pregnancy And Lactation Text: This medication can cause feminization of the male fetus and should be avoided during pregnancy. The active metabolite is also found in breast milk. 20

## 2023-11-01 NOTE — PATIENT PROFILE ADULT - NSPROMUTANXFEARADDRESSFT_GEN_A_NUR
none Complex Repair And Single Advancement Flap Text: The defect edges were debeveled with a #15 scalpel blade.  The primary defect was closed partially with a complex linear closure.  Given the location of the remaining defect, shape of the defect and the proximity to free margins a single advancement flap was deemed most appropriate for complete closure of the defect.  Using a sterile surgical marker, an appropriate advancement flap was drawn incorporating the defect and placing the expected incisions within the relaxed skin tension lines where possible.    The area thus outlined was incised deep to adipose tissue with a #15 scalpel blade.  The skin margins were undermined to an appropriate distance in all directions utilizing iris scissors.

## 2024-01-12 NOTE — DISCHARGE NOTE PROVIDER - HOSPITAL COURSE
Patient is a 73 YO F with a PMH of ESRD on HD TTS, DM, HTN, HLD who presents to the ED for dyspnea.  Patient currently on BiPAP, hx limited.  Patient reportedly complained of dyspnea during her HD session yesterday.  Had full fluid removal but continued to complain of dyspnea after treatement.  Patient was sent to the ED.  Patient reports long hx of smoking, quit 10 years ago.  CT chest shows bilateral ground glass opacities.  Labs show elevated white count, elevated BNP.  Patient found to be influenza A positive.        PNA:    - + influenza A with possible superimposed PNA ( sepsis POA, secondary to flu PNA)    - Continue Tamiflu after HD for 5 days    - Continue Augmentin X 3 more days        ESRD on HD:    - HD today    - Renal diet        HTN:    - BP on lower side.    - Continue current meds with holding parameter        DM with nephropathy:    - Uncontrolled    - HbA 1c 10.4    - Continue basal/ bolus insulin with SS, switch to home med on dc        Acute on Chronic diastolic CHF:    - UF as tolerated        Hyponatremia:     - Secondary to ESRD:    - Stable            HLD:    - Continue statin             DVT ppx:    - S/Q heparin
Statement Selected

## 2024-04-19 NOTE — H&P ADULT - PROBLEM SELECTOR PLAN 4
Botulinum Toxin Injection Procedure    Chief Complaint   Patient presents with    Botulinum Toxin Injection     Patient supplied 200 units        History:  Response to treatment has reduced HA's at least 7 fewer days a month and/or 100 hours fewer each month.     Pre-operative diagnosis: headache    Post-operative diagnosis: Same    Procedure: Chemical neurolysis    After risks and benefits were explained including bleeding, infection, worsening of pain, damage to the areas being injected, weakness of muscles, loss of muscle control, dysphagia if injecting the head or neck, facial droop if injecting the facial area, painful injection, allergic or other reaction to the medications being injected, and the failure of the procedure to help the problem, a signed consent was obtained.      The patient was placed in a comfortable area and the sites to be treated were identified. A time out was called and performed. The area to be treated was prepped three times with alcohol and the alcohol allowed to dry. Next, a 27 gauge needle was used to inject the medication in the area to be treated.    Area(s) injected: frontalis muscle, semispinalis capitis muscle and temporallis muscle    Medications used: botulinum toxin 200 mu, 2 mL of saline used to dilute the botulinum toxin.      The patient did tolerate the procedure well. There were no complications.       Physical Examination    Current Treatment (Units)   Splenius Capitis 25 units on the right and 25 units on the left.   Temporalis 25 units on the right and 25 units on the left.   Frontalis 27 units on the right and 28 units on the left.   EMG Guidance none .   Complications: none .   The total amount injected in units is 155 .   The total amount wasted in units is 45 .   The total amount submitted in units is 200 .      
lipitor

## 2025-01-15 NOTE — PATIENT PROFILE ADULT. - ARE SIGNIFICANT INDICATORS COMPLETE.
[As Noted in HPI] : as noted in HPI [Fever] : no fever [Chills] : no chills [Feeling Poorly] : not feeling poorly [Dry Eyes] : no dryness of the eyes [Chest Pain] : no chest pain [Palpitations] : no palpitations [Lower Ext Edema] : no lower extremity edema [Cough] : no cough [SOB on Exertion] : no shortness of breath during exertion [Abdominal Pain] : no abdominal pain [Dysuria] : no dysuria [Limb Pain] : no limb pain [Itching] : no itching [Dizziness] : no dizziness [Anxiety] : no anxiety [Muscle Weakness] : no muscle weakness [FreeTextEntry4] : Nose covered with dressing bandage Yes

## 2025-01-27 NOTE — H&P ADULT - ASSESSMENT
Patient is a 75 YO F with a PMH of ESRD on HD TTS, DM, HTN, HLD who presents to the ED for dyspnea.  Patient currently on BiPAP, hx limited.  Patient reportedly complained of dyspnea during her HD session yesterday.  Had full fluid removal but continued to complain of dyspnea after treatement.  Patient was sent to the ED.  Patient reports long hx of smoking, quit 10 years ago.  CT chest shows bilateral ground glass opacities.  Labs show elevated white count, elevated BNP.  Patient found to be influenza A positive.  Will admit patient to tele for PNA/ influenza.      IMPROVE VTE Individual Risk Assessment          RISK                                                          Points  [  ] Previous VTE                                                3  [  ] Thrombophilia                                             2  [  ] Lower limb paralysis                                    2        (unable to hold up >15 seconds)    [  ] Current Cancer                                             2         (within 6 months)  [  ] Immobilization > 24 hrs                              1  [  ] ICU/CCU stay > 24 hours                            1  [  ] Age > 60                                                    1    IMPROVE VTE Score - 1 [Maximal Pain Intensity: 0/10] : 0/10 [90: Able to carry normal activity; minor signs or symptoms of disease.] : 90: Able to carry normal activity; minor signs or symptoms of disease.
